# Patient Record
Sex: FEMALE | Race: WHITE | NOT HISPANIC OR LATINO
[De-identification: names, ages, dates, MRNs, and addresses within clinical notes are randomized per-mention and may not be internally consistent; named-entity substitution may affect disease eponyms.]

---

## 2017-01-18 LAB
ALBUMIN SERPL ELPH-MCNC: 4.6 G/DL
ALP BLD-CCNC: 72 U/L
ALT SERPL-CCNC: 11 U/L
ANION GAP SERPL CALC-SCNC: 16 MMOL/L
APPEARANCE: CLEAR
AST SERPL-CCNC: 17 U/L
B BURGDOR AB SER-IMP: NEGATIVE
B BURGDOR IGM PATRN SER IB-IMP: NEGATIVE
B BURGDOR18/20KD IGM SER QL IB: NORMAL
B BURGDOR18KD IGG SER QL IB: NORMAL
B BURGDOR23KD IGG SER QL IB: NORMAL
B BURGDOR23KD IGM SER QL IB: NORMAL
B BURGDOR28KD AB SER QL IB: NORMAL
B BURGDOR28KD IGG SER QL IB: PRESENT
B BURGDOR30KD AB SER QL IB: NORMAL
B BURGDOR30KD IGG SER QL IB: NORMAL
B BURGDOR31KD IGG SER QL IB: NORMAL
B BURGDOR31KD IGM SER QL IB: NORMAL
B BURGDOR39KD IGG SER QL IB: NORMAL
B BURGDOR39KD IGM SER QL IB: NORMAL
B BURGDOR41KD IGG SER QL IB: PRESENT
B BURGDOR41KD IGM SER QL IB: NORMAL
B BURGDOR45KD AB SER QL IB: NORMAL
B BURGDOR45KD IGG SER QL IB: NORMAL
B BURGDOR58KD AB SER QL IB: NORMAL
B BURGDOR58KD IGG SER QL IB: NORMAL
B BURGDOR66KD IGG SER QL IB: NORMAL
B BURGDOR66KD IGM SER QL IB: NORMAL
B BURGDOR93KD IGG SER QL IB: NORMAL
B BURGDOR93KD IGM SER QL IB: NORMAL
BACTERIA: ABNORMAL
BASOPHILS # BLD AUTO: 0.01 K/UL
BASOPHILS NFR BLD AUTO: 0.2 %
BILIRUB SERPL-MCNC: 0.4 MG/DL
BILIRUBIN URINE: NEGATIVE
BLOOD URINE: NEGATIVE
BUN SERPL-MCNC: 15 MG/DL
CALCIUM SERPL-MCNC: 9.2 MG/DL
CHLORIDE SERPL-SCNC: 102 MMOL/L
CHOLEST SERPL-MCNC: 208 MG/DL
CHOLEST/HDLC SERPL: 2.8 RATIO
CO2 SERPL-SCNC: 26 MMOL/L
COLOR: YELLOW
CREAT SERPL-MCNC: 0.85 MG/DL
EOSINOPHIL # BLD AUTO: 0.24 K/UL
EOSINOPHIL NFR BLD AUTO: 4.9 %
GLUCOSE QUALITATIVE U: NORMAL MG/DL
GLUCOSE SERPL-MCNC: 67 MG/DL
HBA1C MFR BLD HPLC: 5.8 %
HCT VFR BLD CALC: 41.9 %
HDLC SERPL-MCNC: 74 MG/DL
HGB BLD-MCNC: 13.2 G/DL
HYALINE CASTS: 0 /LPF
IMM GRANULOCYTES NFR BLD AUTO: 0 %
KETONES URINE: NEGATIVE
LDLC SERPL CALC-MCNC: 119 MG/DL
LEUKOCYTE ESTERASE URINE: NEGATIVE
LYMPHOCYTES # BLD AUTO: 1.28 K/UL
LYMPHOCYTES NFR BLD AUTO: 26.1 %
MAN DIFF?: NORMAL
MCHC RBC-ENTMCNC: 26.7 PG
MCHC RBC-ENTMCNC: 31.5 GM/DL
MCV RBC AUTO: 84.8 FL
MICROSCOPIC-UA: NORMAL
MONOCYTES # BLD AUTO: 0.25 K/UL
MONOCYTES NFR BLD AUTO: 5.1 %
NEUTROPHILS # BLD AUTO: 3.13 K/UL
NEUTROPHILS NFR BLD AUTO: 63.7 %
NITRITE URINE: NEGATIVE
PH URINE: 6.5
PLATELET # BLD AUTO: 189 K/UL
POTASSIUM SERPL-SCNC: 4.2 MMOL/L
PROT SERPL-MCNC: 7.6 G/DL
PROTEIN URINE: NEGATIVE MG/DL
RBC # BLD: 4.94 M/UL
RBC # FLD: 15.3 %
RED BLOOD CELLS URINE: 10 /HPF
SODIUM SERPL-SCNC: 144 MMOL/L
SPECIFIC GRAVITY URINE: 1.02
SQUAMOUS EPITHELIAL CELLS: 1 /HPF
T4 FREE SERPL-MCNC: 1.1 NG/DL
TRIGL SERPL-MCNC: 73 MG/DL
TSH SERPL-ACNC: 1.7 UIU/ML
TSH SERPL-ACNC: 1.71 UIU/ML
UROBILINOGEN URINE: NORMAL MG/DL
WBC # FLD AUTO: 4.91 K/UL
WHITE BLOOD CELLS URINE: 1 /HPF

## 2017-01-22 ENCOUNTER — FORM ENCOUNTER (OUTPATIENT)
Age: 57
End: 2017-01-22

## 2017-01-23 ENCOUNTER — OUTPATIENT (OUTPATIENT)
Dept: OUTPATIENT SERVICES | Facility: HOSPITAL | Age: 57
LOS: 1 days | End: 2017-01-23
Payer: COMMERCIAL

## 2017-01-23 PROCEDURE — 73721 MRI JNT OF LWR EXTRE W/O DYE: CPT | Mod: 26,LT

## 2017-01-23 PROCEDURE — 77081 DXA BONE DENSITY APPENDICULR: CPT

## 2017-01-23 PROCEDURE — 77081 DXA BONE DENSITY APPENDICULR: CPT | Mod: 26

## 2017-01-23 PROCEDURE — 73721 MRI JNT OF LWR EXTRE W/O DYE: CPT

## 2017-01-28 DIAGNOSIS — R31.9 HEMATURIA, UNSPECIFIED: ICD-10-CM

## 2017-01-30 DIAGNOSIS — M23.332 OTHER MENISCUS DERANGEMENTS, OTHER MEDIAL MENISCUS, LEFT KNEE: ICD-10-CM

## 2017-01-30 DIAGNOSIS — Z78.0 ASYMPTOMATIC MENOPAUSAL STATE: ICD-10-CM

## 2017-01-30 DIAGNOSIS — M25.862 OTHER SPECIFIED JOINT DISORDERS, LEFT KNEE: ICD-10-CM

## 2017-02-01 PROBLEM — R31.9 HEMATURIA: Status: ACTIVE | Noted: 2017-02-01

## 2017-02-17 DIAGNOSIS — N39.0 URINARY TRACT INFECTION, SITE NOT SPECIFIED: ICD-10-CM

## 2017-02-20 PROBLEM — N39.0 UTI (LOWER URINARY TRACT INFECTION): Status: ACTIVE | Noted: 2017-02-20

## 2017-02-23 ENCOUNTER — FORM ENCOUNTER (OUTPATIENT)
Age: 57
End: 2017-02-23

## 2017-02-24 ENCOUNTER — OUTPATIENT (OUTPATIENT)
Dept: OUTPATIENT SERVICES | Facility: HOSPITAL | Age: 57
LOS: 1 days | End: 2017-02-24
Payer: COMMERCIAL

## 2017-02-24 PROCEDURE — 76857 US EXAM PELVIC LIMITED: CPT

## 2017-02-24 PROCEDURE — 76700 US EXAM ABDOM COMPLETE: CPT

## 2017-02-24 PROCEDURE — 76857 US EXAM PELVIC LIMITED: CPT | Mod: 26

## 2017-02-24 PROCEDURE — 76536 US EXAM OF HEAD AND NECK: CPT | Mod: 26

## 2017-02-24 PROCEDURE — 76700 US EXAM ABDOM COMPLETE: CPT | Mod: 26

## 2017-02-24 PROCEDURE — 76536 US EXAM OF HEAD AND NECK: CPT

## 2017-03-23 ENCOUNTER — APPOINTMENT (OUTPATIENT)
Dept: ORTHOPEDIC SURGERY | Facility: CLINIC | Age: 57
End: 2017-03-23

## 2017-03-23 VITALS — SYSTOLIC BLOOD PRESSURE: 110 MMHG | DIASTOLIC BLOOD PRESSURE: 68 MMHG

## 2017-06-27 ENCOUNTER — APPOINTMENT (OUTPATIENT)
Dept: ORTHOPEDIC SURGERY | Facility: CLINIC | Age: 57
End: 2017-06-27

## 2017-07-05 ENCOUNTER — APPOINTMENT (OUTPATIENT)
Dept: INTERNAL MEDICINE | Facility: CLINIC | Age: 57
End: 2017-07-05

## 2017-07-05 VITALS
SYSTOLIC BLOOD PRESSURE: 122 MMHG | RESPIRATION RATE: 22 BRPM | DIASTOLIC BLOOD PRESSURE: 72 MMHG | WEIGHT: 128 LBS | OXYGEN SATURATION: 98 % | HEART RATE: 77 BPM

## 2017-07-05 DIAGNOSIS — M25.562 PAIN IN LEFT KNEE: ICD-10-CM

## 2017-08-29 ENCOUNTER — FORM ENCOUNTER (OUTPATIENT)
Age: 57
End: 2017-08-29

## 2017-08-30 ENCOUNTER — OUTPATIENT (OUTPATIENT)
Dept: OUTPATIENT SERVICES | Facility: HOSPITAL | Age: 57
LOS: 1 days | End: 2017-08-30
Payer: COMMERCIAL

## 2017-08-30 PROCEDURE — 77067 SCR MAMMO BI INCL CAD: CPT

## 2017-08-30 PROCEDURE — G0202: CPT | Mod: 26

## 2017-10-05 DIAGNOSIS — Z87.09 PERSONAL HISTORY OF OTHER DISEASES OF THE RESPIRATORY SYSTEM: ICD-10-CM

## 2018-05-03 ENCOUNTER — APPOINTMENT (OUTPATIENT)
Dept: INTERNAL MEDICINE | Facility: CLINIC | Age: 58
End: 2018-05-03
Payer: COMMERCIAL

## 2018-05-03 VITALS
DIASTOLIC BLOOD PRESSURE: 79 MMHG | HEIGHT: 67 IN | SYSTOLIC BLOOD PRESSURE: 131 MMHG | WEIGHT: 134 LBS | HEART RATE: 77 BPM | OXYGEN SATURATION: 99 % | BODY MASS INDEX: 21.03 KG/M2

## 2018-05-03 DIAGNOSIS — M19.90 UNSPECIFIED OSTEOARTHRITIS, UNSPECIFIED SITE: ICD-10-CM

## 2018-05-03 PROCEDURE — 99213 OFFICE O/P EST LOW 20 MIN: CPT | Mod: 25

## 2018-05-03 PROCEDURE — 36415 COLL VENOUS BLD VENIPUNCTURE: CPT

## 2018-05-04 ENCOUNTER — APPOINTMENT (OUTPATIENT)
Dept: INTERNAL MEDICINE | Facility: CLINIC | Age: 58
End: 2018-05-04
Payer: COMMERCIAL

## 2018-05-04 ENCOUNTER — TRANSCRIPTION ENCOUNTER (OUTPATIENT)
Age: 58
End: 2018-05-04

## 2018-05-04 DIAGNOSIS — Z87.442 PERSONAL HISTORY OF URINARY CALCULI: ICD-10-CM

## 2018-05-04 PROCEDURE — 36415 COLL VENOUS BLD VENIPUNCTURE: CPT

## 2018-05-15 LAB
ALBUMIN SERPL ELPH-MCNC: 4.6 G/DL
ALP BLD-CCNC: 76 U/L
ALT SERPL-CCNC: 28 U/L
ANA PAT FLD IF-IMP: ABNORMAL
ANA SER IF-ACNC: ABNORMAL
ANION GAP SERPL CALC-SCNC: 17 MMOL/L
APPEARANCE: CLEAR
AST SERPL-CCNC: 22 U/L
BACTERIA: ABNORMAL
BASOPHILS # BLD AUTO: 0.02 K/UL
BASOPHILS NFR BLD AUTO: 0.4 %
BILIRUB SERPL-MCNC: 0.5 MG/DL
BILIRUBIN URINE: NEGATIVE
BLOOD URINE: NEGATIVE
BUN SERPL-MCNC: 16 MG/DL
CALCIUM SERPL-MCNC: 9.5 MG/DL
CCP AB SER IA-ACNC: <8 UNITS
CHLORIDE SERPL-SCNC: 104 MMOL/L
CHOLEST SERPL-MCNC: 215 MG/DL
CHOLEST/HDLC SERPL: 2.5 RATIO
CO2 SERPL-SCNC: 22 MMOL/L
COLOR: YELLOW
CREAT SERPL-MCNC: 0.83 MG/DL
EOSINOPHIL # BLD AUTO: 0.19 K/UL
EOSINOPHIL NFR BLD AUTO: 3.7 %
GLUCOSE QUALITATIVE U: NEGATIVE MG/DL
GLUCOSE SERPL-MCNC: 90 MG/DL
HBA1C MFR BLD HPLC: 5.7 %
HCT VFR BLD CALC: 42.8 %
HDLC SERPL-MCNC: 86 MG/DL
HGB BLD-MCNC: 13 G/DL
HYALINE CASTS: 0 /LPF
IMM GRANULOCYTES NFR BLD AUTO: 0 %
KETONES URINE: NEGATIVE
LDLC SERPL CALC-MCNC: 120 MG/DL
LEUKOCYTE ESTERASE URINE: NEGATIVE
LYMPHOCYTES # BLD AUTO: 1.26 K/UL
LYMPHOCYTES NFR BLD AUTO: 24.7 %
MAN DIFF?: NORMAL
MCHC RBC-ENTMCNC: 26 PG
MCHC RBC-ENTMCNC: 30.4 GM/DL
MCV RBC AUTO: 85.6 FL
MICROSCOPIC-UA: NORMAL
MONOCYTES # BLD AUTO: 0.41 K/UL
MONOCYTES NFR BLD AUTO: 8 %
NEUTROPHILS # BLD AUTO: 3.22 K/UL
NEUTROPHILS NFR BLD AUTO: 63.2 %
NITRITE URINE: NEGATIVE
PH URINE: 5
PLATELET # BLD AUTO: 219 K/UL
POTASSIUM SERPL-SCNC: 4 MMOL/L
PROT SERPL-MCNC: 7.6 G/DL
PROTEIN URINE: NEGATIVE MG/DL
RBC # BLD: 5 M/UL
RBC # FLD: 15.9 %
RED BLOOD CELLS URINE: 0 /HPF
RF+CCP IGG SER-IMP: NEGATIVE
RHEUMATOID FACT SER QL: 8 IU/ML
SODIUM SERPL-SCNC: 143 MMOL/L
SPECIFIC GRAVITY URINE: 1.03
SQUAMOUS EPITHELIAL CELLS: 1 /HPF
TRIGL SERPL-MCNC: 44 MG/DL
UROBILINOGEN URINE: NEGATIVE MG/DL
WBC # FLD AUTO: 5.1 K/UL
WHITE BLOOD CELLS URINE: 0 /HPF

## 2018-07-30 DIAGNOSIS — J32.9 CHRONIC SINUSITIS, UNSPECIFIED: ICD-10-CM

## 2019-01-07 PROBLEM — J32.9 SINUSITIS: Status: ACTIVE | Noted: 2019-01-07
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
## 2019-05-02 ENCOUNTER — FORM ENCOUNTER (OUTPATIENT)
Age: 59
End: 2019-05-02

## 2019-05-03 ENCOUNTER — OUTPATIENT (OUTPATIENT)
Dept: OUTPATIENT SERVICES | Facility: HOSPITAL | Age: 59
LOS: 1 days | End: 2019-05-03
Payer: COMMERCIAL
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
## 2019-05-03 PROCEDURE — 73610 X-RAY EXAM OF ANKLE: CPT

## 2019-05-03 PROCEDURE — 73610 X-RAY EXAM OF ANKLE: CPT | Mod: 26,RT

## 2019-06-11 ENCOUNTER — APPOINTMENT (OUTPATIENT)
Dept: INTERNAL MEDICINE | Facility: CLINIC | Age: 59
End: 2019-06-11
Payer: COMMERCIAL

## 2019-06-11 VITALS
HEART RATE: 74 BPM | OXYGEN SATURATION: 99 % | HEIGHT: 67 IN | BODY MASS INDEX: 21.19 KG/M2 | SYSTOLIC BLOOD PRESSURE: 122 MMHG | DIASTOLIC BLOOD PRESSURE: 78 MMHG | TEMPERATURE: 98.2 F | WEIGHT: 135 LBS

## 2019-06-11 DIAGNOSIS — R68.89 OTHER GENERAL SYMPTOMS AND SIGNS: ICD-10-CM

## 2019-06-11 DIAGNOSIS — R35.0 FREQUENCY OF MICTURITION: ICD-10-CM

## 2019-06-11 PROCEDURE — 99396 PREV VISIT EST AGE 40-64: CPT | Mod: 25

## 2019-06-11 PROCEDURE — 36415 COLL VENOUS BLD VENIPUNCTURE: CPT

## 2019-06-11 RX ORDER — AMOXICILLIN AND CLAVULANATE POTASSIUM 500; 125 MG/1; MG/1
500-125 TABLET, FILM COATED ORAL TWICE DAILY
Qty: 14 | Refills: 1 | Status: DISCONTINUED | COMMUNITY
Start: 2019-01-07 | End: 2019-06-11

## 2019-06-11 RX ORDER — CIPROFLOXACIN HYDROCHLORIDE 500 MG/1
500 TABLET, FILM COATED ORAL
Qty: 14 | Refills: 1 | Status: DISCONTINUED | COMMUNITY
Start: 2017-05-23 | End: 2019-06-11

## 2019-06-11 RX ORDER — OSELTAMIVIR PHOSPHATE 75 MG/1
75 CAPSULE ORAL
Qty: 10 | Refills: 0 | Status: DISCONTINUED | COMMUNITY
Start: 2018-02-21 | End: 2019-06-11

## 2019-06-11 NOTE — HISTORY OF PRESENT ILLNESS
[FreeTextEntry1] : Forgetful at times; Also balance off at times;  [de-identified] : As above has to write everything done; Some difficulty sleeping at times; Still with some hot flashes;  Wants CT Head;  Nodule in chest area causes her some pain; Gotten larger; Also pain right sided abdominal pain with radiation; Numbness of right toes;

## 2019-06-11 NOTE — PHYSICAL EXAM
[No Acute Distress] : no acute distress [Well Nourished] : well nourished [Well Developed] : well developed [Well-Appearing] : well-appearing [PERRL] : pupils equal round and reactive to light [Normal Sclera/Conjunctiva] : normal sclera/conjunctiva [EOMI] : extraocular movements intact [Normal Outer Ear/Nose] : the outer ears and nose were normal in appearance [Normal Oropharynx] : the oropharynx was normal [No JVD] : no jugular venous distention [Supple] : supple [No Lymphadenopathy] : no lymphadenopathy [No Respiratory Distress] : no respiratory distress  [Thyroid Normal, No Nodules] : the thyroid was normal and there were no nodules present [Clear to Auscultation] : lungs were clear to auscultation bilaterally [No Accessory Muscle Use] : no accessory muscle use [Normal Rate] : normal rate  [Regular Rhythm] : with a regular rhythm [Normal S1, S2] : normal S1 and S2 [No Carotid Bruits] : no carotid bruits [No Murmur] : no murmur heard [No Abdominal Bruit] : a ~M bruit was not heard ~T in the abdomen [Pedal Pulses Present] : the pedal pulses are present [No Varicosities] : no varicosities [No Edema] : there was no peripheral edema [No Palpable Aorta] : no palpable aorta [No Extremity Clubbing/Cyanosis] : no extremity clubbing/cyanosis [Soft] : abdomen soft [Non Tender] : non-tender [No Masses] : no abdominal mass palpated [Non-distended] : non-distended [Normal Posterior Cervical Nodes] : no posterior cervical lymphadenopathy [Normal Bowel Sounds] : normal bowel sounds [No HSM] : no HSM [Normal Anterior Cervical Nodes] : no anterior cervical lymphadenopathy [No Spinal Tenderness] : no spinal tenderness [No CVA Tenderness] : no CVA  tenderness [No Joint Swelling] : no joint swelling [Grossly Normal Strength/Tone] : grossly normal strength/tone [No Rash] : no rash [Normal Gait] : normal gait [Coordination Grossly Intact] : coordination grossly intact [Deep Tendon Reflexes (DTR)] : deep tendon reflexes were 2+ and symmetric [No Focal Deficits] : no focal deficits [Normal Insight/Judgement] : insight and judgment were intact [Normal Affect] : the affect was normal [de-identified] : Area in chest no obvious problem Some pain with deep palpation

## 2019-06-11 NOTE — HEALTH RISK ASSESSMENT
[No falls in past year] : Patient reported no falls in the past year [0] : 1) Little interest or pleasure doing things: Not at all (0) [] : No [[id number]] : 0

## 2019-06-11 NOTE — ASSESSMENT
[FreeTextEntry1] : Patient appears to have a stable examination; Will obtain CT of Head; Also bone density and mammogram; All blood work today; No ultrasound until review of urine;  Will give Prevnar today

## 2019-06-20 LAB
25(OH)D3 SERPL-MCNC: 19.2 NG/ML
ALBUMIN SERPL ELPH-MCNC: 4.7 G/DL
ALP BLD-CCNC: 75 U/L
ALT SERPL-CCNC: 17 U/L
ANION GAP SERPL CALC-SCNC: 11 MMOL/L
APPEARANCE: CLEAR
AST SERPL-CCNC: 16 U/L
BACTERIA: NEGATIVE
BASOPHILS # BLD AUTO: 0.03 K/UL
BASOPHILS NFR BLD AUTO: 0.5 %
BILIRUB SERPL-MCNC: 0.3 MG/DL
BILIRUBIN URINE: NEGATIVE
BLOOD URINE: NEGATIVE
BUN SERPL-MCNC: 12 MG/DL
CALCIUM SERPL-MCNC: 9.2 MG/DL
CHLORIDE SERPL-SCNC: 101 MMOL/L
CHOLEST SERPL-MCNC: 209 MG/DL
CHOLEST/HDLC SERPL: 3 RATIO
CO2 SERPL-SCNC: 28 MMOL/L
COLOR: COLORLESS
CREAT SERPL-MCNC: 0.71 MG/DL
EOSINOPHIL # BLD AUTO: 0.15 K/UL
EOSINOPHIL NFR BLD AUTO: 2.6 %
ESTIMATED AVERAGE GLUCOSE: 117 MG/DL
GLUCOSE QUALITATIVE U: NEGATIVE
GLUCOSE SERPL-MCNC: 95 MG/DL
HBA1C MFR BLD HPLC: 5.7 %
HCT VFR BLD CALC: 45 %
HDLC SERPL-MCNC: 69 MG/DL
HGB BLD-MCNC: 13.4 G/DL
HYALINE CASTS: 0 /LPF
IMM GRANULOCYTES NFR BLD AUTO: 0.2 %
KETONES URINE: NEGATIVE
LDLC SERPL CALC-MCNC: 128 MG/DL
LEUKOCYTE ESTERASE URINE: NEGATIVE
LYMPHOCYTES # BLD AUTO: 1.04 K/UL
LYMPHOCYTES NFR BLD AUTO: 17.9 %
MAN DIFF?: NORMAL
MCHC RBC-ENTMCNC: 26.3 PG
MCHC RBC-ENTMCNC: 29.8 GM/DL
MCV RBC AUTO: 88.2 FL
MICROSCOPIC-UA: NORMAL
MONOCYTES # BLD AUTO: 0.37 K/UL
MONOCYTES NFR BLD AUTO: 6.4 %
NEUTROPHILS # BLD AUTO: 4.22 K/UL
NEUTROPHILS NFR BLD AUTO: 72.4 %
NITRITE URINE: NEGATIVE
PH URINE: 8
PLATELET # BLD AUTO: 234 K/UL
POTASSIUM SERPL-SCNC: 3.9 MMOL/L
PROT SERPL-MCNC: 7.4 G/DL
PROTEIN URINE: NEGATIVE
RBC # BLD: 5.1 M/UL
RBC # FLD: 14.6 %
RED BLOOD CELLS URINE: 1 /HPF
SODIUM SERPL-SCNC: 140 MMOL/L
SPECIFIC GRAVITY URINE: 1.01
SQUAMOUS EPITHELIAL CELLS: 0 /HPF
T4 FREE SERPL-MCNC: 1.2 NG/DL
TRIGL SERPL-MCNC: 59 MG/DL
TSH SERPL-ACNC: 1.98 UIU/ML
UROBILINOGEN URINE: NORMAL
WBC # FLD AUTO: 5.82 K/UL
WHITE BLOOD CELLS URINE: 0 /HPF

## 2019-07-09 ENCOUNTER — CHART COPY (OUTPATIENT)
Age: 59
End: 2019-07-09

## 2019-07-11 ENCOUNTER — APPOINTMENT (OUTPATIENT)
Dept: UROLOGY | Facility: CLINIC | Age: 59
End: 2019-07-11
Payer: COMMERCIAL

## 2019-07-11 VITALS — HEART RATE: 73 BPM | DIASTOLIC BLOOD PRESSURE: 70 MMHG | SYSTOLIC BLOOD PRESSURE: 122 MMHG | TEMPERATURE: 98.2 F

## 2019-07-11 PROCEDURE — 99204 OFFICE O/P NEW MOD 45 MIN: CPT

## 2019-07-11 NOTE — HISTORY OF PRESENT ILLNESS
[FreeTextEntry1] : 59F comes in with coomplaint of urinary incontinence progressively worseining. +AARON 4 PPD incontinence noted with healvy lifting/cough/sneeze. no history vaginal birth. +urgency with urge inconitnence. occasional soaked pads. no gross hematuria. occasional dysuria. feels irritated. no nocturia. no fevers chills. no nausea vomiting. intermittent use of kegel exercises. no family history kidney bladder prostate cancer. no additional compaltins.

## 2019-07-11 NOTE — ASSESSMENT
[FreeTextEntry1] : AARON\par timed voiding\par kegels\par poor antichoinergic candidate given forgetfulness\par PVR 0\par consider sling\par f/u 2 months

## 2019-07-11 NOTE — PHYSICAL EXAM
[General Appearance - Well Developed] : well developed [Normal Appearance] : normal appearance [General Appearance - Well Nourished] : well nourished [] : no respiratory distress [Well Groomed] : well groomed [Heart Rate And Rhythm] : Heart rate and rhythm were normal [Abdomen Soft] : soft [Abdomen Tenderness] : non-tender [Abdomen Hernia] : no hernia was discovered [Costovertebral Angle Tenderness] : no ~M costovertebral angle tenderness [Urethral Meatus] : normal urethra [Urinary Bladder Findings] : the bladder was normal on palpation [FreeTextEntry1] : +AARON. +urethral hypermobility [Normal Station and Gait] : the gait and station were normal for the patient's age [Skin Color & Pigmentation] : normal skin color and pigmentation [No Focal Deficits] : no focal deficits [Oriented To Time, Place, And Person] : oriented to person, place, and time [No Palpable Adenopathy] : no palpable adenopathy

## 2019-07-22 LAB
APPEARANCE: CLEAR
BACTERIA UR CULT: NORMAL
BACTERIA: ABNORMAL
BILIRUBIN URINE: NEGATIVE
BLOOD URINE: NEGATIVE
COLOR: NORMAL
GLUCOSE QUALITATIVE U: NEGATIVE
HYALINE CASTS: 0 /LPF
KETONES URINE: NEGATIVE
LEUKOCYTE ESTERASE URINE: NEGATIVE
MICROSCOPIC-UA: NORMAL
NITRITE URINE: NEGATIVE
PH URINE: 7
PROTEIN URINE: NEGATIVE
RED BLOOD CELLS URINE: 4 /HPF
SPECIFIC GRAVITY URINE: 1.01
SQUAMOUS EPITHELIAL CELLS: 0 /HPF
UROBILINOGEN URINE: NORMAL
WHITE BLOOD CELLS URINE: 0 /HPF

## 2019-07-24 DIAGNOSIS — R31.29 OTHER MICROSCOPIC HEMATURIA: ICD-10-CM

## 2019-07-25 ENCOUNTER — FORM ENCOUNTER (OUTPATIENT)
Age: 59
End: 2019-07-25

## 2019-07-26 ENCOUNTER — APPOINTMENT (OUTPATIENT)
Dept: CT IMAGING | Facility: HOSPITAL | Age: 59
End: 2019-07-26
Payer: COMMERCIAL

## 2019-07-26 ENCOUNTER — OUTPATIENT (OUTPATIENT)
Dept: OUTPATIENT SERVICES | Facility: HOSPITAL | Age: 59
LOS: 1 days | End: 2019-07-26
Payer: COMMERCIAL

## 2019-07-26 PROCEDURE — 70450 CT HEAD/BRAIN W/O DYE: CPT

## 2019-07-26 PROCEDURE — 70450 CT HEAD/BRAIN W/O DYE: CPT | Mod: 26

## 2019-07-26 PROCEDURE — 74178 CT ABD&PLV WO CNTR FLWD CNTR: CPT | Mod: 26

## 2019-07-26 PROCEDURE — 74178 CT ABD&PLV WO CNTR FLWD CNTR: CPT

## 2019-08-02 ENCOUNTER — APPOINTMENT (OUTPATIENT)
Dept: UROLOGY | Facility: CLINIC | Age: 59
End: 2019-08-02
Payer: COMMERCIAL

## 2019-08-02 VITALS
BODY MASS INDEX: 21.19 KG/M2 | HEART RATE: 74 BPM | WEIGHT: 135 LBS | HEIGHT: 67 IN | SYSTOLIC BLOOD PRESSURE: 124 MMHG | OXYGEN SATURATION: 98 % | DIASTOLIC BLOOD PRESSURE: 80 MMHG | TEMPERATURE: 98.3 F

## 2019-08-02 PROCEDURE — 52000 CYSTOURETHROSCOPY: CPT

## 2019-09-22 ENCOUNTER — FORM ENCOUNTER (OUTPATIENT)
Age: 59
End: 2019-09-22

## 2019-09-22 DIAGNOSIS — R92.8 OTHER ABNORMAL AND INCONCLUSIVE FINDINGS ON DIAGNOSTIC IMAGING OF BREAST: ICD-10-CM

## 2019-09-23 ENCOUNTER — OUTPATIENT (OUTPATIENT)
Dept: OUTPATIENT SERVICES | Facility: HOSPITAL | Age: 59
LOS: 1 days | End: 2019-09-23
Payer: COMMERCIAL

## 2019-09-23 ENCOUNTER — APPOINTMENT (OUTPATIENT)
Dept: MAMMOGRAPHY | Facility: HOSPITAL | Age: 59
End: 2019-09-23
Payer: COMMERCIAL

## 2019-09-23 ENCOUNTER — APPOINTMENT (OUTPATIENT)
Dept: RADIOLOGY | Facility: HOSPITAL | Age: 59
End: 2019-09-23
Payer: COMMERCIAL

## 2019-09-23 PROCEDURE — 77063 BREAST TOMOSYNTHESIS BI: CPT | Mod: 26

## 2019-09-23 PROCEDURE — 77085 DXA BONE DENSITY AXL VRT FX: CPT | Mod: 26

## 2019-09-23 PROCEDURE — 77067 SCR MAMMO BI INCL CAD: CPT | Mod: 26

## 2019-09-23 PROCEDURE — 77085 DXA BONE DENSITY AXL VRT FX: CPT

## 2019-09-23 PROCEDURE — 77063 BREAST TOMOSYNTHESIS BI: CPT

## 2019-09-23 PROCEDURE — 77067 SCR MAMMO BI INCL CAD: CPT

## 2019-09-30 PROBLEM — R92.8 ABNORMAL MAMMOGRAM: Status: ACTIVE | Noted: 2019-09-30

## 2019-10-13 ENCOUNTER — FORM ENCOUNTER (OUTPATIENT)
Age: 59
End: 2019-10-13

## 2019-10-14 ENCOUNTER — APPOINTMENT (OUTPATIENT)
Dept: MAMMOGRAPHY | Facility: HOSPITAL | Age: 59
End: 2019-10-14
Payer: COMMERCIAL

## 2019-10-14 ENCOUNTER — APPOINTMENT (OUTPATIENT)
Dept: ULTRASOUND IMAGING | Facility: HOSPITAL | Age: 59
End: 2019-10-14
Payer: COMMERCIAL

## 2019-10-14 ENCOUNTER — OUTPATIENT (OUTPATIENT)
Dept: OUTPATIENT SERVICES | Facility: HOSPITAL | Age: 59
LOS: 1 days | End: 2019-10-14
Payer: COMMERCIAL

## 2019-10-14 PROCEDURE — G0279: CPT | Mod: 26

## 2019-10-14 PROCEDURE — 77066 DX MAMMO INCL CAD BI: CPT | Mod: 26

## 2019-10-14 PROCEDURE — G0279: CPT

## 2019-10-14 PROCEDURE — 76641 ULTRASOUND BREAST COMPLETE: CPT | Mod: 26,50

## 2019-10-14 PROCEDURE — 76641 ULTRASOUND BREAST COMPLETE: CPT

## 2019-10-14 PROCEDURE — 77066 DX MAMMO INCL CAD BI: CPT

## 2019-10-22 ENCOUNTER — RESULT REVIEW (OUTPATIENT)
Age: 59
End: 2019-10-22

## 2019-10-25 ENCOUNTER — APPOINTMENT (OUTPATIENT)
Dept: ULTRASOUND IMAGING | Facility: HOSPITAL | Age: 59
End: 2019-10-25
Payer: COMMERCIAL

## 2019-10-25 ENCOUNTER — OUTPATIENT (OUTPATIENT)
Dept: OUTPATIENT SERVICES | Facility: HOSPITAL | Age: 59
LOS: 1 days | End: 2019-10-25
Payer: COMMERCIAL

## 2019-10-25 PROCEDURE — 76856 US EXAM PELVIC COMPLETE: CPT

## 2019-10-25 PROCEDURE — 76856 US EXAM PELVIC COMPLETE: CPT | Mod: 26

## 2019-10-25 PROCEDURE — 76830 TRANSVAGINAL US NON-OB: CPT | Mod: 26

## 2019-10-25 PROCEDURE — 76830 TRANSVAGINAL US NON-OB: CPT

## 2020-01-07 ENCOUNTER — OUTPATIENT (OUTPATIENT)
Dept: OUTPATIENT SERVICES | Facility: HOSPITAL | Age: 60
LOS: 1 days | End: 2020-01-07
Payer: COMMERCIAL

## 2020-01-07 ENCOUNTER — APPOINTMENT (OUTPATIENT)
Dept: ULTRASOUND IMAGING | Facility: HOSPITAL | Age: 60
End: 2020-01-07

## 2020-01-07 PROCEDURE — 58340 CATHETER FOR HYSTEROGRAPHY: CPT | Mod: 53

## 2020-01-07 PROCEDURE — 76831 ECHO EXAM UTERUS: CPT | Mod: 26,53

## 2020-01-07 PROCEDURE — 76831 ECHO EXAM UTERUS: CPT

## 2020-01-07 PROCEDURE — 58340 CATHETER FOR HYSTEROGRAPHY: CPT

## 2020-01-14 ENCOUNTER — APPOINTMENT (OUTPATIENT)
Dept: INTERNAL MEDICINE | Facility: CLINIC | Age: 60
End: 2020-01-14
Payer: COMMERCIAL

## 2020-01-14 VITALS
BODY MASS INDEX: 21.19 KG/M2 | TEMPERATURE: 97.8 F | OXYGEN SATURATION: 99 % | HEART RATE: 84 BPM | DIASTOLIC BLOOD PRESSURE: 78 MMHG | HEIGHT: 67 IN | WEIGHT: 135 LBS | SYSTOLIC BLOOD PRESSURE: 152 MMHG

## 2020-01-14 PROCEDURE — 99213 OFFICE O/P EST LOW 20 MIN: CPT | Mod: 25

## 2020-01-14 PROCEDURE — 93000 ELECTROCARDIOGRAM COMPLETE: CPT

## 2020-01-14 NOTE — HISTORY OF PRESENT ILLNESS
[Aortic Stenosis] : no aortic stenosis [Coronary Artery Disease] : no coronary artery disease [Atrial Fibrillation] : no atrial fibrillation [Implantable Device/Pacemaker] : no implantable device/pacemaker [Recent Myocardial Infarction] : no recent myocardial infarction [FreeTextEntry1] : Hysteroscopy  [FreeTextEntry2] : January 24 2020 [FreeTextEntry3] : Dr. Sykes [FreeTextEntry4] : As above found to have polyps on uterus; For above procedure

## 2020-01-14 NOTE — PHYSICAL EXAM
[Well Nourished] : well nourished [No Acute Distress] : no acute distress [Well Developed] : well developed [Normal Sclera/Conjunctiva] : normal sclera/conjunctiva [Well-Appearing] : well-appearing [EOMI] : extraocular movements intact [PERRL] : pupils equal round and reactive to light [Normal Outer Ear/Nose] : the outer ears and nose were normal in appearance [No JVD] : no jugular venous distention [Normal Oropharynx] : the oropharynx was normal [Supple] : supple [No Lymphadenopathy] : no lymphadenopathy [No Respiratory Distress] : no respiratory distress  [Thyroid Normal, No Nodules] : the thyroid was normal and there were no nodules present [No Accessory Muscle Use] : no accessory muscle use [Clear to Auscultation] : lungs were clear to auscultation bilaterally [Normal Rate] : normal rate  [Regular Rhythm] : with a regular rhythm [Normal S1, S2] : normal S1 and S2 [No Murmur] : no murmur heard [No Carotid Bruits] : no carotid bruits [No Abdominal Bruit] : a ~M bruit was not heard ~T in the abdomen [No Varicosities] : no varicosities [Pedal Pulses Present] : the pedal pulses are present [No Edema] : there was no peripheral edema [No Palpable Aorta] : no palpable aorta [No Extremity Clubbing/Cyanosis] : no extremity clubbing/cyanosis [Soft] : abdomen soft [Non Tender] : non-tender [Non-distended] : non-distended [No Masses] : no abdominal mass palpated [No HSM] : no HSM [Normal Bowel Sounds] : normal bowel sounds [Normal Posterior Cervical Nodes] : no posterior cervical lymphadenopathy [Normal Anterior Cervical Nodes] : no anterior cervical lymphadenopathy [No Spinal Tenderness] : no spinal tenderness [No CVA Tenderness] : no CVA  tenderness [No Joint Swelling] : no joint swelling [Grossly Normal Strength/Tone] : grossly normal strength/tone [No Rash] : no rash [Coordination Grossly Intact] : coordination grossly intact [No Focal Deficits] : no focal deficits [Normal Gait] : normal gait [Deep Tendon Reflexes (DTR)] : deep tendon reflexes were 2+ and symmetric [Normal Affect] : the affect was normal [Normal Insight/Judgement] : insight and judgment were intact

## 2020-01-24 ENCOUNTER — OUTPATIENT (OUTPATIENT)
Dept: OUTPATIENT SERVICES | Facility: HOSPITAL | Age: 60
LOS: 1 days | Discharge: HOME | End: 2020-01-24
Payer: COMMERCIAL

## 2020-01-24 ENCOUNTER — RESULT REVIEW (OUTPATIENT)
Age: 60
End: 2020-01-24

## 2020-01-24 PROCEDURE — 88305 TISSUE EXAM BY PATHOLOGIST: CPT | Mod: 26

## 2020-01-27 LAB — SURGICAL PATHOLOGY STUDY: SIGNIFICANT CHANGE UP

## 2020-02-07 ENCOUNTER — APPOINTMENT (OUTPATIENT)
Dept: UROLOGY | Facility: CLINIC | Age: 60
End: 2020-02-07

## 2020-04-25 ENCOUNTER — MESSAGE (OUTPATIENT)
Age: 60
End: 2020-04-25

## 2020-05-04 ENCOUNTER — TRANSCRIPTION ENCOUNTER (OUTPATIENT)
Age: 60
End: 2020-05-04

## 2020-05-22 ENCOUNTER — APPOINTMENT (OUTPATIENT)
Dept: DISASTER EMERGENCY | Facility: CLINIC | Age: 60
End: 2020-05-22

## 2020-05-22 LAB
SARS-COV-2 IGG SERPL IA-ACNC: 0.1 INDEX
SARS-COV-2 IGG SERPL QL IA: NEGATIVE

## 2020-10-12 ENCOUNTER — RESULT REVIEW (OUTPATIENT)
Age: 60
End: 2020-10-12

## 2020-10-27 ENCOUNTER — OUTPATIENT (OUTPATIENT)
Dept: OUTPATIENT SERVICES | Facility: HOSPITAL | Age: 60
LOS: 1 days | End: 2020-10-27
Payer: COMMERCIAL

## 2020-10-27 PROCEDURE — 71046 X-RAY EXAM CHEST 2 VIEWS: CPT

## 2020-10-27 PROCEDURE — 71046 X-RAY EXAM CHEST 2 VIEWS: CPT | Mod: 26

## 2020-12-16 PROBLEM — Z87.09 HISTORY OF INFLUENZA: Status: RESOLVED | Noted: 2018-02-21 | Resolved: 2020-12-16

## 2020-12-23 ENCOUNTER — APPOINTMENT (OUTPATIENT)
Dept: INTERNAL MEDICINE | Facility: CLINIC | Age: 60
End: 2020-12-23
Payer: COMMERCIAL

## 2020-12-23 VITALS
DIASTOLIC BLOOD PRESSURE: 82 MMHG | HEART RATE: 79 BPM | TEMPERATURE: 97.9 F | HEIGHT: 67 IN | BODY MASS INDEX: 20.72 KG/M2 | OXYGEN SATURATION: 99 % | WEIGHT: 132 LBS | SYSTOLIC BLOOD PRESSURE: 135 MMHG

## 2020-12-23 PROCEDURE — 99396 PREV VISIT EST AGE 40-64: CPT | Mod: 25

## 2020-12-23 PROCEDURE — 99072 ADDL SUPL MATRL&STAF TM PHE: CPT

## 2020-12-23 PROCEDURE — 36415 COLL VENOUS BLD VENIPUNCTURE: CPT

## 2020-12-23 NOTE — HEALTH RISK ASSESSMENT
[] : Yes [No] : No [No falls in past year] : Patient reported no falls in the past year [0] : 2) Feeling down, depressed, or hopeless: Not at all (0) [Patient reported mammogram was normal] : Patient reported mammogram was normal [Patient reported PAP Smear was normal] : Patient reported PAP Smear was normal [Patient reported bone density results were abnormal] : Patient reported bone density results were abnormal [Patient reported colonoscopy was normal] : Patient reported colonoscopy was normal [HIV test declined] : HIV test declined [Hepatitis C test declined] : Hepatitis C test declined [[id number]] : 0 [MammogramDate] : 02/2021 [PapSmearDate] : 11/2020 [BoneDensityDate] : 11/2019 [BoneDensityComments] : osteoporosis [ColonoscopyDate] : 01/2019

## 2020-12-23 NOTE — ASSESSMENT
[FreeTextEntry1] : Appears to have a stable examination; \par All labs; \par Will refer to Dr. Karrie clarke

## 2020-12-30 ENCOUNTER — APPOINTMENT (OUTPATIENT)
Dept: ENDOCRINOLOGY | Facility: CLINIC | Age: 60
End: 2020-12-30
Payer: COMMERCIAL

## 2020-12-30 VITALS
TEMPERATURE: 97.3 F | SYSTOLIC BLOOD PRESSURE: 126 MMHG | WEIGHT: 132 LBS | HEART RATE: 74 BPM | DIASTOLIC BLOOD PRESSURE: 78 MMHG | BODY MASS INDEX: 21.21 KG/M2 | OXYGEN SATURATION: 99 % | HEIGHT: 66.14 IN

## 2020-12-30 DIAGNOSIS — G60.8 OTHER HEREDITARY AND IDIOPATHIC NEUROPATHIES: ICD-10-CM

## 2020-12-30 LAB
25(OH)D3 SERPL-MCNC: 38.5 NG/ML
ALBUMIN SERPL ELPH-MCNC: 4.7 G/DL
ALP BLD-CCNC: 74 U/L
ALT SERPL-CCNC: 11 U/L
ANA SER IF-ACNC: NEGATIVE
ANION GAP SERPL CALC-SCNC: 15 MMOL/L
AST SERPL-CCNC: 15 U/L
BASOPHILS # BLD AUTO: 0.03 K/UL
BASOPHILS NFR BLD AUTO: 0.5 %
BILIRUB SERPL-MCNC: 0.3 MG/DL
BUN SERPL-MCNC: 12 MG/DL
CALCIUM SERPL-MCNC: 9.5 MG/DL
CCP AB SER IA-ACNC: <8 UNITS
CHLORIDE SERPL-SCNC: 103 MMOL/L
CHOLEST SERPL-MCNC: 212 MG/DL
CO2 SERPL-SCNC: 22 MMOL/L
CREAT SERPL-MCNC: 0.93 MG/DL
EOSINOPHIL # BLD AUTO: 0.21 K/UL
EOSINOPHIL NFR BLD AUTO: 3.7 %
ESTIMATED AVERAGE GLUCOSE: 114 MG/DL
GLUCOSE SERPL-MCNC: 119 MG/DL
HBA1C MFR BLD HPLC: 5.6 %
HCT VFR BLD CALC: 41.8 %
HDLC SERPL-MCNC: 70 MG/DL
HGB BLD-MCNC: 13 G/DL
IMM GRANULOCYTES NFR BLD AUTO: 0.2 %
LDLC SERPL CALC-MCNC: 130 MG/DL
LYMPHOCYTES # BLD AUTO: 1.25 K/UL
LYMPHOCYTES NFR BLD AUTO: 22 %
MAN DIFF?: NORMAL
MCHC RBC-ENTMCNC: 26 PG
MCHC RBC-ENTMCNC: 31.1 GM/DL
MCV RBC AUTO: 83.6 FL
MONOCYTES # BLD AUTO: 0.31 K/UL
MONOCYTES NFR BLD AUTO: 5.5 %
NEUTROPHILS # BLD AUTO: 3.87 K/UL
NEUTROPHILS NFR BLD AUTO: 68.1 %
NONHDLC SERPL-MCNC: 142 MG/DL
PLATELET # BLD AUTO: 204 K/UL
POTASSIUM SERPL-SCNC: 3.8 MMOL/L
PROT SERPL-MCNC: 7.3 G/DL
RBC # BLD: 5 M/UL
RBC # FLD: 14.5 %
RF+CCP IGG SER-IMP: NEGATIVE
SODIUM SERPL-SCNC: 141 MMOL/L
T4 FREE SERPL-MCNC: 1.2 NG/DL
TRIGL SERPL-MCNC: 57 MG/DL
TSH SERPL-ACNC: 1.9 UIU/ML
WBC # FLD AUTO: 5.68 K/UL

## 2020-12-30 PROCEDURE — 99072 ADDL SUPL MATRL&STAF TM PHE: CPT

## 2020-12-30 PROCEDURE — 99214 OFFICE O/P EST MOD 30 MIN: CPT

## 2020-12-30 RX ORDER — CIPROFLOXACIN HYDROCHLORIDE 500 MG/1
500 TABLET, FILM COATED ORAL TWICE DAILY
Qty: 10 | Refills: 1 | Status: DISCONTINUED | COMMUNITY
Start: 2019-06-29 | End: 2020-12-30

## 2020-12-30 RX ORDER — NITROFURANTOIN (MONOHYDRATE/MACROCRYSTALS) 25; 75 MG/1; MG/1
100 CAPSULE ORAL
Qty: 10 | Refills: 5 | Status: DISCONTINUED | COMMUNITY
Start: 2019-12-30 | End: 2020-12-30

## 2020-12-30 RX ORDER — SULFAMETHOXAZOLE AND TRIMETHOPRIM 800; 160 MG/1; MG/1
800-160 TABLET ORAL
Qty: 10 | Refills: 1 | Status: DISCONTINUED | COMMUNITY
Start: 2017-02-20 | End: 2020-12-30

## 2021-01-01 LAB
APPEARANCE: CLEAR
BACTERIA: NEGATIVE
BILIRUBIN URINE: NEGATIVE
BLOOD URINE: NEGATIVE
COLOR: NORMAL
GLUCOSE QUALITATIVE U: NEGATIVE
HYALINE CASTS: 0 /LPF
KETONES URINE: NEGATIVE
LEUKOCYTE ESTERASE URINE: NEGATIVE
MICROSCOPIC-UA: NORMAL
NITRITE URINE: NEGATIVE
PH URINE: 6
PROTEIN URINE: NEGATIVE
RED BLOOD CELLS URINE: 2 /HPF
SPECIFIC GRAVITY URINE: 1.01
SQUAMOUS EPITHELIAL CELLS: 0 /HPF
UROBILINOGEN URINE: NORMAL
WHITE BLOOD CELLS URINE: 0 /HPF

## 2021-01-04 ENCOUNTER — OUTPATIENT (OUTPATIENT)
Dept: OUTPATIENT SERVICES | Facility: HOSPITAL | Age: 61
LOS: 1 days | End: 2021-01-04
Payer: COMMERCIAL

## 2021-01-04 ENCOUNTER — APPOINTMENT (OUTPATIENT)
Dept: ULTRASOUND IMAGING | Facility: HOSPITAL | Age: 61
End: 2021-01-04

## 2021-01-04 PROCEDURE — 76536 US EXAM OF HEAD AND NECK: CPT | Mod: 26

## 2021-01-04 PROCEDURE — 76536 US EXAM OF HEAD AND NECK: CPT

## 2021-01-04 NOTE — ASSESSMENT
[FreeTextEntry1] : 1) Non-toxic multinodular goiter:  Goiter appears to be stable by palpation, and she has no compressive symptoms.  The last ultrasound in 2017 showed a borderline enlarged gland with heterogeneous echotexture.  There were three nodules in the R lobe (9 mm, 7 mm and 3 mm, all at the midpole), and two in the left lobe (6 mm at the upper pole, 10 mm at the mid-pole).  I believe that it was the left midpole nodule which was previously negative on biopsy, but none of the nodules show suspicious sonographic characteristics, and by current criteria none would warrant FNA at this point.\par Her TFTs are normal.  The heterogeneous echotexture of the gland would suggest underlying Hashimoto's disease, but her antibodies were previously negative.\par --Will obtain an updated ultrasound.\par \par 2) Osteoporosis:  The T-score at the spine is in the mildly osteoporotic range, and I explained to her that she is therefore a candidate for pharmacologic therapy.  I also briefly discussed the various options with her, though she will be seeing Dr. Yoon for this problem.  (Oral bisphosphonates might be ruled out by her chronic GERD symptoms and reliance on PPI therapy).  \par Her vitamin D level is normal.  Her diet includes a daily intake of dairy products, though I suggested that she add 500 mg of calcium supplementation a day.  I also encouraged her to increase her upper body resistance exercise.\par \par She is to call back after the ultrasound is done to discuss the results.\par \par See for follow-up in 1-2 years, depending on the results of the ultrasound [FreeTextEntry2] : calcium supplementation

## 2021-01-04 NOTE — ADDENDUM
[FreeTextEntry1] : Spoke with the pt on 1/4/21 regarding results of thyroid ultrasound performed earlier today:\par \par --Normal-sized gland\par --Heterogeneous echotexture  (still suspect underlying Hashimoto's)\par --Three nodules:\par 7 mm in largest dimension at the R mid-pole\par 8 mm in largest dimension at the upper pole of the left lobe\par 9 mm in largest dimension at the L mid-pole\par None of the nodules showed suspicious sonographic characteristics or warrant biopsy\par Defer next U/S for two years

## 2021-01-04 NOTE — DATA REVIEWED
[FreeTextEntry1] : [facility] [employer]  (12/23/20)\par \par Random glucose 119,  A1c 5.6%\par CMP WNL\par TSH level normal at 1.90 uU/ml  (0.27-4.2)\par , HDL 70, TG 57\par 25-D level in target range at 38.3 ng/ml

## 2021-01-04 NOTE — PHYSICAL EXAM
[Alert] : alert [Well Nourished] : well nourished [Healthy Appearance] : healthy appearance [Normal Sclera/Conjunctiva] : normal sclera/conjunctiva [EOMI] : extra ocular movement intact [PERRL] : pupils equal, round and reactive to light [No Proptosis] : no proptosis [No Lid Lag] : no lid lag [Normal Outer Ear/Nose] : the ears and nose were normal in appearance [Normal Hearing] : hearing was normal [No Neck Mass] : no neck mass was observed [No LAD] : no lymphadenopathy [Clear to Auscultation] : lungs were clear to auscultation bilaterally [No Murmurs] : no murmurs [Normal Rate] : heart rate was normal [Regular Rhythm] : with a regular rhythm [Carotids Normal] : carotid pulses were normal with no bruits [No Edema] : no peripheral edema [Not Tender] : non-tender [Soft] : abdomen soft [No HSM] : no hepato-splenomegaly [Normal Supraclavicular Nodes] : no supraclavicular lymphadenopathy [Normal Anterior Cervical Nodes] : no anterior cervical lymphadenopathy [Normal Gait] : normal gait [No Involuntary Movements] : no involuntary movements were seen [No Joint Swelling] : no joint swelling seen [Normal Strength/Tone] : muscle strength and tone were normal [No Tremors] : no tremors [Normal Affect] : the affect was normal [Normal Mood] : the mood was normal [Normal Sensation on Monofilament Testing] : normal sensation on monofilament testing of lower extremities [Kyphosis] : no kyphosis present [Acanthosis Nigricans] : no acanthosis nigricans [Hirsutism] : no hirsutism [de-identified] : No corneal arcus or xanthelasma [de-identified] : Thyroid borderline enlarged, firm and lobular in consistency [de-identified] : Vibratory sensation mildly decreased over the toes

## 2021-01-04 NOTE — HISTORY OF PRESENT ILLNESS
[FreeTextEntry1] : 60-year-old woman who is followed for a non-toxic multinodular goiter.  Most of the nodules are below 1 cm in size, and the single dominant nodule was negative on biopsy many years ago.  \par \par She now returns after a hiatus of close to 4 years.  She has not noted any neck swelling, hoarseness or dysphagia from her goiter..\par Interim history since her last visit in 2017:\par --Surgery to repair a torn rotator cuff and biceps tendon in her R shoulder\par --Hysteroscopy and biopsy of two cervical polyps (which were benign)\par --Diagnosed with torn meniscus in her L knee--did PT, no surgery\par --Recent DEXA showed mild osteoporosis at the spine (T-score -2.8) and osteopenia at the hip.\par Medications reviewed:  Currently limited to daily pantoprazole, vitamin D (she is unsure about the dose), and Lumigan (for increased IOP).\par Does not take any calcium supplementation. \par Dietary calcium at this point consists of yogurt most days, and she also puts milk in her cereal in the morning.\par Exercise--lifts 5 lb weights.\par Menopause was at age 55.  Had significant hot flashes afterward, now infrequent.

## 2021-01-04 NOTE — REVIEW OF SYSTEMS
[Dry Eyes] : dryness [Fatigue] : no fatigue [Decreased Appetite] : appetite not decreased [Recent Weight Gain (___ Lbs)] : no recent weight gain [Recent Weight Loss (___ Lbs)] : no recent weight loss [Fever] : no fever [Chills] : no chills [Blurred Vision] : no blurred vision [Eyes Itch] : no itch [Dysphagia] : no dysphagia [Hearing Loss] : no hearing loss  [Chest Pain] : no chest pain [Palpitations] : no palpitations [Lower Ext Edema] : no lower extremity edema [Shortness Of Breath] : no shortness of breath [Cough] : no cough [Wheezing] : no wheezing [SOB on Exertion] : no shortness of breath on exertion [Nausea] : no nausea [Constipation] : no constipation [Diarrhea] : no diarrhea [Polyuria] : no polyuria [Dysuria] : no dysuria [Nocturia] : no nocturia [Joint Pain] : no joint pain [Muscle Weakness] : no muscle weakness [Myalgia] : no myalgia  [Joint Stiffness] : no joint stiffness [Dry Skin] : no dry skin [Hair Loss] : no hair loss [Ulcer] : no ulcer [Headaches] : no headaches [Difficulty Walking] : no difficulty walking [Tremors] : no tremors [Depression] : no depression [Anxiety] : no anxiety [Stress] : no stress [Polydipsia] : no polydipsia [Easy Bleeding] : no ~M tendency for easy bleeding [Easy Bruising] : no tendency for easy bruising [FreeTextEntry7] : Reflux symptoms require daily PPI for control--famotidine is not effective [FreeTextEntry9] : No pain in her left knee from the torn meniscus [de-identified] : Still with mild subjective numbness in her toes, mostly at night

## 2021-01-05 ENCOUNTER — OUTPATIENT (OUTPATIENT)
Dept: OUTPATIENT SERVICES | Facility: HOSPITAL | Age: 61
LOS: 1 days | End: 2021-01-05
Payer: COMMERCIAL

## 2021-01-05 DIAGNOSIS — D86.9 SARCOIDOSIS, UNSPECIFIED: ICD-10-CM

## 2021-01-05 PROCEDURE — 94729 DIFFUSING CAPACITY: CPT | Mod: 26

## 2021-01-05 PROCEDURE — 94729 DIFFUSING CAPACITY: CPT

## 2021-01-05 PROCEDURE — 94060 EVALUATION OF WHEEZING: CPT

## 2021-01-05 PROCEDURE — 94726 PLETHYSMOGRAPHY LUNG VOLUMES: CPT

## 2021-01-05 PROCEDURE — 94760 N-INVAS EAR/PLS OXIMETRY 1: CPT

## 2021-01-05 PROCEDURE — 94726 PLETHYSMOGRAPHY LUNG VOLUMES: CPT | Mod: 26

## 2021-01-05 PROCEDURE — 94010 BREATHING CAPACITY TEST: CPT | Mod: 26

## 2021-02-22 ENCOUNTER — APPOINTMENT (OUTPATIENT)
Dept: MAMMOGRAPHY | Facility: HOSPITAL | Age: 61
End: 2021-02-22
Payer: COMMERCIAL

## 2021-02-22 ENCOUNTER — RESULT REVIEW (OUTPATIENT)
Age: 61
End: 2021-02-22

## 2021-02-22 ENCOUNTER — OUTPATIENT (OUTPATIENT)
Dept: OUTPATIENT SERVICES | Facility: HOSPITAL | Age: 61
LOS: 1 days | End: 2021-02-22
Payer: COMMERCIAL

## 2021-02-22 ENCOUNTER — APPOINTMENT (OUTPATIENT)
Dept: ULTRASOUND IMAGING | Facility: HOSPITAL | Age: 61
End: 2021-02-22
Payer: COMMERCIAL

## 2021-02-22 PROCEDURE — 76641 ULTRASOUND BREAST COMPLETE: CPT | Mod: 26,50

## 2021-02-22 PROCEDURE — 77063 BREAST TOMOSYNTHESIS BI: CPT | Mod: 26

## 2021-02-22 PROCEDURE — 77067 SCR MAMMO BI INCL CAD: CPT | Mod: 26

## 2021-02-22 PROCEDURE — 77063 BREAST TOMOSYNTHESIS BI: CPT

## 2021-02-22 PROCEDURE — 76641 ULTRASOUND BREAST COMPLETE: CPT

## 2021-02-22 PROCEDURE — 77067 SCR MAMMO BI INCL CAD: CPT

## 2021-03-02 ENCOUNTER — APPOINTMENT (OUTPATIENT)
Dept: ENDOCRINOLOGY | Facility: CLINIC | Age: 61
End: 2021-03-02
Payer: COMMERCIAL

## 2021-03-02 VITALS
RESPIRATION RATE: 18 BRPM | HEIGHT: 66 IN | DIASTOLIC BLOOD PRESSURE: 79 MMHG | HEART RATE: 73 BPM | WEIGHT: 134 LBS | BODY MASS INDEX: 21.53 KG/M2 | SYSTOLIC BLOOD PRESSURE: 126 MMHG

## 2021-03-02 PROCEDURE — 99072 ADDL SUPL MATRL&STAF TM PHE: CPT

## 2021-03-02 PROCEDURE — 99214 OFFICE O/P EST MOD 30 MIN: CPT

## 2021-03-02 PROCEDURE — 99204 OFFICE O/P NEW MOD 45 MIN: CPT

## 2021-04-14 ENCOUNTER — OUTPATIENT (OUTPATIENT)
Dept: OUTPATIENT SERVICES | Facility: HOSPITAL | Age: 61
LOS: 1 days | End: 2021-04-14
Payer: COMMERCIAL

## 2021-04-14 ENCOUNTER — APPOINTMENT (OUTPATIENT)
Dept: RADIOLOGY | Facility: HOSPITAL | Age: 61
End: 2021-04-14
Payer: COMMERCIAL

## 2021-04-14 ENCOUNTER — RESULT REVIEW (OUTPATIENT)
Age: 61
End: 2021-04-14

## 2021-04-14 PROCEDURE — 77085 DXA BONE DENSITY AXL VRT FX: CPT

## 2021-04-14 PROCEDURE — 77085 DXA BONE DENSITY AXL VRT FX: CPT | Mod: 26

## 2021-07-12 ENCOUNTER — NON-APPOINTMENT (OUTPATIENT)
Age: 61
End: 2021-07-12

## 2021-08-02 ENCOUNTER — APPOINTMENT (OUTPATIENT)
Dept: INTERNAL MEDICINE | Facility: CLINIC | Age: 61
End: 2021-08-02
Payer: COMMERCIAL

## 2021-08-02 VITALS
BODY MASS INDEX: 21.86 KG/M2 | DIASTOLIC BLOOD PRESSURE: 80 MMHG | HEIGHT: 66 IN | SYSTOLIC BLOOD PRESSURE: 144 MMHG | RESPIRATION RATE: 16 BRPM | WEIGHT: 136 LBS | HEART RATE: 68 BPM | TEMPERATURE: 98.1 F | OXYGEN SATURATION: 99 %

## 2021-08-02 DIAGNOSIS — R10.9 UNSPECIFIED ABDOMINAL PAIN: ICD-10-CM

## 2021-08-02 PROCEDURE — 99213 OFFICE O/P EST LOW 20 MIN: CPT

## 2021-08-02 NOTE — ASSESSMENT
[FreeTextEntry1] : RUQ pain of questionable etiology\par Will get US of Abdomen\par Also all labs\par \par Further plans after review of studies\par

## 2021-08-02 NOTE — HISTORY OF PRESENT ILLNESS
[FreeTextEntry1] : Chief complaint RUQ pain\par This pain has present for a while\par No Nausea or vomiting [de-identified] : No recent imaging\par Also no recent labs

## 2021-08-04 ENCOUNTER — OUTPATIENT (OUTPATIENT)
Dept: OUTPATIENT SERVICES | Facility: HOSPITAL | Age: 61
LOS: 1 days | End: 2021-08-04
Payer: COMMERCIAL

## 2021-08-04 ENCOUNTER — APPOINTMENT (OUTPATIENT)
Dept: ULTRASOUND IMAGING | Facility: HOSPITAL | Age: 61
End: 2021-08-04
Payer: COMMERCIAL

## 2021-08-04 PROCEDURE — 76700 US EXAM ABDOM COMPLETE: CPT | Mod: 26

## 2021-08-04 PROCEDURE — 76700 US EXAM ABDOM COMPLETE: CPT

## 2021-08-09 LAB
25(OH)D3 SERPL-MCNC: 37.3 NG/ML
ALBUMIN SERPL ELPH-MCNC: 4.6 G/DL
ALP BLD-CCNC: 59 U/L
ALT SERPL-CCNC: 16 U/L
ANION GAP SERPL CALC-SCNC: 12 MMOL/L
APPEARANCE: CLEAR
AST SERPL-CCNC: 15 U/L
BACTERIA: ABNORMAL
BASOPHILS # BLD AUTO: 0.03 K/UL
BASOPHILS NFR BLD AUTO: 0.6 %
BILIRUB SERPL-MCNC: 0.4 MG/DL
BILIRUBIN URINE: NEGATIVE
BLOOD URINE: NEGATIVE
BUN SERPL-MCNC: 10 MG/DL
CALCIUM SERPL-MCNC: 9.7 MG/DL
CHLORIDE SERPL-SCNC: 103 MMOL/L
CHOLEST SERPL-MCNC: 206 MG/DL
CO2 SERPL-SCNC: 25 MMOL/L
COLOR: YELLOW
CREAT SERPL-MCNC: 0.82 MG/DL
EOSINOPHIL # BLD AUTO: 0.21 K/UL
EOSINOPHIL NFR BLD AUTO: 4.4 %
ESTIMATED AVERAGE GLUCOSE: 120 MG/DL
GLUCOSE QUALITATIVE U: NEGATIVE
GLUCOSE SERPL-MCNC: 62 MG/DL
HBA1C MFR BLD HPLC: 5.8 %
HCT VFR BLD CALC: 45.4 %
HDLC SERPL-MCNC: 65 MG/DL
HGB BLD-MCNC: 13.3 G/DL
HYALINE CASTS: 0 /LPF
IMM GRANULOCYTES NFR BLD AUTO: 0.2 %
KETONES URINE: NEGATIVE
LDLC SERPL CALC-MCNC: 129 MG/DL
LEUKOCYTE ESTERASE URINE: NEGATIVE
LYMPHOCYTES # BLD AUTO: 1.16 K/UL
LYMPHOCYTES NFR BLD AUTO: 24.3 %
MAN DIFF?: NORMAL
MCHC RBC-ENTMCNC: 25.8 PG
MCHC RBC-ENTMCNC: 29.3 GM/DL
MCV RBC AUTO: 88.2 FL
MICROSCOPIC-UA: NORMAL
MONOCYTES # BLD AUTO: 0.39 K/UL
MONOCYTES NFR BLD AUTO: 8.2 %
NEUTROPHILS # BLD AUTO: 2.98 K/UL
NEUTROPHILS NFR BLD AUTO: 62.3 %
NITRITE URINE: NEGATIVE
NONHDLC SERPL-MCNC: 141 MG/DL
PH URINE: 7
PLATELET # BLD AUTO: 186 K/UL
POTASSIUM SERPL-SCNC: 4.6 MMOL/L
PROT SERPL-MCNC: 7.3 G/DL
PROTEIN URINE: NEGATIVE
RBC # BLD: 5.15 M/UL
RBC # FLD: 15.4 %
RED BLOOD CELLS URINE: 3 /HPF
SODIUM SERPL-SCNC: 141 MMOL/L
SPECIFIC GRAVITY URINE: 1.02
SQUAMOUS EPITHELIAL CELLS: 0 /HPF
T4 FREE SERPL-MCNC: 1.3 NG/DL
TRIGL SERPL-MCNC: 57 MG/DL
TSH SERPL-ACNC: 2.92 UIU/ML
UROBILINOGEN URINE: NORMAL
WBC # FLD AUTO: 4.78 K/UL
WHITE BLOOD CELLS URINE: 0 /HPF

## 2021-11-15 ENCOUNTER — RESULT REVIEW (OUTPATIENT)
Age: 61
End: 2021-11-15

## 2021-11-19 ENCOUNTER — APPOINTMENT (OUTPATIENT)
Dept: ULTRASOUND IMAGING | Facility: HOSPITAL | Age: 61
End: 2021-11-19
Payer: COMMERCIAL

## 2021-11-19 ENCOUNTER — OUTPATIENT (OUTPATIENT)
Dept: OUTPATIENT SERVICES | Facility: HOSPITAL | Age: 61
LOS: 1 days | End: 2021-11-19
Payer: COMMERCIAL

## 2021-11-19 PROCEDURE — 76830 TRANSVAGINAL US NON-OB: CPT

## 2021-11-19 PROCEDURE — 76830 TRANSVAGINAL US NON-OB: CPT | Mod: 26

## 2021-11-19 PROCEDURE — 76856 US EXAM PELVIC COMPLETE: CPT | Mod: 26

## 2021-11-19 PROCEDURE — 76856 US EXAM PELVIC COMPLETE: CPT

## 2021-11-22 ENCOUNTER — RESULT REVIEW (OUTPATIENT)
Age: 61
End: 2021-11-22

## 2021-11-22 ENCOUNTER — OUTPATIENT (OUTPATIENT)
Dept: OUTPATIENT SERVICES | Facility: HOSPITAL | Age: 61
LOS: 1 days | End: 2021-11-22
Payer: COMMERCIAL

## 2021-11-22 ENCOUNTER — APPOINTMENT (OUTPATIENT)
Dept: CT IMAGING | Facility: HOSPITAL | Age: 61
End: 2021-11-22

## 2021-11-22 PROCEDURE — 74177 CT ABD & PELVIS W/CONTRAST: CPT | Mod: 26

## 2021-11-22 PROCEDURE — 74177 CT ABD & PELVIS W/CONTRAST: CPT

## 2021-12-09 ENCOUNTER — APPOINTMENT (OUTPATIENT)
Dept: MRI IMAGING | Facility: HOSPITAL | Age: 61
End: 2021-12-09

## 2021-12-09 ENCOUNTER — OUTPATIENT (OUTPATIENT)
Dept: OUTPATIENT SERVICES | Facility: HOSPITAL | Age: 61
LOS: 1 days | End: 2021-12-09
Payer: COMMERCIAL

## 2021-12-09 ENCOUNTER — RX RENEWAL (OUTPATIENT)
Age: 61
End: 2021-12-09

## 2021-12-09 ENCOUNTER — APPOINTMENT (OUTPATIENT)
Dept: ORTHOPEDIC SURGERY | Facility: CLINIC | Age: 61
End: 2021-12-09
Payer: COMMERCIAL

## 2021-12-09 VITALS — WEIGHT: 134 LBS | RESPIRATION RATE: 16 BRPM | BODY MASS INDEX: 21.03 KG/M2 | HEIGHT: 67 IN

## 2021-12-09 PROCEDURE — 73564 X-RAY EXAM KNEE 4 OR MORE: CPT | Mod: RT

## 2021-12-09 PROCEDURE — 99204 OFFICE O/P NEW MOD 45 MIN: CPT

## 2021-12-09 PROCEDURE — 72197 MRI PELVIS W/O & W/DYE: CPT

## 2021-12-09 PROCEDURE — A9585: CPT

## 2021-12-09 PROCEDURE — 72197 MRI PELVIS W/O & W/DYE: CPT | Mod: 26

## 2021-12-09 RX ORDER — MELOXICAM 15 MG/1
15 TABLET ORAL
Qty: 30 | Refills: 1 | Status: DISCONTINUED | COMMUNITY
Start: 2021-12-09 | End: 2021-12-09

## 2021-12-09 RX ORDER — NYSTATIN AND TRIAMCINOLONE ACETONIDE 100000; 1 MG/G; MG/G
100000-0.1 CREAM TOPICAL 3 TIMES DAILY
Qty: 30 | Refills: 5 | Status: DISCONTINUED | COMMUNITY
Start: 2021-08-02 | End: 2021-12-09

## 2021-12-09 RX ORDER — BIMATOPROST 0.1 MG/ML
0.01 SOLUTION/ DROPS OPHTHALMIC
Qty: 7 | Refills: 0 | Status: DISCONTINUED | COMMUNITY
Start: 2017-01-23 | End: 2021-12-09

## 2021-12-09 RX ORDER — BIOTIN 10 MG
10000 TABLET ORAL DAILY
Refills: 0 | Status: DISCONTINUED | COMMUNITY
Start: 2020-12-30 | End: 2021-12-09

## 2021-12-09 RX ORDER — CIPROFLOXACIN HYDROCHLORIDE 500 MG/1
500 TABLET, FILM COATED ORAL TWICE DAILY
Qty: 10 | Refills: 1 | Status: DISCONTINUED | COMMUNITY
Start: 2021-09-20 | End: 2021-12-09

## 2021-12-17 ENCOUNTER — APPOINTMENT (OUTPATIENT)
Dept: GYNECOLOGIC ONCOLOGY | Facility: CLINIC | Age: 61
End: 2021-12-17
Payer: COMMERCIAL

## 2021-12-17 VITALS
BODY MASS INDEX: 21.03 KG/M2 | SYSTOLIC BLOOD PRESSURE: 138 MMHG | HEART RATE: 80 BPM | OXYGEN SATURATION: 99 % | TEMPERATURE: 96.4 F | DIASTOLIC BLOOD PRESSURE: 83 MMHG | HEIGHT: 67 IN | WEIGHT: 134 LBS

## 2021-12-17 PROCEDURE — 99205 OFFICE O/P NEW HI 60 MIN: CPT

## 2021-12-17 NOTE — ASSESSMENT
[FreeTextEntry1] : I discussed with the patient with the aid of diagrams, reviewed the findings on history and physical examination, and reviewed the imaging studies in detail.  We reviewed the cystic and solid component of the mass, the CA-125, and other tumor markers. ACOG management of adnexal masses has been reviewed. \par \par We discussed the differential diagnosis which includes benign, low malignant potential tumors and malignancy. Other etiologies of adnexal masses, such as metastatic disease from another organ site also discussed.\par \par In the case of asymptomatic cysts, without findings concerning for malignancy (such as solid components, increasing size and vascular septations, elevated tumor markers) conservative management is an option. \par \par In the case of symptomatic cysts, or those with findings concerning for malignancy, the recommendation is for surgical removal. Again, with the aid of diagrams, different surgical approaches discussed including minimally invasive and open approaches.\par \par Laparoscopic BSO discussed. Frozen section will be performed; and the appropriate additional management including, total hysterectomy, pelvic and para-aortic lymph node dissection, omentectomy and appendectomy. NCCN guidelines discussed. Possible laparotomy also discussed. If diagnosis is not clear on frozen section, we will avoid any additional procedures and defer to permanent pathology. This may require additional surgery and the patient states understanding this. We also discussed hysterectomy in the setting of a known fibroid uterus. We discussed differences in surgical complications and recovery time with hysterectomy. After discussion, patient electing for BSO alone\par \par Complications that include, but are not limited to: bleeding, infection, injury to other organs including bowel, bladder, ureters, blood vessels, nerves; infections, blood clots, lymphedema, pneumonia, wound complications and prolonged hospital stay have all been discussed with the patient. Whenever minimally invasive surgery is attempted, there is a chance of needing to convert to laparotomy. The risk of occult injury requiring additional surgery also discussed. I have also provided her with the diagrams.  \par \par Surgical scheduling was discussed and instructions for optimization prior to surgery were given. will follow the Enhanced Recovery After Surgery (ERAS) protocol.  \par No aspirin or NSAID products for 1 week prior. \par She will choose a surgical date.\par \par [] Request tumor markers from Dr. Bejarano\par [] Medical clearance\par [] COVID pre-op\par [] Lap BSO\par

## 2021-12-17 NOTE — CHIEF COMPLAINT
[FreeTextEntry1] : 62 y/o referred from Dr. Gloria Bejarano for evaluation of complex L adnexal mass. Patient reports cyst was found a few years ago when she was having lower quadrant pain. Patient says cyst has been followed annually and this year grew in size. Currently denies any pelvic pain, vaginal bleeding, bloating, weight gain/loss. \par \par OBHX: G0 \par GYNHX: Denies abnl pap smears, last was Nov 2021 normal per patient. Reports being told she has fibroids but unsure of size and is asymptomatic. Denies history of other ovarian cysts \par PMHX: GERD, Hashimotos, migraines \par SX: hysteroscopic polypectomy 2019, umbilical hernia repair 2004 (no mesh), R rotator cuff sx 2017 (no hardware) \par MED: pantoprazole, multivitamin, alendronate \par ALL: NKDA \par SOCIAL: denies alcohol use, tobacco use. 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 \par FAM: Father lung cancer, Maternal grandmother laryngeal cancer, maternal uncle with liver cancer and other maternal uncle with pancreatic cancer  \par \par Last Mammogram: Feb 2021 \par Last pap: 11/15/21- negative\par Last Colonoscopy: 2019

## 2021-12-17 NOTE — HISTORY OF PRESENT ILLNESS
[FreeTextEntry1] : Problem List\par 1. 4.4cm complex cystic lesion in the left ovary ( increase in size) \par \par Previous Therapy \par 1. Pelvic US 11/19/21\par     a) uterus 5.5. Endometrium 0.4cm -wnl\par     b) 4.9cm complex lesions in left ovary , possibly benign or malignant\par 2. MRI Pelvis 12/9/21\par     a) uterus is 5cm. Endometrium distended by the fibroid, measuring 0.9cm. The junctional zone is not thickened, measuring 0.2cm.\par     b) Left ovary - There has been an increase in the size of a complex cystic lesion in the left ovary, which contains at least two thin, enhancing septation. This lesion measures 3.3 x 4.4 x 2.6cm. On the pelvic US 10/25/19, there was a 1.6 x 1.2 x 1.2cm simple cystic lesion in the left ovary. No enhancing solid tissue noted in this lesion. It has smooth walls. No lipid component. No restricted diffusion.

## 2021-12-27 ENCOUNTER — NON-APPOINTMENT (OUTPATIENT)
Age: 61
End: 2021-12-27

## 2021-12-27 ENCOUNTER — APPOINTMENT (OUTPATIENT)
Dept: INTERNAL MEDICINE | Facility: CLINIC | Age: 61
End: 2021-12-27
Payer: COMMERCIAL

## 2021-12-27 ENCOUNTER — LABORATORY RESULT (OUTPATIENT)
Age: 61
End: 2021-12-27

## 2021-12-27 VITALS — SYSTOLIC BLOOD PRESSURE: 120 MMHG | DIASTOLIC BLOOD PRESSURE: 80 MMHG

## 2021-12-27 DIAGNOSIS — Z01.818 ENCOUNTER FOR OTHER PREPROCEDURAL EXAMINATION: ICD-10-CM

## 2021-12-27 PROCEDURE — 36415 COLL VENOUS BLD VENIPUNCTURE: CPT

## 2021-12-27 PROCEDURE — 99213 OFFICE O/P EST LOW 20 MIN: CPT | Mod: 25

## 2021-12-27 PROCEDURE — 93000 ELECTROCARDIOGRAM COMPLETE: CPT

## 2021-12-28 LAB
ALBUMIN SERPL ELPH-MCNC: 4.7 G/DL
ALP BLD-CCNC: 62 U/L
ALT SERPL-CCNC: 16 U/L
ANION GAP SERPL CALC-SCNC: 17 MMOL/L
APPEARANCE: CLEAR
APTT BLD: 34.9 SEC
AST SERPL-CCNC: 17 U/L
BASOPHILS # BLD AUTO: 0.02 K/UL
BASOPHILS NFR BLD AUTO: 0.4 %
BILIRUB SERPL-MCNC: 0.3 MG/DL
BILIRUBIN URINE: NEGATIVE
BLOOD URINE: NEGATIVE
BUN SERPL-MCNC: 11 MG/DL
CALCIUM SERPL-MCNC: 9.3 MG/DL
CHLORIDE SERPL-SCNC: 104 MMOL/L
CO2 SERPL-SCNC: 22 MMOL/L
COLOR: NORMAL
CREAT SERPL-MCNC: 0.74 MG/DL
EOSINOPHIL # BLD AUTO: 0.13 K/UL
EOSINOPHIL NFR BLD AUTO: 2.4 %
ESTIMATED AVERAGE GLUCOSE: 117 MG/DL
GLUCOSE QUALITATIVE U: NEGATIVE
GLUCOSE SERPL-MCNC: 126 MG/DL
HBA1C MFR BLD HPLC: 5.7 %
HCT VFR BLD CALC: 43.3 %
HGB BLD-MCNC: 13.2 G/DL
IMM GRANULOCYTES NFR BLD AUTO: 0.2 %
INR PPP: 1 RATIO
KETONES URINE: NEGATIVE
LEUKOCYTE ESTERASE URINE: NEGATIVE
LYMPHOCYTES # BLD AUTO: 1.16 K/UL
LYMPHOCYTES NFR BLD AUTO: 21.6 %
MAN DIFF?: NORMAL
MCHC RBC-ENTMCNC: 25.7 PG
MCHC RBC-ENTMCNC: 30.5 GM/DL
MCV RBC AUTO: 84.4 FL
MONOCYTES # BLD AUTO: 0.3 K/UL
MONOCYTES NFR BLD AUTO: 5.6 %
NEUTROPHILS # BLD AUTO: 3.76 K/UL
NEUTROPHILS NFR BLD AUTO: 69.8 %
NITRITE URINE: NEGATIVE
PH URINE: 7
PLATELET # BLD AUTO: 192 K/UL
POTASSIUM SERPL-SCNC: 4.4 MMOL/L
PROT SERPL-MCNC: 7.5 G/DL
PROTEIN URINE: NEGATIVE
PT BLD: 11.9 SEC
RBC # BLD: 5.13 M/UL
RBC # FLD: 14.7 %
SODIUM SERPL-SCNC: 142 MMOL/L
SPECIFIC GRAVITY URINE: 1.01
UROBILINOGEN URINE: NORMAL
WBC # FLD AUTO: 5.38 K/UL

## 2021-12-28 NOTE — PHYSICAL EXAM
[No Acute Distress] : no acute distress [Well Nourished] : well nourished [Well Developed] : well developed [Well-Appearing] : well-appearing [Normal Sclera/Conjunctiva] : normal sclera/conjunctiva [PERRL] : pupils equal round and reactive to light [EOMI] : extraocular movements intact [Normal Outer Ear/Nose] : the outer ears and nose were normal in appearance [Normal Oropharynx] : the oropharynx was normal [No JVD] : no jugular venous distention [No Lymphadenopathy] : no lymphadenopathy [Supple] : supple [Thyroid Normal, No Nodules] : the thyroid was normal and there were no nodules present [No Respiratory Distress] : no respiratory distress  [No Accessory Muscle Use] : no accessory muscle use [Clear to Auscultation] : lungs were clear to auscultation bilaterally [Normal Rate] : normal rate  [Regular Rhythm] : with a regular rhythm [Normal S1, S2] : normal S1 and S2 [No Murmur] : no murmur heard [No Carotid Bruits] : no carotid bruits [No Abdominal Bruit] : a ~M bruit was not heard ~T in the abdomen [No Varicosities] : no varicosities [Pedal Pulses Present] : the pedal pulses are present [No Edema] : there was no peripheral edema [No Palpable Aorta] : no palpable aorta [No Extremity Clubbing/Cyanosis] : no extremity clubbing/cyanosis [Soft] : abdomen soft [No HSM] : no HSM [Normal Bowel Sounds] : normal bowel sounds [Normal Posterior Cervical Nodes] : no posterior cervical lymphadenopathy [Normal Anterior Cervical Nodes] : no anterior cervical lymphadenopathy [No CVA Tenderness] : no CVA  tenderness [No Spinal Tenderness] : no spinal tenderness [No Joint Swelling] : no joint swelling [Grossly Normal Strength/Tone] : grossly normal strength/tone [No Rash] : no rash [Coordination Grossly Intact] : coordination grossly intact [No Focal Deficits] : no focal deficits [Normal Gait] : normal gait [Deep Tendon Reflexes (DTR)] : deep tendon reflexes were 2+ and symmetric [Normal Affect] : the affect was normal [Normal Insight/Judgement] : insight and judgment were intact [de-identified] : Some pain right side of abdomen

## 2021-12-28 NOTE — HISTORY OF PRESENT ILLNESS
[No Pertinent Cardiac History] : no history of aortic stenosis, atrial fibrillation, coronary artery disease, recent myocardial infarction, or implantable device/pacemaker [No Pertinent Pulmonary History] : no history of asthma, COPD, sleep apnea, or smoking [No Adverse Anesthesia Reaction] : no adverse anesthesia reaction in self or family member [Chronic Anticoagulation] : no chronic anticoagulation [Chronic Kidney Disease] : no chronic kidney disease [Diabetes] : no diabetes [FreeTextEntry1] : Oophorectomy [FreeTextEntry2] : 1/30/2022 [FreeTextEntry3] : Dr. John [FreeTextEntry4] : Preop for Lap oophorectomy \par Enlarging ovaries; Needs surgery

## 2021-12-29 ENCOUNTER — TRANSCRIPTION ENCOUNTER (OUTPATIENT)
Age: 61
End: 2021-12-29

## 2021-12-29 VITALS
TEMPERATURE: 98 F | DIASTOLIC BLOOD PRESSURE: 79 MMHG | HEART RATE: 80 BPM | RESPIRATION RATE: 16 BRPM | OXYGEN SATURATION: 98 % | HEIGHT: 67 IN | SYSTOLIC BLOOD PRESSURE: 128 MMHG | WEIGHT: 127.43 LBS

## 2021-12-29 NOTE — ASU PATIENT PROFILE, ADULT - FALL HARM RISK - UNIVERSAL INTERVENTIONS
Bed in lowest position, wheels locked, appropriate side rails in place/Call bell, personal items and telephone in reach/Instruct patient to call for assistance before getting out of bed or chair/Non-slip footwear when patient is out of bed/[place] to call system/Physically safe environment - no spills, clutter or unnecessary equipment/Purposeful Proactive Rounding/Room/bathroom lighting operational, light cord in reach

## 2021-12-29 NOTE — ASU PATIENT PROFILE, ADULT - NSICDXPASTSURGICALHX_GEN_ALL_CORE_FT
PAST SURGICAL HISTORY:  H/O sinus surgery     History of arthroscopy of shoulder rotator cuff repair, right    History of umbilical hernia repair     S/P correction of deviated nasal septum

## 2021-12-29 NOTE — ASU PATIENT PROFILE, ADULT - NSICDXPASTMEDICALHX_GEN_ALL_CORE_FT
PAST MEDICAL HISTORY:  GERD (gastroesophageal reflux disease)     Glaucoma     Hashimoto's disease     Kidney stones     Migraines     Osteoporosis     Ovarian cyst     Sarcoidosis     Sinusitis     Torn meniscus right

## 2021-12-30 ENCOUNTER — TRANSCRIPTION ENCOUNTER (OUTPATIENT)
Age: 61
End: 2021-12-30

## 2021-12-30 ENCOUNTER — OUTPATIENT (OUTPATIENT)
Dept: OUTPATIENT SERVICES | Facility: HOSPITAL | Age: 61
LOS: 1 days | Discharge: HOME | End: 2021-12-30
Payer: COMMERCIAL

## 2021-12-30 ENCOUNTER — APPOINTMENT (OUTPATIENT)
Dept: GYNECOLOGIC ONCOLOGY | Facility: HOSPITAL | Age: 61
End: 2021-12-30

## 2021-12-30 ENCOUNTER — RESULT REVIEW (OUTPATIENT)
Age: 61
End: 2021-12-30

## 2021-12-30 VITALS
HEART RATE: 66 BPM | OXYGEN SATURATION: 98 % | TEMPERATURE: 98 F | DIASTOLIC BLOOD PRESSURE: 78 MMHG | SYSTOLIC BLOOD PRESSURE: 118 MMHG | RESPIRATION RATE: 18 BRPM

## 2021-12-30 DIAGNOSIS — Z98.890 OTHER SPECIFIED POSTPROCEDURAL STATES: Chronic | ICD-10-CM

## 2021-12-30 LAB
BLD GP AB SCN SERPL QL: NEGATIVE — SIGNIFICANT CHANGE UP
GLUCOSE BLDC GLUCOMTR-MCNC: 100 MG/DL — HIGH (ref 70–99)
RH IG SCN BLD-IMP: POSITIVE — SIGNIFICANT CHANGE UP

## 2021-12-30 PROCEDURE — 88305 TISSUE EXAM BY PATHOLOGIST: CPT | Mod: 26

## 2021-12-30 PROCEDURE — 88332 PATH CONSLTJ SURG EA ADD BLK: CPT | Mod: 26

## 2021-12-30 PROCEDURE — 88112 CYTOPATH CELL ENHANCE TECH: CPT | Mod: 26

## 2021-12-30 PROCEDURE — 88331 PATH CONSLTJ SURG 1 BLK 1SPC: CPT | Mod: 26

## 2021-12-30 PROCEDURE — 58661 LAPAROSCOPY REMOVE ADNEXA: CPT | Mod: 50

## 2021-12-30 RX ORDER — OXYCODONE HYDROCHLORIDE 5 MG/1
5 TABLET ORAL ONCE
Refills: 0 | Status: DISCONTINUED | OUTPATIENT
Start: 2021-12-30 | End: 2021-12-30

## 2021-12-30 RX ORDER — HYDROMORPHONE HYDROCHLORIDE 2 MG/ML
0.5 INJECTION INTRAMUSCULAR; INTRAVENOUS; SUBCUTANEOUS
Refills: 0 | Status: DISCONTINUED | OUTPATIENT
Start: 2021-12-30 | End: 2021-12-30

## 2021-12-30 RX ORDER — ONDANSETRON 8 MG/1
8 TABLET, FILM COATED ORAL ONCE
Refills: 0 | Status: DISCONTINUED | OUTPATIENT
Start: 2021-12-30 | End: 2021-12-30

## 2021-12-30 RX ORDER — SODIUM CHLORIDE 9 MG/ML
1000 INJECTION, SOLUTION INTRAVENOUS
Refills: 0 | Status: DISCONTINUED | OUTPATIENT
Start: 2021-12-30 | End: 2021-12-30

## 2021-12-30 RX ORDER — KETOROLAC TROMETHAMINE 30 MG/ML
30 SYRINGE (ML) INJECTION ONCE
Refills: 0 | Status: DISCONTINUED | OUTPATIENT
Start: 2021-12-30 | End: 2021-12-30

## 2021-12-30 RX ORDER — ACETAMINOPHEN 500 MG
1000 TABLET ORAL ONCE
Refills: 0 | Status: COMPLETED | OUTPATIENT
Start: 2021-12-30 | End: 2021-12-30

## 2021-12-30 RX ORDER — ACETAMINOPHEN 500 MG
1000 TABLET ORAL ONCE
Refills: 0 | Status: DISCONTINUED | OUTPATIENT
Start: 2021-12-30 | End: 2021-12-30

## 2021-12-30 RX ORDER — SIMETHICONE 80 MG/1
80 TABLET, CHEWABLE ORAL ONCE
Refills: 0 | Status: DISCONTINUED | OUTPATIENT
Start: 2021-12-30 | End: 2021-12-30

## 2021-12-30 RX ORDER — CELECOXIB 200 MG/1
400 CAPSULE ORAL ONCE
Refills: 0 | Status: COMPLETED | OUTPATIENT
Start: 2021-12-30 | End: 2021-12-30

## 2021-12-30 RX ORDER — GABAPENTIN 400 MG/1
300 CAPSULE ORAL ONCE
Refills: 0 | Status: COMPLETED | OUTPATIENT
Start: 2021-12-30 | End: 2021-12-30

## 2021-12-30 RX ORDER — HEPARIN SODIUM 5000 [USP'U]/ML
5000 INJECTION INTRAVENOUS; SUBCUTANEOUS ONCE
Refills: 0 | Status: COMPLETED | OUTPATIENT
Start: 2021-12-30 | End: 2021-12-30

## 2021-12-30 RX ADMIN — HEPARIN SODIUM 5000 UNIT(S): 5000 INJECTION INTRAVENOUS; SUBCUTANEOUS at 10:32

## 2021-12-30 RX ADMIN — HYDROMORPHONE HYDROCHLORIDE 0.5 MILLIGRAM(S): 2 INJECTION INTRAMUSCULAR; INTRAVENOUS; SUBCUTANEOUS at 15:04

## 2021-12-30 RX ADMIN — CELECOXIB 400 MILLIGRAM(S): 200 CAPSULE ORAL at 10:31

## 2021-12-30 RX ADMIN — GABAPENTIN 300 MILLIGRAM(S): 400 CAPSULE ORAL at 10:32

## 2021-12-30 RX ADMIN — Medication 1000 MILLIGRAM(S): at 10:31

## 2021-12-30 NOTE — DISCHARGE NOTE NURSING/CASE MANAGEMENT/SOCIAL WORK - NSDCPEFALRISK_GEN_ALL_CORE
For information on Fall & Injury Prevention, visit: [email or web address] OR  [email or web address] OR  https://www.cdc.gov/steadi/patient.html

## 2021-12-30 NOTE — ASU DISCHARGE PLAN (ADULT/PEDIATRIC) - CARE PROVIDER_API CALL
Renetta John)  Obstetrics and Gynecology  [address], [address] [address]  Phone: [phone]  Fax: [phone]  Follow Up Time:

## 2021-12-30 NOTE — ASU DISCHARGE PLAN (ADULT/PEDIATRIC) - NS MD DC FALL RISK RISK
For information on Fall & Injury Prevention, visit: [email or web address] OR  [email or web address] OR  https://www.cdc.gov/steadi/patient.html

## 2021-12-30 NOTE — BRIEF OPERATIVE NOTE - NSICDXBRIEFPROCEDURE_GEN_ALL_CORE_FT
PROCEDURES:  Laparoscopic bilateral salpingo-oophorectomy (BSO) 30-Dec-2021 13:13:49  Madalyn Barrios

## 2021-12-30 NOTE — BRIEF OPERATIVE NOTE - OPERATION/FINDINGS
Pt placed in lithotomy position, vaginal preparation with CHG. Normal external genitalia, mobile small uterus. Entry at Palmar's point with a 5mm trocar under vision. Additional suprapubic 5mm and umbilical 12mm trocar inserted after Marcaine injection.   On inspection 5cm left ovary and small right ovary with nodularity on it's surface. Pelvic washings obtained.  LigaSure was used to seal and transect the IP and mesosalpinges up to the uterine cornua bilaterally. Both ovaries and tubes were removed through the umbilical port in an endobag and sent for frozen section:   all port facia closed with 2-0 Vicryl, skin with 4-0 Monocryl.   Excellent hemostasis, EBL minimal. Pt placed in lithotomy position, vaginal preparation with CHG. Normal external genitalia, mobile small uterus. Entry at Palmar's point with a 5mm trocar under vision. Additional suprapubic 5mm and umbilical 12mm trocar inserted after Marcaine injection.   On inspection 5cm left ovary and small right ovary with nodularity on it's surface. Pelvic washings obtained.  LigaSure was used to seal and transect the IP and mesosalpinges up to the uterine cornua bilaterally. Both ovaries and tubes were removed through the umbilical port in an endobag and sent for frozen section and resulted as adenofibroma.  all port facia closed with 2-0 Vicryl, skin with 4-0 Monocryl.   Excellent hemostasis, EBL minimal.

## 2021-12-30 NOTE — DISCHARGE NOTE NURSING/CASE MANAGEMENT/SOCIAL WORK - PATIENT PORTAL LINK FT
You can access the FollowMyHealth Patient Portal offered by [facility] by registering at the following website: [email or web address]. By joining [employer]’s FollowMyHealth portal, you will also be able to view your health information using other applications (apps) compatible with our system.

## 2022-01-01 NOTE — ASU PATIENT PROFILE, ADULT - HAVE YOU HAD A FIRST COVID-19 BOOSTER?
Nutrition Assessment  Reason for Consult/Assessment: Initial     Nutrition Risk Factors: Parenteral nutrition, Prematurity, Length of Stay, Very Low Birth Weight   Diagnosis and Hx: Reviewed          Feeding Order: TPN/PPN, Expressed breast milk, Donor breast milk   TPN/PPN: D15, P4, L2 at 140ml/kg  Donor breast milk: Unfortified             Expressed breast milk: Unfortified                                         Feeding volume/frequency: 6 ml Q 3 hrs     Route: NG/OG   Feeding Tolerance: Tolerating   Food Allergies/Intolerance: No    Nutrient Intake Analysis/Assessment  Energy (calories/kg) Total: 110 calories/kg  Volume (mL/kg) Total: 140 mL/kg  Protein (g/kg) Total: 4.5 g/kg  Energy (calories/kg) Enteral: 28 calories/kg  Energy (calories/kg) Parenteral: 82 calories/kg  Protein (g/kg) Enteral: 0.48 g/kg  Protein (g/kg) Parenteral: 4 g/kg  Glucose Infusion Rate (mg/kg/min) Parenteral: 9.16 mg/kg/min    Demographic/Anthropometrics Information   Gender: female   Birth Gestational Age: 30w2d    Patient Age: 5 day old (Corrected GA: 31w0d) Day of Life: 5 days    Weight Change Since Birth: 6% at DOL 5     Birth Size: AGA     Birth Weight: 2 lb 8.2 oz (1140 g)  Birth Length:  36 cm Birth HC:   26 cm   %tile: 25 %tile: 13  %tile: 20    Z-score: -067  Z-score: -1.11  Z-score: -0.84        Current Weight:(!) 1210 g (22 0600)  Current Length: 14.17\" (36 cm) (22) Current HC: 26 cm (10.24\") (22)    n/a  n/a  n/a                      Growth Curve Used: Shahid       Growth Analysis/Comments: Expected weight gain goal of 23g/d  Estimated Needs   Calories: Method kcal/k-111 kcal/kg   Protein: Method grams/kg: 3-4 g/kg   Fluid:             Physical Observation       Interventions  Intervention: Modify composition of enteral/parenteral, Modify concentration of enteral/parenteral, Modify rate/volume of enteral/parenteral                                                                     Modify  composition of enteral/parenteral: advance EN per feeding protocol  Modify concentration of enteral/parenteral: advance EN per feeding protocol  Modify rate/volume of enteral/parenteral : advance EN per feeding protocol, wean TPN per protocol                               Monitoring/Evaluation  Goal: Transition to enteral nutrition, Meet nutritional needs   Intervention goal status: Initiated  Time frame to achieve goal: 5-7 days   Dietitian will monitor: Biochemical data, medical tests, procedures, Growth velocity, Parenteral nutrition, Vitamin/mineral intake, GI function, Enteral nutrition     Nutrition Diagnosis / PES  Nutrition Diagnosis:   Increased nutrient needs   Related to: Prematurity   As evidenced by: Calculated needs      Primary Nutrition Diagnosis status: New nutrition diagnosis                       DIETITIAN INTERVENTION/RECOMMENDATIONS:    1. Will continue to monitor clinical progress/changes, nutrition tolerance, and growth parameters.   2.      Yes

## 2022-01-01 NOTE — ASSESSMENT
HOSPITAL PROGRESS NOTE  [facility], [place]      Tristan Cardona,  2021, MRN [id number]    Summary:  Tristan is a 3 month old male with history of poor weight gain presented to urgent care for right humerus fx found to have chronic rib fractures concerning for non-accidental trauma.    Interval History:  Mom is at bedside.     She reports that Tristan is doing well. Pain seems to be controlled, no increased fussiness. His feeding is similar to home (1-2 oz q3h during day, 3-4oz q4h overnight). She reports that has had difficulty gaining weight, followed closely be pediatrician. She has tried Simlac, Enfamil Gentlease, currently using Nutramigen formula. With Enfamil, and when fortifying Nutramigen formula, patient would show signs his is in pain and his stomach would get hard.     Making good UOP, has wet diaper with every feed and 4 BMs since arrival at hospital.      Current Facility-Administered Medications   Medication   • sodium chloride (PF) 0.9 % injection 0.6-4.6 mL   • sodium chloride 0.9 % flush bag 25 mL   • sodium chloride (PF) 0.9 % injection 0.5-10 mL   • acetaminophen (TYLENOL) 160 MG/5ML suspension 73.6 mg       Vital Sign Last Value 24 Hour Range   Temp 98.6 °F (37 °C) (22 1140) Temp  Min: 97.2 °F (36.2 °C)  Max: 98.6 °F (37 °C)   Pulse 154 (22 1140) Pulse  Min: 106  Max: 154   Resp Rate 30 (22 1140) Resp  Min: 30  Max: 52   BP (cuff) (!) 101/84 (22 1140) BP  Min: 76/56  Max: 118/62   Pulse Oximetry 98 % (22 1140) SpO2  Min: 90 %  Max: 100 %       Intake/Output:   I/O last 3 completed shifts:  In: 150 [P.O.:150]  Out: 235 [Other:235]      Physical Examination:  Physical Exam  Constitutional:       General: He is not in acute distress.     Appearance: Normal appearance.   HENT:      Head: Normocephalic and atraumatic.      Right Ear: External ear normal.      Left Ear: External ear normal.      Nose: No congestion.      Mouth/Throat:      Mouth: Mucous  [FreeTextEntry4] : Good risk for OR;\par Will review pending labs membranes are moist.      Pharynx: Oropharynx is clear.   Eyes:      Conjunctiva/sclera: Conjunctivae normal.   Cardiovascular:      Rate and Rhythm: Normal rate.      Heart sounds: Normal heart sounds.   Pulmonary:      Effort: Pulmonary effort is normal.      Breath sounds: Normal breath sounds.   Abdominal:      General: Abdomen is flat. There is no distension.   Musculoskeletal:      Comments: Right Arm in Splint; Patient is moving fingers of right hand freely, Good capillary refill   Skin:     General: Skin is warm and dry.      Capillary Refill: Capillary refill takes less than 2 seconds.   Neurological:      Mental Status: He is alert.   Psychiatric:         Behavior: Behavior normal.               Recent Labs:  No results found for this or any previous visit (from the past 24 hour(s)).    Microbiology:  No results found for: UC, CSFCS, BLC, STTH, CULTUREFUN, YSTC, CULTUREWOUND, CTIP    Recent Imaging:    Skeletal Survery, XR Right Arm, CT Head from [facility] [place] - Over-reads Pending    X-ray RUE  Impression:  - Oblique fracture of distal shaft of humerus with < 2 mm of displacement     Bone Survery  Impression:  - Suspicion for chronic lateral right rib fractures in fourtht through eighth ribs      CT Head w/o Contrast  Impression:   - Unremarkable CT head       Assessment:  Tristan Cardona is a 3 month old male with no significant PMH presented with fracture of left humerus, found to have multiple chronic rib fractures admitted for evaluation of non-accidental trauma and DCFS clearance. Vital signs and clinical picture is stable at this time.    Active Hospital Problems    Diagnosis    • Non-accidental traumatic injury to child        Problem List:  2022-12: Non-accidental traumatic injury to child   Right Humerus Fracture  Chronic Rib Fractures (4th-8th)  Poor Weight Gain    Plan:    Neuro/Pain/MSK:  - Tylenol q6h PRN for pain   - F/u CT head, X-ray RUE, Bone Survey over-reads   - Ortho consulted,  appreciate recs     Resp:  - Breathing comfortably on room air, continue to monitor     CV:  - Hemodynamically appropriate, continue to monitor      FEN/GI:  - Nutramigen formula feeds PO adlib on demand  - Nutrition consulted for poor weight gain, appreciate recs     ID:  - COVID negative  - Isolation per protocol  Antibiotic Decision Making  Patient on Antibiotics: - No     Other:  - DCFS investigation underway; Agent is Nikki Ott, #[phone]   - Child protective services consulted      VTE: 0 (None), at baseline mobility, too small for SCDs  Lines: PIV. Function, utilization, and necessity addressed/discussed on rounds     DISPO: will be ready for discharge pending DCFS clearance.    This plan was discussed with the attending physician, nursing staff, care coordinator, and patient's family.  All verbalized understanding and agreement.    Lizbeth Santana MD  Pediatrics Service    Please contact the resident physician assigned to this patient via [employer] with questions.

## 2022-01-03 LAB — SURGICAL PATHOLOGY STUDY: SIGNIFICANT CHANGE UP

## 2022-01-04 LAB — NON-GYNECOLOGICAL CYTOLOGY STUDY: SIGNIFICANT CHANGE UP

## 2022-01-07 ENCOUNTER — APPOINTMENT (OUTPATIENT)
Dept: GYNECOLOGIC ONCOLOGY | Facility: CLINIC | Age: 62
End: 2022-01-07
Payer: COMMERCIAL

## 2022-01-07 ENCOUNTER — APPOINTMENT (OUTPATIENT)
Dept: GYNECOLOGIC ONCOLOGY | Facility: CLINIC | Age: 62
End: 2022-01-07

## 2022-01-07 PROBLEM — J32.9 CHRONIC SINUSITIS, UNSPECIFIED: Chronic | Status: ACTIVE | Noted: 2021-12-29

## 2022-01-07 PROBLEM — G43.909 MIGRAINE, UNSPECIFIED, NOT INTRACTABLE, WITHOUT STATUS MIGRAINOSUS: Chronic | Status: ACTIVE | Noted: 2021-12-29

## 2022-01-07 PROBLEM — K21.9 GASTRO-ESOPHAGEAL REFLUX DISEASE WITHOUT ESOPHAGITIS: Chronic | Status: ACTIVE | Noted: 2021-12-29

## 2022-01-07 PROBLEM — D86.9 SARCOIDOSIS, UNSPECIFIED: Chronic | Status: ACTIVE | Noted: 2021-12-29

## 2022-01-07 PROBLEM — E06.3 AUTOIMMUNE THYROIDITIS: Chronic | Status: ACTIVE | Noted: 2021-12-29

## 2022-01-07 PROBLEM — N20.0 CALCULUS OF KIDNEY: Chronic | Status: ACTIVE | Noted: 2021-12-29

## 2022-01-07 PROBLEM — N83.209 UNSPECIFIED OVARIAN CYST, UNSPECIFIED SIDE: Chronic | Status: ACTIVE | Noted: 2021-12-29

## 2022-01-07 PROBLEM — M81.0 AGE-RELATED OSTEOPOROSIS WITHOUT CURRENT PATHOLOGICAL FRACTURE: Chronic | Status: ACTIVE | Noted: 2021-12-29

## 2022-01-07 PROBLEM — S83.209A UNSPECIFIED TEAR OF UNSPECIFIED MENISCUS, CURRENT INJURY, UNSPECIFIED KNEE, INITIAL ENCOUNTER: Chronic | Status: ACTIVE | Noted: 2021-12-29

## 2022-01-07 PROBLEM — H40.9 UNSPECIFIED GLAUCOMA: Chronic | Status: ACTIVE | Noted: 2021-12-29

## 2022-01-07 PROCEDURE — 99024 POSTOP FOLLOW-UP VISIT: CPT

## 2022-01-07 NOTE — ASSESSMENT
[FreeTextEntry1] : Appropriate 1 week recovery.\par \par Pathology discussed.\par \par Follow up in 3 weeks for post op exam, sooner if indicated.

## 2022-01-07 NOTE — PHYSICAL EXAM
[Normal] : Mood and affect: Normal [de-identified] : patient endorses incisions c/d/i, no erythema, no edema

## 2022-01-07 NOTE — HISTORY OF PRESENT ILLNESS
[FreeTextEntry1] : Problem List\par 1. 4.4cm complex cystic lesion in the left ovary ( increase in size) \par \par Previous Therapy \par 1. Pelvic US 11/19/21\par     a) uterus 5.5. Endometrium 0.4cm -wnl\par     b) 4.9cm complex lesions in left ovary , possibly benign or malignant\par 2. MRI Pelvis 12/9/21\par     a) uterus is 5cm. Endometrium distended by the fibroid, measuring 0.9cm. The junctional zone is not thickened, measuring 0.2cm.\par     b) Left ovary - There has been an increase in the size of a complex cystic lesion in the left ovary, which contains at least two thin, enhancing septation. This lesion measures 3.3 x 4.4 x 2.6cm. On the pelvic US 10/25/19, there was a 1.6 x 1.2 x 1.2cm simple cystic lesion in the left ovary. No enhancing solid tissue noted in this lesion. It has smooth walls. No lipid component. No restricted diffusion. \par 3. 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

## 2022-01-07 NOTE — CHIEF COMPLAINT
[FreeTextEntry1] : 62 y/o referred from Dr. Gloria Bejarano for evaluation of complex L adnexal mass. Patient reports cyst was found a few years ago when she was having lower quadrant pain. Patient says cyst has been followed annually and this year grew in size. Currently denies any pelvic pain, vaginal bleeding, bloating, weight gain/loss. \par \par OBHX: G0 \par GYNHX: Denies abnl pap smears, last was Nov 2021 normal per patient. Reports being told she has fibroids but unsure of size and is asymptomatic. Denies history of other ovarian cysts \par PMHX: GERD, Hashimotos, migraines \par SX: hysteroscopic polypectomy 2019, umbilical hernia repair 2004 (no mesh), R rotator cuff sx 2017 (no hardware) \par MED: pantoprazole, multivitamin, alendronate \par ALL: NKDA \par SOCIAL: denies alcohol use, tobacco use. 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. \par FAM: Father lung cancer, Maternal grandmother laryngeal cancer, maternal uncle with liver cancer and other maternal uncle with pancreatic cancer  \par \par Last Mammogram: Feb 2021 \par Last pap: 11/15/21- negative\par Last Colonoscopy: 2019

## 2022-01-07 NOTE — REASON FOR VISIT
[Home] : at home, [unfilled] , at the time of the visit. [Medical Office: ([facility])___] : at the medical office located in  [Verbal consent obtained from patient] : the patient, [unfilled] [FreeTextEntry1] : 1 week post op. Lap BSO performed 12/30/21 pathology c/w benign serous adenofibroma and serous cystadenofibroma. \par \par Patient reports not taking any pain meds since POD2. She only used ibuprofen nightly the first two days. ROS otherwise negative.

## 2022-02-01 ENCOUNTER — APPOINTMENT (OUTPATIENT)
Dept: GYNECOLOGIC ONCOLOGY | Facility: CLINIC | Age: 62
End: 2022-02-01
Payer: COMMERCIAL

## 2022-02-01 VITALS
BODY MASS INDEX: 20.25 KG/M2 | TEMPERATURE: 96.5 F | OXYGEN SATURATION: 97 % | HEART RATE: 73 BPM | WEIGHT: 129 LBS | HEIGHT: 67 IN | SYSTOLIC BLOOD PRESSURE: 145 MMHG | DIASTOLIC BLOOD PRESSURE: 83 MMHG

## 2022-02-01 DIAGNOSIS — N83.299 OTHER OVARIAN CYST, UNSPECIFIED SIDE: ICD-10-CM

## 2022-02-01 PROCEDURE — 99212 OFFICE O/P EST SF 10 MIN: CPT

## 2022-02-01 NOTE — PHYSICAL EXAM
[Absent] : Adnexa(ae): Absent [Normal] : Anus and perineum: Normal sphincter tone, no masses, no prolapse. [de-identified] : incisions c/d/i

## 2022-02-01 NOTE — ASSESSMENT
[FreeTextEntry1] : Appropriate 1 month recovery\par \par Okay to resume normal activities\par \par Pathology discussed\par \par Importance of routine gyn care emphasized. She will see Dr. Bejarano for her routine care\par \par Reconsult as needed.

## 2022-02-08 PROCEDURE — 86900 BLOOD TYPING SEROLOGIC ABO: CPT

## 2022-02-08 PROCEDURE — 88112 CYTOPATH CELL ENHANCE TECH: CPT

## 2022-02-08 PROCEDURE — 86901 BLOOD TYPING SEROLOGIC RH(D): CPT

## 2022-02-08 PROCEDURE — 82962 GLUCOSE BLOOD TEST: CPT

## 2022-02-08 PROCEDURE — 58661 LAPAROSCOPY REMOVE ADNEXA: CPT | Mod: 50

## 2022-02-08 PROCEDURE — 88332 PATH CONSLTJ SURG EA ADD BLK: CPT

## 2022-02-08 PROCEDURE — C9399: CPT

## 2022-02-08 PROCEDURE — 88331 PATH CONSLTJ SURG 1 BLK 1SPC: CPT

## 2022-02-08 PROCEDURE — 88305 TISSUE EXAM BY PATHOLOGIST: CPT

## 2022-02-08 PROCEDURE — 86850 RBC ANTIBODY SCREEN: CPT

## 2022-02-23 ENCOUNTER — OUTPATIENT (OUTPATIENT)
Dept: OUTPATIENT SERVICES | Facility: HOSPITAL | Age: 62
LOS: 1 days | End: 2022-02-23
Payer: COMMERCIAL

## 2022-02-23 ENCOUNTER — APPOINTMENT (OUTPATIENT)
Dept: MAMMOGRAPHY | Facility: HOSPITAL | Age: 62
End: 2022-02-23
Payer: COMMERCIAL

## 2022-02-23 ENCOUNTER — APPOINTMENT (OUTPATIENT)
Dept: ULTRASOUND IMAGING | Facility: HOSPITAL | Age: 62
End: 2022-02-23
Payer: COMMERCIAL

## 2022-02-23 DIAGNOSIS — Z98.890 OTHER SPECIFIED POSTPROCEDURAL STATES: Chronic | ICD-10-CM

## 2022-02-23 PROCEDURE — 77063 BREAST TOMOSYNTHESIS BI: CPT

## 2022-02-23 PROCEDURE — 77063 BREAST TOMOSYNTHESIS BI: CPT | Mod: 26

## 2022-02-23 PROCEDURE — 76641 ULTRASOUND BREAST COMPLETE: CPT

## 2022-02-23 PROCEDURE — 77067 SCR MAMMO BI INCL CAD: CPT | Mod: 26

## 2022-02-23 PROCEDURE — 77067 SCR MAMMO BI INCL CAD: CPT

## 2022-02-23 PROCEDURE — 76641 ULTRASOUND BREAST COMPLETE: CPT | Mod: 26,50

## 2022-02-24 ENCOUNTER — OUTPATIENT (OUTPATIENT)
Dept: OUTPATIENT SERVICES | Facility: HOSPITAL | Age: 62
LOS: 1 days | End: 2022-02-24
Payer: COMMERCIAL

## 2022-02-24 ENCOUNTER — APPOINTMENT (OUTPATIENT)
Dept: MRI IMAGING | Facility: HOSPITAL | Age: 62
End: 2022-02-24

## 2022-02-24 DIAGNOSIS — Z98.890 OTHER SPECIFIED POSTPROCEDURAL STATES: Chronic | ICD-10-CM

## 2022-02-24 PROCEDURE — 73721 MRI JNT OF LWR EXTRE W/O DYE: CPT

## 2022-02-24 PROCEDURE — 73721 MRI JNT OF LWR EXTRE W/O DYE: CPT | Mod: 26,RT

## 2022-02-25 ENCOUNTER — NON-APPOINTMENT (OUTPATIENT)
Age: 62
End: 2022-02-25

## 2022-02-25 ENCOUNTER — APPOINTMENT (OUTPATIENT)
Age: 62
End: 2022-02-25
Payer: COMMERCIAL

## 2022-02-25 DIAGNOSIS — H40.9 UNSPECIFIED GLAUCOMA: ICD-10-CM

## 2022-02-25 DIAGNOSIS — D86.9 SARCOIDOSIS, UNSPECIFIED: ICD-10-CM

## 2022-02-25 PROCEDURE — 99214 OFFICE O/P EST MOD 30 MIN: CPT | Mod: 95

## 2022-03-01 ENCOUNTER — NON-APPOINTMENT (OUTPATIENT)
Age: 62
End: 2022-03-01

## 2022-03-04 ENCOUNTER — APPOINTMENT (OUTPATIENT)
Dept: RADIOLOGY | Facility: HOSPITAL | Age: 62
End: 2022-03-04
Payer: COMMERCIAL

## 2022-03-04 ENCOUNTER — OUTPATIENT (OUTPATIENT)
Dept: OUTPATIENT SERVICES | Facility: HOSPITAL | Age: 62
LOS: 1 days | End: 2022-03-04
Payer: COMMERCIAL

## 2022-03-04 DIAGNOSIS — Z98.890 OTHER SPECIFIED POSTPROCEDURAL STATES: Chronic | ICD-10-CM

## 2022-03-04 PROCEDURE — 77080 DXA BONE DENSITY AXIAL: CPT | Mod: 26

## 2022-03-04 PROCEDURE — 77080 DXA BONE DENSITY AXIAL: CPT

## 2022-03-04 NOTE — HISTORY OF PRESENT ILLNESS
[FreeTextEntry1] : Ms. Kelley is a 60 year-old woman presenting for evaluation of osteoporosis. 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 She is referred by Dr. Susy Cordero. She has a history of thyroid nodules managed by Dr. Rajeev Phillips. \par \par Bone History\par Menopause: Age 54\par Osteoporosis diagnosed in 2019 on routine bone density significant for T-scores of -2.8 at the lumbar spine, -1.4 at the femoral neck, -0.9 at the total hip, +0.7 at the distal radius\par Fracture history: None\par Family history: No parental history of hip fracture\par Treatment: None\par \par Falls: None recent\par Height loss: No\par Kidney stones: History of right kidney stone\par Dental health: Regular appointments, no history of implants, no upcoming procedures planned\par Exercise: Walks, weight training four times per week\par Dairy intake: 0-1 serving daily (cheese a few times per week, occasional cereal)\par Calcium supplements: None\par Multivitamin: None\par Vitamin D supplements: 1000 intl units daily\par \par Osteoporosis risk factors include: Postmenopausal status,  race, prior fracture, falls, height loss, small thin bones, tobacco use, excessive alcohol, anorexia, family history, vitamin D deficiency, corticosteroid use, seizure medications, malabsorption, hyperparathyroidism, hyperthyroidism.\par NEGATIVE EXCEPT: Postmenopausal status,  race

## 2022-03-04 NOTE — ADDENDUM
[FreeTextEntry1] : Recent bone density as below; discussed with Ms. Kelley. Bone density is overall stable from previous; we will monitor with therapy. She has started alendronate and is tolerating well. 4/15/21\par \par Recent bone density as below. Bone density demonstrated a significant improvement at the femoral neck from previous, with trends towards improvement at the other sites. I left a telephone message for Ms. Kelley to discuss and advised that she schedule a follow-up appointment with me. 3/04/22

## 2022-03-04 NOTE — ASSESSMENT
[Bisphosphonates] : The patient was instructed to take bisphosphonates on an empty stomach with a full glass of water,and wait at least 30 minutes before eating or lying down [FreeTextEntry1] : Osteoporosis. She has no history of fragility fracture. She has no history of prior osteoporosis therapy. Laboratory evaluation unremarkable as above. We discussed the potential benefits and risks of antiresorptive osteoporosis therapy at length, including but not limited to osteonecrosis of the jaw and atypical femoral fracture. She is amenable to therapy with alendronate. We reviewed proper use and compliance with alendronate. \par Start alendronate 70 mg weekly\par Interval bone density testing for treatment baseline\par Calcium 1200 mg daily from diet and supplements (to be taken in divided doses as no more than 500-600 mg can be absorbed at one time); advised increased dietary and/or supplemental calcium\par Continue current vitamin D regimen\par Diet, exercise and fall prevention discussed\par \par I reviewed the DXA performed on September 23, 2019 with the patient today.\par I reviewed the laboratories performed on December 23, 2020 with the patient today. \par I counseled the patient regarding calcium and vitamin D intake today.\par I discussed the following osteoporosis therapies: Alendronate, risedronate, ibandronate, zoledronic acid, denosumab

## 2022-03-04 NOTE — PHYSICAL EXAM
[Alert] : alert [Healthy Appearance] : healthy appearance [No Acute Distress] : no acute distress [Normal Sclera/Conjunctiva] : normal sclera/conjunctiva [Normal Hearing] : hearing was normal [No Respiratory Distress] : no respiratory distress [Clear to Auscultation] : lungs were clear to auscultation bilaterally [Normal S1, S2] : normal S1 and S2 [Normal Rate] : heart rate was normal [Regular Rhythm] : with a regular rhythm [No Stigmata of Cushings Syndrome] : no stigmata of Cushings Syndrome [Normal Gait] : normal gait [Normal Insight/Judgement] : insight and judgment were intact [No LAD] : no lymphadenopathy [Supple] : the neck was supple [Kyphosis] : no kyphosis present [Acanthosis Nigricans] : no acanthosis nigricans [de-identified] : no moon facies, no supraclavicular fat pads

## 2022-03-04 NOTE — DATA REVIEWED
[FreeTextEntry1] : Most recent bone mineral density\par Date: September 23, 2019\par Source: Hologic\par Site: [facility]\par \par Site	BMD (g/cm2)	T-score	Change previous	Change baseline	\par Lumbar spine	0.738	-2.8\par Femoral neck	0.692	-1.4	\par Total hip	                0.831       -0.9         \par Distal radius           	0.738       +0.7         \par DXA Comments:

## 2022-05-04 ENCOUNTER — APPOINTMENT (OUTPATIENT)
Dept: INTERNAL MEDICINE | Facility: CLINIC | Age: 62
End: 2022-05-04
Payer: COMMERCIAL

## 2022-05-04 VITALS
RESPIRATION RATE: 14 BRPM | BODY MASS INDEX: 20.59 KG/M2 | OXYGEN SATURATION: 100 % | HEIGHT: 67 IN | SYSTOLIC BLOOD PRESSURE: 118 MMHG | DIASTOLIC BLOOD PRESSURE: 72 MMHG | WEIGHT: 131.19 LBS | TEMPERATURE: 98.5 F | HEART RATE: 72 BPM

## 2022-05-04 DIAGNOSIS — M06.30 RHEUMATOID NODULE, UNSPECIFIED SITE: ICD-10-CM

## 2022-05-04 PROCEDURE — 99396 PREV VISIT EST AGE 40-64: CPT

## 2022-05-04 NOTE — HEALTH RISK ASSESSMENT
[Good] : ~his/her~  mood as  good [Never] : Never [No] : No [No falls in past year] : Patient reported no falls in the past year [0] : 2) Feeling down, depressed, or hopeless: Not at all (0) [Patient reported mammogram was normal] : Patient reported mammogram was normal [Patient reported colonoscopy was normal] : Patient reported colonoscopy was normal [HIV test declined] : HIV test declined [Hepatitis C test declined] : Hepatitis C test declined [MammogramDate] : 0/2022 [PapSmearComments] : See Gyn note [ColonoscopyDate] : 02/2020

## 2022-05-04 NOTE — HISTORY OF PRESENT ILLNESS
[FreeTextEntry1] : No chief complaint;\par No problems with surgery [de-identified] : Exercise and use weights

## 2022-05-04 NOTE — HEALTH RISK ASSESSMENT
[Patient reported mammogram was normal] : Patient reported mammogram was normal [Patient reported colonoscopy was normal] : Patient reported colonoscopy was normal [MammogramDate] : 02/2022 [PapSmearComments] : See Gyn note [ColonoscopyDate] : 02/2021

## 2022-05-04 NOTE — HISTORY OF PRESENT ILLNESS
[FreeTextEntry1] : No chief complaint;\par No problems with surgery [de-identified] : Exercise and use weights

## 2022-05-04 NOTE — PHYSICAL EXAM
[Normal] : no joint swelling and grossly normal strength and tone [de-identified] : Small hernia on left inguinal area

## 2022-05-04 NOTE — PHYSICAL EXAM
[Normal] : no joint swelling and grossly normal strength and tone [de-identified] : Small hernia on left inguinal area

## 2022-05-05 ENCOUNTER — APPOINTMENT (OUTPATIENT)
Dept: INTERNAL MEDICINE | Facility: CLINIC | Age: 62
End: 2022-05-05
Payer: COMMERCIAL

## 2022-05-05 LAB
APPEARANCE: CLEAR
BACTERIA: NEGATIVE
BILIRUBIN URINE: NEGATIVE
BLOOD URINE: NEGATIVE
COLOR: COLORLESS
GLUCOSE QUALITATIVE U: NEGATIVE
HYALINE CASTS: 0 /LPF
KETONES URINE: NEGATIVE
LEUKOCYTE ESTERASE URINE: NEGATIVE
MICROSCOPIC-UA: NORMAL
NITRITE URINE: NEGATIVE
PH URINE: 6
PROTEIN URINE: NEGATIVE
RED BLOOD CELLS URINE: 2 /HPF
SPECIFIC GRAVITY URINE: 1.01
SQUAMOUS EPITHELIAL CELLS: 0 /HPF
UROBILINOGEN URINE: NORMAL
WHITE BLOOD CELLS URINE: 0 /HPF

## 2022-05-05 PROCEDURE — 36415 COLL VENOUS BLD VENIPUNCTURE: CPT

## 2022-05-10 LAB
25(OH)D3 SERPL-MCNC: 29.7 NG/ML
ALBUMIN SERPL ELPH-MCNC: 4.5 G/DL
ALP BLD-CCNC: 56 U/L
ALT SERPL-CCNC: 15 U/L
ANION GAP SERPL CALC-SCNC: 12 MMOL/L
AST SERPL-CCNC: 16 U/L
BASOPHILS # BLD AUTO: 0.03 K/UL
BASOPHILS NFR BLD AUTO: 0.7 %
BILIRUB SERPL-MCNC: 0.4 MG/DL
BUN SERPL-MCNC: 15 MG/DL
CALCIUM SERPL-MCNC: 9.4 MG/DL
CHLORIDE SERPL-SCNC: 104 MMOL/L
CHOLEST SERPL-MCNC: 207 MG/DL
CO2 SERPL-SCNC: 25 MMOL/L
CREAT SERPL-MCNC: 0.77 MG/DL
EGFR: 88 ML/MIN/1.73M2
EOSINOPHIL # BLD AUTO: 0.18 K/UL
EOSINOPHIL NFR BLD AUTO: 4.1 %
ESTIMATED AVERAGE GLUCOSE: 120 MG/DL
GLUCOSE SERPL-MCNC: 84 MG/DL
HBA1C MFR BLD HPLC: 5.8 %
HCT VFR BLD CALC: 43.7 %
HDLC SERPL-MCNC: 65 MG/DL
HGB BLD-MCNC: 13.1 G/DL
IMM GRANULOCYTES NFR BLD AUTO: 0 %
LDLC SERPL CALC-MCNC: 127 MG/DL
LYMPHOCYTES # BLD AUTO: 1.22 K/UL
LYMPHOCYTES NFR BLD AUTO: 28 %
MAN DIFF?: NORMAL
MCHC RBC-ENTMCNC: 25.9 PG
MCHC RBC-ENTMCNC: 30 GM/DL
MCV RBC AUTO: 86.5 FL
MONOCYTES # BLD AUTO: 0.32 K/UL
MONOCYTES NFR BLD AUTO: 7.3 %
NEUTROPHILS # BLD AUTO: 2.61 K/UL
NEUTROPHILS NFR BLD AUTO: 59.9 %
NONHDLC SERPL-MCNC: 142 MG/DL
PLATELET # BLD AUTO: 203 K/UL
POTASSIUM SERPL-SCNC: 4.2 MMOL/L
PROT SERPL-MCNC: 7.3 G/DL
RBC # BLD: 5.05 M/UL
RBC # FLD: 14.9 %
SODIUM SERPL-SCNC: 142 MMOL/L
T4 FREE SERPL-MCNC: 1.3 NG/DL
TRIGL SERPL-MCNC: 71 MG/DL
TSH SERPL-ACNC: 2.17 UIU/ML
WBC # FLD AUTO: 4.36 K/UL

## 2022-05-12 NOTE — DISCUSSION/SUMMARY
[de-identified] : Assessment: 61-year-old female with complex tear medial meniscus as well as mild subchondral insufficiency fracture medial tibial plateau RIGHT knee.\par \par Findings were discussed with the patient and treatment options were reviewed including continued conservative treatment off  bracing injection therapy surgical arthroscopy with partial meniscectomy and intraosseous bio plasty with bone marrow aspirate concentrate as well as possible partial knee replacement referral\par \par After shared decision making patient would like to proceed with off  brace and gel injections.  Hyaluronic acid was ordered.  We will proceed from there.  If the hyaluronic acid injections are denied we will consider steroid injection.

## 2022-05-12 NOTE — PHYSICAL EXAM
[de-identified] : General: Patient is awake and alert, demonstrates appropriate mood and affect, exhibits normal breathing and is in no acute distress.\par Constitutional: Well sounding in no apparent distress\par \par Respiratory: The patient is in no apparent respiratory distress. \par  [de-identified] : MRI of the right knee obtained at [facility] shows complex tearing posterior horn medial meniscus with subchondral insufficiency fracture medial meniscal mild extrusion

## 2022-05-12 NOTE — DISCUSSION/SUMMARY
[de-identified] : Assessment: 61-year-old female with complex tear medial meniscus as well as mild subchondral insufficiency fracture medial tibial plateau RIGHT knee.\par \par Findings were discussed with the patient and treatment options were reviewed including continued conservative treatment off  bracing injection therapy surgical arthroscopy with partial meniscectomy and intraosseous bio plasty with bone marrow aspirate concentrate as well as possible partial knee replacement referral\par \par After shared decision making patient would like to proceed with off  brace and gel injections.  Hyaluronic acid was ordered.  We will proceed from there.  If the hyaluronic acid injections are denied we will consider steroid injection.

## 2022-05-12 NOTE — HISTORY OF PRESENT ILLNESS
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  After clarification the patient's prior knee pain was in her left knee as well as prior MRI of the right knee has been hurting her on and off for the past 2 years however.  Since we last saw each of the patient has been experiencing increasing pain she states she walks with a limp and has been using Advil intermittently with mild pain relief.\par \par

## 2022-05-12 NOTE — PHYSICAL EXAM
[de-identified] : General: Patient is awake and alert, demonstrates appropriate mood and affect, exhibits normal breathing and is in no acute distress.\par Constitutional: Well sounding in no apparent distress\par \par Respiratory: The patient is in no apparent respiratory distress. \par  [de-identified] : MRI of the right knee obtained at [facility] shows complex tearing posterior horn medial meniscus with subchondral insufficiency fracture medial meniscal mild extrusion

## 2022-05-17 ENCOUNTER — APPOINTMENT (OUTPATIENT)
Dept: ORTHOPEDIC SURGERY | Facility: CLINIC | Age: 62
End: 2022-05-17
Payer: COMMERCIAL

## 2022-05-17 VITALS — BODY MASS INDEX: 20.56 KG/M2 | HEIGHT: 67 IN | WEIGHT: 131 LBS

## 2022-05-17 DIAGNOSIS — M25.561 PAIN IN RIGHT KNEE: ICD-10-CM

## 2022-05-17 PROCEDURE — 20611 DRAIN/INJ JOINT/BURSA W/US: CPT | Mod: RT

## 2022-05-17 NOTE — PROCEDURE
[de-identified] : Procedure: aspiration and Monovisc injection of the right Knee joint under ultrasound\par Indication:  Inflammation and Joint pain. \par Risk, benefits and alternatives were discussed with the patient. Potential complications include bleeding and infection as well as the risk of increased blood sugar. LOT#[id number] EXP 05/31/2023\par Alcohol was used to prep the area.  Ethyl chloride spray was used as a topical anesthetic.  Using sterile technique, the aspiration/injection needle was then directed from a lateral and superior aspect.  A 1.5 inch 22g needle was used to inject 3 mL of 1% Lidocaine and then a 18g needle was used to aspirate 30cc of joint fluid, then the same needle was used to injection Monovisc.  A bandage was applied.  The patient tolerated the procedure well. \par Complications: None. \par Patient instructed to avoid strenuous activity for 2 day(s). \par Follow-up in the office as needed, in 6 months for repeat injection, if pain remains unresolved, or for further concerns.  Specifically counseled regarding the signs and symptoms of potential intraarticular infection and instructed to present promptly to clinic or hospital if such signs and symptoms arise.\par \par

## 2022-07-11 ENCOUNTER — APPOINTMENT (OUTPATIENT)
Dept: ORTHOPEDIC SURGERY | Facility: CLINIC | Age: 62
End: 2022-07-11

## 2022-07-11 DIAGNOSIS — M84.469A PATHOLOGICAL FRACTURE, UNSPECIFIED TIBIA AND FIBULA, INITIAL ENCOUNTER FOR FRACTURE: ICD-10-CM

## 2022-07-11 DIAGNOSIS — S83.242D OTHER TEAR OF MEDIAL MENISCUS, CURRENT INJURY, LEFT KNEE, SUBSEQUENT ENCOUNTER: ICD-10-CM

## 2022-07-11 PROCEDURE — 99214 OFFICE O/P EST MOD 30 MIN: CPT | Mod: 25

## 2022-07-11 PROCEDURE — 20610 DRAIN/INJ JOINT/BURSA W/O US: CPT | Mod: LT

## 2022-07-13 NOTE — DISCUSSION/SUMMARY
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 \par Plan \par Performed arthrocentesis removed 50 cc fluid and placed cortisone. \par Will begin PT \par IN 6 weeks if symptoms persist we will speak about meniscal root repair and or BMAC sub chondral injection. \par We had a long discussion today about the risks and benefits of various Orthobiologic injection procedures. These included PRP, Amniotic Allograft product, Lipogems/lipoaspirate injections and BMAC. The fact that these treatments are investigational and not approved by any insurance product was also discussed \par

## 2022-07-13 NOTE — HISTORY OF PRESENT ILLNESS
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 SHe had right knee HA injection with no benefit by Dr Neri and was told she might need recurrent knee drainage. SHe came for second opinion. \par She has had no cortisone in that knee but had it in the other knee years ago with good result. \par \par \par

## 2022-07-13 NOTE — PROCEDURE
[de-identified] : The left knee was prepped with alchohol and ethyl chloride was used to numb the skin. A 3 cc injection with 1 cc xylocaine, 1cc sensoricaine and 1 cc depomedrol , was given without complication into the knee joint. Patient instructed that there may be an inflammatory flare for 24 hrs , to use ice or advil if needed\par \par

## 2022-07-13 NOTE — PHYSICAL EXAM
[de-identified] : Right knee grade3 effusion medial joint tenderness lacks 2 degrees of extension and has positive olga and Bibiana. \par Neg lachman. \par

## 2022-08-12 ENCOUNTER — NON-APPOINTMENT (OUTPATIENT)
Age: 62
End: 2022-08-12

## 2022-08-19 ENCOUNTER — APPOINTMENT (OUTPATIENT)
Dept: UROLOGY | Facility: CLINIC | Age: 62
End: 2022-08-19

## 2022-08-19 ENCOUNTER — NON-APPOINTMENT (OUTPATIENT)
Age: 62
End: 2022-08-19

## 2022-08-19 VITALS
SYSTOLIC BLOOD PRESSURE: 114 MMHG | HEART RATE: 80 BPM | BODY MASS INDEX: 20.56 KG/M2 | TEMPERATURE: 97.4 F | WEIGHT: 131 LBS | HEIGHT: 67 IN | DIASTOLIC BLOOD PRESSURE: 77 MMHG

## 2022-08-19 DIAGNOSIS — N39.3 STRESS INCONTINENCE (FEMALE) (MALE): ICD-10-CM

## 2022-08-19 PROCEDURE — 99204 OFFICE O/P NEW MOD 45 MIN: CPT

## 2022-08-22 LAB
APPEARANCE: CLEAR
BACTERIA UR CULT: NORMAL
BACTERIA: NEGATIVE
BILIRUBIN URINE: NEGATIVE
BLOOD URINE: NEGATIVE
COLOR: COLORLESS
GLUCOSE QUALITATIVE U: NEGATIVE
HYALINE CASTS: 0 /LPF
KETONES URINE: NEGATIVE
LEUKOCYTE ESTERASE URINE: NEGATIVE
MICROSCOPIC-UA: NORMAL
NITRITE URINE: NEGATIVE
PH URINE: 7
PROTEIN URINE: NEGATIVE
RED BLOOD CELLS URINE: 0 /HPF
SPECIFIC GRAVITY URINE: 1
SQUAMOUS EPITHELIAL CELLS: 0 /HPF
UROBILINOGEN URINE: NORMAL
WHITE BLOOD CELLS URINE: 0 /HPF

## 2022-08-22 NOTE — PHYSICAL EXAM

## 2022-08-22 NOTE — ASSESSMENT
[FreeTextEntry1] : AARON\par Reviewed treatment options\par No interest in sling procedure\par Hesitant to start anticholinergic due to forgetfulness\par Recommend bladder training - timed voiding\par Continue kegel/pelvic exercise \par \par Gross hematuria\par prev negative workup\par recent treated for UTI with antibiotic \par Labs: UA, UC, urine cytology \par \par follow up renal ultrasound

## 2022-08-22 NOTE — HISTORY OF PRESENT ILLNESS
[Currently Experiencing ___] :  [unfilled] [Urinary Incontinence] : urinary incontinence [None] : None [FreeTextEntry1] : 62 yr old female with h/o of urinary incontinence, gross hematuria, \par Returns for follow up \par Complains of gross hematuria with UTI. \par Recently had UTI two weeks ago, treated with ciprofloxacin \par Reports the same urinary incontinence since last office visit in 2019 \par alvaro to hold urine with small leak \par currently asymptomatic\par Denies fever or chills\par Denies dysuria, hematuria, flank pain \par

## 2022-08-24 LAB — URINE CYTOLOGY: NORMAL

## 2022-08-29 ENCOUNTER — APPOINTMENT (OUTPATIENT)
Dept: ORTHOPEDIC SURGERY | Facility: CLINIC | Age: 62
End: 2022-08-29

## 2022-08-29 VITALS — HEIGHT: 67 IN | WEIGHT: 131 LBS | BODY MASS INDEX: 20.56 KG/M2 | TEMPERATURE: 98.2 F

## 2022-08-29 VITALS — HEART RATE: 72 BPM | DIASTOLIC BLOOD PRESSURE: 81 MMHG | SYSTOLIC BLOOD PRESSURE: 127 MMHG | OXYGEN SATURATION: 97 %

## 2022-08-29 DIAGNOSIS — M84.469S: ICD-10-CM

## 2022-08-29 DIAGNOSIS — S83.241S OTHER TEAR OF MEDIAL MENISCUS, CURRENT INJURY, RIGHT KNEE, SEQUELA: ICD-10-CM

## 2022-08-29 PROCEDURE — 99213 OFFICE O/P EST LOW 20 MIN: CPT

## 2022-08-29 NOTE — DISCUSSION/SUMMARY
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 She has been to 10 sessions of PT and is doing very well. She has no tibial plateau tenderness neg Bibiana and olga no effusion and is able to do deep squats without pain. Her insufficiency fracture has healed.\par \par Plan\par Continue PT\par f/u 8 weeks. for final visit to see she is back to all activities without pain

## 2022-08-29 NOTE — HISTORY OF PRESENT ILLNESS
[de-identified] : 63 yo female follow-up for right knee medial meniscus tear and insufficiency fracture. She has been attending physical therapy and it feels she is doing much better. Takes advil as needed. She feels the HA injection is still working.

## 2022-09-01 ENCOUNTER — APPOINTMENT (OUTPATIENT)
Dept: UROLOGY | Facility: CLINIC | Age: 62
End: 2022-09-01

## 2022-09-01 VITALS — TEMPERATURE: 97.3 F | HEART RATE: 74 BPM | SYSTOLIC BLOOD PRESSURE: 125 MMHG | DIASTOLIC BLOOD PRESSURE: 79 MMHG

## 2022-09-01 DIAGNOSIS — R31.0 GROSS HEMATURIA: ICD-10-CM

## 2022-09-01 PROCEDURE — 99214 OFFICE O/P EST MOD 30 MIN: CPT | Mod: 25

## 2022-09-01 PROCEDURE — 76775 US EXAM ABDO BACK WALL LIM: CPT

## 2022-09-01 NOTE — HISTORY OF PRESENT ILLNESS
[FreeTextEntry1] : see attached renal sono\par right fullness no clear hydro\par no pain\par UA UCX negative\par cytology negative

## 2022-09-01 NOTE — ASSESSMENT
[FreeTextEntry1] : right hydro\par hx hematuria\par for CT urogram\par review by phone\par if benign f/u 1 year\par

## 2022-09-09 ENCOUNTER — APPOINTMENT (OUTPATIENT)
Dept: ENDOCRINOLOGY | Facility: CLINIC | Age: 62
End: 2022-09-09

## 2022-09-09 VITALS
BODY MASS INDEX: 20.2 KG/M2 | DIASTOLIC BLOOD PRESSURE: 69 MMHG | HEART RATE: 75 BPM | WEIGHT: 129 LBS | SYSTOLIC BLOOD PRESSURE: 134 MMHG

## 2022-09-09 PROCEDURE — 99214 OFFICE O/P EST MOD 30 MIN: CPT

## 2022-09-09 RX ORDER — OXYCODONE AND ACETAMINOPHEN 5; 325 MG/1; MG/1
5-325 TABLET ORAL
Qty: 10 | Refills: 0 | Status: DISCONTINUED | COMMUNITY
Start: 2021-12-28 | End: 2022-09-09

## 2022-09-09 RX ORDER — CIPROFLOXACIN HYDROCHLORIDE 500 MG/1
500 TABLET, FILM COATED ORAL TWICE DAILY
Qty: 10 | Refills: 1 | Status: DISCONTINUED | COMMUNITY
Start: 2022-04-13 | End: 2022-09-09

## 2022-09-09 RX ORDER — MELOXICAM 15 MG/1
15 TABLET ORAL
Qty: 30 | Refills: 1 | Status: DISCONTINUED | COMMUNITY
Start: 2021-12-09 | End: 2022-09-09

## 2022-09-09 RX ORDER — PANTOPRAZOLE SODIUM 40 MG/1
40 TABLET, DELAYED RELEASE ORAL
Refills: 0 | Status: DISCONTINUED | COMMUNITY
End: 2022-09-09

## 2022-09-09 RX ORDER — IBUPROFEN 800 MG/1
800 TABLET, FILM COATED ORAL 3 TIMES DAILY
Qty: 15 | Refills: 0 | Status: DISCONTINUED | COMMUNITY
Start: 2021-12-28 | End: 2022-09-09

## 2022-09-09 RX ORDER — DOCUSATE SODIUM 100 MG/1
100 CAPSULE ORAL TWICE DAILY
Qty: 20 | Refills: 2 | Status: DISCONTINUED | COMMUNITY
Start: 2021-12-28 | End: 2022-09-09

## 2022-09-09 RX ORDER — NITROFURANTOIN (MONOHYDRATE/MACROCRYSTALS) 25; 75 MG/1; MG/1
100 CAPSULE ORAL TWICE DAILY
Qty: 10 | Refills: 1 | Status: DISCONTINUED | COMMUNITY
Start: 2021-12-27 | End: 2022-09-09

## 2022-09-09 RX ORDER — CHROMIUM 200 MCG
TABLET ORAL
Refills: 0 | Status: ACTIVE | COMMUNITY

## 2022-09-13 ENCOUNTER — RESULT REVIEW (OUTPATIENT)
Age: 62
End: 2022-09-13

## 2022-09-13 ENCOUNTER — OUTPATIENT (OUTPATIENT)
Dept: OUTPATIENT SERVICES | Facility: HOSPITAL | Age: 62
LOS: 1 days | End: 2022-09-13
Payer: COMMERCIAL

## 2022-09-13 ENCOUNTER — APPOINTMENT (OUTPATIENT)
Dept: CT IMAGING | Facility: HOSPITAL | Age: 62
End: 2022-09-13

## 2022-09-13 DIAGNOSIS — Z98.890 OTHER SPECIFIED POSTPROCEDURAL STATES: Chronic | ICD-10-CM

## 2022-09-13 LAB — POCT ISTAT CREATININE: 0.7 MG/DL — SIGNIFICANT CHANGE UP (ref 0.5–1.3)

## 2022-09-13 PROCEDURE — 74178 CT ABD&PLV WO CNTR FLWD CNTR: CPT

## 2022-09-13 PROCEDURE — 74178 CT ABD&PLV WO CNTR FLWD CNTR: CPT | Mod: 26

## 2022-09-13 PROCEDURE — 82565 ASSAY OF CREATININE: CPT

## 2022-10-05 ENCOUNTER — NON-APPOINTMENT (OUTPATIENT)
Age: 62
End: 2022-10-05

## 2022-10-06 ENCOUNTER — APPOINTMENT (OUTPATIENT)
Dept: ULTRASOUND IMAGING | Facility: HOSPITAL | Age: 62
End: 2022-10-06

## 2022-10-06 ENCOUNTER — OUTPATIENT (OUTPATIENT)
Dept: OUTPATIENT SERVICES | Facility: HOSPITAL | Age: 62
LOS: 1 days | End: 2022-10-06
Payer: COMMERCIAL

## 2022-10-06 DIAGNOSIS — Z98.890 OTHER SPECIFIED POSTPROCEDURAL STATES: Chronic | ICD-10-CM

## 2022-10-06 PROCEDURE — 76536 US EXAM OF HEAD AND NECK: CPT | Mod: 26

## 2022-10-06 PROCEDURE — 76536 US EXAM OF HEAD AND NECK: CPT

## 2022-10-31 ENCOUNTER — APPOINTMENT (OUTPATIENT)
Dept: ORTHOPEDIC SURGERY | Facility: CLINIC | Age: 62
End: 2022-10-31

## 2022-11-18 ENCOUNTER — RESULT REVIEW (OUTPATIENT)
Age: 62
End: 2022-11-18

## 2023-01-05 ENCOUNTER — APPOINTMENT (OUTPATIENT)
Dept: INTERNAL MEDICINE | Facility: CLINIC | Age: 63
End: 2023-01-05

## 2023-01-27 ENCOUNTER — LABORATORY RESULT (OUTPATIENT)
Age: 63
End: 2023-01-27

## 2023-01-27 LAB
AFP-TM SERPL-MCNC: 2.3 NG/ML
CANCER AG125 SERPL-ACNC: 9 U/ML
CANCER AG19-9 SERPL-ACNC: <2 U/ML
CEA SERPL-MCNC: 1.9 NG/ML

## 2023-01-31 LAB
APPEARANCE: ABNORMAL
BILIRUBIN URINE: NEGATIVE
BLOOD URINE: ABNORMAL
COLOR: YELLOW
GLUCOSE QUALITATIVE U: NEGATIVE
KETONES URINE: NEGATIVE
LEUKOCYTE ESTERASE URINE: ABNORMAL
NITRITE URINE: NEGATIVE
PH URINE: 6.5
PROTEIN URINE: NORMAL
SPECIFIC GRAVITY URINE: 1.01
UROBILINOGEN URINE: NORMAL

## 2023-02-07 ENCOUNTER — APPOINTMENT (OUTPATIENT)
Dept: ULTRASOUND IMAGING | Facility: HOSPITAL | Age: 63
End: 2023-02-07
Payer: COMMERCIAL

## 2023-02-07 ENCOUNTER — APPOINTMENT (OUTPATIENT)
Dept: MAMMOGRAPHY | Facility: HOSPITAL | Age: 63
End: 2023-02-07
Payer: COMMERCIAL

## 2023-02-07 ENCOUNTER — OUTPATIENT (OUTPATIENT)
Dept: OUTPATIENT SERVICES | Facility: HOSPITAL | Age: 63
LOS: 1 days | End: 2023-02-07
Payer: COMMERCIAL

## 2023-02-07 DIAGNOSIS — Z98.890 OTHER SPECIFIED POSTPROCEDURAL STATES: Chronic | ICD-10-CM

## 2023-02-07 PROCEDURE — 77063 BREAST TOMOSYNTHESIS BI: CPT | Mod: 26

## 2023-02-07 PROCEDURE — 77067 SCR MAMMO BI INCL CAD: CPT

## 2023-02-07 PROCEDURE — 76641 ULTRASOUND BREAST COMPLETE: CPT | Mod: 26,50

## 2023-02-07 PROCEDURE — 77067 SCR MAMMO BI INCL CAD: CPT | Mod: 26

## 2023-02-07 PROCEDURE — 76641 ULTRASOUND BREAST COMPLETE: CPT

## 2023-02-07 PROCEDURE — 77063 BREAST TOMOSYNTHESIS BI: CPT

## 2023-05-17 ENCOUNTER — NON-APPOINTMENT (OUTPATIENT)
Age: 63
End: 2023-05-17

## 2023-05-19 ENCOUNTER — APPOINTMENT (OUTPATIENT)
Dept: INTERNAL MEDICINE | Facility: CLINIC | Age: 63
End: 2023-05-19
Payer: COMMERCIAL

## 2023-05-19 ENCOUNTER — NON-APPOINTMENT (OUTPATIENT)
Age: 63
End: 2023-05-19

## 2023-05-19 VITALS
SYSTOLIC BLOOD PRESSURE: 131 MMHG | HEIGHT: 67 IN | OXYGEN SATURATION: 95 % | TEMPERATURE: 96.5 F | WEIGHT: 129 LBS | HEART RATE: 65 BPM | BODY MASS INDEX: 20.25 KG/M2 | DIASTOLIC BLOOD PRESSURE: 74 MMHG | RESPIRATION RATE: 15 BRPM

## 2023-05-19 DIAGNOSIS — K21.9 GASTRO-ESOPHAGEAL REFLUX DISEASE W/OUT ESOPHAGITIS: ICD-10-CM

## 2023-05-19 PROCEDURE — 99396 PREV VISIT EST AGE 40-64: CPT | Mod: 25

## 2023-05-19 PROCEDURE — 36415 COLL VENOUS BLD VENIPUNCTURE: CPT

## 2023-05-19 NOTE — HEALTH RISK ASSESSMENT
[No] : No [No falls in past year] : Patient reported no falls in the past year [0] : 2) Feeling down, depressed, or hopeless: Not at all (0) [Patient reported mammogram was normal] : Patient reported mammogram was normal [UEQ2Oacem] : 0 [MammogramDate] : 02/2021 [PapSmearComments] : Yearly Dr Sykes [BoneDensityComments] : Repeat Sept  [ColonoscopyComments] : Dr Brady

## 2023-05-19 NOTE — HISTORY OF PRESENT ILLNESS
[FreeTextEntry1] : No chief complaint;\par Positive exercise \par Followed by various consultants [de-identified] : Has occ abnormal cardiac complaints\par Frequent UTI :

## 2023-05-22 LAB
25(OH)D3 SERPL-MCNC: 29 NG/ML
ALBUMIN SERPL ELPH-MCNC: 4.4 G/DL
ALP BLD-CCNC: 53 U/L
ALT SERPL-CCNC: 14 U/L
ANION GAP SERPL CALC-SCNC: 12 MMOL/L
APPEARANCE: CLEAR
AST SERPL-CCNC: 15 U/L
BILIRUB SERPL-MCNC: 0.3 MG/DL
BILIRUBIN URINE: NEGATIVE
BLOOD URINE: NEGATIVE
BUN SERPL-MCNC: 15 MG/DL
CALCIUM SERPL-MCNC: 9.5 MG/DL
CHLORIDE SERPL-SCNC: 102 MMOL/L
CHOLEST SERPL-MCNC: 209 MG/DL
CO2 SERPL-SCNC: 26 MMOL/L
COLOR: YELLOW
CREAT SERPL-MCNC: 0.69 MG/DL
EGFR: 99 ML/MIN/1.73M2
ESTIMATED AVERAGE GLUCOSE: 123 MG/DL
GLUCOSE QUALITATIVE U: NEGATIVE MG/DL
GLUCOSE SERPL-MCNC: 117 MG/DL
HBA1C MFR BLD HPLC: 5.9 %
HDLC SERPL-MCNC: 73 MG/DL
KETONES URINE: NEGATIVE MG/DL
LDLC SERPL CALC-MCNC: 117 MG/DL
LEUKOCYTE ESTERASE URINE: NEGATIVE
NITRITE URINE: NEGATIVE
NONHDLC SERPL-MCNC: 136 MG/DL
PH URINE: 6.5
POTASSIUM SERPL-SCNC: 3.7 MMOL/L
PROT SERPL-MCNC: 7.3 G/DL
PROTEIN URINE: NEGATIVE MG/DL
SODIUM SERPL-SCNC: 140 MMOL/L
SPECIFIC GRAVITY URINE: 1.01
T4 FREE SERPL-MCNC: 1.2 NG/DL
TRIGL SERPL-MCNC: 95 MG/DL
TSH SERPL-ACNC: 1.67 UIU/ML
UROBILINOGEN URINE: 0.2 MG/DL

## 2023-06-27 ENCOUNTER — NON-APPOINTMENT (OUTPATIENT)
Age: 63
End: 2023-06-27

## 2023-09-05 ENCOUNTER — APPOINTMENT (OUTPATIENT)
Dept: RADIOLOGY | Facility: HOSPITAL | Age: 63
End: 2023-09-05
Payer: COMMERCIAL

## 2023-09-05 ENCOUNTER — OUTPATIENT (OUTPATIENT)
Dept: OUTPATIENT SERVICES | Facility: HOSPITAL | Age: 63
LOS: 1 days | End: 2023-09-05
Payer: COMMERCIAL

## 2023-09-05 DIAGNOSIS — Z98.890 OTHER SPECIFIED POSTPROCEDURAL STATES: Chronic | ICD-10-CM

## 2023-09-05 PROCEDURE — 77085 DXA BONE DENSITY AXL VRT FX: CPT

## 2023-09-05 PROCEDURE — 77085 DXA BONE DENSITY AXL VRT FX: CPT | Mod: 26

## 2023-09-06 NOTE — HISTORY OF PRESENT ILLNESS
[FreeTextEntry1] : Ms. Kelley is a 62 year-old woman presenting for follow-up of osteoporosis and thyroid nodules. I saw her for an initial visit in March 2021. 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 \par \par Bone History\par Menopause: Age 54\par Osteoporosis diagnosed in 2019 on routine bone density significant for T-scores of -2.8 at the lumbar spine, -1.4 at the femoral neck, -0.9 at the total hip, +0.7 at the distal radius; most recent bone density as below\par Fracture history: Right knee insufficiency fracture diagnosed in 2022 with torn meniscus\par Family history: No parental history of hip fracture\par Treatment: Alendronate 70 mg weekly from March 2021 to present\par \par Falls: None recent\par Height loss: No\par Kidney stones: History of right kidney stone\par Dental health: Regular appointments, no history of implants, no upcoming procedures planned\par Exercise: Walks, weight training four times per week\par Dairy intake: 2-3 serving daily (yogurt and milk daily, cheese a few times per week)\par Calcium supplements: None\par Multivitamin: None\par Vitamin D supplements: 1000 intl units daily\par \par Osteoporosis risk factors include: Postmenopausal status,  race, prior fracture, falls, height loss, small thin bones, tobacco use, excessive alcohol, anorexia, family history, vitamin D deficiency, corticosteroid use, seizure medications, malabsorption, hyperparathyroidism, hyperthyroidism.\par NEGATIVE EXCEPT: Postmenopausal status,  race\par \par Hashimoto's disease. Thyroid nodules.\par She has a history of Hashimoto's disease but has remained biochemically euthyroid and has not required levothyroxine. \par She has a history of multiple thyroid nodules. She had a biopsy of a single dominant nodule many years ago that was negative per report.\par Thyroid ultrasound from January 2021 with bilateral subcentimeter nodules not meeting criteria for biopsy.\par No history of radiation exposure.\par Niece with history of hyperthyroidism. No family history of thyroid cancer.\par \par Interim History \par We started alendronate 70 mg weekly in March 2021. She is tolerating well and taking as prescribed.\par Bone density from March 2022 demonstrated a significant improvement at the femoral neck from previous, with trends towards improvement at the other sites.\par Medical and surgical history, medications, allergies, social and family history reviewed and updated as needed.

## 2023-09-06 NOTE — ADDENDUM
[FreeTextEntry1] : Recent thyroid ultrasound as below, with bilateral subcentimeter nodules not meeting criteria for biopsy. I left a telephone message for Ms. Kelley to discuss. 10/06/22  Recent bone density as below. There is osteoporosis by the World Health Organization criteria. There were significant declines at the femoral neck and total hip from previous, due at least in part to positioning. Vertebral fracture assessment without evidence of compression fractures. I left a telephone message for Ms. Kelley, and I have asked my office to schedule a follow-up appointment to further discuss. 9/06/23  Most recent bone mineral density Date: September 5, 2023 Source: [facility] Site: [facility]  Site	BMD	T-score	Change previous	Change baseline	 Lumbar spine	0.775	-2.5		+2.8%	 Femoral neck	0.691	-1.4		-4.5%*	 Total hip           0.804	-1.1		-5.7%*	 Distal radius	0.704	+0.2		-2.5%	 DXA comments: *Significant change from previous; left hip values Vertebral fracture assessment without evidence of compression fractures T7 to L5 (my read).   Impression: I have personally reviewed the DXA images and report. There is osteoporosis by the World Health Organization criteria. There were significant declines at the femoral neck and total hip from previous, due at least in part to positioning. Vertebral fracture assessment without evidence of compression fractures.

## 2023-09-06 NOTE — PHYSICAL EXAM
[Alert] : alert [Healthy Appearance] : healthy appearance [No Acute Distress] : no acute distress [Normal Sclera/Conjunctiva] : normal sclera/conjunctiva [Normal Hearing] : hearing was normal [No LAD] : no lymphadenopathy [Supple] : the neck was supple [No Respiratory Distress] : no respiratory distress [No Stigmata of Cushings Syndrome] : no stigmata of Cushings Syndrome [Normal Gait] : normal gait [Normal Insight/Judgement] : insight and judgment were intact [No Neck Mass] : no neck mass was observed [Thyroid Not Enlarged] : the thyroid was not enlarged [Kyphosis] : no kyphosis present [Acanthosis Nigricans] : no acanthosis nigricans [de-identified] : no moon facies, no supraclavicular fat pads

## 2023-09-06 NOTE — DATA REVIEWED
[FreeTextEntry1] : Most recent bone mineral density\par Date: September 23, 2019\par Source: [facility]\par Site: [facility]\par \par Site	BMD (g/cm2)	T-score	Change previous	Change baseline	\par Lumbar spine	0.738	-2.8\par Femoral neck	0.692	-1.4	\par Total hip	                0.831       -0.9         \par Distal radius           	0.738       +0.7         \par DXA Comments:

## 2023-09-06 NOTE — ASSESSMENT
[Bisphosphonates] : The patient was instructed to take bisphosphonates on an empty stomach with a full glass of water,and wait at least 30 minutes before eating or lying down [FreeTextEntry1] : Osteoporosis. She has no history of fragility fracture. She has a history of alendronate therapy from 2021 to present. Metabolic evaluation for secondary causes of bone loss previously unremarkable. Her last bone density demonstrated a significant improvement at the femoral neck. We discussed the potential benefits and risks of antiresorptive osteoporosis therapy, including but not limited to osteonecrosis of the jaw and atypical femoral fracture. She is tolerating alendronate and we will continue. We reviewed proper use and compliance with alendronate. \par Continue alendronate 70 mg weekly\par Calcium 1200 mg daily from diet (to be taken in divided doses as no more than 500-600 mg can be absorbed at one time)\par Continue current vitamin D regimen\par Diet, exercise and fall prevention discussed\par \par Hashimoto's disease. Thyroid nodules. She has been biochemically euthyroid. Thyroid ultrasound from 2021 with bilateral subcentimeter nodules not meeting criteria for biopsy. We will plan to obtain an interval thyroid ultrasound now, and increase the monitoring interval if stable.\par Monitor TSH annually or earlier if clinically indicated\par Interval thyroid ultrasound\par \par Next bone density testin year\par Next appointment: 1 year or earlier as needed\par \par I reviewed the DXA performed on 2022 with the patient today.\par I reviewed the thyroid ultrasound performed on 2021 with the patient today.\par I reviewed the laboratories performed on May 4, 2022 with the patient today. \par I counseled the patient regarding calcium and vitamin D intake today.\par I discussed the following osteoporosis therapies: Alendronate

## 2023-10-08 ENCOUNTER — EMERGENCY (EMERGENCY)
Facility: HOSPITAL | Age: 63
LOS: 1 days | Discharge: HOME | End: 2023-10-08
Attending: EMERGENCY MEDICINE | Admitting: EMERGENCY MEDICINE
Payer: COMMERCIAL

## 2023-10-08 VITALS
SYSTOLIC BLOOD PRESSURE: 153 MMHG | RESPIRATION RATE: 20 BRPM | TEMPERATURE: 99 F | HEART RATE: 79 BPM | OXYGEN SATURATION: 97 % | DIASTOLIC BLOOD PRESSURE: 87 MMHG

## 2023-10-08 VITALS
HEIGHT: 67 IN | HEART RATE: 80 BPM | DIASTOLIC BLOOD PRESSURE: 90 MMHG | SYSTOLIC BLOOD PRESSURE: 157 MMHG | OXYGEN SATURATION: 96 % | TEMPERATURE: 98 F | RESPIRATION RATE: 16 BRPM | WEIGHT: 126.99 LBS

## 2023-10-08 DIAGNOSIS — R30.0 DYSURIA: ICD-10-CM

## 2023-10-08 DIAGNOSIS — M54.9 DORSALGIA, UNSPECIFIED: ICD-10-CM

## 2023-10-08 DIAGNOSIS — Z98.890 OTHER SPECIFIED POSTPROCEDURAL STATES: Chronic | ICD-10-CM

## 2023-10-08 DIAGNOSIS — R20.2 PARESTHESIA OF SKIN: ICD-10-CM

## 2023-10-08 DIAGNOSIS — G43.909 MIGRAINE, UNSPECIFIED, NOT INTRACTABLE, WITHOUT STATUS MIGRAINOSUS: ICD-10-CM

## 2023-10-08 DIAGNOSIS — R59.0 LOCALIZED ENLARGED LYMPH NODES: ICD-10-CM

## 2023-10-08 DIAGNOSIS — N39.3 STRESS INCONTINENCE (FEMALE) (MALE): ICD-10-CM

## 2023-10-08 DIAGNOSIS — Z86.69 PERSONAL HISTORY OF OTHER DISEASES OF THE NERVOUS SYSTEM AND SENSE ORGANS: ICD-10-CM

## 2023-10-08 DIAGNOSIS — Z87.39 PERSONAL HISTORY OF OTHER DISEASES OF THE MUSCULOSKELETAL SYSTEM AND CONNECTIVE TISSUE: ICD-10-CM

## 2023-10-08 DIAGNOSIS — Z87.42 PERSONAL HISTORY OF OTHER DISEASES OF THE FEMALE GENITAL TRACT: ICD-10-CM

## 2023-10-08 DIAGNOSIS — M54.6 PAIN IN THORACIC SPINE: ICD-10-CM

## 2023-10-08 DIAGNOSIS — Z87.442 PERSONAL HISTORY OF URINARY CALCULI: ICD-10-CM

## 2023-10-08 DIAGNOSIS — D86.9 SARCOIDOSIS, UNSPECIFIED: ICD-10-CM

## 2023-10-08 DIAGNOSIS — E06.3 AUTOIMMUNE THYROIDITIS: ICD-10-CM

## 2023-10-08 DIAGNOSIS — K21.9 GASTRO-ESOPHAGEAL REFLUX DISEASE WITHOUT ESOPHAGITIS: ICD-10-CM

## 2023-10-08 DIAGNOSIS — Z87.19 PERSONAL HISTORY OF OTHER DISEASES OF THE DIGESTIVE SYSTEM: ICD-10-CM

## 2023-10-08 DIAGNOSIS — R26.2 DIFFICULTY IN WALKING, NOT ELSEWHERE CLASSIFIED: ICD-10-CM

## 2023-10-08 LAB
ALBUMIN SERPL ELPH-MCNC: 4.5 G/DL — SIGNIFICANT CHANGE UP (ref 3.3–5)
ALP SERPL-CCNC: 64 U/L — SIGNIFICANT CHANGE UP (ref 40–120)
ALT FLD-CCNC: 17 U/L — SIGNIFICANT CHANGE UP (ref 10–45)
ANION GAP SERPL CALC-SCNC: 11 MMOL/L — SIGNIFICANT CHANGE UP (ref 5–17)
APPEARANCE UR: CLEAR — SIGNIFICANT CHANGE UP
APTT BLD: 30.8 SEC — SIGNIFICANT CHANGE UP (ref 24.5–35.6)
AST SERPL-CCNC: 18 U/L — SIGNIFICANT CHANGE UP (ref 10–40)
BACTERIA # UR AUTO: SIGNIFICANT CHANGE UP /HPF
BASOPHILS # BLD AUTO: 0.03 K/UL — SIGNIFICANT CHANGE UP (ref 0–0.2)
BASOPHILS NFR BLD AUTO: 0.4 % — SIGNIFICANT CHANGE UP (ref 0–2)
BILIRUB SERPL-MCNC: 0.3 MG/DL — SIGNIFICANT CHANGE UP (ref 0.2–1.2)
BILIRUB UR-MCNC: NEGATIVE — SIGNIFICANT CHANGE UP
BUN SERPL-MCNC: 11 MG/DL — SIGNIFICANT CHANGE UP (ref 7–23)
CALCIUM SERPL-MCNC: 9.6 MG/DL — SIGNIFICANT CHANGE UP (ref 8.4–10.5)
CHLORIDE SERPL-SCNC: 104 MMOL/L — SIGNIFICANT CHANGE UP (ref 96–108)
CO2 SERPL-SCNC: 25 MMOL/L — SIGNIFICANT CHANGE UP (ref 22–31)
COLOR SPEC: YELLOW — SIGNIFICANT CHANGE UP
CREAT SERPL-MCNC: 0.56 MG/DL — SIGNIFICANT CHANGE UP (ref 0.5–1.3)
DIFF PNL FLD: ABNORMAL
EGFR: 103 ML/MIN/1.73M2 — SIGNIFICANT CHANGE UP
EOSINOPHIL # BLD AUTO: 0.14 K/UL — SIGNIFICANT CHANGE UP (ref 0–0.5)
EOSINOPHIL NFR BLD AUTO: 1.9 % — SIGNIFICANT CHANGE UP (ref 0–6)
EPI CELLS # UR: SIGNIFICANT CHANGE UP /HPF (ref 0–5)
GLUCOSE SERPL-MCNC: 114 MG/DL — HIGH (ref 70–99)
GLUCOSE UR QL: NEGATIVE — SIGNIFICANT CHANGE UP
HCT VFR BLD CALC: 42.3 % — SIGNIFICANT CHANGE UP (ref 34.5–45)
HGB BLD-MCNC: 14 G/DL — SIGNIFICANT CHANGE UP (ref 11.5–15.5)
IMM GRANULOCYTES NFR BLD AUTO: 0.1 % — SIGNIFICANT CHANGE UP (ref 0–0.9)
INR BLD: 0.92 — SIGNIFICANT CHANGE UP (ref 0.85–1.18)
KETONES UR-MCNC: NEGATIVE — SIGNIFICANT CHANGE UP
LEUKOCYTE ESTERASE UR-ACNC: NEGATIVE — SIGNIFICANT CHANGE UP
LYMPHOCYTES # BLD AUTO: 1.02 K/UL — SIGNIFICANT CHANGE UP (ref 1–3.3)
LYMPHOCYTES # BLD AUTO: 13.9 % — SIGNIFICANT CHANGE UP (ref 13–44)
MAGNESIUM SERPL-MCNC: 2 MG/DL — SIGNIFICANT CHANGE UP (ref 1.6–2.6)
MCHC RBC-ENTMCNC: 26.1 PG — LOW (ref 27–34)
MCHC RBC-ENTMCNC: 33.1 GM/DL — SIGNIFICANT CHANGE UP (ref 32–36)
MCV RBC AUTO: 78.9 FL — LOW (ref 80–100)
MONOCYTES # BLD AUTO: 0.39 K/UL — SIGNIFICANT CHANGE UP (ref 0–0.9)
MONOCYTES NFR BLD AUTO: 5.3 % — SIGNIFICANT CHANGE UP (ref 2–14)
NEUTROPHILS # BLD AUTO: 5.73 K/UL — SIGNIFICANT CHANGE UP (ref 1.8–7.4)
NEUTROPHILS NFR BLD AUTO: 78.4 % — HIGH (ref 43–77)
NITRITE UR-MCNC: NEGATIVE — SIGNIFICANT CHANGE UP
NRBC # BLD: 0 /100 WBCS — SIGNIFICANT CHANGE UP (ref 0–0)
PH UR: 6 — SIGNIFICANT CHANGE UP (ref 5–8)
PHOSPHATE SERPL-MCNC: 3.7 MG/DL — SIGNIFICANT CHANGE UP (ref 2.5–4.5)
PLATELET # BLD AUTO: 229 K/UL — SIGNIFICANT CHANGE UP (ref 150–400)
POTASSIUM SERPL-MCNC: 3.8 MMOL/L — SIGNIFICANT CHANGE UP (ref 3.5–5.3)
POTASSIUM SERPL-SCNC: 3.8 MMOL/L — SIGNIFICANT CHANGE UP (ref 3.5–5.3)
PROT SERPL-MCNC: 8.2 G/DL — SIGNIFICANT CHANGE UP (ref 6–8.3)
PROT UR-MCNC: NEGATIVE MG/DL — SIGNIFICANT CHANGE UP
PROTHROM AB SERPL-ACNC: 10.5 SEC — SIGNIFICANT CHANGE UP (ref 9.5–13)
RBC # BLD: 5.36 M/UL — HIGH (ref 3.8–5.2)
RBC # FLD: 14.2 % — SIGNIFICANT CHANGE UP (ref 10.3–14.5)
RBC CASTS # UR COMP ASSIST: < 5 /HPF — SIGNIFICANT CHANGE UP
SODIUM SERPL-SCNC: 140 MMOL/L — SIGNIFICANT CHANGE UP (ref 135–145)
SP GR SPEC: <=1.005 — SIGNIFICANT CHANGE UP (ref 1–1.03)
UROBILINOGEN FLD QL: 0.2 E.U./DL — SIGNIFICANT CHANGE UP
WBC # BLD: 7.32 K/UL — SIGNIFICANT CHANGE UP (ref 3.8–10.5)
WBC # FLD AUTO: 7.32 K/UL — SIGNIFICANT CHANGE UP (ref 3.8–10.5)
WBC UR QL: < 5 /HPF — SIGNIFICANT CHANGE UP

## 2023-10-08 PROCEDURE — 72156 MRI NECK SPINE W/O & W/DYE: CPT | Mod: 26,MA

## 2023-10-08 PROCEDURE — 83735 ASSAY OF MAGNESIUM: CPT

## 2023-10-08 PROCEDURE — 72125 CT NECK SPINE W/O DYE: CPT | Mod: MA

## 2023-10-08 PROCEDURE — 72128 CT CHEST SPINE W/O DYE: CPT | Mod: MA

## 2023-10-08 PROCEDURE — A9585: CPT

## 2023-10-08 PROCEDURE — 87086 URINE CULTURE/COLONY COUNT: CPT

## 2023-10-08 PROCEDURE — 80053 COMPREHEN METABOLIC PANEL: CPT

## 2023-10-08 PROCEDURE — 72156 MRI NECK SPINE W/O & W/DYE: CPT | Mod: MA

## 2023-10-08 PROCEDURE — 72125 CT NECK SPINE W/O DYE: CPT | Mod: 26,MA

## 2023-10-08 PROCEDURE — 85025 COMPLETE CBC W/AUTO DIFF WBC: CPT

## 2023-10-08 PROCEDURE — 36415 COLL VENOUS BLD VENIPUNCTURE: CPT

## 2023-10-08 PROCEDURE — 85610 PROTHROMBIN TIME: CPT

## 2023-10-08 PROCEDURE — 99285 EMERGENCY DEPT VISIT HI MDM: CPT

## 2023-10-08 PROCEDURE — 99284 EMERGENCY DEPT VISIT MOD MDM: CPT | Mod: 25

## 2023-10-08 PROCEDURE — 96374 THER/PROPH/DIAG INJ IV PUSH: CPT | Mod: XU

## 2023-10-08 PROCEDURE — 72157 MRI CHEST SPINE W/O & W/DYE: CPT | Mod: 26,MA

## 2023-10-08 PROCEDURE — 85730 THROMBOPLASTIN TIME PARTIAL: CPT

## 2023-10-08 PROCEDURE — 72157 MRI CHEST SPINE W/O & W/DYE: CPT | Mod: MA

## 2023-10-08 PROCEDURE — 84100 ASSAY OF PHOSPHORUS: CPT

## 2023-10-08 PROCEDURE — 96375 TX/PRO/DX INJ NEW DRUG ADDON: CPT | Mod: XU

## 2023-10-08 PROCEDURE — 72128 CT CHEST SPINE W/O DYE: CPT | Mod: 26,MA

## 2023-10-08 PROCEDURE — 81001 URINALYSIS AUTO W/SCOPE: CPT

## 2023-10-08 RX ORDER — HYDROMORPHONE HYDROCHLORIDE 2 MG/ML
1 INJECTION INTRAMUSCULAR; INTRAVENOUS; SUBCUTANEOUS ONCE
Refills: 0 | Status: DISCONTINUED | OUTPATIENT
Start: 2023-10-08 | End: 2023-10-08

## 2023-10-08 RX ORDER — DIAZEPAM 5 MG
5 TABLET ORAL ONCE
Refills: 0 | Status: DISCONTINUED | OUTPATIENT
Start: 2023-10-08 | End: 2023-10-08

## 2023-10-08 RX ORDER — ONDANSETRON 8 MG/1
4 TABLET, FILM COATED ORAL ONCE
Refills: 0 | Status: DISCONTINUED | OUTPATIENT
Start: 2023-10-08 | End: 2023-10-11

## 2023-10-08 RX ORDER — OXYCODONE AND ACETAMINOPHEN 5; 325 MG/1; MG/1
1 TABLET ORAL
Qty: 20 | Refills: 0
Start: 2023-10-08 | End: 2023-10-12

## 2023-10-08 RX ORDER — ONDANSETRON 8 MG/1
4 TABLET, FILM COATED ORAL ONCE
Refills: 0 | Status: COMPLETED | OUTPATIENT
Start: 2023-10-08 | End: 2023-10-08

## 2023-10-08 RX ORDER — ALENDRONATE SODIUM 70 MG/1
1 TABLET ORAL
Refills: 0 | DISCHARGE

## 2023-10-08 RX ORDER — ONDANSETRON 8 MG/1
1 TABLET, FILM COATED ORAL
Qty: 15 | Refills: 0
Start: 2023-10-08 | End: 2023-10-12

## 2023-10-08 RX ORDER — ACETAMINOPHEN 500 MG
1000 TABLET ORAL ONCE
Refills: 0 | Status: COMPLETED | OUTPATIENT
Start: 2023-10-08 | End: 2023-10-08

## 2023-10-08 RX ORDER — PANTOPRAZOLE SODIUM 20 MG/1
1 TABLET, DELAYED RELEASE ORAL
Qty: 0 | Refills: 0 | DISCHARGE

## 2023-10-08 RX ORDER — DEXAMETHASONE 0.5 MG/5ML
5 ELIXIR ORAL ONCE
Refills: 0 | Status: COMPLETED | OUTPATIENT
Start: 2023-10-08 | End: 2023-10-08

## 2023-10-08 RX ORDER — ALENDRONATE SODIUM 70 MG/1
1 TABLET ORAL
Qty: 0 | Refills: 0 | DISCHARGE

## 2023-10-08 RX ORDER — KETOROLAC TROMETHAMINE 30 MG/ML
15 SYRINGE (ML) INJECTION ONCE
Refills: 0 | Status: DISCONTINUED | OUTPATIENT
Start: 2023-10-08 | End: 2023-10-08

## 2023-10-08 RX ADMIN — Medication 15 MILLIGRAM(S): at 12:37

## 2023-10-08 RX ADMIN — Medication 400 MILLIGRAM(S): at 12:38

## 2023-10-08 RX ADMIN — Medication 5 MILLIGRAM(S): at 12:37

## 2023-10-08 RX ADMIN — HYDROMORPHONE HYDROCHLORIDE 1 MILLIGRAM(S): 2 INJECTION INTRAMUSCULAR; INTRAVENOUS; SUBCUTANEOUS at 15:02

## 2023-10-08 NOTE — ED PROVIDER NOTE - NS ED ATTENDING STATEMENT MOD
I have seen and examined this patient and fully participated in the care of this patient as the teaching attending.  The service was shared with the MILAGROS.  I reviewed and verified the documentation and independently performed the documented:

## 2023-10-08 NOTE — ED PROVIDER NOTE - ATTENDING CONTRIBUTION TO CARE
pt w new thoracic back pain / tingling in extremities, pt w resolved tingling aftger meds / no deficits on my exam however especially as pt w ho of sarcoid / osteoporosis extensive workup performed / no spinal pathology apparent .   + extensive intrathoracic lymphadenopathy , possibly due to pt's sarcoid although will auroraley need malignancy workup / discussed with Dr. Cordero who will f/u w patient for this and pt was seen in ER by neuro team consultation.   discussed emergent return instructions

## 2023-10-08 NOTE — ED ADULT NURSE NOTE - OBJECTIVE STATEMENT
63 year old female with PMH of OA, GERD, and sarcoidosis p/w back pain. pt states that she is having lower back pain with associated numbness in extremities x 5 days. rpts mucus in her stool. ambulating w/ steady gait. also endorses HA. denies urinary incontinence, CP, SOB, n/v/d, dizziness. pt well appearing. No acute distress noted at this time.

## 2023-10-08 NOTE — ED PROVIDER NOTE - CLINICAL SUMMARY MEDICAL DECISION MAKING FREE TEXT BOX
63 year old female with PMH of OA, GERD, and sarcoidosis presenting with worsening back pain associated bilateral upper and lower extremities numbness and tingling for the last 5 days. Vitals stable. PE significant for thoracic spine tenderness, no signs of trauma, bilateral upper extremity numbness from the elbow down, along with b/l lower extremity numbness.       r/o spinal compression vs sarcoidosis spinal spread vs metastasis vs AAA    Labs, CT spine, pain control, UA

## 2023-10-08 NOTE — ED ADULT NURSE NOTE - NSFALLUNIVINTERV_ED_ALL_ED
Bed/Stretcher in lowest position, wheels locked, appropriate side rails in place/Call bell, personal items and telephone in reach/Instruct patient to call for assistance before getting out of bed/chair/stretcher/Non-slip footwear applied when patient is off stretcher/[place] to call system/Physically safe environment - no spills, clutter or unnecessary equipment/Purposeful proactive rounding/Room/bathroom lighting operational, light cord in reach

## 2023-10-08 NOTE — CONSULT NOTE ADULT - SUBJECTIVE AND OBJECTIVE BOX
NEUROLOGY CONSULT    HPI: "63 year old female with PMH of OA, GERD, and sarcoidosis presenting with worsening back pain associated bilateral upper and lower extremities numbness and tingling for the last 5 days. Patient reports she was in her normal state of health until 8 days ago when she started having mucus in her BM, denies any blood in urine or BM. Two days later, patient started experiencing lower back pain which over the day moved to her thoracic spine as a throbbing pain. She reports the pain continued to radiated to her cervical spine causing a migraine. She denies any visual changes but does note tenderness to her upper eyes. She denies any trauma or urinary retention. She notes she does have stress incontinence and has been experiencing urinary discomfort along with a mild odor over the last two days. Of note, patient received flu shot about 2 weeks ago."    Neurology was consulted for back pain, numbness in all extremities x 5 d ago. Per patient started w L shoulder pain with some sensation changes and next day she noticed numnbess on other arm spread to both legs with end result of numbness in distal limbs x 4 (below elbow in UE and below knees in LE). Has hx of sarcoidosis found accidentally after CXR and CT chest for nagging cough in 2008. Chest radiographs were stable since then, no Bx or other diagnostic labs done since then. Denies weakness, falls, new balance problems/dizziness, vision changes, any numbness at face, seizures, previous similar episodes. Admits unintentional weight loss for 6lbs within 4 weeks which is significant for her despite increased appetite; some changes in her stool w mucus, urinary discomfort, occational neck pain which can lead to her migraines or tension HA. 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       MEDICATIONS  Home Medications:  alendronate 70 mg oral tablet: 1 tab(s) orally once a day (08 Oct 2023 12:25)  levothyroxine 125 mcg (0.125 mg) oral tablet: 1 tab(s) orally once a day (08 Oct 2023 12:25)  Protonix 40 mg oral delayed release tablet: 1 tab(s) orally once a day (08 Oct 2023 12:25)      FAMILY HISTORY: Remarkable for GI and  CA in first degree relatives, no neuromuscular ds.   SOCIAL HISTORY: negative for tobacco, alcohol, or ilicit drug use.    Allergies  No Known Allergies          GEN: NAD, pleasant, cooperative  NEUROLOGICAL EXAMINATION:  GENERAL:  Appearance is consistent with chronologic age.   COGNITION/LANGUAGE:  Awake, alert, and oriented to person, place, time and date.  Fluent, intact comprehension, repetition, naming. Recent and remote memory intact.  Fund of knowledge is appropriate.  Nondysarthric.    CRANIAL NERVES:   - Eyes:  Visual acuity intact. Pupils equal round and reactive, no RAPD. EOMI w/o nystagmus, skew or reported double vision. Normal visual field on confrontation. No ptosis/weakness of eyelid closure.   - Face:  Facial sensation normal V1 - 3, no facial asymmetry.    - Ears/Nose/Throat:  Hearing grossly intact b/l to finger rub.  Palate elevates midline.  Tongue and uvula midline.   MOTOR EXAM:  (R/L) 5/5 UE; 5/5 LE.  No observable drift. Normal tone and bulk. No tenderness, twitching, tremors or involuntary movements.  SENSORY EXAM:  Intact to light touch and pinprick, pain, temperature and proprioception and vibration in all extremities.  REFLEXES:   2+ b/l biceps, triceps, Brisk 3+ patella with adductor components, 2+ achilles.  Plantar response downgoing b/l.  Jaw jerk, Maday, clonus absent.  CEREBELLUM:  Finger to nose/Heel to knee and shin intact.  No dysmetria.    GAIT: narrow based and normal.  Romberg: negative.        LABS:                        14.0   7.32  )-----------( 229      ( 08 Oct 2023 12:12 )             42.3     10-08    140  |  104  |  11  ----------------------------<  114<H>  3.8   |  25  |  0.56    Ca    9.6      08 Oct 2023 12:12  Phos  3.7     10-08  Mg     2.0     10-08    TPro  8.2  /  Alb  4.5  /  TBili  0.3  /  DBili  x   /  AST  18  /  ALT  17  /  AlkPhos  64  10-08    Hemoglobin A1C:   Vitamin B12   PT/INR - ( 08 Oct 2023 12:12 )   PT: 10.5 sec;   INR: 0.92          PTT - ( 08 Oct 2023 12:12 )  PTT:30.8 sec  CAPILLARY BLOOD GLUCOSE    Urinalysis Basic - ( 08 Oct 2023 12:12 )    Color: Yellow / Appearance: Clear / SG: <=1.005 / pH: x  Gluc: 114 mg/dL / Ketone: NEGATIVE  / Bili: Negative / Urobili: 0.2 E.U./dL   Blood: x / Protein: NEGATIVE mg/dL / Nitrite: NEGATIVE   Leuk Esterase: NEGATIVE / RBC: < 5 /HPF / WBC < 5 /HPF   Sq Epi: x / Non Sq Epi: x / Bacteria: None /HPF            Microbiology:      RADIOLOGY, EKG AND ADDITIONAL TESTS: Reviewed.           NEUROLOGY CONSULT    HPI: "63 year old female with PMH of OA, GERD, and sarcoidosis presenting with worsening back pain associated bilateral upper and lower extremities numbness and tingling for the last 5 days. Patient reports she was in her normal state of health until 8 days ago when she started having mucus in her BM, denies any blood in urine or BM. Two days later, patient started experiencing lower back pain which over the day moved to her thoracic spine as a throbbing pain. She reports the pain continued to radiated to her cervical spine causing a migraine. She denies any visual changes but does note tenderness to her upper eyes. She denies any trauma or urinary retention. She notes she does have stress incontinence and has been experiencing urinary discomfort along with a mild odor over the last two days. Of note, patient received flu shot about 2 weeks ago."    Neurology was consulted for back pain, numbness in all extremities x 5 d ago. Per patient started w L shoulder pain with some sensation changes and next day she noticed numnbess on other arm spread to both legs with end result of numbness in distal limbs x 4 (below elbow in UE and below knees in LE). Has hx of sarcoidosis found accidentally after CXR and CT chest for nagging cough in 2008. Chest radiographs were stable since then, no Bx or other diagnostic labs done since then. Denies weakness, falls, new balance problems/dizziness, vision changes, any numbness at face, seizures, previous similar episodes. Admits unintentional weight loss for 6lbs within 4 weeks which is significant for her despite increased appetite; some changes in her stool w mucus, urinary discomfort, occasional neck pain which can lead to her migraines or tension HA. 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       MEDICATIONS  Home Medications:  alendronate 70 mg oral tablet: 1 tab(s) orally once a day (08 Oct 2023 12:25)  levothyroxine 125 mcg (0.125 mg) oral tablet: 1 tab(s) orally once a day (08 Oct 2023 12:25)  Protonix 40 mg oral delayed release tablet: 1 tab(s) orally once a day (08 Oct 2023 12:25)      FAMILY HISTORY: Remarkable for GI and  CA in first degree relatives, no neuromuscular ds.   SOCIAL HISTORY: negative for tobacco, alcohol, or ilicit drug use.    Allergies  No Known Allergies          GEN: NAD, pleasant, cooperative  NEUROLOGICAL EXAMINATION:  GENERAL:  Appearance is consistent with chronologic age.   COGNITION/LANGUAGE:  Awake, alert, and oriented to person, place, time and date.  Fluent, intact comprehension, repetition, naming. Recent and remote memory intact.  Fund of knowledge is appropriate.  Nondysarthric.    CRANIAL NERVES:   - Eyes:  Visual acuity intact. Pupils equal round and reactive, no RAPD. EOMI w/o nystagmus, skew or reported double vision. Normal visual field on confrontation. No ptosis/weakness of eyelid closure.   - Face:  Facial sensation normal V1 - 3, no facial asymmetry.    - Ears/Nose/Throat:  Hearing grossly intact b/l to finger rub.  Palate elevates midline.  Tongue and uvula midline.   MOTOR EXAM:  (R/L) 5/5 UE; 5/5 LE.  No observable drift. Normal tone and bulk. No tenderness, twitching, tremors or involuntary movements.  SENSORY EXAM:  Intact to light touch and pinprick, pain, proprioception and decreased temperature and vibration b/l LE.   REFLEXES:   2+ b/l biceps, triceps, Brisk 3+ patella with adductor components, 2+ achilles.  Plantar response downgoing b/l.  Jaw jerk, Maday, clonus absent.  CEREBELLUM:  Finger to nose/Heel to knee and shin intact.  No dysmetria.    GAIT: narrow based and normal.  Romberg: negative.        LABS:                        14.0   7.32  )-----------( 229      ( 08 Oct 2023 12:12 )             42.3     10-08    140  |  104  |  11  ----------------------------<  114<H>  3.8   |  25  |  0.56    Ca    9.6      08 Oct 2023 12:12  Phos  3.7     10-08  Mg     2.0     10-08    TPro  8.2  /  Alb  4.5  /  TBili  0.3  /  DBili  x   /  AST  18  /  ALT  17  /  AlkPhos  64  10-08    Hemoglobin A1C:   Vitamin B12   PT/INR - ( 08 Oct 2023 12:12 )   PT: 10.5 sec;   INR: 0.92          PTT - ( 08 Oct 2023 12:12 )  PTT:30.8 sec  CAPILLARY BLOOD GLUCOSE    Urinalysis Basic - ( 08 Oct 2023 12:12 )    Color: Yellow / Appearance: Clear / SG: <=1.005 / pH: x  Gluc: 114 mg/dL / Ketone: NEGATIVE  / Bili: Negative / Urobili: 0.2 E.U./dL   Blood: x / Protein: NEGATIVE mg/dL / Nitrite: NEGATIVE   Leuk Esterase: NEGATIVE / RBC: < 5 /HPF / WBC < 5 /HPF   Sq Epi: x / Non Sq Epi: x / Bacteria: None /HPF            Microbiology:      RADIOLOGY, EKG AND ADDITIONAL TESTS: Reviewed.           NEUROLOGY CONSULT    HPI: "63 year old female with PMH of OA, GERD, and sarcoidosis presenting with worsening back pain associated bilateral upper and lower extremities numbness and tingling for the last 5 days. Patient reports she was in her normal state of health until 8 days ago when she started having mucus in her BM, denies any blood in urine or BM. Two days later, patient started experiencing lower back pain which over the day moved to her thoracic spine as a throbbing pain. She reports the pain continued to radiated to her cervical spine causing a migraine. She denies any visual changes but does note tenderness to her upper eyes. She denies any trauma or urinary retention. She notes she does have stress incontinence and has been experiencing urinary discomfort along with a mild odor over the last two days. Of note, patient received flu shot about 2 weeks ago."    Neurology was consulted for back pain, numbness in all extremities x 5 d ago. Per patient started w L shoulder pain with some sensation changes and next day she noticed numnbess on other arm spread to both legs with end result of numbness in distal limbs x 4 (below elbow in UE and below knees in LE). Has hx of sarcoidosis found accidentally after CXR and CT chest for nagging cough in 2008. Chest radiographs were stable since then, no Bx or other diagnostic labs done since then. Denies weakness, falls, new balance problems/dizziness, vision changes, any numbness at face, seizures, previous similar episodes. Admits unintentional weight loss for 6lbs within 4 weeks which is significant for her despite increased appetite; some changes in her stool w mucus, urinary discomfort, occasional neck pain which can lead to her migraines or tension HA. 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       MEDICATIONS  Home Medications:  alendronate 70 mg oral tablet: 1 tab(s) orally once a day (08 Oct 2023 12:25)  levothyroxine 125 mcg (0.125 mg) oral tablet: 1 tab(s) orally once a day (08 Oct 2023 12:25)  Protonix 40 mg oral delayed release tablet: 1 tab(s) orally once a day (08 Oct 2023 12:25)      FAMILY HISTORY: Remarkable for GI and  CA in first degree relatives, no neuromuscular ds.   SOCIAL HISTORY: negative for tobacco, alcohol, or ilicit drug use.    Allergies  No Known Allergies          GEN: NAD, pleasant, cooperative  NEUROLOGICAL EXAMINATION:  GENERAL:  Appearance is consistent with chronologic age.   COGNITION/LANGUAGE:  Awake, alert, and oriented to person, place, time and date.  Fluent, intact comprehension, repetition, naming. Recent and remote memory intact.  Fund of knowledge is appropriate.  Nondysarthric.    CRANIAL NERVES:   - Eyes:  Visual acuity intact. Pupils equal round and reactive, no RAPD. EOMI w/o nystagmus, skew or reported double vision. Normal visual field on confrontation. No ptosis/weakness of eyelid closure.   - Face:  Facial sensation normal V1 - 3, no facial asymmetry.    - Ears/Nose/Throat:  Hearing grossly intact b/l to finger rub.  Palate elevates midline.  Tongue and uvula midline.   MOTOR EXAM:  (R/L) 5/5 UE; 5/5 LE.  No observable drift. Normal tone and bulk. No tenderness, twitching, tremors or involuntary movements.  SENSORY EXAM:  Intact to light touch and pinprick, pain, proprioception and decreased temperature and vibration b/l LE.   REFLEXES:   2+ b/l biceps, triceps, Brisk 3+ patella with adductor components, 2+ achilles.  Plantar response downgoing b/l.  Jaw jerk, Maday, clonus absent.  CEREBELLUM:  Finger to nose/Heel to knee and shin intact.  No dysmetria.    GAIT: narrow based and normal.  Romberg: negative.        LABS:                        14.0   7.32  )-----------( 229      ( 08 Oct 2023 12:12 )             42.3     10-08    140  |  104  |  11  ----------------------------<  114<H>  3.8   |  25  |  0.56    Ca    9.6      08 Oct 2023 12:12  Phos  3.7     10-08  Mg     2.0     10-08    TPro  8.2  /  Alb  4.5  /  TBili  0.3  /  DBili  x   /  AST  18  /  ALT  17  /  AlkPhos  64  10-08    Hemoglobin A1C:   Vitamin B12   PT/INR - ( 08 Oct 2023 12:12 )   PT: 10.5 sec;   INR: 0.92          PTT - ( 08 Oct 2023 12:12 )  PTT:30.8 sec  CAPILLARY BLOOD GLUCOSE    Urinalysis Basic - ( 08 Oct 2023 12:12 )    Color: Yellow / Appearance: Clear / SG: <=1.005 / pH: x  Gluc: 114 mg/dL / Ketone: NEGATIVE  / Bili: Negative / Urobili: 0.2 E.U./dL   Blood: x / Protein: NEGATIVE mg/dL / Nitrite: NEGATIVE   Leuk Esterase: NEGATIVE / RBC: < 5 /HPF / WBC < 5 /HPF   Sq Epi: x / Non Sq Epi: x / Bacteria: None /HPF  Microbiology:  RADIOLOGY, EKG AND ADDITIONAL TESTS: Reviewed.

## 2023-10-08 NOTE — CONSULT NOTE ADULT - ASSESSMENT
63 year old female with PMH of OA, GERD, sarcoidosis, migraines presenting with worsening back pain associated bilateral upper and lower extremities subjective numbness and tingling in distal parts of all her extremities for the last 5 days. Her neurological exam is remarkable only for slight temperature hypesthesia in LE and increased patellar reflexes w/o any pathological Babinski signs - most likely related to anxiety and cold temperature in the room. Taking into account her hx of sarcoidosis MRI C and T spine w and w/o contrast was performed which didn't reveal acute pathology, only showed broad C6-7 disc bulging w/o any spinal compression, no contrast enchainment. Her hx of probable sarcoidosis, enlargement of mediastinal LN, tenderness of spinal processus of entire spine, unintentional weight loss needs rheumatological and oncological w/up. From neurological standpoint would do basic polyneuropathy w/up.     Recommendations:     ·	Blood work and neurology f/u which may be done OP settings: A1C, GTT (glucose tolerance test), B12 level, B1 and B6 levels, SPEP w immunofixation, Serum ACE, CHRIS, SSA/B, RF, Hep B/C   ·	Rheumathology consut 63 year old female with PMH of OA, GERD, sarcoidosis, migraines presenting with worsening back pain associated bilateral upper and lower extremities subjective numbness and tingling in distal parts of all her extremities for the last 5 days. Her neurological exam is remarkable only for slight temperature hypesthesia in LE and increased patellar reflexes w/o any pathological Babinski signs - most likely related to anxiety and cold temperature in the room. Taking into account her hx of sarcoidosis MRI C and T spine w and w/o contrast was performed which didn't reveal acute pathology, only showed broad C6-7 disc bulging w/o any spinal compression, no contrast enchainment. Her hx of probable sarcoidosis, enlargement of mediastinal LN, tenderness of spinal processus of entire spine, unintentional weight loss needs rheumatological and oncological w/up. From neurological standpoint would do basic polyneuropathy w/up.     Recommendations:     ·	Blood work and neurology f/u which may be done OP settings: ESR, CRP, Serum ACE, CHRIS, SSA/B, RF, Hep B/C, A1C, GTT (glucose tolerance test), B12 level, B1 and B6 levels, SPEP w immunofixation   ·	Oncology screening tests per discretion of PCP, possible rheumatology GI consult        Was discussed w attending Dr. Villegas 63 year old female with PMH of OA, GERD, sarcoidosis, migraines presenting with worsening back pain associated bilateral upper and lower extremities subjective numbness and tingling in distal parts of all her extremities for the last 5 days. Her neurological exam is remarkable only for slight vibration and temperature hypesthesia in b/l LE and increased patellar reflexes w/o any pathological Babinski signs - most likely related to anxiety and cold temperature in the room. Taking into account her hx of sarcoidosis MRI C and T spine w and w/o contrast was performed which didn't reveal acute pathology, only showed broad C6-7 disc bulging w/o any spinal compression, no contrast enchainment. Her hx of probable sarcoidosis, enlargement of mediastinal LN, unintentional weight loss needs rheumatological and oncological w/up. From neurological standpoint would do basic polyneuropathy w/up.     Recommendations:     ·	Blood work and neurology f/u which may be done OP settings: ESR, CRP, Serum ACE, CHRIS, SSA/B, RF, Hep B/C, A1C, GTT (glucose tolerance test), B12 level, B1 and B6 levels, SPEP w immunofixation   ·	Oncology screening tests per discretion of PCP, possible rheumatology GI consult        Was discussed w attending Dr. Villegas 63 year old female with PMH of OA, GERD, sarcoidosis, migraines presenting with worsening back pain associated bilateral upper and lower extremities subjective numbness and tingling in distal parts of all her extremities for the last 5 days. Her neurological exam is remarkable only for slight vibration and temperature hypesthesia in b/l LE and increased patellar reflexes w/o any pathological Babinski signs - most likely related to anxiety and cold temperature in the room. Taking into account her hx of sarcoidosis MRI C and T spine w and w/o contrast was performed which didn't reveal acute pathology, only showed broad C6-7 disc bulging w/o any spinal compression, no contrast enchainment. Her hx of probable sarcoidosis, enlargement of mediastinal LN, unintentional weight loss needs rheumatological and oncological w/up. From neurological standpoint would do basic polyneuropathy w/up.     Recommendations:     ·	Blood work and neurology f/u which may be done OP settings: ESR, CRP, Serum ACE, CHRIS, SSA/B, RF, Hep B/C, A1C, GTT (glucose tolerance test), B12 level, B1 and B6 levels, SPEP w immunofixation; considering paraneoplastic w/up   ·	Oncology screening tests per discretion of PCP, possible rheumatology GI consult        Was discussed w attending Dr. Villegas

## 2023-10-08 NOTE — ED PROVIDER NOTE - PATIENT PORTAL LINK FT
You can access the FollowMyHealth Patient Portal offered by [facility] by registering at the following website: [email or web address]. By joining [facility]’s FollowMyHealth portal, you will also be able to view your health information using other applications (apps) compatible with our system.

## 2023-10-08 NOTE — ED PROVIDER NOTE - NSFOLLOWUPINSTRUCTIONS_ED_ALL_ED_FT
Please follow up with Dr. Cordero. He has been updated on your care.     Back Pain    Back pain is very common in adults. The cause of back pain is rarely dangerous and the pain often gets better over time. The cause of your back pain may not be known and may include strain of muscles or ligaments, degeneration of the spinal disks, or arthritis. Occasionally the pain may radiate down your leg(s). Over-the-counter medicines to reduce pain and inflammation are often the most helpful. Stretching and remaining active frequently helps the healing process.     SEEK IMMEDIATE MEDICAL CARE IF YOU HAVE ANY OF THE FOLLOWING SYMPTOMS: bowel or bladder control problems, unusual weakness or numbness in your arms or legs, nausea or vomiting, abdominal pain, fever, dizziness/lightheadedness.     Lymphadenopathy    Lymphadenopathy refers to swollen or enlarged lymph glands (nodes). Lymph glands are part of your body's defense (immune) system, which protects the body from infections, germs, and diseases. Enlargement of these glands are commonly related to the body properly fighting an infection but in some people may be a sign of another disease, such as cancer. Make sure to follow up with your primary care physician for monitoring for symptom resolution and possible further testing.     SEEK IMMEDIATE MEDICAL CARE IF YOU HAVE ANY OF THE FOLLOWING SYMPTOMS: fluid leaking from the area of the enlarged lymph node, chest pain, SOB, weight loss, night sweats, or continued swelling.      Warning Signs to monitor:  Please call your doctor or nurse practitioner if you experience the following:  *You experience new chest pain, pressure, squeezing or tightness.  *New or worsening cough, shortness of breath, or wheeze.  *If you are vomiting and cannot keep down fluids or your medications.  *You are getting dehydrated due to continued vomiting, diarrhea, or other reasons. Signs of dehydration include dry mouth, rapid heartbeat, or feeling dizzy or faint when standing.  *You see blood or dark/black material when you vomit or have a bowel movement.  *You experience burning when you urinate, have blood in your urine, or experience a discharge.  *Your pain is not improving within 8-12 hours or is not gone within 24 hours. Call or return immediately if your pain is getting worse, changes location, or moves to your chest or back.  *You have shaking chills, or fever greater than 101.5 degrees Fahrenheit or 38 degrees Celsius.  *Any change in your symptoms, or any new symptoms that concern you.    If unable to reach a medical professional please go to an emergency room.

## 2023-10-08 NOTE — ED PROVIDER NOTE - OBJECTIVE STATEMENT
63 year old female with PMH of OA, GERD, and sarcoidosis presenting with worsening back pain associated bilateral upper and lower extremities numbness and tingling for the last 5 days. Patient reports she was in her normal state of health until 8 days ago when she started having mucus in her BM, denies any blood in urine or BM. Two days later, patient started experiencing lower back pain which over the day moved to her thoracic spine as a throbbing pain. She reports the pain continued to radiated to her cervical spine causing a migraine. She denies any visual changes but does note tenderness to her upper eyes. She denies any trauma or urinary retention. She notes she does have stress incontinence and has been experiencing urinary discomfort along with a mild odor over the last two days. 63 year old female with PMH of OA, GERD, and sarcoidosis presenting with worsening back pain associated bilateral upper and lower extremities numbness and tingling for the last 5 days. Patient reports she was in her normal state of health until 8 days ago when she started having mucus in her BM, denies any blood in urine or BM. Two days later, patient started experiencing lower back pain which over the day moved to her thoracic spine as a throbbing pain. She reports the pain continued to radiated to her cervical spine causing a migraine. She denies any visual changes but does note tenderness to her upper eyes. She denies any trauma or urinary retention. She notes she does have stress incontinence and has been experiencing urinary discomfort along with a mild odor over the last two days. Of note, patient received flu shot about 2 weeks ago.

## 2023-10-08 NOTE — ED ADULT TRIAGE NOTE - CHIEF COMPLAINT QUOTE
Pt presents to ED c/o middle back pain, radiating to the neck with numbness/tingling sensation to bilateral arms and bilateral legs x 3 days. denies chest pain, dizziness, shortness of breath.

## 2023-10-09 DIAGNOSIS — M54.50 LOW BACK PAIN, UNSPECIFIED: ICD-10-CM

## 2023-10-09 LAB
CULTURE RESULTS: NO GROWTH — SIGNIFICANT CHANGE UP
SPECIMEN SOURCE: SIGNIFICANT CHANGE UP

## 2023-10-11 ENCOUNTER — APPOINTMENT (OUTPATIENT)
Dept: INTERNAL MEDICINE | Facility: CLINIC | Age: 63
End: 2023-10-11
Payer: COMMERCIAL

## 2023-10-11 ENCOUNTER — INPATIENT (INPATIENT)
Facility: HOSPITAL | Age: 63
LOS: 13 days | Discharge: HOME | DRG: 95 | End: 2023-10-25
Attending: INTERNAL MEDICINE | Admitting: INTERNAL MEDICINE
Payer: COMMERCIAL

## 2023-10-11 VITALS
OXYGEN SATURATION: 97 % | DIASTOLIC BLOOD PRESSURE: 94 MMHG | RESPIRATION RATE: 16 BRPM | SYSTOLIC BLOOD PRESSURE: 173 MMHG | HEIGHT: 67 IN | WEIGHT: 131.62 LBS | TEMPERATURE: 98 F | HEART RATE: 73 BPM

## 2023-10-11 VITALS
BODY MASS INDEX: 20.4 KG/M2 | HEART RATE: 80 BPM | HEIGHT: 67 IN | WEIGHT: 130 LBS | DIASTOLIC BLOOD PRESSURE: 84 MMHG | SYSTOLIC BLOOD PRESSURE: 182 MMHG | TEMPERATURE: 97.9 F | OXYGEN SATURATION: 98 %

## 2023-10-11 DIAGNOSIS — M54.50 LOW BACK PAIN, UNSPECIFIED: ICD-10-CM

## 2023-10-11 DIAGNOSIS — R29.898 OTHER SYMPTOMS AND SIGNS INVOLVING THE MUSCULOSKELETAL SYSTEM: ICD-10-CM

## 2023-10-11 DIAGNOSIS — Z98.890 OTHER SPECIFIED POSTPROCEDURAL STATES: Chronic | ICD-10-CM

## 2023-10-11 DIAGNOSIS — D86.9 SARCOIDOSIS, UNSPECIFIED: ICD-10-CM

## 2023-10-11 DIAGNOSIS — E06.3 AUTOIMMUNE THYROIDITIS: ICD-10-CM

## 2023-10-11 DIAGNOSIS — R59.0 LOCALIZED ENLARGED LYMPH NODES: ICD-10-CM

## 2023-10-11 DIAGNOSIS — K21.9 GASTRO-ESOPHAGEAL REFLUX DISEASE WITHOUT ESOPHAGITIS: ICD-10-CM

## 2023-10-11 DIAGNOSIS — Z29.9 ENCOUNTER FOR PROPHYLACTIC MEASURES, UNSPECIFIED: ICD-10-CM

## 2023-10-11 LAB
A1C WITH ESTIMATED AVERAGE GLUCOSE RESULT: 5.9 % — HIGH (ref 4–5.6)
ALBUMIN SERPL ELPH-MCNC: 4.5 G/DL — SIGNIFICANT CHANGE UP (ref 3.3–5)
ALP SERPL-CCNC: 64 U/L — SIGNIFICANT CHANGE UP (ref 40–120)
ALT FLD-CCNC: 16 U/L — SIGNIFICANT CHANGE UP (ref 10–45)
ANION GAP SERPL CALC-SCNC: 14 MMOL/L — SIGNIFICANT CHANGE UP (ref 5–17)
AST SERPL-CCNC: 17 U/L — SIGNIFICANT CHANGE UP (ref 10–40)
BASOPHILS # BLD AUTO: 0.03 K/UL — SIGNIFICANT CHANGE UP (ref 0–0.2)
BASOPHILS NFR BLD AUTO: 0.3 % — SIGNIFICANT CHANGE UP (ref 0–2)
BILIRUB SERPL-MCNC: 0.6 MG/DL — SIGNIFICANT CHANGE UP (ref 0.2–1.2)
BUN SERPL-MCNC: 11 MG/DL — SIGNIFICANT CHANGE UP (ref 7–23)
CALCIUM SERPL-MCNC: 9.1 MG/DL — SIGNIFICANT CHANGE UP (ref 8.4–10.5)
CHLORIDE SERPL-SCNC: 92 MMOL/L — LOW (ref 96–108)
CK SERPL-CCNC: 111 U/L — SIGNIFICANT CHANGE UP (ref 25–170)
CO2 SERPL-SCNC: 23 MMOL/L — SIGNIFICANT CHANGE UP (ref 22–31)
CREAT SERPL-MCNC: 0.49 MG/DL — LOW (ref 0.5–1.3)
CRP SERPL-MCNC: <3 MG/L — SIGNIFICANT CHANGE UP (ref 0–4)
CRP SERPL-MCNC: <3 MG/L — SIGNIFICANT CHANGE UP (ref 0–4)
EGFR: 106 ML/MIN/1.73M2 — SIGNIFICANT CHANGE UP
EOSINOPHIL # BLD AUTO: 0.06 K/UL — SIGNIFICANT CHANGE UP (ref 0–0.5)
EOSINOPHIL NFR BLD AUTO: 0.5 % — SIGNIFICANT CHANGE UP (ref 0–6)
ERYTHROCYTE [SEDIMENTATION RATE] IN BLOOD: 7 MM/HR — SIGNIFICANT CHANGE UP
ERYTHROCYTE [SEDIMENTATION RATE] IN BLOOD: 7 MM/HR — SIGNIFICANT CHANGE UP
ESTIMATED AVERAGE GLUCOSE: 123 MG/DL — HIGH (ref 68–114)
GLUCOSE SERPL-MCNC: 145 MG/DL — HIGH (ref 70–99)
HAV IGM SER-ACNC: SIGNIFICANT CHANGE UP
HBV CORE AB SER-ACNC: SIGNIFICANT CHANGE UP
HBV CORE IGM SER-ACNC: SIGNIFICANT CHANGE UP
HBV SURFACE AB SER-ACNC: SIGNIFICANT CHANGE UP
HBV SURFACE AG SER-ACNC: SIGNIFICANT CHANGE UP
HCT VFR BLD CALC: 42.2 % — SIGNIFICANT CHANGE UP (ref 34.5–45)
HCV AB S/CO SERPL IA: 0.04 S/CO — SIGNIFICANT CHANGE UP
HCV AB SERPL-IMP: SIGNIFICANT CHANGE UP
HGB BLD-MCNC: 13.7 G/DL — SIGNIFICANT CHANGE UP (ref 11.5–15.5)
IMM GRANULOCYTES NFR BLD AUTO: 0.5 % — SIGNIFICANT CHANGE UP (ref 0–0.9)
LYMPHOCYTES # BLD AUTO: 1.67 K/UL — SIGNIFICANT CHANGE UP (ref 1–3.3)
LYMPHOCYTES # BLD AUTO: 14.9 % — SIGNIFICANT CHANGE UP (ref 13–44)
MAGNESIUM SERPL-MCNC: 1.8 MG/DL — SIGNIFICANT CHANGE UP (ref 1.6–2.6)
MCHC RBC-ENTMCNC: 25.8 PG — LOW (ref 27–34)
MCHC RBC-ENTMCNC: 32.5 GM/DL — SIGNIFICANT CHANGE UP (ref 32–36)
MCV RBC AUTO: 79.5 FL — LOW (ref 80–100)
MONOCYTES # BLD AUTO: 0.71 K/UL — SIGNIFICANT CHANGE UP (ref 0–0.9)
MONOCYTES NFR BLD AUTO: 6.3 % — SIGNIFICANT CHANGE UP (ref 2–14)
NEUTROPHILS # BLD AUTO: 8.71 K/UL — HIGH (ref 1.8–7.4)
NEUTROPHILS NFR BLD AUTO: 77.5 % — HIGH (ref 43–77)
NRBC # BLD: 0 /100 WBCS — SIGNIFICANT CHANGE UP (ref 0–0)
PHOSPHATE SERPL-MCNC: 3 MG/DL — SIGNIFICANT CHANGE UP (ref 2.5–4.5)
PLATELET # BLD AUTO: 250 K/UL — SIGNIFICANT CHANGE UP (ref 150–400)
POTASSIUM SERPL-MCNC: 3.9 MMOL/L — SIGNIFICANT CHANGE UP (ref 3.5–5.3)
POTASSIUM SERPL-SCNC: 3.9 MMOL/L — SIGNIFICANT CHANGE UP (ref 3.5–5.3)
PROT SERPL-MCNC: 7.8 G/DL — SIGNIFICANT CHANGE UP (ref 6–8.3)
RBC # BLD: 5.31 M/UL — HIGH (ref 3.8–5.2)
RBC # FLD: 14.3 % — SIGNIFICANT CHANGE UP (ref 10.3–14.5)
SODIUM SERPL-SCNC: 129 MMOL/L — LOW (ref 135–145)
WBC # BLD: 11.24 K/UL — HIGH (ref 3.8–10.5)
WBC # FLD AUTO: 11.24 K/UL — HIGH (ref 3.8–10.5)

## 2023-10-11 PROCEDURE — 99213 OFFICE O/P EST LOW 20 MIN: CPT

## 2023-10-11 PROCEDURE — 99221 1ST HOSP IP/OBS SF/LOW 40: CPT | Mod: GC

## 2023-10-11 PROCEDURE — 99223 1ST HOSP IP/OBS HIGH 75: CPT

## 2023-10-11 RX ORDER — HYDROMORPHONE HYDROCHLORIDE 2 MG/ML
0.5 INJECTION INTRAMUSCULAR; INTRAVENOUS; SUBCUTANEOUS EVERY 6 HOURS
Refills: 0 | Status: DISCONTINUED | OUTPATIENT
Start: 2023-10-11 | End: 2023-10-13

## 2023-10-11 RX ORDER — ACETAMINOPHEN 500 MG
650 TABLET ORAL EVERY 6 HOURS
Refills: 0 | Status: DISCONTINUED | OUTPATIENT
Start: 2023-10-11 | End: 2023-10-12

## 2023-10-11 RX ORDER — BIMATOPROST 0.3 MG/ML
1 SOLUTION/ DROPS OPHTHALMIC
Refills: 0 | DISCHARGE

## 2023-10-11 RX ORDER — LATANOPROST 0.05 MG/ML
1 SOLUTION/ DROPS OPHTHALMIC; TOPICAL AT BEDTIME
Refills: 0 | Status: DISCONTINUED | OUTPATIENT
Start: 2023-10-11 | End: 2023-10-18

## 2023-10-11 RX ORDER — LANOLIN ALCOHOL/MO/W.PET/CERES
3 CREAM (GRAM) TOPICAL AT BEDTIME
Refills: 0 | Status: DISCONTINUED | OUTPATIENT
Start: 2023-10-11 | End: 2023-10-25

## 2023-10-11 RX ORDER — ONDANSETRON 8 MG/1
4 TABLET, FILM COATED ORAL EVERY 8 HOURS
Refills: 0 | Status: DISCONTINUED | OUTPATIENT
Start: 2023-10-11 | End: 2023-10-25

## 2023-10-11 RX ORDER — LEVOTHYROXINE SODIUM 125 MCG
1 TABLET ORAL
Refills: 0 | DISCHARGE

## 2023-10-11 RX ORDER — ENOXAPARIN SODIUM 100 MG/ML
40 INJECTION SUBCUTANEOUS EVERY 24 HOURS
Refills: 0 | Status: DISCONTINUED | OUTPATIENT
Start: 2023-10-11 | End: 2023-10-25

## 2023-10-11 RX ORDER — HYDROMORPHONE HYDROCHLORIDE 2 MG/ML
0.25 INJECTION INTRAMUSCULAR; INTRAVENOUS; SUBCUTANEOUS EVERY 6 HOURS
Refills: 0 | Status: DISCONTINUED | OUTPATIENT
Start: 2023-10-11 | End: 2023-10-13

## 2023-10-11 RX ORDER — PANTOPRAZOLE SODIUM 20 MG/1
40 TABLET, DELAYED RELEASE ORAL EVERY 24 HOURS
Refills: 0 | Status: DISCONTINUED | OUTPATIENT
Start: 2023-10-11 | End: 2023-10-25

## 2023-10-11 RX ADMIN — HYDROMORPHONE HYDROCHLORIDE 0.25 MILLIGRAM(S): 2 INJECTION INTRAMUSCULAR; INTRAVENOUS; SUBCUTANEOUS at 19:46

## 2023-10-11 RX ADMIN — HYDROMORPHONE HYDROCHLORIDE 0.25 MILLIGRAM(S): 2 INJECTION INTRAMUSCULAR; INTRAVENOUS; SUBCUTANEOUS at 20:19

## 2023-10-11 RX ADMIN — Medication 0.5 MILLIGRAM(S): at 21:28

## 2023-10-11 RX ADMIN — LATANOPROST 1 DROP(S): 0.05 SOLUTION/ DROPS OPHTHALMIC; TOPICAL at 21:29

## 2023-10-11 RX ADMIN — ONDANSETRON 4 MILLIGRAM(S): 8 TABLET, FILM COATED ORAL at 13:43

## 2023-10-11 RX ADMIN — HYDROMORPHONE HYDROCHLORIDE 0.5 MILLIGRAM(S): 2 INJECTION INTRAMUSCULAR; INTRAVENOUS; SUBCUTANEOUS at 23:37

## 2023-10-11 RX ADMIN — ENOXAPARIN SODIUM 40 MILLIGRAM(S): 100 INJECTION SUBCUTANEOUS at 19:46

## 2023-10-11 RX ADMIN — HYDROMORPHONE HYDROCHLORIDE 0.5 MILLIGRAM(S): 2 INJECTION INTRAMUSCULAR; INTRAVENOUS; SUBCUTANEOUS at 23:42

## 2023-10-11 RX ADMIN — HYDROMORPHONE HYDROCHLORIDE 0.5 MILLIGRAM(S): 2 INJECTION INTRAMUSCULAR; INTRAVENOUS; SUBCUTANEOUS at 14:07

## 2023-10-11 RX ADMIN — HYDROMORPHONE HYDROCHLORIDE 0.5 MILLIGRAM(S): 2 INJECTION INTRAMUSCULAR; INTRAVENOUS; SUBCUTANEOUS at 16:19

## 2023-10-11 NOTE — H&P ADULT - PROBLEM SELECTOR PLAN 1
Progressive weakness in lower limbs over last 3 days with continued urinary incontinence. Last admission 10/08 with MRI and CT didn't reveal acute pathology, only showed broad C6-7 disc bulging w/o any spinal compression, no contrast enchainment. Imaging also showed mediastinal LN. Pt on methylprednisolone 4mg for 6 day (on day 3/6).  - neuro consulted  - PT consulted Progressive weakness in lower limbs over last 3 days with continued urinary incontinence. Last admission 10/08 with MRI and CT didn't reveal acute pathology, only showed broad C6-7 disc bulging w/o any spinal compression, no contrast enchainment. Imaging also showed mediastinal LN. Pt on methylprednisolone 4mg for 6 day (on day 3/6- 3 pills 10/12, 2 pills 10/13, 1 pill 10/14).  - neuro consulted  - PT consulted  - c/w steroid for 3 more days Progressive weakness in lower limbs over last 3 days with continued urinary incontinence. Last admission 10/08 with MRI and CT didn't reveal acute pathology, only showed broad C6-7 disc bulging w/o any spinal compression, no contrast enchainment. Imaging also showed mediastinal LN. Pt on methylprednisolone 4mg for 6 day (on day 3/6- 3 pills 10/12, 2 pills 10/13, 1 pill 10/14).  - neuro consulted  - PT consulted  - hold steroid course pending neuro recs

## 2023-10-11 NOTE — H&P ADULT - ASSESSMENT
Diabetes log 3/16-7/1/20 63 year old female with PMH of OA, GERD, and sarcoidosis (diagnosed in 2008) presenting with worsening back pain and progressive lower limb weakness for the last three days. Admitted for pain management.

## 2023-10-11 NOTE — H&P ADULT - PROBLEM SELECTOR PLAN 6
D: regular  E: Mg <2, Phosp <3, K <4  DVT ppx: Lovenox  Code: Full Hx of hashimotos. Not on any meds

## 2023-10-11 NOTE — H&P ADULT - PROBLEM SELECTOR PLAN 2
Lower lumbar back pain, that radiates down anterior part of b/l legs.   - dilaudid 0.5mg q6 for severe pain  - acetaminophen 650 prn q6 10/8: CT and MRI showed Extensive mediastinal, hilar, and paratracheal lymphadenopathy is present  in the chest. Enlarged thoracic lymphadenopathy was noted on the 06/08/2012 chest CT study which was attributed to the patient's known history of sarcoidosis at that time. Concern for rheumatologic vs malignant work up  - f/u ESR, CRP, Serum ACE, CHRIS, SSA/B, RF, Hep B/C, A1C, B12 level, B1 and B6 levels  - consider paraneoplastic w/up

## 2023-10-11 NOTE — PROGRESS NOTE ADULT - SUBJECTIVE AND OBJECTIVE BOX
INTERVAL HPI/OVERNIGHT EVENTS:  All notes reviewed;  Neurology consult noted;  Unclear etiology of this leg weakness;   Have discussed with resident and all studies to be ordered;  Also she has asked for anti anxiety medication       MEDICATIONS  (STANDING):  enoxaparin Injectable 40 milliGRAM(s) SubCutaneous every 24 hours  latanoprost 0.005% Ophthalmic Solution 1 Drop(s) Both EYES at bedtime  pantoprazole    Tablet 40 milliGRAM(s) Oral every 24 hours    MEDICATIONS  (PRN):  acetaminophen     Tablet .. 650 milliGRAM(s) Oral every 6 hours PRN Temp greater or equal to 38C (100.4F), Mild Pain (1 - 3)  aluminum hydroxide/magnesium hydroxide/simethicone Suspension 30 milliLiter(s) Oral every 4 hours PRN Dyspepsia  HYDROmorphone  Injectable 0.5 milliGRAM(s) IV Push every 6 hours PRN Severe Pain (7 - 10)  melatonin 3 milliGRAM(s) Oral at bedtime PRN Insomnia  ondansetron Injectable 4 milliGRAM(s) IV Push every 8 hours PRN Nausea and/or Vomiting      Allergies    No Known Allergies    Intolerances        Vital Signs Last 24 Hrs  T(C): 36.7 (11 Oct 2023 13:17), Max: 36.7 (11 Oct 2023 13:17)  T(F): 98 (11 Oct 2023 13:17), Max: 98 (11 Oct 2023 13:17)  HR: 73 (11 Oct 2023 13:17) (73 - 73)  BP: 173/94 (11 Oct 2023 13:17) (173/94 - 173/94)  BP(mean): --  RR: 16 (11 Oct 2023 13:17) (16 - 16)  SpO2: 97% (11 Oct 2023 13:17) (97% - 97%)    Parameters below as of 11 Oct 2023 13:17  Patient On (Oxygen Delivery Method): room air              Constitutional:  Awake     Eyes: EMILY    ENMT: Negative    Neck: Supple    Back:  no tenderness     Respiratory:  clear     Cardiovascular: S1 S2    Gastrointestinal:  soft     Genitourinary:    Extremities:  no edema       Vascular:    Neurological: Neurological exam as outlined     Skin:    Lymph Nodes:            LABS:                        13.7   11.24 )-----------( 250      ( 11 Oct 2023 14:38 )             42.2     10-11    129<L>  |  92<L>  |  11  ----------------------------<  145<H>  3.9   |  23  |  0.49<L>    Ca    9.1      11 Oct 2023 14:38  Phos  3.0     10-11  Mg     1.8     10-11    TPro  7.8  /  Alb  4.5  /  TBili  0.6  /  DBili  x   /  AST  17  /  ALT  16  /  AlkPhos  64  10-11      Urinalysis Basic - ( 11 Oct 2023 14:38 )    Color: x / Appearance: x / SG: x / pH: x  Gluc: 145 mg/dL / Ketone: x  / Bili: x / Urobili: x   Blood: x / Protein: x / Nitrite: x   Leuk Esterase: x / RBC: x / WBC x   Sq Epi: x / Non Sq Epi: x / Bacteria: x        RADIOLOGY & ADDITIONAL TESTS:

## 2023-10-11 NOTE — CONSULT NOTE ADULT - ATTENDING COMMENTS
For the past week she's had back pain, imbalance, leg weakness, numbness in hands and feet  MRI C and T spine were unremarkable other than lymphadenopathy, present to some degree on prior scans and has history of sarcoidosis  Exam shows vibration loss in toes, absent achilles reflexes, weakness of hip flexion and knee flexion b/l   Recommend: check CK, neuropathy labs, MRI brain and L spine with contrast, PET/CT to evaluate lymphadenopathy  Will follow

## 2023-10-11 NOTE — PROGRESS NOTE ADULT - TIME BILLING
Patient seen and examined;  Discussed plans with neurology  All labs and imaging studies to be ordered  Also small dose of Lorazepam for anxiety

## 2023-10-11 NOTE — H&P ADULT - PROBLEM SELECTOR PLAN 3
at home protonix 40mg qd  -c/w home med Lower lumbar back pain, that radiates down anterior part of b/l legs.   - dilaudid 0.5mg q6 for severe pain  - acetaminophen 650 prn q6

## 2023-10-11 NOTE — H&P ADULT - NSHPPHYSICALEXAM_GEN_ALL_CORE
T(C): 36.7 (10-11-23 @ 13:17), Max: 36.7 (10-11-23 @ 13:17)  HR: 73 (10-11-23 @ 13:17) (73 - 73)  BP: 173/94 (10-11-23 @ 13:17) (173/94 - 173/94)  RR: 16 (10-11-23 @ 13:17) (16 - 16)  SpO2: 97% (10-11-23 @ 13:17) (97% - 97%)    CONSTITUTIONAL: Well groomed, no apparent distress  EYES: PERRLA and symmetric, EOMI  ENMT: Oral mucosa with moist membranes.   RESP: No respiratory distress, no use of accessory muscles; CTA b/l  CV: RRR, +S1S2, no peripheral edema  GI: Soft, NT, ND, no rebound, no guarding; no palpable masses  MSK: 5/5 UE b/l. Tenderness to palpation along lumbar spine. LE 4/5 strength b/l   NEURO: AOx3. CN II-XII intact; sensation intact in upper and lower extremities b/l to light touch T(C): 36.7 (10-11-23 @ 13:17), Max: 36.7 (10-11-23 @ 13:17)  HR: 73 (10-11-23 @ 13:17) (73 - 73)  BP: 173/94 (10-11-23 @ 13:17) (173/94 - 173/94)  RR: 16 (10-11-23 @ 13:17) (16 - 16)  SpO2: 97% (10-11-23 @ 13:17) (97% - 97%)    CONSTITUTIONAL: Well groomed, no apparent distress  EYES: PERRLA and symmetric, EOMI  ENMT: Oral mucosa with moist membranes.   RESP: No respiratory distress, no use of accessory muscles; CTA b/l  CV: RRR, +S1S2, no peripheral edema  GI: Soft, NT, ND, no rebound, no guarding; no palpable masses  MSK: 5/5 UE b/l. Tenderness to palpation along lumbar spine. LE 4/5 strength proximally with 5/5 strength distally b/l   NEURO: AOx3. CN II-XII intact; sensation intact in upper and lower extremities b/l to light touch

## 2023-10-11 NOTE — H&P ADULT - HISTORY OF PRESENT ILLNESS
63 year old female with PMH of OA, GERD, and sarcoidosis (diagnosed in 2008) presenting with worsening back pain and progressive lower limb weakness for the last three days. Pt was recently admitted on 10/8/23 for lower and upper extremity numbness and tingling. At the time, MRI and CT lumbar and thoracic which didn't reveal acute pathology, only showed broad C6-7 disc bulging w/o any spinal compression, no contrast enchainment. Imaging also showed mediastinal LN. Today, pt reports 9/10 tearing in quality lower back pain, increased weakness as she was unable to get off the toilet on her own. She also had to use a walker as she felt like she would fall otherwise. Pt also reports urinary incontinence during the day, reports no episodes of incontinence overnight. Pt endorses occipital headache and nausea. Last BM on sunday. Pt denies chest pain, sob, abd pain.

## 2023-10-11 NOTE — PATIENT PROFILE ADULT - FALL HARM RISK - HARM RISK INTERVENTIONS

## 2023-10-11 NOTE — PATIENT PROFILE ADULT - PATIENT'S SEXUAL ORIENTATION
ASSESSMENT:  1. Acute diarrhea  Bola Lyon is a 48-year-old man presents for acute diarrhea.  He has had diarrhea for the last 4 days, up to 5 nonbloody loose stools, with nausea and vomiting on onset 3 days ago.  He was at a pot luck, so I think foodborne illness is still high on the differential, especially given the nausea/vomiting preceding the diarrhea.  His left lower quadrant tenderness, rebound tenderness as well.  He does not look acutely ill, but does have commented that he did not look well.  Differential diagnosis includes bacterial infection, Clostridium difficile (recent hospitalization and Augmentin), diverticulitis.  Check CT for the latter, metronidazole and ciprofloxacin started empirically.  I plan to adjust these or drop them depending on the results of the following tests.  He can use Imodium if no fever or diarrhea, and if C. difficile is negative.  - Culture, Stool; Future  - C. difficile Toxigenic by PCR; Future  - CT Abdomen With Oral With IV Contrast; Future      PLAN:  Patient Instructions   Diarrhea    Stool culture (foodborne illness) , C.diff test (antibiotic related)     CT scan abdomen     Ciprofloxacin and metronidazole antibiotic x 10 days, pending results from the tests    Imodium AD-- can get over the counter to help slow down the diarrhea if needed (don't take if you have a fever or blood in the stool)       Orders Placed This Encounter   Procedures     Culture, Stool     Standing Status:   Future     Standing Expiration Date:   6/24/2017     C. difficile Toxigenic by PCR     Standing Status:   Future     Standing Expiration Date:   6/24/2017     CT Abdomen With Oral With IV Contrast     Standing Status:   Future     Standing Expiration Date:   5/25/2018     Order Specific Question:   Reason for Exam (Describe Symptoms):     Answer:   fever, abdominal pain     Order Specific Question:   Can the procedure be changed per Radiologist protocol?     Answer:   Yes     There are no  discontinued medications.    Return if symptoms worsen or fail to improve, for Next scheduled follow up.    CHIEF COMPLAINT:  Chief Complaint   Patient presents with     Abdominal Pain     pt thinks that he has food poisoning. He was at a potluck 5 days ago and symptoms started that night     Diarrhea     Hot Flashes       HISTORY OF PRESENT ILLNESS:  Bola is a 48 y.o. male presenting to the clinic today with complaints of abdominal pain and diarrhea. He is normally followed by Dr. Gentile. On saturday he went to a [employer] luck and ate some food he thinks did not agree with him. He woke Saturday night feeling very nauseous and began to vomit. Subsequently, he developed body aches, increased nausea and diarrhea. He again vomited on Sunday morning. Additionally, he has had constant abdominal pain that he rates as  6 of 10 in severity and likens to bloating. He has not vomited since early Sunday morning. Pain is most located in the periumbilical area with slight radiation to the left quadrant, most acute just around the umbilicus. His appetite has been very poor and he has been trying to eat foods that are easy on his stomach, like toast and fruits. Stool has become watery but remains very loose. He estimates that he has about 3-5 bowel movements every day. Interestingly, he thinks that his stool has had an odor of humus since the [employer]. He has been drinking fluids and supplementing his electrolytes. Of note, he felt feverish yesterday but did not take his temperature. He mentions that he has also had sinus head aches that he thinks are related to his illness or might be due to dehydration. He tried madyson seltzer antidiarrheal but is unsure if it has helped.      REVIEW OF SYSTEMS:   He has chronic lymphedema in his lower abdomen. He denies any hematochezia.    All other systems are negative.    PFSH:  He ate a great deal of humus while at the [email or web address] and recalls that there was a recall a month ago.    He has a  "history of hernia repair.     TOBACCO USE:  History   Smoking Status     Former Smoker     Packs/day: 0.50     Years: 6.00     Types: Cigarettes     Start date: 8/20/2009     Quit date: 2/21/2017   Smokeless Tobacco     Former User     Comment: USING PATCH       VITALS:  Vitals:    05/24/17 1539   BP: 136/80   Pulse: 80   Temp: 97.5  F (36.4  C)   TempSrc: Tympanic   Weight: (!) 225 lb (102.1 kg)   Height: 5' 6.5\" (1.689 m)     Wt Readings from Last 3 Encounters:   05/24/17 (!) 225 lb (102.1 kg)   05/01/17 (!) 228 lb (103.4 kg)   04/03/17 (!) 232 lb (105.2 kg)       PHYSICAL EXAM:  General: Alert, pleasant.   Skin: Mildly diaphoretic   Lungs:  Clear to auscultation.   Abdomen:  Active bowel sounds. Moderate left side tenderness with rebound.   Musculoskeletal:  Well healed incision of the left leg, mild surrounding swelling.     ADDITIONAL HISTORY SUMMARIZED (2): Nurse triage notes from 5/24/2017 reviewed regarding emesis and diarrhea.  DECISION TO OBTAIN EXTRA INFORMATION (1): Care everywhere accessed.   RADIOLOGY TESTS (1): CT ordered.  LABS (1): Labs ordered.  MEDICINE TESTS (1): None.  INDEPENDENT REVIEW (2 each): None.     QUALITY MEASURES:  The following high BMI interventions were performed this visit: encouragement to exercise     The visit lasted a total of 23 minutes face to face with the patient. Over 50% of the time was spent counseling and educating the patient about colitis.    IDmitriy, am scribing for and in the presence of, Dr. Mcclain.    IDr. Mcclain, personally performed the services described in this documentation, as scribed by Dmitriy Pineda in my presence, and it is both accurate and complete.    MEDICATIONS:  Current Outpatient Prescriptions   Medication Sig Dispense Refill     albuterol (PROAIR HFA;PROVENTIL HFA;VENTOLIN HFA) 90 mcg/actuation inhaler Inhale 2 puffs every 6 (six) hours as needed for wheezing. 1 Inhaler 12     amLODIPine (NORVASC) 10 MG tablet TAKE ONE TABLET BY " MOUTH ONCE DAILY 90 tablet 1     [START ON 5/31/2017] busPIRone (BUSPAR) 10 MG tablet Take 1 tablet (10 mg total) by mouth 3 (three) times a day. 90 tablet 0     calcium carbonate-vitamin D3 (CALTRATE 600 PLUS D3) 600 mg(1,500mg) -400 unit per tablet Take 3 tablets by mouth daily. 270 tablet 2     cetirizine (ZYRTEC) 10 MG tablet Take 1 tablet (10 mg total) by mouth daily. 90 tablet 5     ciprofloxacin HCl (CIPRO) 500 MG tablet Take 1 tablet (500 mg total) by mouth 2 (two) times a day for 10 days. 20 tablet 0     divalproex (DEPAKOTE) 500 MG 24 hr tablet Take 500 mg by mouth 2 (two) times a day.        fluticasone (FLONASE) 50 mcg/actuation nasal spray USE TWO SPRAY(S) IN EACH NOSTRIL ONCE DAILY 16 g 5     gabapentin (NEURONTIN) 400 MG capsule Take 1,600 mg by mouth daily.       guaiFENesin ER (MUCINEX) 600 mg 12 hr tablet Take 2 tablets (1,200 mg total) by mouth 2 (two) times a day. 60 each 0     [START ON 5/31/2017] hydrOXYzine (ATARAX) 25 MG tablet Take 1 tablet (25 mg total) by mouth 4 (four) times a day. 120 tablet 5     ketoconazole (NIZORAL) 2 % cream Apply topically 2 (two) times a day. 30 g 5     lidocaine HCl 3 % Crea Apply topically to affected areas twice daily 1 Tube 5     loperamide (IMODIUM A-D) 2 mg tablet Take 1 tablet (2 mg total) by mouth 4 (four) times a day as needed for diarrhea. 30 tablet 0     losartan (COZAAR) 50 MG tablet TAKE 1 TABLET (50 MG TOTAL) BY MOUTH DAILY. 90 tablet 3     meloxicam (MOBIC) 15 MG tablet TAKE 1 TABLET (15 MG TOTAL) BY MOUTH DAILY. 90 tablet 1     metoprolol succinate (TOPROL-XL) 100 MG 24 hr tablet TAKE 2 TABLETS (200 MG TOTAL) BY MOUTH DAILY. 180 tablet 3     metroNIDAZOLE (FLAGYL) 500 MG tablet Take 1 tablet (500 mg total) by mouth 3 (three) times a day for 10 days. 30 tablet 0     nicotine (NICODERM CQ) 21 mg/24 hr Place 1 patch on the skin daily. 30 patch 1     nystatin (MYCOSTATIN) powder Apply to affected area 3 times daily 60 g 12     omeprazole (PRILOSEC) 40  MG capsule TAKE 1 CAPSULE (40 MG TOTAL) BY MOUTH DAILY. 90 capsule 2     [START ON 5/31/2017] oxyCODONE (OXYCONTIN) 15 mg 12 hr tablet Take 1 tablet (15 mg total) by mouth every 12 (twelve) hours. 60 tablet 0     [START ON 5/31/2017] oxyCODONE (ROXICODONE) 5 MG immediate release tablet Take 1-2 tablets (5-10 mg total) by mouth every 4 (four) hours as needed for pain. 300 tablet 0     pseudoephedrine (SUDAFED) 120 mg 12 hr tablet Take 1 tablet (120 mg total) by mouth 2 (two) times a day as needed for congestion. 60 tablet 5     pseudoephedrine (SUDAFED) 120 mg 12 hr tablet   4     QUEtiapine (SEROQUEL) 200 MG tablet Take 200 mg by mouth bedtime.       QUEtiapine (SEROQUEL) 50 MG tablet Take 50 mg by mouth 4 (four) times a day.       THEREMS-M 27-0.4 mg Tab 1 daily 90 tablet 3     traZODone (DESYREL) 50 MG tablet Take  mg by mouth bedtime as needed.        No current facility-administered medications for this visit.        Total data points:  5.     Dmitriy Mcclain DO  Internal Medicine  [facility]     Heterosexual

## 2023-10-12 ENCOUNTER — TRANSCRIPTION ENCOUNTER (OUTPATIENT)
Age: 63
End: 2023-10-12

## 2023-10-12 DIAGNOSIS — E87.1 HYPO-OSMOLALITY AND HYPONATREMIA: ICD-10-CM

## 2023-10-12 LAB
ALBUMIN SERPL ELPH-MCNC: 3.9 G/DL — SIGNIFICANT CHANGE UP (ref 3.3–5)
ALP SERPL-CCNC: 68 U/L — SIGNIFICANT CHANGE UP (ref 40–120)
ALT FLD-CCNC: 16 U/L — SIGNIFICANT CHANGE UP (ref 10–45)
ANION GAP SERPL CALC-SCNC: 8 MMOL/L — SIGNIFICANT CHANGE UP (ref 5–17)
APPEARANCE CSF: CLEAR — SIGNIFICANT CHANGE UP
APPEARANCE SPUN FLD: COLORLESS — SIGNIFICANT CHANGE UP
AST SERPL-CCNC: 16 U/L — SIGNIFICANT CHANGE UP (ref 10–40)
BASOPHILS # BLD AUTO: 0.02 K/UL — SIGNIFICANT CHANGE UP (ref 0–0.2)
BASOPHILS NFR BLD AUTO: 0.2 % — SIGNIFICANT CHANGE UP (ref 0–2)
BILIRUB SERPL-MCNC: 0.6 MG/DL — SIGNIFICANT CHANGE UP (ref 0.2–1.2)
BUN SERPL-MCNC: 12 MG/DL — SIGNIFICANT CHANGE UP (ref 7–23)
CALCIUM SERPL-MCNC: 8.7 MG/DL — SIGNIFICANT CHANGE UP (ref 8.4–10.5)
CHLORIDE SERPL-SCNC: 93 MMOL/L — LOW (ref 96–108)
CO2 SERPL-SCNC: 27 MMOL/L — SIGNIFICANT CHANGE UP (ref 22–31)
COLOR CSF: SIGNIFICANT CHANGE UP
CREAT SERPL-MCNC: 0.59 MG/DL — SIGNIFICANT CHANGE UP (ref 0.5–1.3)
EGFR: 102 ML/MIN/1.73M2 — SIGNIFICANT CHANGE UP
EOSINOPHIL # BLD AUTO: 0.09 K/UL — SIGNIFICANT CHANGE UP (ref 0–0.5)
EOSINOPHIL NFR BLD AUTO: 0.9 % — SIGNIFICANT CHANGE UP (ref 0–6)
GLUCOSE CSF-MCNC: 82 MG/DL — HIGH (ref 40–70)
GLUCOSE SERPL-MCNC: 115 MG/DL — HIGH (ref 70–99)
GLUCOSE SERPL-MCNC: 125 MG/DL — HIGH (ref 70–99)
HCT VFR BLD CALC: 40 % — SIGNIFICANT CHANGE UP (ref 34.5–45)
HGB BLD-MCNC: 13.2 G/DL — SIGNIFICANT CHANGE UP (ref 11.5–15.5)
IMM GRANULOCYTES NFR BLD AUTO: 0.3 % — SIGNIFICANT CHANGE UP (ref 0–0.9)
LYMPHOCYTES # BLD AUTO: 1.58 K/UL — SIGNIFICANT CHANGE UP (ref 1–3.3)
LYMPHOCYTES # BLD AUTO: 15.4 % — SIGNIFICANT CHANGE UP (ref 13–44)
LYMPHOCYTES # CSF: 0 % — LOW (ref 40–80)
MAGNESIUM SERPL-MCNC: 2.1 MG/DL — SIGNIFICANT CHANGE UP (ref 1.6–2.6)
MCHC RBC-ENTMCNC: 26.1 PG — LOW (ref 27–34)
MCHC RBC-ENTMCNC: 33 GM/DL — SIGNIFICANT CHANGE UP (ref 32–36)
MCV RBC AUTO: 79.1 FL — LOW (ref 80–100)
MONOCYTES # BLD AUTO: 0.76 K/UL — SIGNIFICANT CHANGE UP (ref 0–0.9)
MONOCYTES NFR BLD AUTO: 7.4 % — SIGNIFICANT CHANGE UP (ref 2–14)
MONOS+MACROS NFR CSF: 0 % — LOW (ref 15–45)
NEUTROPHILS # BLD AUTO: 7.76 K/UL — HIGH (ref 1.8–7.4)
NEUTROPHILS # CSF: 0 % — SIGNIFICANT CHANGE UP (ref 0–6)
NEUTROPHILS NFR BLD AUTO: 75.8 % — SIGNIFICANT CHANGE UP (ref 43–77)
NRBC # BLD: 0 /100 WBCS — SIGNIFICANT CHANGE UP (ref 0–0)
NRBC NFR CSF: 0 /UL — SIGNIFICANT CHANGE UP (ref 0–5)
PHOSPHATE SERPL-MCNC: 2.9 MG/DL — SIGNIFICANT CHANGE UP (ref 2.5–4.5)
PLATELET # BLD AUTO: 245 K/UL — SIGNIFICANT CHANGE UP (ref 150–400)
POTASSIUM SERPL-MCNC: 3.5 MMOL/L — SIGNIFICANT CHANGE UP (ref 3.5–5.3)
POTASSIUM SERPL-SCNC: 3.5 MMOL/L — SIGNIFICANT CHANGE UP (ref 3.5–5.3)
PROT CSF-MCNC: 109 MG/DL — HIGH (ref 15–45)
PROT SERPL-MCNC: 7.3 G/DL — SIGNIFICANT CHANGE UP (ref 6–8.3)
PROT SERPL-MCNC: 7.6 G/DL — SIGNIFICANT CHANGE UP (ref 6–8.3)
PROT SERPL-MCNC: 7.6 G/DL — SIGNIFICANT CHANGE UP (ref 6–8.3)
RBC # BLD: 5.06 M/UL — SIGNIFICANT CHANGE UP (ref 3.8–5.2)
RBC # CSF: 0 /UL — SIGNIFICANT CHANGE UP (ref 0–0)
RBC # FLD: 14.2 % — SIGNIFICANT CHANGE UP (ref 10.3–14.5)
RHEUMATOID FACT SERPL-ACNC: 11 IU/ML — SIGNIFICANT CHANGE UP (ref 0–13)
SODIUM SERPL-SCNC: 128 MMOL/L — LOW (ref 135–145)
T4 FREE SERPL-MCNC: 1.47 NG/DL — SIGNIFICANT CHANGE UP (ref 0.93–1.7)
TSH SERPL-MCNC: 1.75 UIU/ML — SIGNIFICANT CHANGE UP (ref 0.27–4.2)
TUBE TYPE: SIGNIFICANT CHANGE UP
VIT B12 SERPL-MCNC: 361 PG/ML — SIGNIFICANT CHANGE UP (ref 232–1245)
WBC # BLD: 10.24 K/UL — SIGNIFICANT CHANGE UP (ref 3.8–10.5)
WBC # FLD AUTO: 10.24 K/UL — SIGNIFICANT CHANGE UP (ref 3.8–10.5)

## 2023-10-12 PROCEDURE — 95885 MUSC TST DONE W/NERV TST LIM: CPT | Mod: 26

## 2023-10-12 PROCEDURE — 99232 SBSQ HOSP IP/OBS MODERATE 35: CPT | Mod: GC

## 2023-10-12 PROCEDURE — 95913 NRV CNDJ TEST 13/> STUDIES: CPT | Mod: 26

## 2023-10-12 PROCEDURE — 99233 SBSQ HOSP IP/OBS HIGH 50: CPT | Mod: 25

## 2023-10-12 RX ORDER — POLYETHYLENE GLYCOL 3350 17 G/17G
17 POWDER, FOR SOLUTION ORAL EVERY 12 HOURS
Refills: 0 | Status: DISCONTINUED | OUTPATIENT
Start: 2023-10-12 | End: 2023-10-25

## 2023-10-12 RX ORDER — ONDANSETRON 8 MG/1
4 TABLET, FILM COATED ORAL ONCE
Refills: 0 | Status: COMPLETED | OUTPATIENT
Start: 2023-10-12 | End: 2023-10-12

## 2023-10-12 RX ORDER — LIDOCAINE HCL 20 MG/ML
30 VIAL (ML) INJECTION ONCE
Refills: 0 | Status: COMPLETED | OUTPATIENT
Start: 2023-10-12 | End: 2023-10-12

## 2023-10-12 RX ORDER — SENNA PLUS 8.6 MG/1
2 TABLET ORAL ONCE
Refills: 0 | Status: COMPLETED | OUTPATIENT
Start: 2023-10-12 | End: 2023-10-12

## 2023-10-12 RX ORDER — ACETAMINOPHEN 500 MG
1000 TABLET ORAL EVERY 8 HOURS
Refills: 0 | Status: DISCONTINUED | OUTPATIENT
Start: 2023-10-12 | End: 2023-10-18

## 2023-10-12 RX ORDER — SENNA PLUS 8.6 MG/1
2 TABLET ORAL AT BEDTIME
Refills: 0 | Status: DISCONTINUED | OUTPATIENT
Start: 2023-10-12 | End: 2023-10-25

## 2023-10-12 RX ORDER — ACETAMINOPHEN 500 MG
650 TABLET ORAL DAILY
Refills: 0 | Status: DISCONTINUED | OUTPATIENT
Start: 2023-10-12 | End: 2023-10-15

## 2023-10-12 RX ORDER — IMMUNE GLOBULIN (HUMAN) 10 G/100ML
30 INJECTION INTRAVENOUS; SUBCUTANEOUS EVERY 24 HOURS
Refills: 0 | Status: COMPLETED | OUTPATIENT
Start: 2023-10-12 | End: 2023-10-15

## 2023-10-12 RX ORDER — DIPHENHYDRAMINE HCL 50 MG
25 CAPSULE ORAL DAILY
Refills: 0 | Status: DISCONTINUED | OUTPATIENT
Start: 2023-10-12 | End: 2023-10-22

## 2023-10-12 RX ORDER — SODIUM CHLORIDE 9 MG/ML
1000 INJECTION INTRAMUSCULAR; INTRAVENOUS; SUBCUTANEOUS
Refills: 0 | Status: DISCONTINUED | OUTPATIENT
Start: 2023-10-12 | End: 2023-10-17

## 2023-10-12 RX ORDER — ACETAMINOPHEN 500 MG
1000 TABLET ORAL ONCE
Refills: 0 | Status: COMPLETED | OUTPATIENT
Start: 2023-10-12 | End: 2023-10-12

## 2023-10-12 RX ADMIN — IMMUNE GLOBULIN (HUMAN) 50 GRAM(S): 10 INJECTION INTRAVENOUS; SUBCUTANEOUS at 19:17

## 2023-10-12 RX ADMIN — PANTOPRAZOLE SODIUM 40 MILLIGRAM(S): 20 TABLET, DELAYED RELEASE ORAL at 07:42

## 2023-10-12 RX ADMIN — Medication 30 MILLILITER(S): at 16:10

## 2023-10-12 RX ADMIN — Medication 650 MILLIGRAM(S): at 19:22

## 2023-10-12 RX ADMIN — Medication 1000 MILLIGRAM(S): at 22:07

## 2023-10-12 RX ADMIN — HYDROMORPHONE HYDROCHLORIDE 0.5 MILLIGRAM(S): 2 INJECTION INTRAMUSCULAR; INTRAVENOUS; SUBCUTANEOUS at 15:41

## 2023-10-12 RX ADMIN — HYDROMORPHONE HYDROCHLORIDE 0.25 MILLIGRAM(S): 2 INJECTION INTRAMUSCULAR; INTRAVENOUS; SUBCUTANEOUS at 23:59

## 2023-10-12 RX ADMIN — HYDROMORPHONE HYDROCHLORIDE 0.25 MILLIGRAM(S): 2 INJECTION INTRAMUSCULAR; INTRAVENOUS; SUBCUTANEOUS at 02:35

## 2023-10-12 RX ADMIN — Medication 0.5 MILLIGRAM(S): at 21:41

## 2023-10-12 RX ADMIN — ENOXAPARIN SODIUM 40 MILLIGRAM(S): 100 INJECTION SUBCUTANEOUS at 19:15

## 2023-10-12 RX ADMIN — PANTOPRAZOLE SODIUM 40 MILLIGRAM(S): 20 TABLET, DELAYED RELEASE ORAL at 19:15

## 2023-10-12 RX ADMIN — HYDROMORPHONE HYDROCHLORIDE 0.25 MILLIGRAM(S): 2 INJECTION INTRAMUSCULAR; INTRAVENOUS; SUBCUTANEOUS at 08:28

## 2023-10-12 RX ADMIN — Medication 1000 MILLIGRAM(S): at 22:00

## 2023-10-12 RX ADMIN — HYDROMORPHONE HYDROCHLORIDE 0.5 MILLIGRAM(S): 2 INJECTION INTRAMUSCULAR; INTRAVENOUS; SUBCUTANEOUS at 05:20

## 2023-10-12 RX ADMIN — HYDROMORPHONE HYDROCHLORIDE 0.5 MILLIGRAM(S): 2 INJECTION INTRAMUSCULAR; INTRAVENOUS; SUBCUTANEOUS at 21:40

## 2023-10-12 RX ADMIN — HYDROMORPHONE HYDROCHLORIDE 0.5 MILLIGRAM(S): 2 INJECTION INTRAMUSCULAR; INTRAVENOUS; SUBCUTANEOUS at 15:26

## 2023-10-12 RX ADMIN — HYDROMORPHONE HYDROCHLORIDE 0.25 MILLIGRAM(S): 2 INJECTION INTRAMUSCULAR; INTRAVENOUS; SUBCUTANEOUS at 02:07

## 2023-10-12 RX ADMIN — POLYETHYLENE GLYCOL 3350 17 GRAM(S): 17 POWDER, FOR SOLUTION ORAL at 10:04

## 2023-10-12 RX ADMIN — Medication 1000 MILLIGRAM(S): at 10:03

## 2023-10-12 RX ADMIN — Medication 650 MILLIGRAM(S): at 18:28

## 2023-10-12 RX ADMIN — SENNA PLUS 2 TABLET(S): 8.6 TABLET ORAL at 10:04

## 2023-10-12 RX ADMIN — HYDROMORPHONE HYDROCHLORIDE 0.5 MILLIGRAM(S): 2 INJECTION INTRAMUSCULAR; INTRAVENOUS; SUBCUTANEOUS at 21:15

## 2023-10-12 RX ADMIN — HYDROMORPHONE HYDROCHLORIDE 0.25 MILLIGRAM(S): 2 INJECTION INTRAMUSCULAR; INTRAVENOUS; SUBCUTANEOUS at 07:40

## 2023-10-12 RX ADMIN — HYDROMORPHONE HYDROCHLORIDE 0.5 MILLIGRAM(S): 2 INJECTION INTRAMUSCULAR; INTRAVENOUS; SUBCUTANEOUS at 05:40

## 2023-10-12 NOTE — PROGRESS NOTE ADULT - PROBLEM SELECTOR PLAN 2
10/8: CT and MRI showed Extensive mediastinal, hilar, and paratracheal lymphadenopathy is present  in the chest. Enlarged thoracic lymphadenopathy was noted on the 06/08/2012 chest CT study which was attributed to the patient's known history of sarcoidosis at that time. Concern for rheumatologic vs malignant work up  - f/u ESR, CRP, Serum ACE, CHRIS, SSA/B, RF, Hep B/C, A1C, B12 level, B1 and B6 levels  - consider paraneoplastic w/up 10/8: CT and MRI showed Extensive mediastinal, hilar, and paratracheal lymphadenopathy is present  in the chest. Enlarged thoracic lymphadenopathy was noted on the 06/08/2012 chest CT study which was attributed to the patient's known history of sarcoidosis at that time. Concern for rheumatologic vs malignant work up,  ESR, CRP, B12, RF w/n/l, Hep B/C negative, A1C 5.9.  - f/u CHRIS, SSA, TSH, ACE, immunoglobulin panel, B1 and B6 levels  - f/u SPEC with w immunofixation  - consider paraneoplastic w/up

## 2023-10-12 NOTE — PROGRESS NOTE ADULT - SUBJECTIVE AND OBJECTIVE BOX
**INCOMPLETE NOTE    OVERNIGHT EVENTS:    SUBJECTIVE:  Patient seen and examined at bedside.  No fever, chills, dizziness, headache, changes in vision, chest pain, dyspnea, nausea or vomiting, dysuria, changes in bowel movements, LE edema. Rest of 12 point Review of systems negative unless otherwise documented elsewhere in note.     Vital Signs Last 12 Hrs  T(F): 98.2 (10-12-23 @ 06:09), Max: 98.2 (10-12-23 @ 06:09)  HR: 72 (10-12-23 @ 06:09) (72 - 76)  BP: 162/85 (10-12-23 @ 06:09) (141/88 - 162/85)  BP(mean): --  RR: 15 (10-12-23 @ 06:09) (15 - 16)  SpO2: 97% (10-12-23 @ 06:09) (97% - 97%)  I&O's Summary      PHYSICAL EXAM:  Constitutional: NAD, comfortable in bed.  HEENT: NC/AT, PERRLA, EOMI, no conjunctival pallor or scleral icterus, MMM  Neck: Supple, no JVD  Respiratory: CTA B/L. No w/r/r.   Cardiovascular: RRR, normal S1 and S2, no m/r/g.   Gastrointestinal: +BS, soft NTND, no guarding or rebound tenderness, no palpable masses   Extremities: wwp; no cyanosis, clubbing or edema.   Vascular: Pulses equal and strong throughout.   Neurological: AAOx3, no CN deficits, strength and sensation intact throughout.   Skin: No gross skin abnormalities or rashes        LABS:                        13.7   11.24 )-----------( 250      ( 11 Oct 2023 14:38 )             42.2     10-11    129<L>  |  92<L>  |  11  ----------------------------<  145<H>  3.9   |  23  |  0.49<L>    Ca    9.1      11 Oct 2023 14:38  Phos  3.0     10-11  Mg     1.8     10-11    TPro  7.8  /  Alb  4.5  /  TBili  0.6  /  DBili  x   /  AST  17  /  ALT  16  /  AlkPhos  64  10-11      Urinalysis Basic - ( 11 Oct 2023 14:38 )    Color: x / Appearance: x / SG: x / pH: x  Gluc: 145 mg/dL / Ketone: x  / Bili: x / Urobili: x   Blood: x / Protein: x / Nitrite: x   Leuk Esterase: x / RBC: x / WBC x   Sq Epi: x / Non Sq Epi: x / Bacteria: x          RADIOLOGY & ADDITIONAL TESTS:    MEDICATIONS  (STANDING):  enoxaparin Injectable 40 milliGRAM(s) SubCutaneous every 24 hours  latanoprost 0.005% Ophthalmic Solution 1 Drop(s) Both EYES at bedtime  pantoprazole    Tablet 40 milliGRAM(s) Oral every 24 hours    MEDICATIONS  (PRN):  acetaminophen     Tablet .. 650 milliGRAM(s) Oral every 6 hours PRN Temp greater or equal to 38C (100.4F), Mild Pain (1 - 3)  aluminum hydroxide/magnesium hydroxide/simethicone Suspension 30 milliLiter(s) Oral every 4 hours PRN Dyspepsia  HYDROmorphone  Injectable 0.5 milliGRAM(s) IV Push every 6 hours PRN Severe Pain (7 - 10)  HYDROmorphone  Injectable 0.25 milliGRAM(s) IV Push every 6 hours PRN Moderate Pain (4 - 6)  LORazepam     Tablet 0.5 milliGRAM(s) Oral every 8 hours PRN Anxiety  melatonin 3 milliGRAM(s) Oral at bedtime PRN Insomnia  ondansetron Injectable 4 milliGRAM(s) IV Push every 8 hours PRN Nausea and/or Vomiting   OVERNIGHT EVENTS: ALBAN    SUBJECTIVE:  Patient seen and examined at bedside. Patient has numbness in hands, pain in the lumbo-sacral area, and lateral thigh pain when raising legs. Balance is off. Patient endorse having flu shot 3 weeks ago.   No fever, chills, dizziness, headache, changes in vision, chest pain, dyspnea, nausea or vomiting, dysuria, changes in bowel movements, LE edema. Rest of 12 point Review of systems negative unless otherwise documented elsewhere in note.     Vital Signs Last 12 Hrs  T(F): 98.2 (10-12-23 @ 06:09), Max: 98.2 (10-12-23 @ 06:09)  HR: 72 (10-12-23 @ 06:09) (72 - 76)  BP: 162/85 (10-12-23 @ 06:09) (141/88 - 162/85)  BP(mean): --  RR: 15 (10-12-23 @ 06:09) (15 - 16)  SpO2: 97% (10-12-23 @ 06:09) (97% - 97%)  I&O's Summary      PHYSICAL EXAM:  Constitutional: NAD, comfortable in bed.  HEENT: NC/AT, PERRLA, EOMI, no conjunctival pallor or scleral icterus, MMM  Neck: Supple, no JVD  Respiratory: CTA B/L. No w/r/r.   Cardiovascular: RRR, normal S1 and S2, no m/r/g.   Gastrointestinal: +BS, soft NTND, no guarding or rebound tenderness, no palpable masses   Extremities: wwp; no cyanosis, clubbing or edema.   Vascular: Pulses equal and strong throughout.   Neurological: AAOx3, unsteady gait, 4/5 hip flexors, 4/5 knee flexors. Sensation intact.   Skin: No gross skin abnormalities or rashes        LABS:                        13.7   11.24 )-----------( 250      ( 11 Oct 2023 14:38 )             42.2     10-11    129<L>  |  92<L>  |  11  ----------------------------<  145<H>  3.9   |  23  |  0.49<L>    Ca    9.1      11 Oct 2023 14:38  Phos  3.0     10-11  Mg     1.8     10-11    TPro  7.8  /  Alb  4.5  /  TBili  0.6  /  DBili  x   /  AST  17  /  ALT  16  /  AlkPhos  64  10-11      Urinalysis Basic - ( 11 Oct 2023 14:38 )    Color: x / Appearance: x / SG: x / pH: x  Gluc: 145 mg/dL / Ketone: x  / Bili: x / Urobili: x   Blood: x / Protein: x / Nitrite: x   Leuk Esterase: x / RBC: x / WBC x   Sq Epi: x / Non Sq Epi: x / Bacteria: x          RADIOLOGY & ADDITIONAL TESTS:    MEDICATIONS  (STANDING):  enoxaparin Injectable 40 milliGRAM(s) SubCutaneous every 24 hours  latanoprost 0.005% Ophthalmic Solution 1 Drop(s) Both EYES at bedtime  pantoprazole    Tablet 40 milliGRAM(s) Oral every 24 hours    MEDICATIONS  (PRN):  acetaminophen     Tablet .. 650 milliGRAM(s) Oral every 6 hours PRN Temp greater or equal to 38C (100.4F), Mild Pain (1 - 3)  aluminum hydroxide/magnesium hydroxide/simethicone Suspension 30 milliLiter(s) Oral every 4 hours PRN Dyspepsia  HYDROmorphone  Injectable 0.5 milliGRAM(s) IV Push every 6 hours PRN Severe Pain (7 - 10)  HYDROmorphone  Injectable 0.25 milliGRAM(s) IV Push every 6 hours PRN Moderate Pain (4 - 6)  LORazepam     Tablet 0.5 milliGRAM(s) Oral every 8 hours PRN Anxiety  melatonin 3 milliGRAM(s) Oral at bedtime PRN Insomnia  ondansetron Injectable 4 milliGRAM(s) IV Push every 8 hours PRN Nausea and/or Vomiting

## 2023-10-12 NOTE — PROGRESS NOTE ADULT - PROBLEM SELECTOR PLAN 1
Progressive weakness in lower limbs over last 3 days with continued urinary incontinence. Last admission 10/08 with MRI and CT didn't reveal acute pathology, only showed broad C6-7 disc bulging w/o any spinal compression, no contrast enchainment. Imaging also showed mediastinal LN. Pt on methylprednisolone 4mg for 6 day (on day 3/6- 3 pills 10/12, 2 pills 10/13, 1 pill 10/14).  - neuro consulted  - PT consulted  - hold steroid course pending neuro recs Progressive weakness in lower limbs over last 3 days with continued urinary incontinence. Last admission 10/08 with MRI and CT didn't reveal acute pathology, only showed broad C6-7 disc bulging w/o any spinal compression, no contrast enchainment. Imaging also showed mediastinal LN. Pt on methylprednisolone 4mg for 6 day (on day 3/6- 3 pills 10/12, 2 pills 10/13, 1 pill 10/14).  - neuro consulted, recommendation appreciated  - s/p EMG and LP, suspecting GBS, start IVIG 30g qd, infuse over 6hr  - PT consulted  - hold steroid course pending neuro recs

## 2023-10-12 NOTE — DIETITIAN INITIAL EVALUATION ADULT - PROBLEM SELECTOR PLAN 2
10/8: CT and MRI showed Extensive mediastinal, hilar, and paratracheal lymphadenopathy is present  in the chest. Enlarged thoracic lymphadenopathy was noted on the 06/08/2012 chest CT study which was attributed to the patient's known history of sarcoidosis at that time. Concern for rheumatologic vs malignant work up  - f/u ESR, CRP, Serum ACE, CHRIS, SSA/B, RF, Hep B/C, A1C, B12 level, B1 and B6 levels  - consider paraneoplastic w/up

## 2023-10-12 NOTE — DISCHARGE NOTE PROVIDER - NSDCMRMEDTOKEN_GEN_ALL_CORE_FT
alendronate 70 mg oral tablet: 1 tab(s) orally once a day  Lumigan 0.01% ophthalmic solution: 1 drop(s) in each affected eye once a day (at bedtime) Both eyes  methylPREDNISolone 4 mg oral tablet: 1 tab(s) orally 3 times a day Taper for 6 days  ondansetron 8 mg oral tablet: 1 tab(s) orally 3 times a day for nausea if needed  Protonix 40 mg oral delayed release tablet: 1 tab(s) orally once a day   alendronate 70 mg oral tablet: 1 tab(s) orally once a day  Lumigan 0.01% ophthalmic solution: 1 drop(s) in each affected eye once a day (at bedtime) Both eyes  methylPREDNISolone 4 mg oral tablet: 1 tab(s) orally 3 times a day Taper for 6 days  ocular lubricant ophthalmic ointment: 1 application to each affected eye once a day  ocular lubricant ophthalmic solution: 1 drop(s) to each affected eye every 2 hours  ondansetron 8 mg oral tablet: 1 tab(s) orally 3 times a day for nausea if needed   alendronate 70 mg oral tablet: 1 tab(s) orally once a day  gabapentin 300 mg oral capsule: 2 cap(s) orally every 24 hours  Lumigan 0.01% ophthalmic solution: 1 drop(s) in each affected eye once a day (at bedtime) Both eyes  ocular lubricant ophthalmic ointment: 1 application to each affected eye once a day  ocular lubricant ophthalmic solution: 1 drop(s) to each affected eye every 2 hours  ondansetron 8 mg oral tablet: 1 tab(s) orally 3 times a day for nausea if needed

## 2023-10-12 NOTE — PROGRESS NOTE ADULT - TIME BILLING
Patient seen and examined;  Scans pending including PETCT  Also please check Thyroid function with low sodium  Also obtain Urine Sodium   EMG   Neuro follow up

## 2023-10-12 NOTE — DIETITIAN INITIAL EVALUATION ADULT - OTHER CALCULATIONS
Estimated needs based on CBW as within % IBW 135lb (98%). Needs adjusted for age and clinical status.   Defer fluids to team in setting of hyponatremia.

## 2023-10-12 NOTE — DIETITIAN INITIAL EVALUATION ADULT - EDUCATION DIETARY MODIFICATIONS
Discussed strategies to promote intake and foods well/not well tolerated in setting of GI symptoms. Encouraged intake of oral nutrition supplements between meals to maximize nutrition. Pt aware RD remains available for additional questions/concerns./(2) meets goals/outcomes/verbalization

## 2023-10-12 NOTE — DISCHARGE NOTE PROVIDER - CARE PROVIDER_API CALL
Susy Cordero J  Critical Care Medicine  [address] [address]  Phone: [phone]  Fax: [phone]  Follow Up Time: 2 weeks   Susy Cordero J  Critical Care Medicine  [address] [address]  Phone: [phone]  Fax: [phone]  Follow Up Time: 2 weeks    Bob Lozano  Neurology  [address] [address]  Phone: [phone]  Fax: [phone]  Follow Up Time:    Susy Cordero J  Critical Care Medicine  [address] [address]  Phone: [phone]  Fax: [phone]  Scheduled Appointment: 10/24/2023 12:20 PM    Bob Lozano  Neurology  [address] [address]  Phone: [phone]  Fax: [phone]  Scheduled Appointment: 03/08/2024 03:20 PM   Susy Cordero J  Critical Care Medicine  [address] [address]  Phone: [phone]  Fax: [phone]  Scheduled Appointment: 10/24/2023 12:20 PM    Bob Lozano  Neurology  [address] [address]  Phone: [phone]  Fax: [phone]  Scheduled Appointment: 03/08/2024 03:20 PM    YAIR CORTES  [address] [place] [id number]  Phone: [phone]  Fax: ([phone]  Scheduled Appointment: 12/06/2023 09:00 AM   oBb Lozano  Neurology  [address] [address]  Phone: [phone]  Fax: [phone]  Scheduled Appointment: 03/08/2024 03:20 PM    YAIR CORTES  [address] [place] [address]  Phone: [phone]  Fax: [phone]  Scheduled Appointment: 12/06/2023 09:00 AM    Susy Cordero  Critical Care Medicine  [address] [address]  Phone: [phone]  Fax: [phone]  Follow Up Time:

## 2023-10-12 NOTE — DISCHARGE NOTE PROVIDER - HOSPITAL COURSE
#Discharge: do not delete    Patient is __ yo M/F with past medical history of _____ presented with _____, found to have _____ (one liner)    Hospital course (by problem):     Patient was discharged to: (home/[facility]/acute rehab/hospice, etc, and with what services – home health PT/RN? Home O2?)    New medications:   Changes to old medications:  Medications that were stopped:    Items to follow up as outpatient:    Physical exam at the time of discharge:       #Discharge: do not delete  63 year old female with PMH of OA, GERD, and sarcoidosis (diagnosed in 2008) presenting with worsening back pain and progressive lower limb weakness for the 3 days, workup c/w possible GBS vs AIDP vs neurosarcoidosis now s/p IVIG x4 days with symptomatic improvement, still with some bulbar involvement pending AR disposition.    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   -Progressive weakness in lower limbs over 3 days with continued urinary incontinence. EMG findings consistent with demyelinating disease, CSF with elevated protein supporting likely Guillain [place]. MRI head w/ no acute infarct or other acute abnormality. No abnormal enhancement. MRI of lumbar spine on 10/14 compatible with active neurosarcoidosis, other etiologies include Guillain-Mentor syndrome and other inflammatory, infectious, or neoplastic causes. Now s/p IVIG 30g qd x4 days. PET/CT showed mediastinal LN. Daily NIFs and vital capacity normalized. Weakness now resolved, no respiratory distress, improving bulbar involvement. PT consulted (patient can tolerate 3 hours of PT).  Plan:  - Follow up outpatient with Dr. Lozano at scheduled appointment  - Continue gabapentin to 300mg/300mg/600mg.     Problem/Plan - 2:  ·  Problem: Corneal ulcer.   ·  Plan: Pt at high risk for corneal ulcer and infection due to inability to close eye. Ophthalmology consulted; made recommendations 10/24. Tobramycin eyedrops stopped.    Plan:  - C/W artifical tears q2-3h  - Continue bimatoprost 0/01% ophthalmic solution for glaucoma.     Problem/Plan - 3:  ·  Problem: Lower back pain.   ·  Plan: MRI of lumbar spine on 10/14 compatible with active neurosarcoidosis, other etiologies include Guillain-M[place]or syndrome and other inflammatory,  infectious, or neoplastic causes. Imaging showed mediastinal LN.  DDx: Likely related to inflammatory polyneuropathy vs malignancy driven pain. Discontinued Tylenol given transaminitis. Stopped IV morphine 10/24, switched to oxycodone IR 5mg.    Plan:  - Continue with gabapentin to 300mg/300mg/600mg   - Diclofenac 75mg PO bid  - Toradol 30 IV q12h PRN for severe pain  - Oxycodone 5mg PO IR for severe pain  - Lidocaine patch.     Problem/Plan - 4:  ·  Problem: Blood pressure instability.   ·  Plan: BP gradually rising to 200s. Patient was started on labetalol 100 P.O BID. On 10/14-10/15 overnight BP in 220s s/p labetalol 5mg IV, Metoprolol 5mg IV in am. BP remained elevated to 190s, Patient received Amlodipine 10mg and BP dropped to 96/92. Per neurology, dysautonomia is an expected complication of GBS.    Plan:   - Continue anti-hypertensive to goal SBP <140.     Problem/Plan - 5:  ·  Problem: Transaminitis.   ·  Plan: Likely 2/2 IVIG treatment vs hepatitis. Ordered CK (wnl) and RUQ U/S (neg.). LFTs declining    Plan:  - Trend LFTs.     Problem/Plan - 6:  ·  Problem: Mediastinal lymphadenopathy.   ·  Plan: 10/8: CT and MRI showed Extensive mediastinal, hilar, and paratracheal lymphadenopathy is present  in the chest. Enlarged thoracic lymphadenopathy was noted on the 06/08/2012 chest CT study which was attributed to the patient's known history of sarcoidosis at that time. Concern for rheumatologic vs malignant work up,  ESR, CRP, B12, RF w/n/l, Hep A/B/C negative, A1C 5.9. CHRIS elevated 1:160, SSA wnl, TSH 1.75 w/n/l. Immunoglobulin panel wnl. Immunofixation no monoclonal band. MRI as above.    Plan:  - Consider paraneoplastic workup/pursue LN biopsy.     Problem/Plan - 7:  ·  Problem: Hyponatremia.   ·  Plan: #RESOLVED  Na 128. Patient asymptomatic. Patient appear euvolemic on exam. Patient has been having severe and constant pain. 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 TSH 1.75 w/n/l.  Serum osmo low at 267, hypotonic hyponatremia. Ulytes c/w SIADH i/so sarcoidosis     Plan  - Trend CMP.     Problem/Plan - 8:  ·  Problem: GERD (gastroesophageal reflux disease).   ·  Plan: At home, Protonix 40mg qd.    Plan:  -Continue home med.     Problem/Plan - 9:  ·  Problem: Sarcoidosis.   ·  Plan: Diagnosed in 2008. MRI imaging 10/8 with mediastinal LAD. Imaging consistent with neurosarcoidosis.     Problem/Plan - 10:  ·  Problem: Hashimoto's disease.   ·  Plan; Hx of Hashimoto's. Not on any meds. TSH 1.75 w/n/l.    Patient was discharged to: Acute rehab  New medications:   Changes to old medications:  Medications that were stopped:  Items to follow up as outpatient:  Physical exam at the time of discharge:       #Discharge: do not delete  63 year old female with PMH of OA, GERD, and sarcoidosis (diagnosed in 2008) presenting with worsening back pain and progressive lower limb weakness for the 3 days, workup c/w possible GBS vs AIDP vs neurosarcoidosis now s/p IVIG x4 days with symptomatic improvement, still with some bulbar involvement pending AR disposition.    Hospital course (by problem):     1. GBS (Guillain [place] syndrome).   -Progressive weakness in lower limbs over 3 days with continued urinary incontinence. EMG findings consistent with demyelinating disease, CSF with elevated protein supporting likely Guillain [place]. MRI head w/ no acute infarct or other acute abnormality. No abnormal enhancement. MRI of lumbar spine on 10/14 compatible with active neurosarcoidosis, other etiologies include Guillain-[place] syndrome and other inflammatory, infectious, or neoplastic causes. Now s/p IVIG 30g qd x4 days. PET/CT showed mediastinal LN. Daily NIFs and vital capacity normalized. Weakness now resolved, no respiratory distress, improving bulbar involvement. PT consulted (patient can tolerate 3 hours of PT).  Plan:  - Follow up outpatient with Dr. Lozano at scheduled appointment  - Continue gabapentin to 300mg/300mg/600mg.    2. Corneal ulcer.   -Pt at high risk for corneal ulcer and infection due to inability to close eye. Ophthalmology consulted; made recommendations 10/24. Tobramycin eyedrops stopped.  Plan:  - C/W artifical tears q2-3h  - Continue bimatoprost 0/01% ophthalmic solution for glaucoma.     Problem/Plan - 3:  ·  Problem: Lower back pain.   ·  Plan: MRI of lumbar spine on 10/14 compatible with active neurosarcoidosis, other etiologies include Guillain-[place]toon syndrome and other inflammatory,  infectious, or neoplastic causes. Imaging showed mediastinal LN.  DDx: Likely related to inflammatory polyneuropathy vs malignancy driven pain. Discontinued Tylenol given transaminitis. Stopped IV morphine 10/24, switched to oxycodone IR 5mg.    Plan:  - Continue with gabapentin to 300mg/300mg/600mg   - Diclofenac 75mg PO bid  - Toradol 30 IV q12h PRN for severe pain  - Oxycodone 5mg PO IR for severe pain  - Lidocaine patch.     Problem/Plan - 4:  ·  Problem: Blood pressure instability.   ·  Plan: BP gradually rising to 200s. Patient was started on labetalol 100 P.O BID. On 10/14-10/15 overnight BP in 220s s/p labetalol 5mg IV, Metoprolol 5mg IV in am. BP remained elevated to 190s, Patient received Amlodipine 10mg and BP dropped to 96/92. Per neurology, dysautonomia is an expected complication of GBS.    Plan:   - Continue anti-hypertensive to goal SBP <140.     Problem/Plan - 5:  ·  Problem: Transaminitis.   ·  Plan: Likely 2/2 IVIG treatment vs hepatitis. Ordered CK (wnl) and RUQ U/S (neg.). LFTs declining    Plan:  - Trend LFTs.     Problem/Plan - 6:  ·  Problem: Mediastinal lymphadenopathy.   ·  Plan: 10/8: CT and MRI showed Extensive mediastinal, hilar, and paratracheal lymphadenopathy is present  in the chest. Enlarged thoracic lymphadenopathy was noted on the 06/08/2012 chest CT study which was attributed to the patient's known history of sarcoidosis at that time. Concern for rheumatologic vs malignant work up,  ESR, CRP, B12, RF w/n/l, Hep A/B/C negative, A1C 5.9. CHRIS elevated 1:160, SSA wnl, TSH 1.75 w/n/l. Immunoglobulin panel wnl. Immunofixation no monoclonal band. MRI as above.    Plan:  - Consider paraneoplastic workup/pursue LN biopsy.     Problem/Plan - 7:  ·  Problem: Hyponatremia.   ·  Plan: #RESOLVED  Na 128. Patient asymptomatic. Patient appear euvolemic on exam. Patient has been having severe and constant pain. 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 TSH 1.75 w/n/l.  Serum osmo low at 267, hypotonic hyponatremia. Ulytes c/w SIADH i/so sarcoidosis     Plan  - Trend CMP.     Problem/Plan - 8:  ·  Problem: GERD (gastroesophageal reflux disease).   ·  Plan: At home, Protonix 40mg qd.    Plan:  -Continue home med.     Problem/Plan - 9:  ·  Problem: Sarcoidosis.   ·  Plan: Diagnosed in 2008. MRI imaging 10/8 with mediastinal LAD. Imaging consistent with neurosarcoidosis.     Problem/Plan - 10:  ·  Problem: Hashimoto's disease.   ·  Plan; Hx of Hashimoto's. Not on any meds. TSH 1.75 w/n/l.    Patient was discharged to: Acute rehab  New medications:   Changes to old medications:  Medications that were stopped:  Items to follow up as outpatient:  Physical exam at the time of discharge:       #Discharge: do not delete  63 year old female with PMH of OA, GERD, and sarcoidosis (diagnosed in 2008) presenting with worsening back pain and progressive lower limb weakness for the 3 days, workup c/w possible GBS vs AIDP vs neurosarcoidosis now s/p IVIG x4 days with symptomatic improvement, still with some bulbar involvement pending AR disposition.    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   -Progressive weakness in lower limbs over 3 days with continued urinary incontinence. EMG findings consistent with demyelinating disease, CSF with elevated protein supporting likely Guillain [place]. MRI head w/ no acute infarct or other acute abnormality. No abnormal enhancement. MRI of lumbar spine on 10/14 compatible with active neurosarcoidosis, other etiologies include Guillain-[place] syndrome and other inflammatory, infectious, or neoplastic causes. Now s/p IVIG 30g qd x4 days. PET/CT showed mediastinal LN. Daily NIFs and vital capacity normalized. Weakness now resolved, no respiratory distress, improving bulbar involvement. PT consulted (patient can tolerate 3 hours of PT).  Plan:  - Follow up outpatient with Dr. Lozano at scheduled appointment  - Continue gabapentin to 300mg/300mg/600mg.    2. Corneal ulcer.   -Pt at high risk for corneal ulcer and infection due to inability to close eye. Ophthalmology consulted; made recommendations 10/24. Tobramycin eyedrops stopped.  Plan:  - C/W artifical tears q2-3h  - Continue bimatoprost 0/01% ophthalmic solution for glaucoma.    3. Lower back pain.   -MRI of lumbar spine on 10/14 compatible with active neurosarcoidosis, other etiologies include Guillain-[place] syndrome and other inflammatory,  infectious, or neoplastic causes. Imaging showed mediastinal LN. Likely related to inflammatory polyneuropathy vs malignancy driven pain. Discontinued Tylenol given transaminitis. Stopped IV morphine 10/24, switched to oxycodone IR 5mg.  Plan:  - Continue with gabapentin to 300mg bid for neuropathic pain   - Can also use OTC NSAIDs for neuropathic pain    4. Blood pressure instability.   -BP gradually rising to 200s during admission. Patient was started on labetalol 100 P.O BID. On 10/14-10/15 overnight BP in 220s s/p labetalol 5mg IV, metoprolol 5mg IV in am. BP remained elevated to 190s, patient received amlodipine 10mg and BP dropped to 96/92. Per neurology, dysautonomia is an expected complication of GBS. BPs stable during last week of admission.  Plan:   -NTD    5. Transaminitis.   -Likely 2/2 IVIG treatment vs hepatitis. Ordered CK (wnl) and RUQ U/S (neg.). LFTs declining.  Plan:  -NTD    6. Mediastinal lymphadenopathy.   -On 10/8, CT and MRI showed extensive mediastinal, hilar, and paratracheal lymphadenopathy in the chest. Enlarged thoracic lymphadenopathy was noted on the 06/08/2012 chest CT study which was attributed to the patient's known history of sarcoidosis at that time. Concern for rheumatologic vs malignant work up,  ESR, CRP, B12, RF w/n/l, Hep A/B/C negative, A1C 5.9. CHRIS elevated 1:160, SSA wnl, TSH 1.75 w/n/l. Immunoglobulin panel wnl. Immunofixation no monoclonal band.   Plan:  - Likely i/s/o known sarcoidosis; NTD    7. Hyponatremia.   #RESOLVED  -Na 128. Patient asymptomatic. Patient appear euvolemic on exam. Patient has been having severe and constant pain. 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 TSH 1.75 w/n/l.  Serum osmo low at 267, hypotonic hyponatremia. Urine electrolytes c/w SIADH i/so sarcoidosis.  Plan:  -NTD    8. GERD (gastroesophageal reflux disease).   -At home, Protonix 40mg qd.  Plan:  -Continue home med.    9. Sarcoidosis.   -Diagnosed in 2008. MRI imaging 10/8 with mediastinal LAD. Imaging consistent with neurosarcoidosis.  Plan:  -NTD    10. Hashimoto's disease.   -History of Hashimoto's. Not on any meds. TSH 1.75 w/n/l.    Patient was discharged to: Home with home PT  New medications: Gabapentin 300mg bid  Changes to old medications: N/A  Medications that were stopped: Methylprednisolone taper (unsure if patient was actively still tapering)  Items to follow up as outpatient: Neurology, ophthalmology,   Physical exam at the time of discharge:    T(C): 36.3 (10-25-23 @ 11:07), Max: 36.7 (10-24-23 @ 20:21)  HR: 86 (10-25-23 @ 11:07) (85 - 97)  BP: 124/69 (10-25-23 @ 11:07) (119/69 - 132/79)  RR: 16 (10-25-23 @ 11:07) (16 - 16)  SpO2: 97% (10-25-23 @ 11:07) (95% - 97%)    General: Well-appearing, no acute distress  HEENT: NC/AT; PERRL, scleral injection; MMM  Neck: supple  Cardiovascular: +S1/S2; RRR  Respiratory: CTA B/L; no W/R/R  Gastrointestinal: soft, NT/ND; +BSx4  Extremities: WWP; no edema, clubbing or cyanosis  Vascular: 2+ radial, DP/PT pulses B/L  Neurological: AAOx3; no facial droop. Weakness in closing L eye. Strength 5/5 in all 4 ext, no sensory deficits

## 2023-10-12 NOTE — DISCHARGE NOTE PROVIDER - NPI NUMBER (FOR [employer] USE ONLY) :
[[id number]] [[id number]],[[id number]] [[id number]],[[id number]]

## 2023-10-12 NOTE — PROGRESS NOTE ADULT - ASSESSMENT
63 year old female with PMH of OA, GERD, and sarcoidosis (diagnosed in 2008) presenting with worsening back pain and progressive lower limb weakness for the last three days. Admitted for pain management.

## 2023-10-12 NOTE — DISCHARGE NOTE PROVIDER - PROVIDER TOKENS
PROVIDER:[TOKEN:[[id number]],FOLLOWUP:[2 weeks]] PROVIDER:[TOKEN:[[id number]],FOLLOWUP:[2 weeks]],PROVIDER:[TOKEN:[[id number]]] PROVIDER:[[id number]],SCHEDULEDAPPT:[10/24/2023],SCHEDULEDAPPTTIME:[12:20 PM]],PROVIDER:[[id number]],SCHEDULEDAPPT:[03/08/2024],SCHEDULEDAPPTTIME:[03:20 PM]] PROVIDER:[[id number]],SCHEDULEDAPPT:[10/24/2023],SCHEDULEDAPPTTIME:[12:20 PM]],PROVIDER:[TOKEN:[[id number]],SCHEDULEDAPPT:[03/08/2024],SCHEDULEDAPPTTIME:[03:20 PM]],PROVIDER:[[id number]],SCHEDULEDAPPT:[12/06/2023],SCHEDULEDAPPTTIME:[09:00 AM]] PROVIDER:[[id number]],SCHEDULEDAPPT:[03/08/2024],SCHEDULEDAPPTTIME:[03:20 PM]],PROVIDER:[[id number]],SCHEDULEDAPPT:[12/06/2023],SCHEDULEDAPPTTIME:[09:00 AM]],PROVIDER:[[id number]]]

## 2023-10-12 NOTE — PROGRESS NOTE ADULT - PROBLEM SELECTOR PLAN 3
Lower lumbar back pain, that radiates down anterior part of b/l legs.   - dilaudid 0.5mg q6 for severe pain  - acetaminophen 650 prn q6 Lower lumbar back pain, that radiates down anterior part of b/l legs.   - Dilaudid 0.5mg q6 for severe pain  - acetaminophen 650 prn q6

## 2023-10-12 NOTE — PROGRESS NOTE ADULT - ASSESSMENT
63 year old female with PMH of OA, GERD, sarcoidosis, migraines presenting with worsening back pain associated bilateral upper and lower extremities numbness and tingling in distal parts of all her extremities for the last week. Her neurological exam is remarkable only for mild b/l proximal hip flexion and knee flexion vibration and temperature hypesthesia in b/l LE and increased patellar reflexes. MRI C and T spine w and w/o contrast was performed which didn't reveal acute pathology, only showed broad C6-7 disc bulging w/o any spinal compression, no contrast enchainment. Her hx of probable sarcoidosis, enlargement of mediastinal LN, unintentional weight loss needs oncological w/up. From neurological standpoint would benefit from MRI L-spine w and w/o, basic polyneuropathy w/up, whole body PET scan. CPK, ESR, CRP was unremarkable. A1C prediabetic.     Recommendations:     1. F/u:   - Serum ACE,   - CHRIS, SSA/B,   - RF,   - Hep B/C,   - B12 level, B1 and B6 levels,   - SPEP w immunofixation; considering paraneoplastic w/up    2. MRI  L-spine w and w/o contrast  3. whole body PET scan for LAD  4. LP today 10/12.  5. EMG/NCS today 10/12.       The case was discussed w Dr. Lozano   Thank you for the courtesy of the consult. Will continue to follow.

## 2023-10-12 NOTE — DIETITIAN INITIAL EVALUATION ADULT - OTHER INFO
63 year old female with PMH of OA, GERD, and sarcoidosis (diagnosed in 2008) presenting with worsening back pain and progressive lower limb weakness for the last three days. Admitted for pain management.     On assessment, pt resting in bed. Labs and medication orders reviewed. Na 128 <L>, K/Mg/Phos WNL. On Regular diet. Pt reports poor intake x3 days due to poor appetite, nausea and vomiting, last episode emesis this AM 10/12 - ordered for antiemetic regimen. Endorses constipation, reports last BM 10/9 - bowel regimen ordered. Reports UBW ~132lb, consistent with admission wt 132lb and RD obtained bedscale wt 10/12 133lb. Nutrition-focused physical examination insignificant for overt wasting at this time. Pt denies difficulty chewing/swallowing. Confirms NKFA. No Orthodox/ethnic/cultural food preferences noted. No pressure injuries or edema documented, Alan score 23. Recommended oral nutrition supplement, pt receptive to Ensure x2/day. See nutrition recommendations. RD to follow-up per protocol.

## 2023-10-12 NOTE — PROGRESS NOTE ADULT - SUBJECTIVE AND OBJECTIVE BOX
Neurology Progress Note    Interval History:  The patient was seen and examined at the bedside.       PAST MEDICAL & SURGICAL HISTORY:  Ovarian cyst    GERD (gastroesophageal reflux disease)    Glaucoma    Sarcoidosis    Sinusitis    Osteoporosis    Migraines    Hashimoto's disease    Kidney stones    Torn meniscus  right    History of arthroscopy of shoulder  rotator cuff repair, right    History of umbilical hernia repair    S/P correction of deviated nasal septum    H/O sinus surgery            Medications:  acetaminophen     Tablet .. 1000 milliGRAM(s) Oral every 8 hours  aluminum hydroxide/magnesium hydroxide/simethicone Suspension 30 milliLiter(s) Oral every 4 hours PRN  enoxaparin Injectable 40 milliGRAM(s) SubCutaneous every 24 hours  HYDROmorphone  Injectable 0.25 milliGRAM(s) IV Push every 6 hours PRN  HYDROmorphone  Injectable 0.5 milliGRAM(s) IV Push every 6 hours PRN  latanoprost 0.005% Ophthalmic Solution 1 Drop(s) Both EYES at bedtime  LORazepam     Tablet 0.5 milliGRAM(s) Oral every 8 hours PRN  melatonin 3 milliGRAM(s) Oral at bedtime PRN  ondansetron Injectable 4 milliGRAM(s) IV Push every 8 hours PRN  pantoprazole    Tablet 40 milliGRAM(s) Oral every 24 hours  polyethylene glycol 3350 17 Gram(s) Oral every 12 hours  senna 2 Tablet(s) Oral at bedtime      Vital Signs Last 24 Hrs  T(C): 36.8 (12 Oct 2023 11:21), Max: 36.8 (12 Oct 2023 06:09)  T(F): 98.3 (12 Oct 2023 11:21), Max: 98.3 (12 Oct 2023 11:21)  HR: 78 (12 Oct 2023 11:21) (72 - 78)  BP: 156/86 (12 Oct 2023 11:21) (141/88 - 173/94)  BP(mean): --  RR: 16 (12 Oct 2023 11:21) (15 - 16)  SpO2: 98% (12 Oct 2023 11:21) (97% - 98%)    Parameters below as of 12 Oct 2023 11:21  Patient On (Oxygen Delivery Method): room air      NEUROLOGICAL EXAMINATION:  GENERAL:  Appearance is consistent with chronologic age.   COGNITION/LANGUAGE:  Awake, alert, and oriented to person, place, time and date.  Fluent, intact comprehension, repetition, naming. Recent and remote memory intact.  Fund of knowledge is appropriate.  Nondysarthric.    CRANIAL NERVES:   - Eyes:  Visual acuity intact. Pupils equal round and reactive, no RAPD. EOMI w/o nystagmus, skew or reported double vision. Normal visual field on confrontation. No ptosis/weakness of eyelid closure.   - Face:  Facial sensation normal V1 - 3, no facial asymmetry.    - Ears/Nose/Throat:  Hearing grossly intact b/l to finger rub.  Palate elevates midline.  Tongue and uvula midline.     MOTOR EXAM:  No observable drift. Normal tone and bulk. No tenderness, twitching, tremors or involuntary movements.                                                                 Right | Left    Shoulder abductors:    4|5   Shoulder adductors:    5|5   Elbow flexors:             5|5   Elbow extensors:         5|5   Palmar flexion:            5|5   Palmar dorsiflexion:     5|5   Hip flexors:              4-|4-   Hip extensors:            5|5   Knee extensors:          5|5   Knee flexors:            4-|4-   Foot dorsiflexors:        4|4   Foot plantarflexors:     5|5      DTRs:             Right | Left   Biceps:                 2+|2+     Triceps:                2+|2+   Brachioradialis:     2+|2+     Patellar:              3+|3+   Achilles:              0+|0+   Plantar responses equivocal b/l.    No observable drift. Normal tone and bulk. No tenderness, twitching, tremors or involuntary movements.  SENSORY EXAM:  Intact to light touch and pinprick, pain, proprioception but decreased temperature and vibration b/l in distal LE.   REFLEXES:   2+ b/l biceps, triceps, Brisk 3+ patella with adductor components, 0+ achilles.  Plantar response downgoing b/l.  Jaw jerk, Maday, clonus absent.  CEREBELLUM:  Finger to nose/Heel to knee and shin intact. No dysmetria.    GAIT: unsteady but broad based.  Romberg: not assessed due to unsteadiness.       Labs:  CBC Full  -  ( 12 Oct 2023 08:28 )  WBC Count : 10.24 K/uL  RBC Count : 5.06 M/uL  Hemoglobin : 13.2 g/dL  Hematocrit : 40.0 %  Platelet Count - Automated : 245 K/uL  Mean Cell Volume : 79.1 fl  Mean Cell Hemoglobin : 26.1 pg  Mean Cell Hemoglobin Concentration : 33.0 gm/dL  Auto Neutrophil # : 7.76 K/uL  Auto Lymphocyte # : 1.58 K/uL  Auto Monocyte # : 0.76 K/uL  Auto Eosinophil # : 0.09 K/uL  Auto Basophil # : 0.02 K/uL  Auto Neutrophil % : 75.8 %  Auto Lymphocyte % : 15.4 %  Auto Monocyte % : 7.4 %  Auto Eosinophil % : 0.9 %  Auto Basophil % : 0.2 %    10-12    128<L>  |  93<L>  |  12  ----------------------------<  125<H>  3.5   |  27  |  0.59    Ca    8.7      12 Oct 2023 08:28  Phos  2.9     10-12  Mg     2.1     10-12    TPro  7.3  /  Alb  3.9  /  TBili  0.6  /  DBili  x   /  AST  16  /  ALT  16  /  AlkPhos  68  10-12    LIVER FUNCTIONS - ( 12 Oct 2023 08:28 )  Alb: 3.9 g/dL / Pro: 7.3 g/dL / ALK PHOS: 68 U/L / ALT: 16 U/L / AST: 16 U/L / GGT: x             Urinalysis Basic - ( 12 Oct 2023 08:28 )    Color: x / Appearance: x / SG: x / pH: x  Gluc: 125 mg/dL / Ketone: x  / Bili: x / Urobili: x   Blood: x / Protein: x / Nitrite: x   Leuk Esterase: x / RBC: x / WBC x   Sq Epi: x / Non Sq Epi: x / Bacteria: x        Assessment:  This is a 63y Female w/ h/o     Plan: Neurology Progress Note    Interval History:  The patient was seen and examined at the bedside. She reports worsening balance compared to yesterday.       PAST MEDICAL & SURGICAL HISTORY:  Ovarian cyst    GERD (gastroesophageal reflux disease)    Glaucoma    Sarcoidosis    Sinusitis    Osteoporosis    Migraines    Hashimoto's disease    Kidney stones    Torn meniscus  right    History of arthroscopy of shoulder  rotator cuff repair, right    History of umbilical hernia repair    S/P correction of deviated nasal septum    H/O sinus surgery            Medications:  acetaminophen     Tablet .. 1000 milliGRAM(s) Oral every 8 hours  aluminum hydroxide/magnesium hydroxide/simethicone Suspension 30 milliLiter(s) Oral every 4 hours PRN  enoxaparin Injectable 40 milliGRAM(s) SubCutaneous every 24 hours  HYDROmorphone  Injectable 0.25 milliGRAM(s) IV Push every 6 hours PRN  HYDROmorphone  Injectable 0.5 milliGRAM(s) IV Push every 6 hours PRN  latanoprost 0.005% Ophthalmic Solution 1 Drop(s) Both EYES at bedtime  LORazepam     Tablet 0.5 milliGRAM(s) Oral every 8 hours PRN  melatonin 3 milliGRAM(s) Oral at bedtime PRN  ondansetron Injectable 4 milliGRAM(s) IV Push every 8 hours PRN  pantoprazole    Tablet 40 milliGRAM(s) Oral every 24 hours  polyethylene glycol 3350 17 Gram(s) Oral every 12 hours  senna 2 Tablet(s) Oral at bedtime      Vital Signs Last 24 Hrs  T(C): 36.8 (12 Oct 2023 11:21), Max: 36.8 (12 Oct 2023 06:09)  T(F): 98.3 (12 Oct 2023 11:21), Max: 98.3 (12 Oct 2023 11:21)  HR: 78 (12 Oct 2023 11:21) (72 - 78)  BP: 156/86 (12 Oct 2023 11:21) (141/88 - 173/94)  BP(mean): --  RR: 16 (12 Oct 2023 11:21) (15 - 16)  SpO2: 98% (12 Oct 2023 11:21) (97% - 98%)    Parameters below as of 12 Oct 2023 11:21  Patient On (Oxygen Delivery Method): room air      NEUROLOGICAL EXAMINATION:  GENERAL:  Appearance is consistent with chronologic age.   COGNITION/LANGUAGE:  Awake, alert, and oriented to person, place, time and date.  Fluent, intact comprehension, repetition, naming. Recent and remote memory intact.  Fund of knowledge is appropriate.  Nondysarthric.    CRANIAL NERVES:   - Eyes:  Visual acuity intact. Pupils equal round and reactive, no RAPD. EOMI w/o nystagmus, skew or reported double vision. Normal visual field on confrontation. No ptosis/weakness of eyelid closure.   - Face:  Facial sensation normal V1 - 3, no facial asymmetry.    - Ears/Nose/Throat:  Hearing grossly intact b/l to finger rub.  Palate elevates midline.  Tongue and uvula midline.     MOTOR EXAM:  No observable drift. Normal tone and bulk. No tenderness, twitching, tremors or involuntary movements.                                                                 Right | Left    Shoulder abductors:    4|5   Shoulder adductors:    5|5   Elbow flexors:             5|5   Elbow extensors:         5|5   Palmar flexion:            5|5   Palmar dorsiflexion:     5|5   Hip flexors:              4-|4-   Hip extensors:            5|5   Knee extensors:          5|5   Knee flexors:            4-|4-   Foot dorsiflexors:        4|4   Foot plantarflexors:     5|5      DTRs:             Right | Left   Biceps:                 2+|2+     Triceps:                2+|2+   Brachioradialis:     2+|2+     Patellar:              3+|3+   Achilles:              0+|0+   Plantar responses equivocal b/l.    No observable drift. Normal tone and bulk. No tenderness, twitching, tremors or involuntary movements.  SENSORY EXAM:  Intact to light touch and pinprick, pain, proprioception but decreased temperature and vibration b/l in distal LE.   REFLEXES:   2+ b/l biceps, triceps, 2+ patella, 0+ achilles.  Plantar response downgoing b/l.  Jaw jerk, Maday, clonus absent.  CEREBELLUM:  Finger to nose/Heel to knee and shin intact. No dysmetria.    GAIT: unsteady, broad based stance  Romberg: not assessed due to unsteadiness.       Labs:  CBC Full  -  ( 12 Oct 2023 08:28 )  WBC Count : 10.24 K/uL  RBC Count : 5.06 M/uL  Hemoglobin : 13.2 g/dL  Hematocrit : 40.0 %  Platelet Count - Automated : 245 K/uL  Mean Cell Volume : 79.1 fl  Mean Cell Hemoglobin : 26.1 pg  Mean Cell Hemoglobin Concentration : 33.0 gm/dL  Auto Neutrophil # : 7.76 K/uL  Auto Lymphocyte # : 1.58 K/uL  Auto Monocyte # : 0.76 K/uL  Auto Eosinophil # : 0.09 K/uL  Auto Basophil # : 0.02 K/uL  Auto Neutrophil % : 75.8 %  Auto Lymphocyte % : 15.4 %  Auto Monocyte % : 7.4 %  Auto Eosinophil % : 0.9 %  Auto Basophil % : 0.2 %    10-12    128<L>  |  93<L>  |  12  ----------------------------<  125<H>  3.5   |  27  |  0.59    Ca    8.7      12 Oct 2023 08:28  Phos  2.9     10-12  Mg     2.1     10-12    TPro  7.3  /  Alb  3.9  /  TBili  0.6  /  DBili  x   /  AST  16  /  ALT  16  /  AlkPhos  68  10-12    LIVER FUNCTIONS - ( 12 Oct 2023 08:28 )  Alb: 3.9 g/dL / Pro: 7.3 g/dL / ALK PHOS: 68 U/L / ALT: 16 U/L / AST: 16 U/L / GGT: x             Urinalysis Basic - ( 12 Oct 2023 08:28 )    Color: x / Appearance: x / SG: x / pH: x  Gluc: 125 mg/dL / Ketone: x  / Bili: x / Urobili: x   Blood: x / Protein: x / Nitrite: x   Leuk Esterase: x / RBC: x / WBC x   Sq Epi: x / Non Sq Epi: x / Bacteria: x        Assessment:  This is a 63y Female w/ h/o     Plan:

## 2023-10-12 NOTE — PROGRESS NOTE ADULT - PROBLEM SELECTOR PLAN 7
D: regular  E: Mg <2, Phosp <3, K <4  DVT ppx: Lovenox  Code: Full Hx of hashimotos. Not on any meds. TSH 1.75 w/n/l

## 2023-10-12 NOTE — DIETITIAN INITIAL EVALUATION ADULT - PERTINENT MEDS FT
MEDICATIONS  (STANDING):  acetaminophen     Tablet .. 1000 milliGRAM(s) Oral every 8 hours  enoxaparin Injectable 40 milliGRAM(s) SubCutaneous every 24 hours  latanoprost 0.005% Ophthalmic Solution 1 Drop(s) Both EYES at bedtime  pantoprazole    Tablet 40 milliGRAM(s) Oral every 24 hours  polyethylene glycol 3350 17 Gram(s) Oral every 12 hours  senna 2 Tablet(s) Oral at bedtime    MEDICATIONS  (PRN):  aluminum hydroxide/magnesium hydroxide/simethicone Suspension 30 milliLiter(s) Oral every 4 hours PRN Dyspepsia  HYDROmorphone  Injectable 0.5 milliGRAM(s) IV Push every 6 hours PRN Severe Pain (7 - 10)  HYDROmorphone  Injectable 0.25 milliGRAM(s) IV Push every 6 hours PRN Moderate Pain (4 - 6)  LORazepam     Tablet 0.5 milliGRAM(s) Oral every 8 hours PRN Anxiety  melatonin 3 milliGRAM(s) Oral at bedtime PRN Insomnia  ondansetron Injectable 4 milliGRAM(s) IV Push every 8 hours PRN Nausea and/or Vomiting

## 2023-10-12 NOTE — PHYSICAL THERAPY INITIAL EVALUATION ADULT - ADDITIONAL COMMENTS
Pt reports prior to admission living in a 2 family home with 10 steps to enter. Pt lives by herself on her level but her mother and son live in the same house, upon discharge pt can stay at her mothers level of the home with no steps to enter. Pt reports being indep with ADLs and mobility without AD. Since having recent progressing weakness, pt has been using her mothers rollator for amb.

## 2023-10-12 NOTE — DISCHARGE NOTE PROVIDER - NSDCCPCAREPLAN_GEN_ALL_CORE_FT
PRINCIPAL DISCHARGE DIAGNOSIS  Diagnosis: GBS (Guillain [place] syndrome)  Assessment and Plan of Treatment:      PRINCIPAL DISCHARGE DIAGNOSIS  Diagnosis: GBS (Guillain [place] syndrome)  Assessment and Plan of Treatment: You were found to have Guillain [place] syndrome. Guillain-[place] syndrome is a rare disorder in which your body's immune system attacks your nerves. Weakness and tingling in your hands and feet are usually the first symptoms.  These sensations can quickly spread, eventually paralyzing your whole body. In its most severe form, Guillain-[place] syndrome is a medical emergency. Most people with the condition must be hospitalized to receive treatment.  The exact cause of Guillain-[place] syndrome is unknown. But two-thirds of patients report symptoms of an infection in the six weeks preceding.  There's no known cure for Guillain-[place] syndrome, but several treatments can ease symptoms and reduce the duration of the illness. While in the hospital, you received a treatment called IVIG, which improved your symptoms.  While full recovery may take up to several years, most people are able to walk again six months after symptoms first started. Some people may have lasting effects from it, such as weakness, numbness or fatigue.     PRINCIPAL DISCHARGE DIAGNOSIS  Diagnosis: GBS (Guillain [place] syndrome)  Assessment and Plan of Treatment: You were found to have Guillain [place] syndrome. Guillain[place] syndrome is a rare disorder in which your body's immune system attacks your nerves. Weakness and tingling in your hands and feet are usually the first symptoms.  These sensations can quickly spread, eventually paralyzing your whole body. In its most severe form, Guillain[place] syndrome is a medical emergency. Most people with the condition must be hospitalized to receive treatment.  The exact cause of Guillain[place] syndrome is unknown. But two-thirds of patients report symptoms of an infection in the six weeks preceding.  There's no known cure for Guillain[place] syndrome, but several treatments can ease symptoms and reduce the duration of the illness. While in the hospital, you received a treatment called IVIG, which improved your symptoms.  While full recovery may take up to several years, most people are able to walk again six months after symptoms first started. Some people may have lasting effects from it, such as weakness, numbness or fatigue.  INSTRUCTIONS  1. Take one gabapentin 300mg tablet twice daily, once in the morning and once in the evening. We are providing you with a 30-day supply - Dr. Cordero can prescribe more for you if your pain persists at your follow-up appointment.  2. Take over-the-counter NSAIDs as needed for any additional pain.  3. Follow up at your scheduled outpatient appointments with neurology (Dr. Lozano), ophthalmology (Dr. Haddad), and Dr. Cordero (call Dr. Cordero to schedule your appointment).

## 2023-10-12 NOTE — PROGRESS NOTE ADULT - PROBLEM SELECTOR PLAN 8
N: regular w/ Ensure  E: Mg <2, Phosp <3, K <4  DVT ppx: Lovenox  GI: Protonix 40mg PO qd  C: Full Code  D: [facility]

## 2023-10-12 NOTE — DISCHARGE NOTE PROVIDER - NSDCFUADDAPPT_GEN_ALL_CORE_FT
Please bring your Insurance card, Photo ID and Discharge paperwork to the following appointment:    (1) Please follow up with your Neurology Provider, Dr. Bob Lozano at [address] [place] [address], 8[address] [place] [address] on 03/08/2024 at 3:20pm.    Please note that this a tentative date. Please note that the office will contact you for an earlier appointment once available.    Appointment was scheduled by Ms. MICHELLE Goodman, Referral Coordinator.

## 2023-10-12 NOTE — DISCHARGE NOTE PROVIDER - CARE PROVIDERS DIRECT ADDRESSES
,[email or web address]'s [email or web address] ,[email or web address]

## 2023-10-12 NOTE — PROGRESS NOTE ADULT - PROBLEM SELECTOR PLAN 4
at home protonix 40mg qd  -c/w home med Na 128. Patient asymptomatic. No edema on physical exam.  TSH 1.75 w/n/l  - f/u Serum Osm, Urine Osm, Urine Na

## 2023-10-12 NOTE — DIETITIAN INITIAL EVALUATION ADULT - PERTINENT LABORATORY DATA
10-12    128<L>  |  93<L>  |  12  ----------------------------<  125<H>  3.5   |  27  |  0.59    Ca    8.7      12 Oct 2023 08:28  Phos  2.9     10-12  Mg     2.1     10-12    TPro  7.3  /  Alb  3.9  /  TBili  0.6  /  DBili  x   /  AST  16  /  ALT  16  /  AlkPhos  68  10-12  A1C with Estimated Average Glucose Result: 5.9 % (10-11-23 @ 20:24)

## 2023-10-12 NOTE — PROGRESS NOTE ADULT - SUBJECTIVE AND OBJECTIVE BOX
INTERVAL HPI/OVERNIGHT EVENTS:  Patient feels weaker today  Also having difficulty eating   Labs noted;  Hyponatremia ;      MEDICATIONS  (STANDING):  acetaminophen     Tablet .. 1000 milliGRAM(s) Oral every 8 hours  enoxaparin Injectable 40 milliGRAM(s) SubCutaneous every 24 hours  latanoprost 0.005% Ophthalmic Solution 1 Drop(s) Both EYES at bedtime  pantoprazole    Tablet 40 milliGRAM(s) Oral every 24 hours  polyethylene glycol 3350 17 Gram(s) Oral every 12 hours  senna 2 Tablet(s) Oral at bedtime    MEDICATIONS  (PRN):  aluminum hydroxide/magnesium hydroxide/simethicone Suspension 30 milliLiter(s) Oral every 4 hours PRN Dyspepsia  HYDROmorphone  Injectable 0.5 milliGRAM(s) IV Push every 6 hours PRN Severe Pain (7 - 10)  HYDROmorphone  Injectable 0.25 milliGRAM(s) IV Push every 6 hours PRN Moderate Pain (4 - 6)  LORazepam     Tablet 0.5 milliGRAM(s) Oral every 8 hours PRN Anxiety  melatonin 3 milliGRAM(s) Oral at bedtime PRN Insomnia  ondansetron Injectable 4 milliGRAM(s) IV Push every 8 hours PRN Nausea and/or Vomiting      Allergies    No Known Allergies    Intolerances        Vital Signs Last 24 Hrs  T(C): 36.8 (12 Oct 2023 06:09), Max: 36.8 (12 Oct 2023 06:09)  T(F): 98.2 (12 Oct 2023 06:09), Max: 98.2 (12 Oct 2023 06:09)  HR: 72 (12 Oct 2023 06:09) (72 - 76)  BP: 162/85 (12 Oct 2023 06:09) (141/88 - 173/94)  BP(mean): --  RR: 15 (12 Oct 2023 06:09) (15 - 16)  SpO2: 97% (12 Oct 2023 06:09) (97% - 97%)    Parameters below as of 11 Oct 2023 21:15  Patient On (Oxygen Delivery Method): room air              Constitutional:  Awake     Eyes: EMILY    ENMT: Negative    Neck: Supple    Back:  no tenderness     Respiratory:  clear     Cardiovascular: S1 S2    Gastrointestinal:  soft     Genitourinary:    Extremities:  no edema     Vascular:    Neurological:  Bilateral leg weakness     Skin:    Lymph Nodes:            LABS:                        13.2   10.24 )-----------( 245      ( 12 Oct 2023 08:28 )             40.0     10-12    128<L>  |  93<L>  |  12  ----------------------------<  125<H>  3.5   |  27  |  0.59    Ca    8.7      12 Oct 2023 08:28  Phos  2.9     10-12  Mg     2.1     10-12    TPro  7.3  /  Alb  3.9  /  TBili  0.6  /  DBili  x   /  AST  16  /  ALT  16  /  AlkPhos  68  10-12      Urinalysis Basic - ( 12 Oct 2023 08:28 )    Color: x / Appearance: x / SG: x / pH: x  Gluc: 125 mg/dL / Ketone: x  / Bili: x / Urobili: x   Blood: x / Protein: x / Nitrite: x   Leuk Esterase: x / RBC: x / WBC x   Sq Epi: x / Non Sq Epi: x / Bacteria: x        RADIOLOGY & ADDITIONAL TESTS:

## 2023-10-12 NOTE — DIETITIAN INITIAL EVALUATION ADULT - ADD RECOMMEND
1. Continue Regular diet, recommend ADD Ensure High Protein (350kcal, 20g protein) x2/day to promote intake.   >>Encourage & monitor PO intake. [place] dietary preferences as able.   2. Monitor GI tolerance, weight trends, labs, & skin integrity.  3. Defer bowel and pain regimens to team.   4. RD to remain available for diet education/intervention prn.

## 2023-10-12 NOTE — DIETITIAN INITIAL EVALUATION ADULT - PROBLEM SELECTOR PLAN 3
Lower lumbar back pain, that radiates down anterior part of b/l legs.   - dilaudid 0.5mg q6 for severe pain  - acetaminophen 650 prn q6

## 2023-10-13 DIAGNOSIS — G61.0 GUILLAIN-BARRE SYNDROME: ICD-10-CM

## 2023-10-13 LAB
ACE SERPL-CCNC: 41 U/L — SIGNIFICANT CHANGE UP (ref 14–82)
ALBUMIN SERPL ELPH-MCNC: 4 G/DL — SIGNIFICANT CHANGE UP (ref 3.3–5)
ALP SERPL-CCNC: 70 U/L — SIGNIFICANT CHANGE UP (ref 40–120)
ALT FLD-CCNC: 20 U/L — SIGNIFICANT CHANGE UP (ref 10–45)
ANA PAT FLD IF-IMP: ABNORMAL
ANA TITR SER: ABNORMAL
ANION GAP SERPL CALC-SCNC: 10 MMOL/L — SIGNIFICANT CHANGE UP (ref 5–17)
AST SERPL-CCNC: 17 U/L — SIGNIFICANT CHANGE UP (ref 10–40)
BASOPHILS # BLD AUTO: 0.01 K/UL — SIGNIFICANT CHANGE UP (ref 0–0.2)
BASOPHILS NFR BLD AUTO: 0.2 % — SIGNIFICANT CHANGE UP (ref 0–2)
BILIRUB SERPL-MCNC: 0.5 MG/DL — SIGNIFICANT CHANGE UP (ref 0.2–1.2)
BUN SERPL-MCNC: 10 MG/DL — SIGNIFICANT CHANGE UP (ref 7–23)
CALCIUM SERPL-MCNC: 8 MG/DL — LOW (ref 8.4–10.5)
CHLORIDE SERPL-SCNC: 93 MMOL/L — LOW (ref 96–108)
CO2 SERPL-SCNC: 24 MMOL/L — SIGNIFICANT CHANGE UP (ref 22–31)
CREAT SERPL-MCNC: 0.46 MG/DL — LOW (ref 0.5–1.3)
EGFR: 108 ML/MIN/1.73M2 — SIGNIFICANT CHANGE UP
EOSINOPHIL # BLD AUTO: 0.04 K/UL — SIGNIFICANT CHANGE UP (ref 0–0.5)
EOSINOPHIL NFR BLD AUTO: 0.6 % — SIGNIFICANT CHANGE UP (ref 0–6)
GLUCOSE BLDC GLUCOMTR-MCNC: 108 MG/DL — HIGH (ref 70–99)
GLUCOSE SERPL-MCNC: 123 MG/DL — HIGH (ref 70–99)
HCT VFR BLD CALC: 39.2 % — SIGNIFICANT CHANGE UP (ref 34.5–45)
HGB BLD-MCNC: 12.8 G/DL — SIGNIFICANT CHANGE UP (ref 11.5–15.5)
IGA FLD-MCNC: 364 MG/DL — SIGNIFICANT CHANGE UP (ref 84–499)
IGG FLD-MCNC: 1084 MG/DL — SIGNIFICANT CHANGE UP (ref 610–1660)
IGM SERPL-MCNC: 82 MG/DL — SIGNIFICANT CHANGE UP (ref 35–242)
IMM GRANULOCYTES NFR BLD AUTO: 0.5 % — SIGNIFICANT CHANGE UP (ref 0–0.9)
KAPPA LC SER QL IFE: 1.44 MG/DL — SIGNIFICANT CHANGE UP (ref 0.33–1.94)
KAPPA/LAMBDA FREE LIGHT CHAIN RATIO, SERUM: 1.44 RATIO — SIGNIFICANT CHANGE UP (ref 0.26–1.65)
LAMBDA LC SER QL IFE: 1 MG/DL — SIGNIFICANT CHANGE UP (ref 0.57–2.63)
LDH CSF L TO P-CCNC: 22 U/L — SIGNIFICANT CHANGE UP
LDH FLD-CCNC: 22 U/L — SIGNIFICANT CHANGE UP
LYMPHOCYTES # BLD AUTO: 0.76 K/UL — LOW (ref 1–3.3)
LYMPHOCYTES # BLD AUTO: 12.1 % — LOW (ref 13–44)
MAGNESIUM SERPL-MCNC: 2.1 MG/DL — SIGNIFICANT CHANGE UP (ref 1.6–2.6)
MCHC RBC-ENTMCNC: 25.7 PG — LOW (ref 27–34)
MCHC RBC-ENTMCNC: 32.7 GM/DL — SIGNIFICANT CHANGE UP (ref 32–36)
MCV RBC AUTO: 78.6 FL — LOW (ref 80–100)
MONOCYTES # BLD AUTO: 0.45 K/UL — SIGNIFICANT CHANGE UP (ref 0–0.9)
MONOCYTES NFR BLD AUTO: 7.2 % — SIGNIFICANT CHANGE UP (ref 2–14)
NEUTROPHILS # BLD AUTO: 4.99 K/UL — SIGNIFICANT CHANGE UP (ref 1.8–7.4)
NEUTROPHILS NFR BLD AUTO: 79.4 % — HIGH (ref 43–77)
NRBC # BLD: 0 /100 WBCS — SIGNIFICANT CHANGE UP (ref 0–0)
OSMOLALITY SERPL: 267 MOSM/KG — LOW (ref 280–301)
OSMOLALITY UR: 609 MOSM/KG — SIGNIFICANT CHANGE UP (ref 300–900)
PHOSPHATE SERPL-MCNC: 2.4 MG/DL — LOW (ref 2.5–4.5)
PLATELET # BLD AUTO: 216 K/UL — SIGNIFICANT CHANGE UP (ref 150–400)
POTASSIUM SERPL-MCNC: 3.4 MMOL/L — LOW (ref 3.5–5.3)
POTASSIUM SERPL-SCNC: 3.4 MMOL/L — LOW (ref 3.5–5.3)
PROT SERPL-MCNC: 7.4 G/DL — SIGNIFICANT CHANGE UP (ref 6–8.3)
PROT SERPL-MCNC: 7.4 G/DL — SIGNIFICANT CHANGE UP (ref 6–8.3)
PROT SERPL-MCNC: 7.9 G/DL — SIGNIFICANT CHANGE UP (ref 6–8.3)
RBC # BLD: 4.99 M/UL — SIGNIFICANT CHANGE UP (ref 3.8–5.2)
RBC # FLD: 14 % — SIGNIFICANT CHANGE UP (ref 10.3–14.5)
SODIUM SERPL-SCNC: 127 MMOL/L — LOW (ref 135–145)
SODIUM UR-SCNC: 137 MMOL/L — SIGNIFICANT CHANGE UP
WBC # BLD: 6.28 K/UL — SIGNIFICANT CHANGE UP (ref 3.8–10.5)
WBC # FLD AUTO: 6.28 K/UL — SIGNIFICANT CHANGE UP (ref 3.8–10.5)

## 2023-10-13 PROCEDURE — 99233 SBSQ HOSP IP/OBS HIGH 50: CPT

## 2023-10-13 PROCEDURE — 99233 SBSQ HOSP IP/OBS HIGH 50: CPT | Mod: GC

## 2023-10-13 RX ORDER — POTASSIUM CHLORIDE 20 MEQ
40 PACKET (EA) ORAL ONCE
Refills: 0 | Status: COMPLETED | OUTPATIENT
Start: 2023-10-13 | End: 2023-10-13

## 2023-10-13 RX ORDER — OXYCODONE HYDROCHLORIDE 5 MG/1
10 TABLET ORAL EVERY 4 HOURS
Refills: 0 | Status: DISCONTINUED | OUTPATIENT
Start: 2023-10-13 | End: 2023-10-13

## 2023-10-13 RX ORDER — OXYCODONE HYDROCHLORIDE 5 MG/1
5 TABLET ORAL EVERY 4 HOURS
Refills: 0 | Status: DISCONTINUED | OUTPATIENT
Start: 2023-10-13 | End: 2023-10-13

## 2023-10-13 RX ORDER — HYDROMORPHONE HYDROCHLORIDE 2 MG/ML
0.5 INJECTION INTRAMUSCULAR; INTRAVENOUS; SUBCUTANEOUS ONCE
Refills: 0 | Status: DISCONTINUED | OUTPATIENT
Start: 2023-10-13 | End: 2023-10-13

## 2023-10-13 RX ORDER — MORPHINE SULFATE 50 MG/1
4 CAPSULE, EXTENDED RELEASE ORAL EVERY 4 HOURS
Refills: 0 | Status: DISCONTINUED | OUTPATIENT
Start: 2023-10-13 | End: 2023-10-17

## 2023-10-13 RX ORDER — MORPHINE SULFATE 50 MG/1
2 CAPSULE, EXTENDED RELEASE ORAL EVERY 4 HOURS
Refills: 0 | Status: DISCONTINUED | OUTPATIENT
Start: 2023-10-13 | End: 2023-10-17

## 2023-10-13 RX ORDER — POTASSIUM PHOSPHATE, MONOBASIC POTASSIUM PHOSPHATE, DIBASIC 236; 224 MG/ML; MG/ML
30 INJECTION, SOLUTION INTRAVENOUS ONCE
Refills: 0 | Status: COMPLETED | OUTPATIENT
Start: 2023-10-13 | End: 2023-10-13

## 2023-10-13 RX ORDER — OXYCODONE HYDROCHLORIDE 5 MG/1
5 TABLET ORAL ONCE
Refills: 0 | Status: DISCONTINUED | OUTPATIENT
Start: 2023-10-13 | End: 2023-10-13

## 2023-10-13 RX ADMIN — HYDROMORPHONE HYDROCHLORIDE 0.5 MILLIGRAM(S): 2 INJECTION INTRAMUSCULAR; INTRAVENOUS; SUBCUTANEOUS at 07:14

## 2023-10-13 RX ADMIN — MORPHINE SULFATE 4 MILLIGRAM(S): 50 CAPSULE, EXTENDED RELEASE ORAL at 10:02

## 2023-10-13 RX ADMIN — POLYETHYLENE GLYCOL 3350 17 GRAM(S): 17 POWDER, FOR SOLUTION ORAL at 21:25

## 2023-10-13 RX ADMIN — HYDROMORPHONE HYDROCHLORIDE 0.5 MILLIGRAM(S): 2 INJECTION INTRAMUSCULAR; INTRAVENOUS; SUBCUTANEOUS at 02:21

## 2023-10-13 RX ADMIN — MORPHINE SULFATE 4 MILLIGRAM(S): 50 CAPSULE, EXTENDED RELEASE ORAL at 13:51

## 2023-10-13 RX ADMIN — Medication 1000 MILLIGRAM(S): at 07:03

## 2023-10-13 RX ADMIN — HYDROMORPHONE HYDROCHLORIDE 0.25 MILLIGRAM(S): 2 INJECTION INTRAMUSCULAR; INTRAVENOUS; SUBCUTANEOUS at 00:15

## 2023-10-13 RX ADMIN — MORPHINE SULFATE 2 MILLIGRAM(S): 50 CAPSULE, EXTENDED RELEASE ORAL at 13:08

## 2023-10-13 RX ADMIN — LATANOPROST 1 DROP(S): 0.05 SOLUTION/ DROPS OPHTHALMIC; TOPICAL at 21:34

## 2023-10-13 RX ADMIN — HYDROMORPHONE HYDROCHLORIDE 0.25 MILLIGRAM(S): 2 INJECTION INTRAMUSCULAR; INTRAVENOUS; SUBCUTANEOUS at 07:04

## 2023-10-13 RX ADMIN — PANTOPRAZOLE SODIUM 40 MILLIGRAM(S): 20 TABLET, DELAYED RELEASE ORAL at 19:16

## 2023-10-13 RX ADMIN — Medication 1000 MILLIGRAM(S): at 14:52

## 2023-10-13 RX ADMIN — MORPHINE SULFATE 4 MILLIGRAM(S): 50 CAPSULE, EXTENDED RELEASE ORAL at 19:16

## 2023-10-13 RX ADMIN — HYDROMORPHONE HYDROCHLORIDE 0.5 MILLIGRAM(S): 2 INJECTION INTRAMUSCULAR; INTRAVENOUS; SUBCUTANEOUS at 02:46

## 2023-10-13 RX ADMIN — MORPHINE SULFATE 2 MILLIGRAM(S): 50 CAPSULE, EXTENDED RELEASE ORAL at 17:08

## 2023-10-13 RX ADMIN — POTASSIUM PHOSPHATE, MONOBASIC POTASSIUM PHOSPHATE, DIBASIC 83.33 MILLIMOLE(S): 236; 224 INJECTION, SOLUTION INTRAVENOUS at 10:02

## 2023-10-13 RX ADMIN — OXYCODONE HYDROCHLORIDE 5 MILLIGRAM(S): 5 TABLET ORAL at 08:53

## 2023-10-13 RX ADMIN — MORPHINE SULFATE 2 MILLIGRAM(S): 50 CAPSULE, EXTENDED RELEASE ORAL at 16:15

## 2023-10-13 RX ADMIN — IMMUNE GLOBULIN (HUMAN) 50 GRAM(S): 10 INJECTION INTRAVENOUS; SUBCUTANEOUS at 17:52

## 2023-10-13 RX ADMIN — MORPHINE SULFATE 4 MILLIGRAM(S): 50 CAPSULE, EXTENDED RELEASE ORAL at 17:08

## 2023-10-13 RX ADMIN — MORPHINE SULFATE 2 MILLIGRAM(S): 50 CAPSULE, EXTENDED RELEASE ORAL at 21:40

## 2023-10-13 RX ADMIN — MORPHINE SULFATE 4 MILLIGRAM(S): 50 CAPSULE, EXTENDED RELEASE ORAL at 10:43

## 2023-10-13 RX ADMIN — ENOXAPARIN SODIUM 40 MILLIGRAM(S): 100 INJECTION SUBCUTANEOUS at 19:16

## 2023-10-13 RX ADMIN — SENNA PLUS 2 TABLET(S): 8.6 TABLET ORAL at 21:25

## 2023-10-13 RX ADMIN — Medication 1000 MILLIGRAM(S): at 13:51

## 2023-10-13 RX ADMIN — OXYCODONE HYDROCHLORIDE 5 MILLIGRAM(S): 5 TABLET ORAL at 09:34

## 2023-10-13 RX ADMIN — Medication 1000 MILLIGRAM(S): at 23:46

## 2023-10-13 RX ADMIN — MORPHINE SULFATE 2 MILLIGRAM(S): 50 CAPSULE, EXTENDED RELEASE ORAL at 12:05

## 2023-10-13 RX ADMIN — MORPHINE SULFATE 2 MILLIGRAM(S): 50 CAPSULE, EXTENDED RELEASE ORAL at 21:25

## 2023-10-13 RX ADMIN — Medication 1000 MILLIGRAM(S): at 07:44

## 2023-10-13 RX ADMIN — MORPHINE SULFATE 4 MILLIGRAM(S): 50 CAPSULE, EXTENDED RELEASE ORAL at 20:00

## 2023-10-13 RX ADMIN — Medication 650 MILLIGRAM(S): at 17:51

## 2023-10-13 RX ADMIN — SODIUM CHLORIDE 150 MILLILITER(S): 9 INJECTION INTRAMUSCULAR; INTRAVENOUS; SUBCUTANEOUS at 00:28

## 2023-10-13 RX ADMIN — HYDROMORPHONE HYDROCHLORIDE 0.5 MILLIGRAM(S): 2 INJECTION INTRAMUSCULAR; INTRAVENOUS; SUBCUTANEOUS at 04:50

## 2023-10-13 RX ADMIN — HYDROMORPHONE HYDROCHLORIDE 0.25 MILLIGRAM(S): 2 INJECTION INTRAMUSCULAR; INTRAVENOUS; SUBCUTANEOUS at 09:33

## 2023-10-13 RX ADMIN — Medication 40 MILLIEQUIVALENT(S): at 10:03

## 2023-10-13 NOTE — PROGRESS NOTE ADULT - PROBLEM SELECTOR PLAN 2
10/8: CT and MRI showed Extensive mediastinal, hilar, and paratracheal lymphadenopathy is present  in the chest. Enlarged thoracic lymphadenopathy was noted on the 06/08/2012 chest CT study which was attributed to the patient's known history of sarcoidosis at that time. Concern for rheumatologic vs malignant work up,  ESR, CRP, B12, RF w/n/l, Hep B/C negative, A1C 5.9.  - f/u CHRIS, SSA, TSH, ACE, immunoglobulin panel, B1 and B6 levels  - f/u SPEC with w immunofixation  - consider paraneoplastic w/up

## 2023-10-13 NOTE — PROGRESS NOTE ADULT - TIME BILLING
Patient seen and examined;  Pain is severe this morning;  Unclear etiology   Scans need to be done today;  IVIG as per neurology  Will try Morphine for pain   Check Urine sodium and osmolarity ; R/O SIADH   Follow respiratory parameters  Physical therapy

## 2023-10-13 NOTE — PROGRESS NOTE ADULT - SUBJECTIVE AND OBJECTIVE BOX
Neurology Progress Note    Interval History:  The patient was seen and examined at the bedside. This morning weakness is lateral. She still       PAST MEDICAL & SURGICAL HISTORY:  Ovarian cyst    GERD (gastroesophageal reflux disease)    Glaucoma    Sarcoidosis    Sinusitis    Osteoporosis    Migraines    Hashimoto's disease    Kidney stones    Torn meniscus  right    History of arthroscopy of shoulder  rotator cuff repair, right    History of umbilical hernia repair    S/P correction of deviated nasal septum    H/O sinus surgery            Medications:  acetaminophen     Tablet .. 1000 milliGRAM(s) Oral every 8 hours  aluminum hydroxide/magnesium hydroxide/simethicone Suspension 30 milliLiter(s) Oral every 4 hours PRN  enoxaparin Injectable 40 milliGRAM(s) SubCutaneous every 24 hours  HYDROmorphone  Injectable 0.25 milliGRAM(s) IV Push every 6 hours PRN  HYDROmorphone  Injectable 0.5 milliGRAM(s) IV Push every 6 hours PRN  latanoprost 0.005% Ophthalmic Solution 1 Drop(s) Both EYES at bedtime  LORazepam     Tablet 0.5 milliGRAM(s) Oral every 8 hours PRN  melatonin 3 milliGRAM(s) Oral at bedtime PRN  ondansetron Injectable 4 milliGRAM(s) IV Push every 8 hours PRN  pantoprazole    Tablet 40 milliGRAM(s) Oral every 24 hours  polyethylene glycol 3350 17 Gram(s) Oral every 12 hours  senna 2 Tablet(s) Oral at bedtime      Vital Signs Last 24 Hrs  T(C): 36.8 (12 Oct 2023 11:21), Max: 36.8 (12 Oct 2023 06:09)  T(F): 98.3 (12 Oct 2023 11:21), Max: 98.3 (12 Oct 2023 11:21)  HR: 78 (12 Oct 2023 11:21) (72 - 78)  BP: 156/86 (12 Oct 2023 11:21) (141/88 - 173/94)  BP(mean): --  RR: 16 (12 Oct 2023 11:21) (15 - 16)  SpO2: 98% (12 Oct 2023 11:21) (97% - 98%)    Parameters below as of 12 Oct 2023 11:21  Patient On (Oxygen Delivery Method): room air      NEUROLOGICAL EXAMINATION:  GENERAL:  Appearance is consistent with chronologic age.   COGNITION/LANGUAGE:  Awake, alert, and oriented to person, place, time and date.  Fluent, intact comprehension, repetition, naming. Recent and remote memory intact.  Fund of knowledge is appropriate.  Nondysarthric.    CRANIAL NERVES:   - Eyes:  Visual acuity intact. Pupils equal round and reactive, no RAPD. EOMI w/o nystagmus, skew or reported double vision. Normal visual field on confrontation. No ptosis/weakness of eyelid closure.   - Face:  Facial sensation normal V1 - 3, no facial asymmetry.    - Ears/Nose/Throat:  Hearing grossly intact b/l to finger rub.  Palate elevates midline.  Tongue and uvula midline.     MOTOR EXAM:  No observable drift. Normal tone and bulk. No tenderness, twitching, tremors or involuntary movements.                                                                 Right | Left    Shoulder abductors:    4|5   Shoulder adductors:    5|5   Elbow flexors:             5|5   Elbow extensors:         5|5   Palmar flexion:            5|5   Palmar dorsiflexion:     5|5   Hip flexors:              4-|4-   Hip extensors:            5|5   Knee extensors:          5|5   Knee flexors:            4-|4-   Foot dorsiflexors:        4|4   Foot plantarflexors:     5|5      DTRs:             Right | Left   Biceps:                 2+|2+     Triceps:                2+|2+   Brachioradialis:     2+|2+     Patellar:              3+|3+   Achilles:              0+|0+   Plantar responses equivocal b/l.    No observable drift. Normal tone and bulk. No tenderness, twitching, tremors or involuntary movements.  SENSORY EXAM:  Intact to light touch and pinprick, pain, proprioception but decreased temperature and vibration b/l in distal LE.   REFLEXES:   2+ b/l biceps, triceps, 2+ patella, 0+ achilles.  Plantar response downgoing b/l.  Jaw jerk, Maday, clonus absent.  CEREBELLUM:  Finger to nose/Heel to knee and shin intact. No dysmetria.    GAIT: unsteady, broad based stance  Romberg: not assessed due to unsteadiness.       Labs:  CBC Full  -  ( 12 Oct 2023 08:28 )  WBC Count : 10.24 K/uL  RBC Count : 5.06 M/uL  Hemoglobin : 13.2 g/dL  Hematocrit : 40.0 %  Platelet Count - Automated : 245 K/uL  Mean Cell Volume : 79.1 fl  Mean Cell Hemoglobin : 26.1 pg  Mean Cell Hemoglobin Concentration : 33.0 gm/dL  Auto Neutrophil # : 7.76 K/uL  Auto Lymphocyte # : 1.58 K/uL  Auto Monocyte # : 0.76 K/uL  Auto Eosinophil # : 0.09 K/uL  Auto Basophil # : 0.02 K/uL  Auto Neutrophil % : 75.8 %  Auto Lymphocyte % : 15.4 %  Auto Monocyte % : 7.4 %  Auto Eosinophil % : 0.9 %  Auto Basophil % : 0.2 %    10-12    128<L>  |  93<L>  |  12  ----------------------------<  125<H>  3.5   |  27  |  0.59    Ca    8.7      12 Oct 2023 08:28  Phos  2.9     10-12  Mg     2.1     10-12    TPro  7.3  /  Alb  3.9  /  TBili  0.6  /  DBili  x   /  AST  16  /  ALT  16  /  AlkPhos  68  10-12    LIVER FUNCTIONS - ( 12 Oct 2023 08:28 )  Alb: 3.9 g/dL / Pro: 7.3 g/dL / ALK PHOS: 68 U/L / ALT: 16 U/L / AST: 16 U/L / GGT: x             Urinalysis Basic - ( 12 Oct 2023 08:28 )    Color: x / Appearance: x / SG: x / pH: x  Gluc: 125 mg/dL / Ketone: x  / Bili: x / Urobili: x   Blood: x / Protein: x / Nitrite: x   Leuk Esterase: x / RBC: x / WBC x   Sq Epi: x / Non Sq Epi: x / Bacteria: x        Assessment:  This is a 63y Female w/ h/o     Plan: Neurology Progress Note    Interval History:  The patient was seen and examined at the bedside. This morning weakness is continued. She has new lip numbness and R sided facial droop.       PAST MEDICAL & SURGICAL HISTORY:  Ovarian cyst    GERD (gastroesophageal reflux disease)    Glaucoma    Sarcoidosis    Sinusitis    Osteoporosis    Migraines    Hashimoto's disease    Kidney stones    Torn meniscus  right    History of arthroscopy of shoulder  rotator cuff repair, right    History of umbilical hernia repair    S/P correction of deviated nasal septum    H/O sinus surgery            Medications:  acetaminophen     Tablet .. 1000 milliGRAM(s) Oral every 8 hours  aluminum hydroxide/magnesium hydroxide/simethicone Suspension 30 milliLiter(s) Oral every 4 hours PRN  enoxaparin Injectable 40 milliGRAM(s) SubCutaneous every 24 hours  HYDROmorphone  Injectable 0.25 milliGRAM(s) IV Push every 6 hours PRN  HYDROmorphone  Injectable 0.5 milliGRAM(s) IV Push every 6 hours PRN  latanoprost 0.005% Ophthalmic Solution 1 Drop(s) Both EYES at bedtime  LORazepam     Tablet 0.5 milliGRAM(s) Oral every 8 hours PRN  melatonin 3 milliGRAM(s) Oral at bedtime PRN  ondansetron Injectable 4 milliGRAM(s) IV Push every 8 hours PRN  pantoprazole    Tablet 40 milliGRAM(s) Oral every 24 hours  polyethylene glycol 3350 17 Gram(s) Oral every 12 hours  senna 2 Tablet(s) Oral at bedtime      Vital Signs Last 24 Hrs  T(C): 36.8 (12 Oct 2023 11:21), Max: 36.8 (12 Oct 2023 06:09)  T(F): 98.3 (12 Oct 2023 11:21), Max: 98.3 (12 Oct 2023 11:21)  HR: 78 (12 Oct 2023 11:21) (72 - 78)  BP: 156/86 (12 Oct 2023 11:21) (141/88 - 173/94)  BP(mean): --  RR: 16 (12 Oct 2023 11:21) (15 - 16)  SpO2: 98% (12 Oct 2023 11:21) (97% - 98%)    Parameters below as of 12 Oct 2023 11:21  Patient On (Oxygen Delivery Method): room air      NEUROLOGICAL EXAMINATION:  GENERAL:  Appearance is consistent with chronologic age.   COGNITION/LANGUAGE:  Awake, alert, and oriented to person, place, time and date.  Fluent, intact comprehension, repetition, naming. Recent and remote memory intact.  Fund of knowledge is appropriate.  Nondysarthric.    CRANIAL NERVES:   - Eyes:  Visual acuity intact. Pupils equal round and reactive, no RAPD. EOMI w/o nystagmus, skew or reported double vision. Normal visual field on confrontation. No ptosis/weakness of eyelid closure.   - Face:  Facial sensation normal V1 - 3, + R facial droop upper and lower   - Ears/Nose/Throat:  Hearing grossly intact b/l to finger rub.  Palate elevates midline.  Tongue and uvula midline.     MOTOR EXAM:  No observable drift. Normal tone and bulk. No tenderness, twitching, tremors or involuntary movements.                                                                 Right | Left    Shoulder abductors:    4|5   Shoulder adductors:    5|5   Elbow flexors:             5|5   Elbow extensors:         5|5   Palmar flexion:            5|5   Palmar dorsiflexion:     5|5   Hip flexors:              4-|4-   Hip extensors:            5|5   Knee extensors:          5|5   Knee flexors:            4-|4-   Foot dorsiflexors:        4|4   Foot plantarflexors:     5|5      DTRs:             Right | Left   Biceps:                 2+|2+     Triceps:                2+|2+   Brachioradialis:     2+|2+     Patellar:              3+|3+   Achilles:              0+|0+   Plantar responses equivocal b/l.    No observable drift. Normal tone and bulk. No tenderness, twitching, tremors or involuntary movements.  SENSORY EXAM:  Intact to light touch and pinprick, pain, proprioception but decreased temperature and vibration b/l in distal LE.   REFLEXES:   2+ b/l biceps, triceps, 2+ patella, 0+ achilles.  Plantar response downgoing b/l.  Jaw jerk, Maday, clonus absent.  CEREBELLUM:  Finger to nose/Heel to knee and shin intact. No dysmetria.    GAIT: unsteady, broad based stance  Romberg: not assessed due to unsteadiness.       Labs:  CBC Full  -  ( 12 Oct 2023 08:28 )  WBC Count : 10.24 K/uL  RBC Count : 5.06 M/uL  Hemoglobin : 13.2 g/dL  Hematocrit : 40.0 %  Platelet Count - Automated : 245 K/uL  Mean Cell Volume : 79.1 fl  Mean Cell Hemoglobin : 26.1 pg  Mean Cell Hemoglobin Concentration : 33.0 gm/dL  Auto Neutrophil # : 7.76 K/uL  Auto Lymphocyte # : 1.58 K/uL  Auto Monocyte # : 0.76 K/uL  Auto Eosinophil # : 0.09 K/uL  Auto Basophil # : 0.02 K/uL  Auto Neutrophil % : 75.8 %  Auto Lymphocyte % : 15.4 %  Auto Monocyte % : 7.4 %  Auto Eosinophil % : 0.9 %  Auto Basophil % : 0.2 %    10-12    128<L>  |  93<L>  |  12  ----------------------------<  125<H>  3.5   |  27  |  0.59    Ca    8.7      12 Oct 2023 08:28  Phos  2.9     10-12  Mg     2.1     10-12    TPro  7.3  /  Alb  3.9  /  TBili  0.6  /  DBili  x   /  AST  16  /  ALT  16  /  AlkPhos  68  10-12    LIVER FUNCTIONS - ( 12 Oct 2023 08:28 )  Alb: 3.9 g/dL / Pro: 7.3 g/dL / ALK PHOS: 68 U/L / ALT: 16 U/L / AST: 16 U/L / GGT: x             Urinalysis Basic - ( 12 Oct 2023 08:28 )    Color: x / Appearance: x / SG: x / pH: x  Gluc: 125 mg/dL / Ketone: x  / Bili: x / Urobili: x   Blood: x / Protein: x / Nitrite: x   Leuk Esterase: x / RBC: x / WBC x   Sq Epi: x / Non Sq Epi: x / Bacteria: x        Assessment:  This is a 63y Female w/ h/o     Plan: Neurology Progress Note    Interval History:  The patient was seen and examined at the bedside. This morning weakness is continued. She has new lip numbness and R sided facial weakness      PAST MEDICAL & SURGICAL HISTORY:  Ovarian cyst    GERD (gastroesophageal reflux disease)    Glaucoma    Sarcoidosis    Sinusitis    Osteoporosis    Migraines    Hashimoto's disease    Kidney stones    Torn meniscus  right    History of arthroscopy of shoulder  rotator cuff repair, right    History of umbilical hernia repair    S/P correction of deviated nasal septum    H/O sinus surgery            Medications:  acetaminophen     Tablet .. 1000 milliGRAM(s) Oral every 8 hours  aluminum hydroxide/magnesium hydroxide/simethicone Suspension 30 milliLiter(s) Oral every 4 hours PRN  enoxaparin Injectable 40 milliGRAM(s) SubCutaneous every 24 hours  HYDROmorphone  Injectable 0.25 milliGRAM(s) IV Push every 6 hours PRN  HYDROmorphone  Injectable 0.5 milliGRAM(s) IV Push every 6 hours PRN  latanoprost 0.005% Ophthalmic Solution 1 Drop(s) Both EYES at bedtime  LORazepam     Tablet 0.5 milliGRAM(s) Oral every 8 hours PRN  melatonin 3 milliGRAM(s) Oral at bedtime PRN  ondansetron Injectable 4 milliGRAM(s) IV Push every 8 hours PRN  pantoprazole    Tablet 40 milliGRAM(s) Oral every 24 hours  polyethylene glycol 3350 17 Gram(s) Oral every 12 hours  senna 2 Tablet(s) Oral at bedtime      Vital Signs Last 24 Hrs  T(C): 36.8 (12 Oct 2023 11:21), Max: 36.8 (12 Oct 2023 06:09)  T(F): 98.3 (12 Oct 2023 11:21), Max: 98.3 (12 Oct 2023 11:21)  HR: 78 (12 Oct 2023 11:21) (72 - 78)  BP: 156/86 (12 Oct 2023 11:21) (141/88 - 173/94)  BP(mean): --  RR: 16 (12 Oct 2023 11:21) (15 - 16)  SpO2: 98% (12 Oct 2023 11:21) (97% - 98%)    Parameters below as of 12 Oct 2023 11:21  Patient On (Oxygen Delivery Method): room air      NEUROLOGICAL EXAMINATION:  GENERAL:  Appearance is consistent with chronologic age.   COGNITION/LANGUAGE:  Awake, alert, and oriented to person, place, time and date.  Fluent, intact comprehension, repetition, naming. Recent and remote memory intact.  Fund of knowledge is appropriate.  Nondysarthric.    CRANIAL NERVES:   - Eyes:  Visual acuity intact. Pupils equal round and reactive, no RAPD. EOMI w/o nystagmus, skew or reported double vision. Normal visual field on confrontation. No ptosis/weakness of eyelid closure.   - Face:  Facial sensation normal V1 - 3, + R facial droop upper and lower   - Ears/Nose/Throat:  Hearing grossly intact b/l to finger rub.  Palate elevates midline.  Tongue and uvula midline.     MOTOR EXAM:  No observable drift. Normal tone and bulk. No tenderness, twitching, tremors or involuntary movements.                                                                 Right | Left    Shoulder abductors:    4|5   Shoulder adductors:    5|5   Elbow flexors:             5|5   Elbow extensors:         5|5   Palmar flexion:            5|5   Palmar dorsiflexion:     5|5   Hip flexors:              4-|4-   Hip extensors:            5|5   Knee extensors:          5|5   Knee flexors:            4-|4-   Foot dorsiflexors:        4|4   Foot plantarflexors:     5|5      DTRs:             Right | Left   Biceps:                 2+|2+     Triceps:                2+|2+   Brachioradialis:     2+|2+     Patellar:              3+|3+   Achilles:              0+|0+   Plantar responses equivocal b/l.    No observable drift. Normal tone and bulk. No tenderness, twitching, tremors or involuntary movements.  SENSORY EXAM:  Intact to light touch and pinprick, pain, proprioception but decreased temperature and vibration b/l in distal LE.   REFLEXES:   2+ b/l biceps, triceps, 2+ patella, 0+ achilles.  Plantar response downgoing b/l.  Jaw jerk, Maday, clonus absent.  CEREBELLUM:  Finger to nose/Heel to knee and shin intact. No dysmetria.    GAIT: unsteady, broad based stance  Romberg: not assessed due to unsteadiness.       Labs:  CBC Full  -  ( 12 Oct 2023 08:28 )  WBC Count : 10.24 K/uL  RBC Count : 5.06 M/uL  Hemoglobin : 13.2 g/dL  Hematocrit : 40.0 %  Platelet Count - Automated : 245 K/uL  Mean Cell Volume : 79.1 fl  Mean Cell Hemoglobin : 26.1 pg  Mean Cell Hemoglobin Concentration : 33.0 gm/dL  Auto Neutrophil # : 7.76 K/uL  Auto Lymphocyte # : 1.58 K/uL  Auto Monocyte # : 0.76 K/uL  Auto Eosinophil # : 0.09 K/uL  Auto Basophil # : 0.02 K/uL  Auto Neutrophil % : 75.8 %  Auto Lymphocyte % : 15.4 %  Auto Monocyte % : 7.4 %  Auto Eosinophil % : 0.9 %  Auto Basophil % : 0.2 %    10-12    128<L>  |  93<L>  |  12  ----------------------------<  125<H>  3.5   |  27  |  0.59    Ca    8.7      12 Oct 2023 08:28  Phos  2.9     10-12  Mg     2.1     10-12    TPro  7.3  /  Alb  3.9  /  TBili  0.6  /  DBili  x   /  AST  16  /  ALT  16  /  AlkPhos  68  10-12    LIVER FUNCTIONS - ( 12 Oct 2023 08:28 )  Alb: 3.9 g/dL / Pro: 7.3 g/dL / ALK PHOS: 68 U/L / ALT: 16 U/L / AST: 16 U/L / GGT: x             Urinalysis Basic - ( 12 Oct 2023 08:28 )    Color: x / Appearance: x / SG: x / pH: x  Gluc: 125 mg/dL / Ketone: x  / Bili: x / Urobili: x   Blood: x / Protein: x / Nitrite: x   Leuk Esterase: x / RBC: x / WBC x   Sq Epi: x / Non Sq Epi: x / Bacteria: x        Assessment:  This is a 63y Female w/ h/o     Plan:

## 2023-10-13 NOTE — PROGRESS NOTE ADULT - SUBJECTIVE AND OBJECTIVE BOX
INTERVAL HPI/OVERNIGHT EVENTS:  Patient with severe pain in lower back which has interfered with her sleep;  Present pain meds without help  Labs noted; CSF c/w with GBS;   Hyponatremia noted ; Check urine sodium and osmolarity; R/O SIADH       MEDICATIONS  (STANDING):  acetaminophen     Tablet .. 650 milliGRAM(s) Oral daily  acetaminophen     Tablet .. 1000 milliGRAM(s) Oral every 8 hours  enoxaparin Injectable 40 milliGRAM(s) SubCutaneous every 24 hours  immune   globulin 10% (GAMMAGARD) IVPB 30 Gram(s) IV Intermittent every 24 hours  latanoprost 0.005% Ophthalmic Solution 1 Drop(s) Both EYES at bedtime  pantoprazole    Tablet 40 milliGRAM(s) Oral every 24 hours  polyethylene glycol 3350 17 Gram(s) Oral every 12 hours  potassium chloride    Tablet ER 40 milliEquivalent(s) Oral once  potassium phosphate IVPB 30 milliMole(s) IV Intermittent once  senna 2 Tablet(s) Oral at bedtime  sodium chloride 0.9%. 1000 milliLiter(s) (150 mL/Hr) IV Continuous <Continuous>    MEDICATIONS  (PRN):  aluminum hydroxide/magnesium hydroxide/simethicone Suspension 30 milliLiter(s) Oral every 4 hours PRN Dyspepsia  diphenhydrAMINE 25 milliGRAM(s) Oral daily PRN Allergy symptoms  LORazepam     Tablet 0.5 milliGRAM(s) Oral every 8 hours PRN Anxiety  melatonin 3 milliGRAM(s) Oral at bedtime PRN Insomnia  morphine  - Injectable 4 milliGRAM(s) IV Push every 4 hours PRN Severe Pain (7 - 10)  ondansetron Injectable 4 milliGRAM(s) IV Push every 8 hours PRN Nausea and/or Vomiting      Allergies    No Known Allergies    Intolerances        Vital Signs Last 24 Hrs  T(C): 36.6 (13 Oct 2023 05:46), Max: 36.8 (12 Oct 2023 11:21)  T(F): 97.8 (13 Oct 2023 05:46), Max: 98.3 (12 Oct 2023 11:21)  HR: 73 (13 Oct 2023 05:46) (73 - 79)  BP: 174/88 (13 Oct 2023 05:46) (156/80 - 174/88)  BP(mean): --  RR: 16 (13 Oct 2023 05:46) (16 - 16)  SpO2: 97% (13 Oct 2023 05:46) (97% - 98%)    Parameters below as of 12 Oct 2023 21:10  Patient On (Oxygen Delivery Method): room air              Constitutional:  Awake ;  Respiratory status is stable:    Eyes: EMILY    ENMT: Negative    Neck: Supple    Back:  no tenderness     Respiratory:  clear with good inspiratory effort     Cardiovascular: S1 S2    Gastrointestinal:  soft     Genitourinary:    Extremities:  Strength appears improved     Vascular:    Neurological:    Skin:    Lymph Nodes:            LABS:                        12.8   6.28  )-----------( 216      ( 13 Oct 2023 08:25 )             39.2     10-13    127<L>  |  93<L>  |  10  ----------------------------<  123<H>  3.4<L>   |  24  |  0.46<L>    Ca    8.0<L>      13 Oct 2023 08:25  Phos  2.4     10-13  Mg     2.1     10-13    TPro  7.9  /  Alb  4.0  /  TBili  0.5  /  DBili  x   /  AST  17  /  ALT  20  /  AlkPhos  70  10-13      Urinalysis Basic - ( 13 Oct 2023 08:25 )    Color: x / Appearance: x / SG: x / pH: x  Gluc: 123 mg/dL / Ketone: x  / Bili: x / Urobili: x   Blood: x / Protein: x / Nitrite: x   Leuk Esterase: x / RBC: x / WBC x   Sq Epi: x / Non Sq Epi: x / Bacteria: x        RADIOLOGY & ADDITIONAL TESTS:

## 2023-10-13 NOTE — PROGRESS NOTE ADULT - PROBLEM SELECTOR PLAN 1
Progressive weakness in lower limbs over last 3 days with continued urinary incontinence. Last admission 10/08 with MRI and CT didn't reveal acute pathology, only showed broad C6-7 disc bulging w/o any spinal compression, no contrast enchainment. Imaging also showed mediastinal LN. Pt on methylprednisolone 4mg for 6 day (on day 3/6- 3 pills 10/12, 2 pills 10/13, 1 pill 10/14).  - neuro consulted, recommendation appreciated  - s/p EMG and LP, suspecting GBS, start IVIG 30g qd, infuse over 6hr  - PT consulted  - hold steroid course pending neuro recs

## 2023-10-13 NOTE — PROGRESS NOTE ADULT - PROBLEM SELECTOR PROBLEM 6
Mediastinal lymphadenopathy Detail Level: Simple Render Risk Assessment In Note?: yes Comment: Acne vulgaris, moderate (mild end of spectrum), mixed, improving on oral doxycycline and topical therapy- nature/etiology reviewed with patient and mom, gentle skin care reviewed. Pt is unable to tolerate benzoyl peroxide. Continue tretinoin 0.05% cream every 3 nights as tolerated. Will do Amzeeq foam the evenings she doesn’t do Tretinoin 0.05% cream. Will continue doxycycline 100mg BID x2 months for a total of 5 months. Will continue clindamycin 1% lotion QAM. Follow up in 4 months, 2 months after finishing the oral abx.

## 2023-10-13 NOTE — PROGRESS NOTE ADULT - ASSESSMENT
63 year old female with PMH of OA, GERD, sarcoidosis, migraines presenting with worsening back pain associated bilateral upper and lower extremities numbness and tingling in distal parts of all her extremities for the last week. Her neurological exam is remarkable only for mild b/l proximal hip flexion and knee flexion vibration and temperature hypesthesia in b/l LE and increased patellar reflexes. MRI C and T spine w and w/o contrast was performed which didn't reveal acute pathology, only showed broad C6-7 disc bulging w/o any spinal compression, no contrast enchainment. Her hx of probable sarcoidosis, enlargement of mediastinal LN, unintentional weight loss needs oncological w/up. From neurological standpoint would benefit from MRI L-spine w and w/o, basic polyneuropathy w/up, whole body PET scan. CPK, ESR, CRP was unremarkable. A1C prediabetic.     Recommendations:     1. F/u:   - Serum ACE,   - CHRIS, SSA/B,   - RF,   - Hep B/C,   - B12 level, B1 and B6 levels,   - SPEP w immunofixation; considering paraneoplastic w/up    2. MRI  L-spine w and w/o contrast  3. whole body PET scan for LAD  4. LP today 10/12.  5. EMG/NCS today 10/12.       The case was discussed w Dr. Lozano   Thank you for the courtesy of the consult. Will continue to follow.       63 year old female with PMH of OA, GERD, sarcoidosis, migraines presenting with worsening back pain associated bilateral upper and lower extremities numbness and tingling in distal parts of all her extremities for the last week. Her neurological exam is remarkable only for mild b/l proximal hip flexion and knee flexion vibration and temperature hypesthesia in b/l LE and increased patellar reflexes. MRI C and T spine w and w/o contrast was performed which didn't reveal acute pathology, only showed broad C6-7 disc bulging w/o any spinal compression, no contrast enchainment. Her hx of probable sarcoidosis, enlargement of mediastinal LN, unintentional weight loss needs oncological w/up. From neurological standpoint would benefit from MRI L-spine w and w/o, basic polyneuropathy w/up, whole body PET scan. CPK, ESR, CRP was unremarkable. A1C prediabetic. 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    Recommendations:     1. F/u:   - Serum ACE,   - CHRIS, SSA/B,   - RF,   - Hep B/C,   - B12 level, B1 and B6 levels,   - SPEP w immunofixation;     2. MRI brain and  L-spine w and w/o contrast  3. whole body PET scan for LAD  4. NIF and vital capacity QD vs BID  5. Continue with IVIG 500mg/kg x4 days  6. Consider closer monitoring to [facility] given worsening of symptoms.      The case was discussed w Dr. Lozano   Thank you for the courtesy of the consult. Will continue to follow.       63 year old female with PMH of OA, GERD, sarcoidosis, migraines presenting with worsening back pain associated bilateral upper and lower extremities numbness and tingling in distal parts of all her extremities for the last week. Her neurological exam is remarkable only for mild b/l proximal hip flexion and knee flexion vibration and temperature hypesthesia in b/l LE and increased patellar reflexes. MRI C and T spine w and w/o contrast was performed which didn't reveal acute pathology, only showed broad C6-7 disc bulging w/o any spinal compression, no contrast enchainment. Her hx of probable sarcoidosis, enlargement of mediastinal LN, unintentional weight loss needs oncological w/up. From neurological standpoint would benefit from MRI L-spine w and w/o, basic polyneuropathy w/up, whole body PET scan. CPK, ESR, CRP was unremarkable. A1C prediabetic. 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    Recommendations:     1. F/u:   - Serum ACE,   - CHRIS, SSA/B,   - RF,   - Hep B/C,   - B12 level, B1 and B6 levels,   - SPEP w immunofixation;     2. MRI brain and  L-spine w and w/o contrast  3. whole body PET scan for LAD  4. NIF and vital capacity QD vs BID  5. Continue with IVIG 500mg/kg x4 days  6. Consider closer monitoring given worsening of symptoms.      The case was discussed w Dr. Lozano   Thank you for the courtesy of the consult. Will continue to follow.

## 2023-10-13 NOTE — PROGRESS NOTE ADULT - PROBLEM SELECTOR PLAN 8
N: regular w/ Ensure  E: Mg <2, Phosp <3, K <4  DVT ppx: Lovenox  GI: Protonix 40mg PO qd  C: Full Code  D: [facility]

## 2023-10-13 NOTE — PROGRESS NOTE ADULT - PROBLEM SELECTOR PLAN 4
Na 128. Patient asymptomatic. No edema on physical exam.  TSH 1.75 w/n/l  - f/u Serum Osm, Urine Osm, Urine Na Na 128. Patient asymptomatic. Patient appear euvolemic on exam. Patient has been having severe and constant pain. 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  TSH 1.75 w/n/l.   Serum osmo low at 267, true hyponatremia  - f/u urine osmo and urine Na

## 2023-10-13 NOTE — PROGRESS NOTE ADULT - SUBJECTIVE AND OBJECTIVE BOX
OVERNIGHT EVENTS: [place]    SUBJECTIVE:  Patient seen and examined at bedside. Patient is in pain, stating the pain meds only lasts for minutes. Zofran is not very helpful for nausea but chocolate helped. No BM yet but wants to hold bowel regimen for PET scan.   No fever, chills, dizziness, headache, changes in vision, chest pain, dyspnea, nausea or vomiting, dysuria, changes in bowel movements, LE edema. Rest of 12 point Review of systems negative unless otherwise documented elsewhere in note.     Vital Signs Last 12 Hrs  T(F): 97.8 (10-13-23 @ 05:46), Max: 97.8 (10-12-23 @ 21:10)  HR: 73 (10-13-23 @ 05:46) (73 - 79)  BP: 174/88 (10-13-23 @ 05:46) (156/80 - 174/88)  BP(mean): --  RR: 16 (10-13-23 @ 05:46) (16 - 16)  SpO2: 97% (10-13-23 @ 05:46) (97% - 97%)  I&O's Summary      PHYSICAL EXAM:  Constitutional: NAD, comfortable in bed.  HEENT: NC/AT, PERRLA, EOMI, no conjunctival pallor or scleral icterus, MMM  Neck: Supple, no JVD  Respiratory: CTA B/L. No w/r/r.   Cardiovascular: RRR, normal S1 and S2, no m/r/g.   Gastrointestinal: +BS, soft NTND, no guarding or rebound tenderness, no palpable masses   Extremities: wwp; no cyanosis, clubbing or edema.   Vascular: Pulses equal and strong throughout.   Neurological: AAOx3, no CN deficits, strength and sensation intact throughout.   Skin: No gross skin abnormalities or rashes        LABS:                        13.2   10.24 )-----------( 245      ( 12 Oct 2023 08:28 )             40.0     10-12    x   |  x   |  x   ----------------------------<  115<H>  x    |  x   |  x     Ca    8.7      12 Oct 2023 08:28  Phos  2.9     10-12  Mg     2.1     10-12    TPro  7.3  /  Alb  3.9  /  TBili  0.6  /  DBili  x   /  AST  16  /  ALT  16  /  AlkPhos  68  10-12      Urinalysis Basic - ( 12 Oct 2023 18:09 )    Color: x / Appearance: x / SG: x / pH: x  Gluc: 115 mg/dL / Ketone: x  / Bili: x / Urobili: x   Blood: x / Protein: x / Nitrite: x   Leuk Esterase: x / RBC: x / WBC x   Sq Epi: x / Non Sq Epi: x / Bacteria: x          RADIOLOGY & ADDITIONAL TESTS:    MEDICATIONS  (STANDING):  acetaminophen     Tablet .. 1000 milliGRAM(s) Oral every 8 hours  acetaminophen     Tablet .. 650 milliGRAM(s) Oral daily  enoxaparin Injectable 40 milliGRAM(s) SubCutaneous every 24 hours  immune   globulin 10% (GAMMAGARD) IVPB 30 Gram(s) IV Intermittent every 24 hours  latanoprost 0.005% Ophthalmic Solution 1 Drop(s) Both EYES at bedtime  oxyCODONE    IR 5 milliGRAM(s) Oral once  pantoprazole    Tablet 40 milliGRAM(s) Oral every 24 hours  polyethylene glycol 3350 17 Gram(s) Oral every 12 hours  senna 2 Tablet(s) Oral at bedtime  sodium chloride 0.9%. 1000 milliLiter(s) (150 mL/Hr) IV Continuous <Continuous>    MEDICATIONS  (PRN):  aluminum hydroxide/magnesium hydroxide/simethicone Suspension 30 milliLiter(s) Oral every 4 hours PRN Dyspepsia  diphenhydrAMINE 25 milliGRAM(s) Oral daily PRN Allergy symptoms  LORazepam     Tablet 0.5 milliGRAM(s) Oral every 8 hours PRN Anxiety  melatonin 3 milliGRAM(s) Oral at bedtime PRN Insomnia  ondansetron Injectable 4 milliGRAM(s) IV Push every 8 hours PRN Nausea and/or Vomiting  oxyCODONE    IR 5 milliGRAM(s) Oral every 4 hours PRN Moderate Pain (4 - 6)  oxyCODONE    IR 10 milliGRAM(s) Oral every 4 hours PRN Severe Pain (7 - 10)   ----------------------------TRANSFER to TELEMETRY from 7W--------------------------    HOSPITAL COURSE:  Patient is a 63 year old female (RA) with PMH of OA, GERD, and sarcoidosis (diagnosed in 2008), presenting with worsening back pain and progressive lower limb weakness for the 3 days. On last Wed patient started to have neck and occipital headache with L. lower arm numbness; on Thursday the pain progressed down her back and numbness in both arms; on Friday pain more severe in lumbosacral area and numbness in all extremities. Patient had a flushot 3 weeks ago. She was admitted for further evaluation. On 10/12 EMG and LP completed by neurology, suspicious of GBS. IVIG 30g qd started. Pain regimen adjusted to Morphine IV for moderate/severe pain and Dilaudid for breakthrough pain. New facial numbness noticed AM 10/13, ordered NIF and VC with RT and plan to transfer to telemetry for monitoring of respiratory status.    OVERNIGHT EVENTS: Dilaudid given 1 hour ahead of scheduled time for pain    SUBJECTIVE:  Patient seen and examined at bedside. Patient in constant pain, stating the pain meds (Dilaudid) helps with the pain but only lasts for minutes. Zofran is not very helpful for nausea but chocolate helped. No BM yet but wants to hold bowel regimen till after completion of PET scan.   No fever, chills, dizziness, headache, changes in vision, chest pain, dyspnea, dysuria, LE edema. Rest of 12 point Review of systems negative unless otherwise documented elsewhere in note.     Vital Signs Last 12 Hrs  T(F): 97.8 (10-13-23 @ 05:46), Max: 97.8 (10-12-23 @ 21:10)  HR: 73 (10-13-23 @ 05:46) (73 - 79)  BP: 174/88 (10-13-23 @ 05:46) (156/80 - 174/88)  BP(mean): --  RR: 16 (10-13-23 @ 05:46) (16 - 16)  SpO2: 97% (10-13-23 @ 05:46) (97% - 97%)  I&O's Summary      PHYSICAL EXAM:  Constitutional: NAD, in constant pain, resting in bed.  HEENT: NC/AT, PERRLA, EOMI, no conjunctival pallor or scleral icterus, MMM  Neck: Supple, no JVD  Respiratory: CTA B/L. No w/r/r.   Cardiovascular: RRR, normal S1 and S2, no m/r/g.   Gastrointestinal: +BS, soft NTND, no guarding or rebound tenderness, no palpable masses   Extremities: wwp; no cyanosis, clubbing or edema.   Vascular: Pulses equal and strong throughout.   Neurological: AAOx3, unsteady gait, 4/5 hip flexors, 4/5 knee flexors. Sensation intact.   Skin: No gross skin abnormalities or rashes          LABS:                        13.2   10.24 )-----------( 245      ( 12 Oct 2023 08:28 )             40.0     10-12    x   |  x   |  x   ----------------------------<  115<H>  x    |  x   |  x     Ca    8.7      12 Oct 2023 08:28  Phos  2.9     10-12  Mg     2.1     10-12    TPro  7.3  /  Alb  3.9  /  TBili  0.6  /  DBili  x   /  AST  16  /  ALT  16  /  AlkPhos  68  10-12      Urinalysis Basic - ( 12 Oct 2023 18:09 )    Color: x / Appearance: x / SG: x / pH: x  Gluc: 115 mg/dL / Ketone: x  / Bili: x / Urobili: x   Blood: x / Protein: x / Nitrite: x   Leuk Esterase: x / RBC: x / WBC x   Sq Epi: x / Non Sq Epi: x / Bacteria: x          RADIOLOGY & ADDITIONAL TESTS:    MEDICATIONS  (STANDING):  acetaminophen     Tablet .. 1000 milliGRAM(s) Oral every 8 hours  acetaminophen     Tablet .. 650 milliGRAM(s) Oral daily  enoxaparin Injectable 40 milliGRAM(s) SubCutaneous every 24 hours  immune   globulin 10% (GAMMAGARD) IVPB 30 Gram(s) IV Intermittent every 24 hours  latanoprost 0.005% Ophthalmic Solution 1 Drop(s) Both EYES at bedtime  oxyCODONE    IR 5 milliGRAM(s) Oral once  pantoprazole    Tablet 40 milliGRAM(s) Oral every 24 hours  polyethylene glycol 3350 17 Gram(s) Oral every 12 hours  senna 2 Tablet(s) Oral at bedtime  sodium chloride 0.9%. 1000 milliLiter(s) (150 mL/Hr) IV Continuous <Continuous>    MEDICATIONS  (PRN):  aluminum hydroxide/magnesium hydroxide/simethicone Suspension 30 milliLiter(s) Oral every 4 hours PRN Dyspepsia  diphenhydrAMINE 25 milliGRAM(s) Oral daily PRN Allergy symptoms  LORazepam     Tablet 0.5 milliGRAM(s) Oral every 8 hours PRN Anxiety  melatonin 3 milliGRAM(s) Oral at bedtime PRN Insomnia  ondansetron Injectable 4 milliGRAM(s) IV Push every 8 hours PRN Nausea and/or Vomiting  oxyCODONE    IR 5 milliGRAM(s) Oral every 4 hours PRN Moderate Pain (4 - 6)  oxyCODONE    IR 10 milliGRAM(s) Oral every 4 hours PRN Severe Pain (7 - 10)   ----------------------------TRANSFER to TELEMETRY from 7W--------------------------    HOSPITAL COURSE:  Patient is a 63 year old female (RA) with PMH of OA, GERD, and sarcoidosis (diagnosed in 2008), presenting with worsening back pain and progressive lower limb weakness for the 3 days. On last Wed patient started to have neck and occipital headache with L. lower arm numbness; on Thursday the pain progressed down her back and numbness in both arms; on Friday pain more severe in lumbosacral area and numbness in all extremities. Patient had a flushot 3 weeks ago. She was admitted for further evaluation. On 10/12 EMG and LP w/ CSF sent by neurology, suspicious of GBS. IVIG 30g qd started. Patient NPO for PET scan on 10/13. Pain regimen adjusted to Morphine IV for moderate/severe pain and Dilaudid for breakthrough pain. New facial numbness noticed AM 10/13, ordered NIF and VC with RT and plan to transfer to telemetry for monitoring of respiratory status.    OVERNIGHT EVENTS: Dilaudid given 1 hour ahead of scheduled time for pain    SUBJECTIVE:  Patient seen and examined at bedside. Patient in constant pain, stating the pain meds (Dilaudid) helps with the pain but only lasts for minutes. Zofran is not very helpful for nausea but chocolate helped. No BM yet but wants to hold bowel regimen till after completion of PET scan.   No fever, chills, dizziness, headache, changes in vision, chest pain, dyspnea, dysuria, LE edema. Rest of 12 point Review of systems negative unless otherwise documented elsewhere in note.     Vital Signs Last 12 Hrs  T(F): 97.8 (10-13-23 @ 05:46), Max: 97.8 (10-12-23 @ 21:10)  HR: 73 (10-13-23 @ 05:46) (73 - 79)  BP: 174/88 (10-13-23 @ 05:46) (156/80 - 174/88)  BP(mean): --  RR: 16 (10-13-23 @ 05:46) (16 - 16)  SpO2: 97% (10-13-23 @ 05:46) (97% - 97%)  I&O's Summary      PHYSICAL EXAM:  Constitutional: NAD, in constant pain, resting in bed.  HEENT: NC/AT, PERRLA, EOMI, no conjunctival pallor or scleral icterus, MMM  Neck: Supple, no JVD  Respiratory: CTA B/L. No w/r/r.   Cardiovascular: RRR, normal S1 and S2, no m/r/g.   Gastrointestinal: +BS, soft NTND, no guarding or rebound tenderness, no palpable masses   Extremities: wwp; no cyanosis, clubbing or edema.   Vascular: Pulses equal and strong throughout.   Neurological: AAOx3, unsteady gait, 4/5 hip flexors, 4/5 knee flexors. Sensation intact.   Skin: No gross skin abnormalities or rashes          LABS:                        13.2   10.24 )-----------( 245      ( 12 Oct 2023 08:28 )             40.0     10-12    x   |  x   |  x   ----------------------------<  115<H>  x    |  x   |  x     Ca    8.7      12 Oct 2023 08:28  Phos  2.9     10-12  Mg     2.1     10-12    TPro  7.3  /  Alb  3.9  /  TBili  0.6  /  DBili  x   /  AST  16  /  ALT  16  /  AlkPhos  68  10-12      Urinalysis Basic - ( 12 Oct 2023 18:09 )    Color: x / Appearance: x / SG: x / pH: x  Gluc: 115 mg/dL / Ketone: x  / Bili: x / Urobili: x   Blood: x / Protein: x / Nitrite: x   Leuk Esterase: x / RBC: x / WBC x   Sq Epi: x / Non Sq Epi: x / Bacteria: x          RADIOLOGY & ADDITIONAL TESTS:    MEDICATIONS  (STANDING):  acetaminophen     Tablet .. 1000 milliGRAM(s) Oral every 8 hours  acetaminophen     Tablet .. 650 milliGRAM(s) Oral daily  enoxaparin Injectable 40 milliGRAM(s) SubCutaneous every 24 hours  immune   globulin 10% (GAMMAGARD) IVPB 30 Gram(s) IV Intermittent every 24 hours  latanoprost 0.005% Ophthalmic Solution 1 Drop(s) Both EYES at bedtime  oxyCODONE    IR 5 milliGRAM(s) Oral once  pantoprazole    Tablet 40 milliGRAM(s) Oral every 24 hours  polyethylene glycol 3350 17 Gram(s) Oral every 12 hours  senna 2 Tablet(s) Oral at bedtime  sodium chloride 0.9%. 1000 milliLiter(s) (150 mL/Hr) IV Continuous <Continuous>    MEDICATIONS  (PRN):  aluminum hydroxide/magnesium hydroxide/simethicone Suspension 30 milliLiter(s) Oral every 4 hours PRN Dyspepsia  diphenhydrAMINE 25 milliGRAM(s) Oral daily PRN Allergy symptoms  LORazepam     Tablet 0.5 milliGRAM(s) Oral every 8 hours PRN Anxiety  melatonin 3 milliGRAM(s) Oral at bedtime PRN Insomnia  ondansetron Injectable 4 milliGRAM(s) IV Push every 8 hours PRN Nausea and/or Vomiting  oxyCODONE    IR 5 milliGRAM(s) Oral every 4 hours PRN Moderate Pain (4 - 6)  oxyCODONE    IR 10 milliGRAM(s) Oral every 4 hours PRN Severe Pain (7 - 10)

## 2023-10-14 DIAGNOSIS — G61.0 GUILLAIN-BARRE SYNDROME: ICD-10-CM

## 2023-10-14 LAB
% ALBUMIN: 49.7 % — SIGNIFICANT CHANGE UP
% ALBUMIN: 56.6 % — SIGNIFICANT CHANGE UP
% ALPHA 1: 4.5 % — SIGNIFICANT CHANGE UP
% ALPHA 1: 4.6 % — SIGNIFICANT CHANGE UP
% ALPHA 2: 10.4 % — SIGNIFICANT CHANGE UP
% ALPHA 2: 10.8 % — SIGNIFICANT CHANGE UP
% BETA: 11.3 % — SIGNIFICANT CHANGE UP
% BETA: 12.7 % — SIGNIFICANT CHANGE UP
% GAMMA: 15.3 % — SIGNIFICANT CHANGE UP
% GAMMA: 24.1 % — SIGNIFICANT CHANGE UP
ALBUMIN SERPL ELPH-MCNC: 3.6 G/DL — SIGNIFICANT CHANGE UP (ref 3.3–5)
ALBUMIN SERPL ELPH-MCNC: 3.6 G/DL — SIGNIFICANT CHANGE UP (ref 3.3–5)
ALBUMIN SERPL ELPH-MCNC: 3.7 G/DL — SIGNIFICANT CHANGE UP (ref 3.6–5.5)
ALBUMIN SERPL ELPH-MCNC: 4.3 G/DL — SIGNIFICANT CHANGE UP (ref 3.6–5.5)
ALBUMIN/GLOB SERPL ELPH: 1 RATIO — SIGNIFICANT CHANGE UP
ALBUMIN/GLOB SERPL ELPH: 1.3 RATIO — SIGNIFICANT CHANGE UP
ALP SERPL-CCNC: 68 U/L — SIGNIFICANT CHANGE UP (ref 40–120)
ALP SERPL-CCNC: 70 U/L — SIGNIFICANT CHANGE UP (ref 40–120)
ALPHA1 GLOB SERPL ELPH-MCNC: 0.3 G/DL — SIGNIFICANT CHANGE UP (ref 0.1–0.4)
ALPHA1 GLOB SERPL ELPH-MCNC: 0.3 G/DL — SIGNIFICANT CHANGE UP (ref 0.1–0.4)
ALPHA2 GLOB SERPL ELPH-MCNC: 0.8 G/DL — SIGNIFICANT CHANGE UP (ref 0.5–1)
ALPHA2 GLOB SERPL ELPH-MCNC: 0.8 G/DL — SIGNIFICANT CHANGE UP (ref 0.5–1)
ALT FLD-CCNC: 16 U/L — SIGNIFICANT CHANGE UP (ref 10–45)
ALT FLD-CCNC: 18 U/L — SIGNIFICANT CHANGE UP (ref 10–45)
ANION GAP SERPL CALC-SCNC: 11 MMOL/L — SIGNIFICANT CHANGE UP (ref 5–17)
ANION GAP SERPL CALC-SCNC: 9 MMOL/L — SIGNIFICANT CHANGE UP (ref 5–17)
AST SERPL-CCNC: 16 U/L — SIGNIFICANT CHANGE UP (ref 10–40)
AST SERPL-CCNC: 16 U/L — SIGNIFICANT CHANGE UP (ref 10–40)
B-GLOBULIN SERPL ELPH-MCNC: 0.8 G/DL — SIGNIFICANT CHANGE UP (ref 0.5–1)
B-GLOBULIN SERPL ELPH-MCNC: 1 G/DL — SIGNIFICANT CHANGE UP (ref 0.5–1)
BASOPHILS # BLD AUTO: 0.03 K/UL — SIGNIFICANT CHANGE UP (ref 0–0.2)
BASOPHILS NFR BLD AUTO: 0.5 % — SIGNIFICANT CHANGE UP (ref 0–2)
BILIRUB SERPL-MCNC: 0.4 MG/DL — SIGNIFICANT CHANGE UP (ref 0.2–1.2)
BILIRUB SERPL-MCNC: 0.4 MG/DL — SIGNIFICANT CHANGE UP (ref 0.2–1.2)
BUN SERPL-MCNC: 8 MG/DL — SIGNIFICANT CHANGE UP (ref 7–23)
BUN SERPL-MCNC: 8 MG/DL — SIGNIFICANT CHANGE UP (ref 7–23)
CALCIUM SERPL-MCNC: 7.9 MG/DL — LOW (ref 8.4–10.5)
CALCIUM SERPL-MCNC: 8 MG/DL — LOW (ref 8.4–10.5)
CHLORIDE SERPL-SCNC: 95 MMOL/L — LOW (ref 96–108)
CHLORIDE SERPL-SCNC: 96 MMOL/L — SIGNIFICANT CHANGE UP (ref 96–108)
CO2 SERPL-SCNC: 21 MMOL/L — LOW (ref 22–31)
CO2 SERPL-SCNC: 24 MMOL/L — SIGNIFICANT CHANGE UP (ref 22–31)
CREAT SERPL-MCNC: 0.42 MG/DL — LOW (ref 0.5–1.3)
CREAT SERPL-MCNC: 0.48 MG/DL — LOW (ref 0.5–1.3)
EGFR: 107 ML/MIN/1.73M2 — SIGNIFICANT CHANGE UP
EGFR: 111 ML/MIN/1.73M2 — SIGNIFICANT CHANGE UP
EOSINOPHIL # BLD AUTO: 0.07 K/UL — SIGNIFICANT CHANGE UP (ref 0–0.5)
EOSINOPHIL NFR BLD AUTO: 1.1 % — SIGNIFICANT CHANGE UP (ref 0–6)
GAMMA GLOBULIN: 1.2 G/DL — SIGNIFICANT CHANGE UP (ref 0.6–1.6)
GAMMA GLOBULIN: 1.8 G/DL — HIGH (ref 0.6–1.6)
GLUCOSE BLDC GLUCOMTR-MCNC: 110 MG/DL — HIGH (ref 70–99)
GLUCOSE SERPL-MCNC: 115 MG/DL — HIGH (ref 70–99)
GLUCOSE SERPL-MCNC: 128 MG/DL — HIGH (ref 70–99)
HCT VFR BLD CALC: 40.8 % — SIGNIFICANT CHANGE UP (ref 34.5–45)
HCT VFR BLD CALC: 41.8 % — SIGNIFICANT CHANGE UP (ref 34.5–45)
HGB BLD-MCNC: 13.4 G/DL — SIGNIFICANT CHANGE UP (ref 11.5–15.5)
HGB BLD-MCNC: 14.1 G/DL — SIGNIFICANT CHANGE UP (ref 11.5–15.5)
IMM GRANULOCYTES NFR BLD AUTO: 0.3 % — SIGNIFICANT CHANGE UP (ref 0–0.9)
INTERPRETATION SERPL IFE-IMP: SIGNIFICANT CHANGE UP
LYMPHOCYTES # BLD AUTO: 0.71 K/UL — LOW (ref 1–3.3)
LYMPHOCYTES # BLD AUTO: 11.2 % — LOW (ref 13–44)
MAGNESIUM SERPL-MCNC: 2.3 MG/DL — SIGNIFICANT CHANGE UP (ref 1.6–2.6)
MAGNESIUM SERPL-MCNC: 2.4 MG/DL — SIGNIFICANT CHANGE UP (ref 1.6–2.6)
MCHC RBC-ENTMCNC: 25.7 PG — LOW (ref 27–34)
MCHC RBC-ENTMCNC: 26.1 PG — LOW (ref 27–34)
MCHC RBC-ENTMCNC: 32.8 GM/DL — SIGNIFICANT CHANGE UP (ref 32–36)
MCHC RBC-ENTMCNC: 33.7 GM/DL — SIGNIFICANT CHANGE UP (ref 32–36)
MCV RBC AUTO: 77.4 FL — LOW (ref 80–100)
MCV RBC AUTO: 78.3 FL — LOW (ref 80–100)
MONOCYTES # BLD AUTO: 0.35 K/UL — SIGNIFICANT CHANGE UP (ref 0–0.9)
MONOCYTES NFR BLD AUTO: 5.5 % — SIGNIFICANT CHANGE UP (ref 2–14)
NEUTROPHILS # BLD AUTO: 5.14 K/UL — SIGNIFICANT CHANGE UP (ref 1.8–7.4)
NEUTROPHILS NFR BLD AUTO: 81.4 % — HIGH (ref 43–77)
NRBC # BLD: 0 /100 WBCS — SIGNIFICANT CHANGE UP (ref 0–0)
NRBC # BLD: 0 /100 WBCS — SIGNIFICANT CHANGE UP (ref 0–0)
PHOSPHATE SERPL-MCNC: 2.1 MG/DL — LOW (ref 2.5–4.5)
PHOSPHATE SERPL-MCNC: 2.2 MG/DL — LOW (ref 2.5–4.5)
PLATELET # BLD AUTO: 120 K/UL — LOW (ref 150–400)
PLATELET # BLD AUTO: 214 K/UL — SIGNIFICANT CHANGE UP (ref 150–400)
POTASSIUM SERPL-MCNC: 3.5 MMOL/L — SIGNIFICANT CHANGE UP (ref 3.5–5.3)
POTASSIUM SERPL-MCNC: 3.6 MMOL/L — SIGNIFICANT CHANGE UP (ref 3.5–5.3)
POTASSIUM SERPL-SCNC: 3.5 MMOL/L — SIGNIFICANT CHANGE UP (ref 3.5–5.3)
POTASSIUM SERPL-SCNC: 3.6 MMOL/L — SIGNIFICANT CHANGE UP (ref 3.5–5.3)
PROT PATTERN SERPL ELPH-IMP: SIGNIFICANT CHANGE UP
PROT PATTERN SERPL ELPH-IMP: SIGNIFICANT CHANGE UP
PROT SERPL-MCNC: 8.3 G/DL — SIGNIFICANT CHANGE UP (ref 6–8.3)
PROT SERPL-MCNC: 8.5 G/DL — HIGH (ref 6–8.3)
RBC # BLD: 5.21 M/UL — HIGH (ref 3.8–5.2)
RBC # BLD: 5.4 M/UL — HIGH (ref 3.8–5.2)
RBC # FLD: 14 % — SIGNIFICANT CHANGE UP (ref 10.3–14.5)
RBC # FLD: 14.1 % — SIGNIFICANT CHANGE UP (ref 10.3–14.5)
SODIUM SERPL-SCNC: 128 MMOL/L — LOW (ref 135–145)
SODIUM SERPL-SCNC: 128 MMOL/L — LOW (ref 135–145)
WBC # BLD: 6.32 K/UL — SIGNIFICANT CHANGE UP (ref 3.8–10.5)
WBC # BLD: 6.97 K/UL — SIGNIFICANT CHANGE UP (ref 3.8–10.5)
WBC # FLD AUTO: 6.32 K/UL — SIGNIFICANT CHANGE UP (ref 3.8–10.5)
WBC # FLD AUTO: 6.97 K/UL — SIGNIFICANT CHANGE UP (ref 3.8–10.5)

## 2023-10-14 PROCEDURE — 76937 US GUIDE VASCULAR ACCESS: CPT | Mod: 26

## 2023-10-14 PROCEDURE — 36000 PLACE NEEDLE IN VEIN: CPT

## 2023-10-14 PROCEDURE — 99233 SBSQ HOSP IP/OBS HIGH 50: CPT | Mod: GC

## 2023-10-14 PROCEDURE — 72158 MRI LUMBAR SPINE W/O & W/DYE: CPT | Mod: 26

## 2023-10-14 PROCEDURE — 70553 MRI BRAIN STEM W/O & W/DYE: CPT | Mod: 26

## 2023-10-14 PROCEDURE — 78815 PET IMAGE W/CT SKULL-THIGH: CPT | Mod: 26,PI

## 2023-10-14 RX ORDER — LABETALOL HCL 100 MG
5 TABLET ORAL ONCE
Refills: 0 | Status: DISCONTINUED | OUTPATIENT
Start: 2023-10-14 | End: 2023-10-14

## 2023-10-14 RX ORDER — POTASSIUM CHLORIDE 20 MEQ
40 PACKET (EA) ORAL ONCE
Refills: 0 | Status: DISCONTINUED | OUTPATIENT
Start: 2023-10-14 | End: 2023-10-14

## 2023-10-14 RX ORDER — LABETALOL HCL 100 MG
5 TABLET ORAL ONCE
Refills: 0 | Status: COMPLETED | OUTPATIENT
Start: 2023-10-14 | End: 2023-10-14

## 2023-10-14 RX ORDER — LABETALOL HCL 100 MG
100 TABLET ORAL EVERY 12 HOURS
Refills: 0 | Status: DISCONTINUED | OUTPATIENT
Start: 2023-10-14 | End: 2023-10-15

## 2023-10-14 RX ORDER — POTASSIUM PHOSPHATE, MONOBASIC POTASSIUM PHOSPHATE, DIBASIC 236; 224 MG/ML; MG/ML
30 INJECTION, SOLUTION INTRAVENOUS ONCE
Refills: 0 | Status: COMPLETED | OUTPATIENT
Start: 2023-10-14 | End: 2023-10-14

## 2023-10-14 RX ADMIN — MORPHINE SULFATE 4 MILLIGRAM(S): 50 CAPSULE, EXTENDED RELEASE ORAL at 19:15

## 2023-10-14 RX ADMIN — Medication 1000 MILLIGRAM(S): at 00:18

## 2023-10-14 RX ADMIN — Medication 5 MILLIGRAM(S): at 07:32

## 2023-10-14 RX ADMIN — POTASSIUM PHOSPHATE, MONOBASIC POTASSIUM PHOSPHATE, DIBASIC 83.33 MILLIMOLE(S): 236; 224 INJECTION, SOLUTION INTRAVENOUS at 18:01

## 2023-10-14 RX ADMIN — MORPHINE SULFATE 4 MILLIGRAM(S): 50 CAPSULE, EXTENDED RELEASE ORAL at 23:21

## 2023-10-14 RX ADMIN — POLYETHYLENE GLYCOL 3350 17 GRAM(S): 17 POWDER, FOR SOLUTION ORAL at 15:03

## 2023-10-14 RX ADMIN — MORPHINE SULFATE 4 MILLIGRAM(S): 50 CAPSULE, EXTENDED RELEASE ORAL at 04:26

## 2023-10-14 RX ADMIN — MORPHINE SULFATE 4 MILLIGRAM(S): 50 CAPSULE, EXTENDED RELEASE ORAL at 13:20

## 2023-10-14 RX ADMIN — ENOXAPARIN SODIUM 40 MILLIGRAM(S): 100 INJECTION SUBCUTANEOUS at 20:04

## 2023-10-14 RX ADMIN — MORPHINE SULFATE 2 MILLIGRAM(S): 50 CAPSULE, EXTENDED RELEASE ORAL at 21:59

## 2023-10-14 RX ADMIN — Medication 5 MILLIGRAM(S): at 17:03

## 2023-10-14 RX ADMIN — Medication 5 MILLIGRAM(S): at 23:21

## 2023-10-14 RX ADMIN — Medication 100 MILLIGRAM(S): at 20:06

## 2023-10-14 RX ADMIN — MORPHINE SULFATE 2 MILLIGRAM(S): 50 CAPSULE, EXTENDED RELEASE ORAL at 02:20

## 2023-10-14 RX ADMIN — PANTOPRAZOLE SODIUM 40 MILLIGRAM(S): 20 TABLET, DELAYED RELEASE ORAL at 20:04

## 2023-10-14 RX ADMIN — MORPHINE SULFATE 4 MILLIGRAM(S): 50 CAPSULE, EXTENDED RELEASE ORAL at 04:41

## 2023-10-14 RX ADMIN — Medication 0.5 MILLIGRAM(S): at 08:55

## 2023-10-14 RX ADMIN — MORPHINE SULFATE 4 MILLIGRAM(S): 50 CAPSULE, EXTENDED RELEASE ORAL at 13:35

## 2023-10-14 RX ADMIN — MORPHINE SULFATE 4 MILLIGRAM(S): 50 CAPSULE, EXTENDED RELEASE ORAL at 23:36

## 2023-10-14 RX ADMIN — MORPHINE SULFATE 2 MILLIGRAM(S): 50 CAPSULE, EXTENDED RELEASE ORAL at 02:09

## 2023-10-14 RX ADMIN — MORPHINE SULFATE 2 MILLIGRAM(S): 50 CAPSULE, EXTENDED RELEASE ORAL at 22:20

## 2023-10-14 RX ADMIN — MORPHINE SULFATE 4 MILLIGRAM(S): 50 CAPSULE, EXTENDED RELEASE ORAL at 18:52

## 2023-10-14 RX ADMIN — IMMUNE GLOBULIN (HUMAN) 50 GRAM(S): 10 INJECTION INTRAVENOUS; SUBCUTANEOUS at 17:23

## 2023-10-14 RX ADMIN — SODIUM CHLORIDE 150 MILLILITER(S): 9 INJECTION INTRAMUSCULAR; INTRAVENOUS; SUBCUTANEOUS at 18:00

## 2023-10-14 NOTE — PROGRESS NOTE ADULT - PROBLEM SELECTOR PLAN 4
Na 128. Patient asymptomatic. Patient appear euvolemic on exam. Patient has been having severe and constant pain. 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  TSH 1.75 w/n/l.   Serum osmo low at 267, true hyponatremia  - f/u urine osmo and urine Na

## 2023-10-14 NOTE — PROGRESS NOTE ADULT - PROBLEM SELECTOR PLAN 2
Progressive weakness in lower limbs over last 3 days with continued urinary incontinence. Last admission 10/08 with MRI and CT didn't reveal acute pathology, only showed broad C6-7 disc bulging w/o any spinal compression, no contrast enchainment. Imaging also showed mediastinal LN. Pt on methylprednisolone 4mg for 6 day (on day 3/6- 3 pills 10/12, 2 pills 10/13, 1 pill 10/14).  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    - neuro consulted, recommendation appreciated  - s/p EMG and LP, suspecting GBS, start IVIG 30g qd, infuse over 6hr  - PT consulted  - hold steroid course pending neuro recs

## 2023-10-14 NOTE — PROGRESS NOTE ADULT - SUBJECTIVE AND OBJECTIVE BOX
INTERVAL HPI/OVERNIGHT EVENTS:  Patient was seen and examined at bedside. R facial droop present. Pt states she still feels weak.   ROS otherwise (-).     VITAL SIGNS:  T(F): 98.4 (10-14-23 @ 17:40)  HR: 73 (10-14-23 @ 18:30)  BP: 169/94 (10-14-23 @ 18:30)  RR: 20 (10-14-23 @ 18:30)  SpO2: 95% (10-14-23 @ 18:30)  Wt(kg): --      10-13-23 @ 07:01  -  10-14-23 @ 07:00  --------------------------------------------------------  IN: 1200 mL / OUT: 900 mL / NET: 300 mL    10-14-23 @ 07:01  -  10-14-23 @ 19:01  --------------------------------------------------------  IN: 2502.6 mL / OUT: 1800 mL / NET: 702.6 mL        PHYSICAL EXAM:    CoConstitutional: NAD, in constant pain, resting in bed.  HEENT: MMM, R facial droop   Neck: Supple, no JVD  Respiratory: CTA B/L. No w/r/r.   Cardiovascular: RRR, normal S1 and S2, no m/r/g.   Gastrointestinal: +BS, soft NTND, no guarding or rebound tenderness, no palpable masses   Extremities: wwp; no cyanosis, clubbing or edema.   Vascular: Pulses equal and strong throughout.   Neurological: AAOx3, unsteady gait, 4/5 hip flexors, 4/5 knee flexors. Sensation intact. , b/l LE weakness   Skin: No gross skin abnormalities or     MEDICATIONS  (STANDING):  acetaminophen     Tablet .. 1000 milliGRAM(s) Oral every 8 hours  acetaminophen     Tablet .. 650 milliGRAM(s) Oral daily  enoxaparin Injectable 40 milliGRAM(s) SubCutaneous every 24 hours  immune   globulin 10% (GAMMAGARD) IVPB 30 Gram(s) IV Intermittent every 24 hours  labetalol Injectable 5 milliGRAM(s) IV Push once  latanoprost 0.005% Ophthalmic Solution 1 Drop(s) Both EYES at bedtime  pantoprazole    Tablet 40 milliGRAM(s) Oral every 24 hours  polyethylene glycol 3350 17 Gram(s) Oral every 12 hours  senna 2 Tablet(s) Oral at bedtime  sodium chloride 0.9%. 1000 milliLiter(s) (150 mL/Hr) IV Continuous <Continuous>    MEDICATIONS  (PRN):  aluminum hydroxide/magnesium hydroxide/simethicone Suspension 30 milliLiter(s) Oral every 4 hours PRN Dyspepsia  diphenhydrAMINE 25 milliGRAM(s) Oral daily PRN Allergy symptoms  LORazepam     Tablet 0.5 milliGRAM(s) Oral every 8 hours PRN Anxiety  melatonin 3 milliGRAM(s) Oral at bedtime PRN Insomnia  morphine  - Injectable 4 milliGRAM(s) IV Push every 4 hours PRN Severe Pain (7 - 10)  morphine  - Injectable 2 milliGRAM(s) IV Push every 4 hours PRN Moderate Pain (4 - 6)  ondansetron Injectable 4 milliGRAM(s) IV Push every 8 hours PRN Nausea and/or Vomiting      Allergies    No Known Allergies    Intolerances        LABS:                        14.1   6.97  )-----------( 120      ( 14 Oct 2023 17:35 )             41.8     10-14    128<L>  |  96  |  8   ----------------------------<  115<H>  3.6   |  21<L>  |  0.42<L>    Ca    8.0<L>      14 Oct 2023 17:35  Phos  2.1     10-14  Mg     2.3     10-14    TPro  8.5<H>  /  Alb  3.6  /  TBili  0.4  /  DBili  x   /  AST  16  /  ALT  18  /  AlkPhos  70  10-14      Urinalysis Basic - ( 14 Oct 2023 17:35 )    Color: x / Appearance: x / SG: x / pH: x  Gluc: 115 mg/dL / Ketone: x  / Bili: x / Urobili: x   Blood: x / Protein: x / Nitrite: x   Leuk Esterase: x / RBC: x / WBC x   Sq Epi: x / Non Sq Epi: x / Bacteria: x        RADIOLOGY & ADDITIONAL TESTS:  Reviewed

## 2023-10-14 NOTE — PROGRESS NOTE ADULT - SUBJECTIVE AND OBJECTIVE BOX
Transfer note    Patient is a 63 year old female (RA) with PMH of OA, GERD, and sarcoidosis (diagnosed in 2008), presenting with worsening back pain and progressive lower limb weakness for the 3 days. On last Wed patient started to have neck and occipital headache with L. lower arm numbness; on Thursday the pain progressed down her back and numbness in both arms; on Friday pain more severe in lumbosacral area and numbness in all extremities. Patient had a flushot 3 weeks ago. She was admitted for further evaluation. On 10/12 EMG and LP w/ CSF sent by neurology, suspicious of GBS. IVIG 30g qd started. Patient NPO for PET scan on 10/13. Pain regimen adjusted to Morphine IV for moderate/severe pain and Dilaudid for breakthrough pain. New facial numbness noticed AM 10/13, ordered NIF and VC with RT and plan to transfer to telemetry for monitoring of respiratory status.    VITAL SIGNS:  Vital Signs Last 24 Hrs  T(C): 36.8 (13 Oct 2023 20:49), Max: 36.8 (13 Oct 2023 20:49)  T(F): 98.2 (13 Oct 2023 20:49), Max: 98.2 (13 Oct 2023 20:49)  HR: 72 (14 Oct 2023 04:08) (72 - 88)  BP: 160/83 (14 Oct 2023 04:08) (160/80 - 190/90)  BP(mean): 115 (14 Oct 2023 04:08) (115 - 136)  RR: 16 (14 Oct 2023 04:08) (16 - 20)  SpO2: 99% (14 Oct 2023 04:08) (95% - 99%)    Parameters below as of 14 Oct 2023 04:08  Patient On (Oxygen Delivery Method): room air        PHYSICAL EXAM:  Constitutional: NAD, in constant pain, resting in bed.  HEENT: NC/AT, PERRLA, EOMI, no conjunctival pallor or scleral icterus, MMM  Neck: Supple, no JVD  Respiratory: CTA B/L. No w/r/r.   Cardiovascular: RRR, normal S1 and S2, no m/r/g.   Gastrointestinal: +BS, soft NTND, no guarding or rebound tenderness, no palpable masses   Extremities: wwp; no cyanosis, clubbing or edema.   Vascular: Pulses equal and strong throughout.   Neurological: AAOx3, unsteady gait, 4/5 hip flexors, 4/5 knee flexors. Sensation intact.   Skin: No gross skin abnormalities or     MEDICATIONS:  MEDICATIONS  (STANDING):  acetaminophen     Tablet .. 650 milliGRAM(s) Oral daily  acetaminophen     Tablet .. 1000 milliGRAM(s) Oral every 8 hours  enoxaparin Injectable 40 milliGRAM(s) SubCutaneous every 24 hours  immune   globulin 10% (GAMMAGARD) IVPB 30 Gram(s) IV Intermittent every 24 hours  latanoprost 0.005% Ophthalmic Solution 1 Drop(s) Both EYES at bedtime  pantoprazole    Tablet 40 milliGRAM(s) Oral every 24 hours  polyethylene glycol 3350 17 Gram(s) Oral every 12 hours  senna 2 Tablet(s) Oral at bedtime  sodium chloride 0.9%. 1000 milliLiter(s) (150 mL/Hr) IV Continuous <Continuous>    MEDICATIONS  (PRN):  aluminum hydroxide/magnesium hydroxide/simethicone Suspension 30 milliLiter(s) Oral every 4 hours PRN Dyspepsia  diphenhydrAMINE 25 milliGRAM(s) Oral daily PRN Allergy symptoms  LORazepam     Tablet 0.5 milliGRAM(s) Oral every 8 hours PRN Anxiety  melatonin 3 milliGRAM(s) Oral at bedtime PRN Insomnia  morphine  - Injectable 4 milliGRAM(s) IV Push every 4 hours PRN Severe Pain (7 - 10)  morphine  - Injectable 2 milliGRAM(s) IV Push every 4 hours PRN Moderate Pain (4 - 6)  ondansetron Injectable 4 milliGRAM(s) IV Push every 8 hours PRN Nausea and/or Vomiting      ALLERGIES:  Allergies    No Known Allergies    Intolerances        LABS:                        12.8   6.28  )-----------( 216      ( 13 Oct 2023 08:25 )             39.2     10-13    127<L>  |  93<L>  |  10  ----------------------------<  123<H>  3.4<L>   |  24  |  0.46<L>    Ca    8.0<L>      13 Oct 2023 08:25  Phos  2.4     10-13  Mg     2.1     10-13    TPro  7.9  /  Alb  4.0  /  TBili  0.5  /  DBili  x   /  AST  17  /  ALT  20  /  AlkPhos  70  10-13        RADIOLOGY & ADDITIONAL TESTS: Reviewed.

## 2023-10-14 NOTE — PROGRESS NOTE ADULT - NSPROGADDITIONALINFOA_GEN_ALL_CORE
She was off the floor getting MRI during rounds   will f/u MRI brain, L spine  Continue IVIG  will follow

## 2023-10-14 NOTE — PROCEDURE NOTE - NSPROCDETAILS_GEN_ALL_CORE
location identified, draped/prepped, sterile technique used/blood seen on insertion/dressing applied/flushes easily/secured in place
location identified, draped/prepped, sterile technique used, needle inserted/introduced

## 2023-10-14 NOTE — PROGRESS NOTE ADULT - SUBJECTIVE AND OBJECTIVE BOX
OVERNIGHT EVENTS: ramón    SUBJECTIVE / INTERVAL HPI: Patient seen and examined at bedside.  Endorses feeling somewhat better although feels R facial droop is slightly worsened.  No difficulty breathing; continued back pain.    VITAL SIGNS:  Vital Signs Last 24 Hrs  T(C): 37.2 (14 Oct 2023 10:05), Max: 37.2 (14 Oct 2023 10:05)  T(F): 98.9 (14 Oct 2023 10:05), Max: 98.9 (14 Oct 2023 10:05)  HR: 77 (14 Oct 2023 08:25) (72 - 81)  BP: 152/88 (14 Oct 2023 08:25) (152/88 - 195/84)  BP(mean): 115 (14 Oct 2023 08:25) (110 - 136)  RR: 22 (14 Oct 2023 08:25) (16 - 22)  SpO2: 97% (14 Oct 2023 08:25) (95% - 99%)    Parameters below as of 14 Oct 2023 06:58  Patient On (Oxygen Delivery Method): room air      I&O's Summary    13 Oct 2023 07:01  -  14 Oct 2023 07:00  --------------------------------------------------------  IN: 1200 mL / OUT: 900 mL / NET: 300 mL    14 Oct 2023 07:01  -  14 Oct 2023 12:00  --------------------------------------------------------  IN: 0 mL / OUT: 1400 mL / NET: -1400 mL        PHYSICAL EXAM:      MEDICATIONS:  MEDICATIONS  (STANDING):  acetaminophen     Tablet .. 650 milliGRAM(s) Oral daily  acetaminophen     Tablet .. 1000 milliGRAM(s) Oral every 8 hours  enoxaparin Injectable 40 milliGRAM(s) SubCutaneous every 24 hours  immune   globulin 10% (GAMMAGARD) IVPB 30 Gram(s) IV Intermittent every 24 hours  latanoprost 0.005% Ophthalmic Solution 1 Drop(s) Both EYES at bedtime  pantoprazole    Tablet 40 milliGRAM(s) Oral every 24 hours  polyethylene glycol 3350 17 Gram(s) Oral every 12 hours  senna 2 Tablet(s) Oral at bedtime  sodium chloride 0.9%. 1000 milliLiter(s) (150 mL/Hr) IV Continuous <Continuous>    MEDICATIONS  (PRN):  aluminum hydroxide/magnesium hydroxide/simethicone Suspension 30 milliLiter(s) Oral every 4 hours PRN Dyspepsia  diphenhydrAMINE 25 milliGRAM(s) Oral daily PRN Allergy symptoms  LORazepam     Tablet 0.5 milliGRAM(s) Oral every 8 hours PRN Anxiety  melatonin 3 milliGRAM(s) Oral at bedtime PRN Insomnia  morphine  - Injectable 2 milliGRAM(s) IV Push every 4 hours PRN Moderate Pain (4 - 6)  morphine  - Injectable 4 milliGRAM(s) IV Push every 4 hours PRN Severe Pain (7 - 10)  ondansetron Injectable 4 milliGRAM(s) IV Push every 8 hours PRN Nausea and/or Vomiting      ALLERGIES:  Allergies    No Known Allergies    Intolerances        LABS:                        12.8   6.28  )-----------( 216      ( 13 Oct 2023 08:25 )             39.2     10-13    127<L>  |  93<L>  |  10  ----------------------------<  123<H>  3.4<L>   |  24  |  0.46<L>    Ca    8.0<L>      13 Oct 2023 08:25  Phos  2.4     10-13  Mg     2.1     10-13    TPro  7.9  /  Alb  4.0  /  TBili  0.5  /  DBili  x   /  AST  17  /  ALT  20  /  AlkPhos  70  10-13      Urinalysis Basic - ( 13 Oct 2023 08:25 )    Color: x / Appearance: x / SG: x / pH: x  Gluc: 123 mg/dL / Ketone: x  / Bili: x / Urobili: x   Blood: x / Protein: x / Nitrite: x   Leuk Esterase: x / RBC: x / WBC x   Sq Epi: x / Non Sq Epi: x / Bacteria: x      CAPILLARY BLOOD GLUCOSE      POCT Blood Glucose.: 110 mg/dL (14 Oct 2023 11:13)      RADIOLOGY & ADDITIONAL TESTS: Reviewed.

## 2023-10-14 NOTE — CHART NOTE - NSCHARTNOTEFT_GEN_A_CORE
Urine numbers consistent with SIADH  Needs fluid restriction  Increased Urine sodium, urine osmo greater than  serum osmo

## 2023-10-14 NOTE — PROGRESS NOTE ADULT - SUBJECTIVE AND OBJECTIVE BOX
Physical Medicine and Rehabilitation Progress Note :       Patient is a 63y old  Female who presents with a chief complaint of lower limb weakness (14 Oct 2023 09:11)      HPI:  63 year old female with PMH of OA, GERD, and sarcoidosis (diagnosed in 2008) presenting with worsening back pain and progressive lower limb weakness for the last three days. Pt was recently admitted on 10/8/23 for lower and upper extremity numbness and tingling. At the time, MRI and CT lumbar and thoracic which didn't reveal acute pathology, only showed broad C6-7 disc bulging w/o any spinal compression, no contrast enchainment. Imaging also showed mediastinal LN. Today, pt reports 9/10 tearing in quality lower back pain, increased weakness as she was unable to get off the toilet on her own. She also had to use a walker as she felt like she would fall otherwise. Pt also reports urinary incontinence during the day, reports no episodes of incontinence overnight. Pt endorses occipital headache and nausea. Last BM on sunday. Pt denies chest pain, sob, abd pain.  (11 Oct 2023 12:40)                            12.8   6.28  )-----------( 216      ( 13 Oct 2023 08:25 )             39.2       10-13    127<L>  |  93<L>  |  10  ----------------------------<  123<H>  3.4<L>   |  24  |  0.46<L>    Ca    8.0<L>      13 Oct 2023 08:25  Phos  2.4     10-13  Mg     2.1     10-13    TPro  7.9  /  Alb  4.0  /  TBili  0.5  /  DBili  x   /  AST  17  /  ALT  20  /  AlkPhos  70  10-13    Vital Signs Last 24 Hrs  T(C): 37.2 (14 Oct 2023 10:05), Max: 37.2 (14 Oct 2023 10:05)  T(F): 98.9 (14 Oct 2023 10:05), Max: 98.9 (14 Oct 2023 10:05)  HR: 75 (14 Oct 2023 12:55) (72 - 81)  BP: 158/90 (14 Oct 2023 12:55) (152/88 - 195/84)  BP(mean): 118 (14 Oct 2023 12:55) (110 - 136)  RR: 15 (14 Oct 2023 12:55) (15 - 22)  SpO2: 99% (14 Oct 2023 12:55) (95% - 99%)    Parameters below as of 14 Oct 2023 06:58  Patient On (Oxygen Delivery Method): room air        MEDICATIONS  (STANDING):  acetaminophen     Tablet .. 650 milliGRAM(s) Oral daily  acetaminophen     Tablet .. 1000 milliGRAM(s) Oral every 8 hours  enoxaparin Injectable 40 milliGRAM(s) SubCutaneous every 24 hours  immune   globulin 10% (GAMMAGARD) IVPB 30 Gram(s) IV Intermittent every 24 hours  latanoprost 0.005% Ophthalmic Solution 1 Drop(s) Both EYES at bedtime  pantoprazole    Tablet 40 milliGRAM(s) Oral every 24 hours  polyethylene glycol 3350 17 Gram(s) Oral every 12 hours  senna 2 Tablet(s) Oral at bedtime  sodium chloride 0.9%. 1000 milliLiter(s) (150 mL/Hr) IV Continuous <Continuous>    MEDICATIONS  (PRN):  aluminum hydroxide/magnesium hydroxide/simethicone Suspension 30 milliLiter(s) Oral every 4 hours PRN Dyspepsia  diphenhydrAMINE 25 milliGRAM(s) Oral daily PRN Allergy symptoms  LORazepam     Tablet 0.5 milliGRAM(s) Oral every 8 hours PRN Anxiety  melatonin 3 milliGRAM(s) Oral at bedtime PRN Insomnia  morphine  - Injectable 2 milliGRAM(s) IV Push every 4 hours PRN Moderate Pain (4 - 6)  morphine  - Injectable 4 milliGRAM(s) IV Push every 4 hours PRN Severe Pain (7 - 10)  ondansetron Injectable 4 milliGRAM(s) IV Push every 8 hours PRN Nausea and/or Vomiting          Initial Functional Status Assessment :     Previous Level of Function:     · Ambulation Skills	independent  · Transfer Skills	independent  · ADL Skills	independent  · Work/Leisure Activity	independent  · Additional Comments	Pt reports prior to admission living in a 2 family home with 10 steps to enter. Pt lives by herself on her level but her mother and son live in the same house, upon discharge pt can stay at her mothers level of the home with no steps to enter. Pt reports being indep with ADLs and mobility without AD. Since having recent progressing weakness, pt has been using her mothers rollator for amb.    Cognitive Status Examination:   · Orientation	oriented to person, place, time and situation  · Follows Commands and Answers Questions	100% of the time  · Personal Safety and Judgment	intact  · Short Term Memory	intact    Range of Motion Exam:   · Range of Motion Examination	bilateral lower extremity ROM was WFL (within functional limits); bilateral upper extremity ROM was WFL (within functional limits)    Manual Muscle Testing:   · Manual Muscle Testing Results	no strength deficits were identified    Bed Mobility: Sit to Supine:     · Level of Lea	supervision  · Physical Assist/Nonphysical Assist	1 person assist    Bed Mobility: Supine to Sit:     · Level of Lea	supervision  · Physical Assist/Nonphysical Assist	1 person assist    Transfer: Sit to Stand:     · Level of Lea	contact guard  · Physical Assist/Nonphysical Assist	1 person assist  · Weight-Bearing Restrictions	weight-bearing as tolerated  · Assistive Device	rolling walker    Transfer: Stand to Sit:     · Level of Lea	contact guard  · Physical Assist/Nonphysical Assist	1 person assist  · Weight-Bearing Restrictions	weight-bearing as tolerated  · Assistive Device	rolling walker    Gait Skills:     · Level of Lea	contact guard  · Physical Assist/Nonphysical Assist	1 person assist  · Weight-Bearing Restrictions	weight-bearing as tolerated  · Assistive Device	rolling walker  · Gait Distance	20 feet    Gait Analysis:     · Gait Pattern Used	3-point gait  · Gait Deviations Noted	increased time in double stance; decreased step length; decreased stride length; decreased weight-shifting ability  · Impairments Contributing to Gait Deviations	impaired balance; impaired motor control; impaired postural control; decreased strength    Balance Skills Assessment:     · Sitting Balance: Static	good balance  · Sitting Balance: Dynamic	good balance  · Standing Balance: Static	good balance  · Standing Balance: Dynamic	good balance  · Systems Impairment Contributing to Balance Disturbance	musculoskeletal  · Identified Impairments Contributing to Balance Disturbance	impaired motor control; impaired postural control; decreased strength    Sensory Examination:   Sensory Examination:    Grossly Intact:   · Gross Sensory Examination	Grossly Intact      Clinical Impressions:   · Criteria for Skilled Therapeutic Interventions	rehab potential; impairments found; therapy frequency; anticipated equipment needs at discharge; functional limitations in following categories; predicted duration of therapy intervention; anticipated discharge recommendation; risk reduction/prevention  · Impairments Found (describe specific impairments)	aerobic capacity/endurance; gait, locomotion, and balance; muscle strength; posture; gross motor; ergonomics and body mechanics  · Functional Limitations in Following Categories (describe specific limitations)	self-care; home management; community/leisure  · Risk Reduction/Prevention (Describe Specific Areas of risk reduction/prevention)	risk factors  · Risk Areas	fall          PM&R Impression : as above    Current disposition plan recommendation :  d/c home with home physical therapy

## 2023-10-14 NOTE — PROGRESS NOTE ADULT - PROBLEM SELECTOR PLAN 2
Report received from Kia Torre for continuation of care. 10/8: CT and MRI showed Extensive mediastinal, hilar, and paratracheal lymphadenopathy is present  in the chest. Enlarged thoracic lymphadenopathy was noted on the 06/08/2012 chest CT study which was attributed to the patient's known history of sarcoidosis at that time. Concern for rheumatologic vs malignant work up,  ESR, CRP, B12, RF w/n/l, Hep B/C negative, A1C 5.9.  - f/u CHRIS, SSA, TSH, ACE, immunoglobulin panel, B1 and B6 levels  - f/u SPEC with w immunofixation  - consider paraneoplastic w/up

## 2023-10-14 NOTE — PROGRESS NOTE ADULT - PROBLEM SELECTOR PLAN 1
Progressive weakness in lower limbs over last 3 days with continued urinary incontinence. Last admission 10/08 with MRI and CT didn't reveal acute pathology, only showed broad C6-7 disc bulging w/o any spinal compression, no contrast enchainment. Imaging also showed mediastinal LN. Pt on methylprednisolone 4mg for 6 day (on day 3/6- 3 pills 10/12, 2 pills 10/13, 1 pill 10/14).  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  10/14: Vital Capacity 1130 then 1510  - f/u Vital capacity q6h, should be greater than 20ml/kg  - neuro consulted, recommendation appreciated  - s/p EMG and LP, suspecting GBS, start IVIG 30g qd, infuse over 6hr  - PT consulted  - hold steroid course pending neuro recs

## 2023-10-14 NOTE — PROGRESS NOTE ADULT - PROBLEM SELECTOR PLAN 8
- BRBPR  - history of PHG in the past  - GI bleed pathway initiated  - endoscopic results as above: continue PPI and will need f/u for resected polyp results   [facility], [place]  Procedure Note          Intra-Aortic Balloon Pump Insertion:       Jaxon SAPNA Melendez  MRN# [id number]   January 21, 2021, 2:54 PM Indication: CAD           Procedure performed: January 21, 2021, 2:55 PM   Location: Cath lab   Catheter size: 50   Inserted: 11 inch introducer sheath   Catheter placed: Right femoral artery   Complications:: None   Assist initiated: 1:1 ratio   Percent augmentation: 100   Timing adjusted: Adjusted to achieve optimal assist   Verification of position: Fluoroscopy   Comments: None      Recorded by Esteban Fisher   N: regular w/ Ensure  E: Mg <2, Phosp <3, K <4  DVT ppx: Lovenox  GI: Protonix 40mg PO qd  C: Full Code  D: [facility]

## 2023-10-14 NOTE — PROGRESS NOTE ADULT - ASSESSMENT
I M    63 year old female with PMH of OA, GERD, and sarcoidosis (diagnosed in 2008) presenting with worsening back pain and progressive lower limb weakness for the last three days. Admitted for pain management.     Problem/Plan - 1:  ·  Problem: Lower extremity weakness.   ·  Plan: Progressive weakness in lower limbs over last 3 days with continued urinary incontinence. Last admission 10/08 with MRI and CT didn't reveal acute pathology, only showed broad C6-7 disc bulging w/o any spinal compression, no contrast enchainment. Imaging also showed mediastinal LN. Pt on methylprednisolone 4mg for 6 day (on day 3/6- 3 pills 10/12, 2 pills 10/13, 1 pill 10/14).  - neuro consulted, recommendation appreciated  - s/p EMG and LP, suspecting GBS, start IVIG 30g qd, infuse over 6hr  - PT consulted  - hold steroid course pending neuro recs.    Problem/Plan - 2:  ·  Problem: Mediastinal lymphadenopathy.   ·  Plan: 10/8: CT and MRI showed Extensive mediastinal, hilar, and paratracheal lymphadenopathy is present  in the chest. Enlarged thoracic lymphadenopathy was noted on the 06/08/2012 chest CT study which was attributed to the patient's known history of sarcoidosis at that time. Concern for rheumatologic vs malignant work up,  ESR, CRP, B12, RF w/n/l, Hep B/C negative, A1C 5.9.  - f/u CHRIS, SSA, TSH, ACE, immunoglobulin panel, B1 and B6 levels  - f/u SPEC with w immunofixation  - consider paraneoplastic w/up.    Problem/Plan - 3:  ·  Problem: Lower back pain.   ·  Plan: Lower lumbar back pain, that radiates down anterior part of b/l legs.   - Dilaudid 0.5mg q6 for severe pain  - acetaminophen 650 prn q6.    Problem/Plan - 4:  ·  Problem: Hyponatremia.   ·  Plan: Na 128. Patient asymptomatic. Patient appear euvolemic on exam. Patient has been having severe and constant pain. 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  TSH 1.75 w/n/l.   Serum osmo low at 267, true hyponatremia  - f/u urine osmo and urine Na.    Problem/Plan - 5:  ·  Problem: GERD (gastroesophageal reflux disease).   ·  Plan: at home protonix 40mg qd  -c/w home med.    Problem/Plan - 6:  ·  Problem: Sarcoidosis.   ·  Plan: Diagnosed in 2008. MRI imaging 10/8 with mediastinal LAD.    Problem/Plan - 7:  ·  Problem: Hashimoto's disease.   ·  Plan: Hx of hashimotos. Not on any meds. TSH 1.75 w/n/l.    Problem/Plan - 8:  ·  Problem: Prophylactic measure.   ·  Plan: N: regular w/ Ensure  E: Mg <2, Phosp <3, K <4  DVT ppx: Lovenox  GI: Protonix 40mg PO qd  C: Full Code  D: [facility].

## 2023-10-14 NOTE — PROVIDER CONTACT NOTE (OTHER) - DATE AND TIME:
Trent Fisher is a 55 y.o. male who presents today   Chief Complaint   Patient presents with    Follow-up     I am here today for my 6 mo follow up for low testosterone       Patient is a 60-year-old male presents to clinic today for testosterone follow-up. He is currently maintained on testosterone cypionate 100 mg / 0.5 mL weekly. Recent testosterone results 485.8, PSA 0.75, hemoglobin 17.3, hematocrit 53.8. He does state a significant decrease in fatigue and increase in libido. He is overall satisfied with his symptoms and improved on testosterone replacement therapy. He denies any lower urinary tract symptoms at this time. I did refer him to hematology for possible therapeutic phlebotomy. They were able to complete this once due to patient's symptoms of dizziness. He is now donating blood every 3 months at his place of employment. He denies any symptoms today.     Lab Results   Component Value Date    TESTOSTERONE 485.8 04/08/2022     Lab Results   Component Value Date    PSA 0.75 04/08/2022    PSA 0.45 02/08/2021     Lab Results   Component Value Date    WBC 10.12 (H) 01/07/2022    HGB 17.3 04/08/2022    HCT 53.8 (H) 04/08/2022    MCV 94.6 (H) 01/07/2022     01/07/2022       Past Medical History:   Diagnosis Date    BiPAP (biphasic positive airway pressure) dependence     16cm/12cm    Depression with anxiety     Heart murmur     Hyperlipidemia     Hypertension     Hypothyroidism     Obstructive sleep apnea treated with BiPAP     AHI:  43.2 per PSG, 2/2016       Past Surgical History:   Procedure Laterality Date    BACK SURGERY N/A 2005    anterior cervical fusion    CHOLECYSTECTOMY  2009   [facility] SINUS SURGERY  2008    TONSILLECTOMY  1990    VASECTOMY  2002       Current Outpatient Medications   Medication Sig Dispense Refill    ALPRAZolam (XANAX) 1 MG tablet TAKE 1 TABLET 3 TIMES DAILY AS NEEDED FOR SLEEP      SYRINGE-NEEDLE, DISP, 3 ML (B-D 3CC LUER-JAY SYR 23GX1\") 23G X 1\" 3 ML MISC
14-Oct-2023 17:48
14-Oct-2023 20:12
Alcohol use: Yes     Alcohol/week: 0.0 standard drinks     Comment: occasional     Drug use: Never    Sexual activity: Yes     Partners: Female   Other Topics Concern    Not on file   Social History Narrative    Not on file     Social Determinants of Health     Financial Resource Strain:     Difficulty of Paying Living Expenses: Not on file   Food Insecurity:     Worried About Running Out of Food in the Last Year: Not on file    Kaylan of Food in the Last Year: Not on file   Transportation Needs:     Lack of Transportation (Medical): Not on file    Lack of Transportation (Non-Medical): Not on file   Physical Activity:     Days of Exercise per Week: Not on file    Minutes of Exercise per Session: Not on file   Stress:     Feeling of Stress : Not on file   Social Connections:     Frequency of Communication with Friends and Family: Not on file    Frequency of Social Gatherings with Friends and Family: Not on file    Attends Denominational Services: Not on file    Active Member of [address] [place] [address] or Organizations: Not on file    Attends Club or Organization Meetings: Not on file    Marital Status: Not on file   Intimate Partner Violence:     Fear of Current or Ex-Partner: Not on file    Emotionally Abused: Not on file    Physically Abused: Not on file    Sexually Abused: Not on file   Housing Stability:     Unable to Pay for Housing in the Last Year: Not on file    Number of [place] in the Last Year: Not on file    Unstable Housing in the Last Year: Not on file       Family History   Adopted: Yes       REVIEW OF SYSTEMS:  Review of Systems   Constitutional: Negative for chills and fever. Gastrointestinal: Negative for abdominal distention, abdominal pain, nausea and vomiting. Genitourinary: Negative for difficulty urinating, dysuria, flank pain, frequency, hematuria and urgency. Musculoskeletal: Negative for back pain and gait problem. Psychiatric/Behavioral: Negative for agitation and confusion.
PHYSICAL EXAM:  /86   Temp 97.9 °F (36.6 °C) (Temporal)   Ht 5' 8\" (1.727 m)   Wt 213 lb 6.4 oz (96.8 kg)   BMI 32.45 kg/m²   Physical Exam  Vitals and nursing note reviewed. Constitutional:       General: He is not in acute distress. Appearance: Normal appearance. He is not ill-appearing. Pulmonary:      Effort: Pulmonary effort is normal. No respiratory distress. Abdominal:      General: There is no distension. Tenderness: There is no abdominal tenderness. There is no right CVA tenderness or left CVA tenderness. Genitourinary:     Comments: Deferred LINDA  Neurological:      Mental Status: He is alert and oriented to person, place, and time. Mental status is at baseline. Psychiatric:         Mood and Affect: Mood normal.         Behavior: Behavior normal.       DATA:    Results for orders placed or performed in visit on 04/13/22   POCT Urinalysis no Micro   Result Value Ref Range    Color, UA YELLOW     Clarity, UA CLEAR     Glucose, UA POC NEG     Bilirubin, UA NEG     Ketones, UA TRACE     Spec Grav, UA 1.030     Blood, UA POC NEG     pH, UA 6     Protein, UA POC TRACE     Urobilinogen, UA 0.2     Leukocytes, UA NEG     Nitrite, UA NEG     Appearance, Fluid Clear Clear, Slightly Cloudy     Lab Results   Component Value Date    PSA 0.75 04/08/2022    PSA 0.45 02/08/2021     Lab Results   Component Value Date    TESTOSTERONE 485.8 04/08/2022     Lab Results   Component Value Date    WBC 10.12 (H) 01/07/2022    HGB 17.3 04/08/2022    HCT 53.8 (H) 04/08/2022    MCV 94.6 (H) 01/07/2022     01/07/2022         1. Hypogonadism in male  History of low testosterone. Doing well on testosterone cypionate 0.5 mL weekly. We will continue this dosage. Testosterone within therapeutic range. PSA within normal limits. He did deferred LINDA today. We will have patient follow-up in approximately 6 months labs prior    - POCT Urinalysis no Micro  - CBC; Future  - Testosterone;  Future  -
Needles & Syringes MISC; 1 each by Does not apply route daily  Dispense: 100 each; Refill: 3  - Testosterone Cypionate 200 MG/ML SOLN; Inject 0.5 mLs as directed once a week  Dispense: 2 mL; Refill: 0    2. Secondary polycythemia  H&H stable at this time. Did undergo therapeutic phlebotomy with hematology since he was symptomatic at the time. He is now donating blood at his work approximately every 3 months. Orders Placed This Encounter   Procedures    CBC     Standing Status:   Future     Standing Expiration Date:   4/13/2023    Testosterone     Standing Status:   Future     Standing Expiration Date:   4/13/2023    POCT Urinalysis no Micro        Return in about 6 months (around 10/13/2022) for with labs prior. All information inputted into the note by the MA to include chief complaint, past medical history, past surgical history, medications, allergies, social and family history and review of systems has been reviewed and updated as needed by me. EMR Dragon/transcription disclaimer: Much of this documentt is electronic  transcription/translation of spoken language to printed text. The  electronic translation of spoken language may be erroneous, or at times,  nonsensical words or phrases may be inadvertently transcribed.  Although I  have reviewed the document for such errors, some may still exist.

## 2023-10-14 NOTE — PROGRESS NOTE ADULT - ASSESSMENT
63 year old female with PMH of OA, GERD, sarcoidosis, migraines presenting with worsening back pain associated bilateral upper and lower extremities numbness and tingling in distal parts of all her extremities for the last week. Her neurological exam is remarkable only for mild b/l proximal hip flexion and knee flexion vibration and temperature hypesthesia in b/l LE and increased patellar reflexes. MRI C and T spine w and w/o contrast was performed which didn't reveal acute pathology, only showed broad C6-7 disc bulging w/o any spinal compression, no contrast enchainment. Her hx of probable sarcoidosis, enlargement of mediastinal LN, unintentional weight loss needs oncological w/up. From neurological standpoint would benefit from MRI L-spine w and w/o, basic polyneuropathy w/up, whole body PET scan. CPK, ESR, CRP was unremarkable. A1C prediabetic. 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    Recommendations:     1. F/u:   - Serum ACE,   - CHRIS, SSA/B,   - RF,   - Hep B/C,   - B12 level, B1 and B6 levels,   - SPEP w immunofixation;     2. MRI brain and  L-spine w and w/o contrast (performed 10/14, f/u results)  3. whole body PET scan for LAD  4. NIF and vital capacity QD vs BID  5. Continue with IVIG 500mg/kg x4 days      The case was discussed w Dr. Lozano.   Thank you for the courtesy of the consult. Will continue to follow.

## 2023-10-14 NOTE — PROGRESS NOTE ADULT - PROBLEM SELECTOR PLAN 9
N: regular w/ Ensure  E: Mg <2, Phosp <3, K <4  DVT ppx: Lovenox  GI: Protonix 40mg PO qd  C: Full Code  D: [facility]

## 2023-10-15 DIAGNOSIS — I99.8 OTHER DISORDER OF CIRCULATORY SYSTEM: ICD-10-CM

## 2023-10-15 LAB
ALBUMIN SERPL ELPH-MCNC: 3.4 G/DL — SIGNIFICANT CHANGE UP (ref 3.3–5)
ALP SERPL-CCNC: 62 U/L — SIGNIFICANT CHANGE UP (ref 40–120)
ALT FLD-CCNC: 16 U/L — SIGNIFICANT CHANGE UP (ref 10–45)
ANION GAP SERPL CALC-SCNC: 8 MMOL/L — SIGNIFICANT CHANGE UP (ref 5–17)
AST SERPL-CCNC: 15 U/L — SIGNIFICANT CHANGE UP (ref 10–40)
BASOPHILS # BLD AUTO: 0.02 K/UL — SIGNIFICANT CHANGE UP (ref 0–0.2)
BASOPHILS NFR BLD AUTO: 0.4 % — SIGNIFICANT CHANGE UP (ref 0–2)
BILIRUB SERPL-MCNC: 0.4 MG/DL — SIGNIFICANT CHANGE UP (ref 0.2–1.2)
BUN SERPL-MCNC: 6 MG/DL — LOW (ref 7–23)
CALCIUM SERPL-MCNC: 7.9 MG/DL — LOW (ref 8.4–10.5)
CHLORIDE SERPL-SCNC: 99 MMOL/L — SIGNIFICANT CHANGE UP (ref 96–108)
CO2 SERPL-SCNC: 22 MMOL/L — SIGNIFICANT CHANGE UP (ref 22–31)
CREAT SERPL-MCNC: 0.34 MG/DL — LOW (ref 0.5–1.3)
EGFR: 116 ML/MIN/1.73M2 — SIGNIFICANT CHANGE UP
EOSINOPHIL # BLD AUTO: 0.09 K/UL — SIGNIFICANT CHANGE UP (ref 0–0.5)
EOSINOPHIL NFR BLD AUTO: 1.7 % — SIGNIFICANT CHANGE UP (ref 0–6)
GLUCOSE SERPL-MCNC: 123 MG/DL — HIGH (ref 70–99)
HCT VFR BLD CALC: 40.9 % — SIGNIFICANT CHANGE UP (ref 34.5–45)
HGB BLD-MCNC: 13.6 G/DL — SIGNIFICANT CHANGE UP (ref 11.5–15.5)
IMM GRANULOCYTES NFR BLD AUTO: 0.4 % — SIGNIFICANT CHANGE UP (ref 0–0.9)
LYMPHOCYTES # BLD AUTO: 0.82 K/UL — LOW (ref 1–3.3)
LYMPHOCYTES # BLD AUTO: 15.7 % — SIGNIFICANT CHANGE UP (ref 13–44)
MAGNESIUM SERPL-MCNC: 2.1 MG/DL — SIGNIFICANT CHANGE UP (ref 1.6–2.6)
MCHC RBC-ENTMCNC: 26 PG — LOW (ref 27–34)
MCHC RBC-ENTMCNC: 33.3 GM/DL — SIGNIFICANT CHANGE UP (ref 32–36)
MCV RBC AUTO: 78.1 FL — LOW (ref 80–100)
MONOCYTES # BLD AUTO: 0.39 K/UL — SIGNIFICANT CHANGE UP (ref 0–0.9)
MONOCYTES NFR BLD AUTO: 7.5 % — SIGNIFICANT CHANGE UP (ref 2–14)
NEUTROPHILS # BLD AUTO: 3.89 K/UL — SIGNIFICANT CHANGE UP (ref 1.8–7.4)
NEUTROPHILS NFR BLD AUTO: 74.3 % — SIGNIFICANT CHANGE UP (ref 43–77)
NRBC # BLD: 0 /100 WBCS — SIGNIFICANT CHANGE UP (ref 0–0)
PHOSPHATE SERPL-MCNC: 2.4 MG/DL — LOW (ref 2.5–4.5)
PLATELET # BLD AUTO: 187 K/UL — SIGNIFICANT CHANGE UP (ref 150–400)
POTASSIUM SERPL-MCNC: 3.7 MMOL/L — SIGNIFICANT CHANGE UP (ref 3.5–5.3)
POTASSIUM SERPL-SCNC: 3.7 MMOL/L — SIGNIFICANT CHANGE UP (ref 3.5–5.3)
PROT SERPL-MCNC: 8.8 G/DL — HIGH (ref 6–8.3)
RBC # BLD: 5.24 M/UL — HIGH (ref 3.8–5.2)
RBC # FLD: 14.2 % — SIGNIFICANT CHANGE UP (ref 10.3–14.5)
SODIUM SERPL-SCNC: 129 MMOL/L — LOW (ref 135–145)
SSA-52 (RO52) (ENA) ANTIBODY, IGG: 9 AU/ML — SIGNIFICANT CHANGE UP (ref 0–40)
SSA-60 (RO60) (ENA) ANTIBODY, IGG: 1 AU/ML — SIGNIFICANT CHANGE UP (ref 0–40)
WBC # BLD: 5.23 K/UL — SIGNIFICANT CHANGE UP (ref 3.8–10.5)
WBC # FLD AUTO: 5.23 K/UL — SIGNIFICANT CHANGE UP (ref 3.8–10.5)

## 2023-10-15 PROCEDURE — 99233 SBSQ HOSP IP/OBS HIGH 50: CPT

## 2023-10-15 PROCEDURE — 99233 SBSQ HOSP IP/OBS HIGH 50: CPT | Mod: GC

## 2023-10-15 RX ORDER — KETOROLAC TROMETHAMINE 30 MG/ML
15 SYRINGE (ML) INJECTION ONCE
Refills: 0 | Status: DISCONTINUED | OUTPATIENT
Start: 2023-10-15 | End: 2023-10-15

## 2023-10-15 RX ORDER — METOPROLOL TARTRATE 50 MG
5 TABLET ORAL ONCE
Refills: 0 | Status: COMPLETED | OUTPATIENT
Start: 2023-10-15 | End: 2023-10-15

## 2023-10-15 RX ORDER — GABAPENTIN 400 MG/1
300 CAPSULE ORAL EVERY 8 HOURS
Refills: 0 | Status: DISCONTINUED | OUTPATIENT
Start: 2023-10-15 | End: 2023-10-18

## 2023-10-15 RX ORDER — AMLODIPINE BESYLATE 2.5 MG/1
10 TABLET ORAL EVERY 24 HOURS
Refills: 0 | Status: DISCONTINUED | OUTPATIENT
Start: 2023-10-15 | End: 2023-10-15

## 2023-10-15 RX ORDER — LABETALOL HCL 100 MG
200 TABLET ORAL EVERY 12 HOURS
Refills: 0 | Status: DISCONTINUED | OUTPATIENT
Start: 2023-10-15 | End: 2023-10-15

## 2023-10-15 RX ORDER — LACTULOSE 10 G/15ML
10 SOLUTION ORAL DAILY
Refills: 0 | Status: DISCONTINUED | OUTPATIENT
Start: 2023-10-15 | End: 2023-10-25

## 2023-10-15 RX ORDER — MORPHINE SULFATE 50 MG/1
2 CAPSULE, EXTENDED RELEASE ORAL ONCE
Refills: 0 | Status: DISCONTINUED | OUTPATIENT
Start: 2023-10-15 | End: 2023-10-15

## 2023-10-15 RX ORDER — POTASSIUM PHOSPHATE, MONOBASIC POTASSIUM PHOSPHATE, DIBASIC 236; 224 MG/ML; MG/ML
15 INJECTION, SOLUTION INTRAVENOUS ONCE
Refills: 0 | Status: COMPLETED | OUTPATIENT
Start: 2023-10-15 | End: 2023-10-15

## 2023-10-15 RX ORDER — LIDOCAINE 4 G/100G
1 CREAM TOPICAL EVERY 24 HOURS
Refills: 0 | Status: DISCONTINUED | OUTPATIENT
Start: 2023-10-15 | End: 2023-10-25

## 2023-10-15 RX ADMIN — GABAPENTIN 300 MILLIGRAM(S): 400 CAPSULE ORAL at 14:16

## 2023-10-15 RX ADMIN — MORPHINE SULFATE 2 MILLIGRAM(S): 50 CAPSULE, EXTENDED RELEASE ORAL at 09:46

## 2023-10-15 RX ADMIN — POLYETHYLENE GLYCOL 3350 17 GRAM(S): 17 POWDER, FOR SOLUTION ORAL at 21:36

## 2023-10-15 RX ADMIN — PANTOPRAZOLE SODIUM 40 MILLIGRAM(S): 20 TABLET, DELAYED RELEASE ORAL at 19:04

## 2023-10-15 RX ADMIN — ENOXAPARIN SODIUM 40 MILLIGRAM(S): 100 INJECTION SUBCUTANEOUS at 19:04

## 2023-10-15 RX ADMIN — Medication 0.5 MILLIGRAM(S): at 00:29

## 2023-10-15 RX ADMIN — GABAPENTIN 300 MILLIGRAM(S): 400 CAPSULE ORAL at 04:52

## 2023-10-15 RX ADMIN — LATANOPROST 1 DROP(S): 0.05 SOLUTION/ DROPS OPHTHALMIC; TOPICAL at 21:36

## 2023-10-15 RX ADMIN — MORPHINE SULFATE 2 MILLIGRAM(S): 50 CAPSULE, EXTENDED RELEASE ORAL at 09:25

## 2023-10-15 RX ADMIN — Medication 3 MILLIGRAM(S): at 00:29

## 2023-10-15 RX ADMIN — MORPHINE SULFATE 4 MILLIGRAM(S): 50 CAPSULE, EXTENDED RELEASE ORAL at 03:22

## 2023-10-15 RX ADMIN — Medication 100 MILLIGRAM(S): at 08:30

## 2023-10-15 RX ADMIN — MORPHINE SULFATE 2 MILLIGRAM(S): 50 CAPSULE, EXTENDED RELEASE ORAL at 15:03

## 2023-10-15 RX ADMIN — IMMUNE GLOBULIN (HUMAN) 50 GRAM(S): 10 INJECTION INTRAVENOUS; SUBCUTANEOUS at 16:06

## 2023-10-15 RX ADMIN — MORPHINE SULFATE 4 MILLIGRAM(S): 50 CAPSULE, EXTENDED RELEASE ORAL at 03:07

## 2023-10-15 RX ADMIN — Medication 1000 MILLIGRAM(S): at 05:51

## 2023-10-15 RX ADMIN — MORPHINE SULFATE 2 MILLIGRAM(S): 50 CAPSULE, EXTENDED RELEASE ORAL at 04:52

## 2023-10-15 RX ADMIN — LIDOCAINE 1 PATCH: 4 CREAM TOPICAL at 08:32

## 2023-10-15 RX ADMIN — Medication 1000 MILLIGRAM(S): at 22:37

## 2023-10-15 RX ADMIN — AMLODIPINE BESYLATE 10 MILLIGRAM(S): 2.5 TABLET ORAL at 11:51

## 2023-10-15 RX ADMIN — LIDOCAINE 1 PATCH: 4 CREAM TOPICAL at 00:29

## 2023-10-15 RX ADMIN — Medication 1000 MILLIGRAM(S): at 04:51

## 2023-10-15 RX ADMIN — MORPHINE SULFATE 2 MILLIGRAM(S): 50 CAPSULE, EXTENDED RELEASE ORAL at 02:09

## 2023-10-15 RX ADMIN — MORPHINE SULFATE 2 MILLIGRAM(S): 50 CAPSULE, EXTENDED RELEASE ORAL at 02:24

## 2023-10-15 RX ADMIN — POTASSIUM PHOSPHATE, MONOBASIC POTASSIUM PHOSPHATE, DIBASIC 62.5 MILLIMOLE(S): 236; 224 INJECTION, SOLUTION INTRAVENOUS at 08:30

## 2023-10-15 RX ADMIN — Medication 1000 MILLIGRAM(S): at 11:44

## 2023-10-15 RX ADMIN — Medication 1000 MILLIGRAM(S): at 12:27

## 2023-10-15 RX ADMIN — MORPHINE SULFATE 2 MILLIGRAM(S): 50 CAPSULE, EXTENDED RELEASE ORAL at 05:07

## 2023-10-15 RX ADMIN — LACTULOSE 10 GRAM(S): 10 SOLUTION ORAL at 10:40

## 2023-10-15 RX ADMIN — SENNA PLUS 2 TABLET(S): 8.6 TABLET ORAL at 21:37

## 2023-10-15 RX ADMIN — MORPHINE SULFATE 2 MILLIGRAM(S): 50 CAPSULE, EXTENDED RELEASE ORAL at 14:15

## 2023-10-15 RX ADMIN — GABAPENTIN 300 MILLIGRAM(S): 400 CAPSULE ORAL at 21:37

## 2023-10-15 RX ADMIN — MORPHINE SULFATE 2 MILLIGRAM(S): 50 CAPSULE, EXTENDED RELEASE ORAL at 00:28

## 2023-10-15 RX ADMIN — Medication 5 MILLIGRAM(S): at 06:42

## 2023-10-15 RX ADMIN — Medication 1000 MILLIGRAM(S): at 21:37

## 2023-10-15 RX ADMIN — MORPHINE SULFATE 2 MILLIGRAM(S): 50 CAPSULE, EXTENDED RELEASE ORAL at 00:43

## 2023-10-15 RX ADMIN — LIDOCAINE 1 PATCH: 4 CREAM TOPICAL at 12:13

## 2023-10-15 NOTE — PROGRESS NOTE ADULT - PROBLEM SELECTOR PLAN 2
Progressive weakness in lower limbs over last 3 days with continued urinary incontinence. Last admission 10/08 with MRI and CT didn't reveal acute pathology, only showed broad C6-7 disc bulging w/o any spinal compression, no contrast enchainment. Imaging also showed mediastinal LN. Pt on methylprednisolone 4mg for 6 day (on day 3/6- 3 pills 10/12, 2 pills 10/13, 1 pill 10/14).  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    - neuro consulted, recommendation appreciated  - s/p EMG and LP, suspecting GBS, start IVIG 30g qd, infuse over 6hr  - PT consulted  - hold steroid course pending neuro recs Progressive weakness in lower limbs over last 3 days with continued urinary incontinence. Last admission 10/08 with MRI and CT didn't reveal acute pathology, only showed broad C6-7 disc bulging w/o any spinal compression, no contrast enchainment. Imaging also showed mediastinal LN. Pt on methylprednisolone 4mg for 6 day (on day 3/6- 3 pills 10/12, 2 pills 10/13, 1 pill 10/14).  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 s/p EMG and LP, suspecting GBS.     Plan  -c/w IVIG 30g qd, infuse over 6hr for 4 days  - neuro consulted, recommendation appreciated  - PT consulted  - hold steroid course

## 2023-10-15 NOTE — PROGRESS NOTE ADULT - PROBLEM SELECTOR PLAN 1
Progressive weakness in lower limbs over last 3 days with continued urinary incontinence. Last admission 10/08 with MRI and CT didn't reveal acute pathology, only showed broad C6-7 disc bulging w/o any spinal compression, no contrast enchainment. Imaging also showed mediastinal LN. Pt on methylprednisolone 4mg for 6 day (on day 3/6- 3 pills 10/12, 2 pills 10/13, 1 pill 10/14).  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  10/14: Vital Capacity 1130 then 1510  - f/u Vital capacity q6h, should be greater than 20ml/kg  - neuro consulted, recommendation appreciated  - s/p EMG and LP, suspecting GBS, start IVIG 30g qd, infuse over 6hr  - PT consulted  - hold steroid course pending neuro recs Progressive weakness in lower limbs over last 3 days with continued urinary incontinence. Last admission 10/08 with MRI and CT didn't reveal acute pathology, only showed broad C6-7 disc bulging w/o any spinal compression, no contrast enchainment. Imaging also showed mediastinal LN. Pt on methylprednisolone 4mg for 6 day (on day 3/6- 3 pills 10/12, 2 pills 10/13, 1 pill 10/14).  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  s/p EMG and LP, suspecting GBS  Mr head: No acute infarct or other acute abnormality. No abnormal enhancement  MRI of lumbar spine on 10/14:  compatible with active neurosarcoidosis, other etiologies include Guillain-[place] syndrome and other inflammatory,  infectious, or neoplastic causes.  10/14: Vital Capacity 1130 then 1510    Plan  - f/u Vital capacity q6h, should be greater than 20ml/kg  - neuro consulted, recommendation appreciated  -c/w IVIG 30g qd, infuse over 6hr for 4 days  - PT consulted  - hold steroid course

## 2023-10-15 NOTE — PROGRESS NOTE ADULT - PROBLEM SELECTOR PLAN 8
Hx of hashimotos. Not on any meds. TSH 1.75 w/n/l Diagnosed in 2008. MRI imaging 10/8 with mediastinal LAD

## 2023-10-15 NOTE — PROGRESS NOTE ADULT - PROBLEM SELECTOR PLAN 10
N: regular w/ Ensure  E: Mg <2, Phosp <3, K <4  DVT ppx: Lovenox  GI: Protonix 40mg PO qd  C: Full Code  D: [facility]

## 2023-10-15 NOTE — PROGRESS NOTE ADULT - ASSESSMENT
63 year old female with PMH of OA, GERD, sarcoidosis, migraines presenting with worsening back pain associated bilateral upper and lower extremities numbness and tingling in distal parts of all her extremities for the last week. Her neurological exam is worsened from yesterday, with more pronounced facial bulbar symptoms, progressive LE weakness, and now absent LE reflexes.  MRI C and T spine w and w/o contrast was performed which didn't reveal acute pathology, only showed broad C6-7 disc bulging w/o any spinal compression, no contrast enchainment. Her hx of probable sarcoidosis, enlargement of mediastinal LN, unintentional weight loss needs oncological w/up. MRI L-spine showed extensive leptomeningeal enhancement along the periphery of the   conus medullaris with enhancement of the cauda equina nerve roots. compatible with active neurosarcoidosis, or Guillain-[place] syndrome and other inflammatory, infectious, or neoplastic causes.  CPK, ESR, CRP was unremarkable. A1C prediabetic. 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   - NIF and vital capacity QD.  Pt at high risk for intubation.   - Complete IVIG 500mg/kg x4 days      The case was discussed w Dr. Lozano.         63 year old female with PMH of OA, GERD, sarcoidosis, migraines presenting with worsening back pain associated bilateral upper and lower extremities numbness and tingling in distal parts of all her extremities for the last week. Her neurological exam is worsened from yesterday, with more pronounced facial bulbar symptoms, progressive LE weakness, and now absent LE reflexes.  MRI C and T spine w and w/o contrast was performed which didn't reveal acute pathology, only showed broad C6-7 disc bulging w/o any spinal compression, no contrast enchainment. Her hx of probable sarcoidosis, enlargement of mediastinal LN, unintentional weight loss needs oncological w/up. MRI L-spine showed extensive leptomeningeal enhancement along the periphery of the   conus medullaris with enhancement of the cauda equina nerve roots. compatible with active neurosarcoidosis, or Guillain-[place] syndrome and other inflammatory, infectious, or neoplastic causes.  CPK, ESR, CRP was unremarkable. A1C prediabetic. 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   - NIF and vital capacity QD.  Pt at high risk for intubation.   - Complete IVIG 500mg/kg x4 days  - c/w Gabapentin 300mg TID      The case was discussed w Dr. Lozano.         63 year old female with PMH of OA, GERD, sarcoidosis, migraines presenting with worsening back pain associated bilateral upper and lower extremities numbness and tingling in distal parts of all her extremities for the last week. Her neurological exam is worsened from yesterday, with more pronounced facial bulbar symptoms, progressive LE weakness, and now absent LE reflexes.  MRI C and T spine w and w/o contrast was performed which didn't reveal acute pathology, only showed broad C6-7 disc bulging w/o any spinal compression, no contrast enchainment. Her hx of probable sarcoidosis, enlargement of mediastinal LN, unintentional weight loss needs oncological w/up. MRI L-spine showed extensive leptomeningeal enhancement along the periphery of the   conus medullaris with enhancement of the cauda equina nerve roots. compatible with active neurosarcoidosis, or Guillain-[place] syndrome and other inflammatory, infectious, or neoplastic causes.  CPK, ESR, CRP was unremarkable. A1C prediabetic. 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  Pt at high risk for intubation given progression of facial / bulbar weakness  - Complete IVIG 500mg/kg x4 days  - c/w Gabapentin 300mg TID      The case was discussed w Dr. Lozano.

## 2023-10-15 NOTE — PROGRESS NOTE ADULT - PROBLEM SELECTOR PLAN 9
N: regular w/ Ensure  E: Mg <2, Phosp <3, K <4  DVT ppx: Lovenox  GI: Protonix 40mg PO qd  C: Full Code  D: [facility] Hx of hashimotos. Not on any meds. TSH 1.75 w/n/l

## 2023-10-15 NOTE — PROGRESS NOTE ADULT - SUBJECTIVE AND OBJECTIVE BOX
**INCOMPLETE NOTE    OVERNIGHT EVENTS:    SUBJECTIVE:  Patient seen and examined at bedside.    Vital Signs Last 12 Hrs  T(F): 97.5 (10-15-23 @ 05:30), Max: 98.4 (10-14-23 @ 17:40)  HR: 67 (10-15-23 @ 05:26) (67 - 81)  BP: 198/96 (10-15-23 @ 05:26) (164/91 - 199/98)  BP(mean): 138 (10-15-23 @ 05:26) (122 - 140)  RR: 20 (10-15-23 @ 05:26) (14 - 26)  SpO2: 99% (10-15-23 @ 05:26) (60% - 99%)  I&O's Summary    13 Oct 2023 07:01  -  14 Oct 2023 07:00  --------------------------------------------------------  IN: 1200 mL / OUT: 900 mL / NET: 300 mL    14 Oct 2023 07:01  -  15 Oct 2023 05:38  --------------------------------------------------------  IN: 3969.2 mL / OUT: 2900 mL / NET: 1069.2 mL        PHYSICAL EXAM:  Constitutional: NAD, comfortable in bed.  HEENT: NC/AT, PERRLA, EOMI, no conjunctival pallor or scleral icterus, MMM  Neck: Supple, no JVD  Respiratory: CTA B/L. No w/r/r.   Cardiovascular: RRR, normal S1 and S2, no m/r/g.   Gastrointestinal: +BS, soft NTND, no guarding or rebound tenderness, no palpable masses   Extremities: wwp; no cyanosis, clubbing or edema.   Vascular: Pulses equal and strong throughout.   Neurological: AAOx3, no CN deficits, strength and sensation intact throughout.   Skin: No gross skin abnormalities or rashes        LABS:                        13.6   5.23  )-----------( 187      ( 15 Oct 2023 04:42 )             40.9     10-15    129<L>  |  99  |  6<L>  ----------------------------<  123<H>  3.7   |  22  |  0.34<L>    Ca    7.9<L>      15 Oct 2023 04:42  Phos  2.4     10-15  Mg     2.1     10-15    TPro  8.8<H>  /  Alb  3.4  /  TBili  0.4  /  DBili  x   /  AST  15  /  ALT  16  /  AlkPhos  62  10-15      Urinalysis Basic - ( 15 Oct 2023 04:42 )    Color: x / Appearance: x / SG: x / pH: x  Gluc: 123 mg/dL / Ketone: x  / Bili: x / Urobili: x   Blood: x / Protein: x / Nitrite: x   Leuk Esterase: x / RBC: x / WBC x   Sq Epi: x / Non Sq Epi: x / Bacteria: x          RADIOLOGY & ADDITIONAL TESTS:    MEDICATIONS  (STANDING):  acetaminophen     Tablet .. 650 milliGRAM(s) Oral daily  acetaminophen     Tablet .. 1000 milliGRAM(s) Oral every 8 hours  enoxaparin Injectable 40 milliGRAM(s) SubCutaneous every 24 hours  gabapentin 300 milliGRAM(s) Oral every 8 hours  immune   globulin 10% (GAMMAGARD) IVPB 30 Gram(s) IV Intermittent every 24 hours  labetalol 100 milliGRAM(s) Oral every 12 hours  latanoprost 0.005% Ophthalmic Solution 1 Drop(s) Both EYES at bedtime  lidocaine   4% Patch 1 Patch Transdermal every 24 hours  pantoprazole    Tablet 40 milliGRAM(s) Oral every 24 hours  polyethylene glycol 3350 17 Gram(s) Oral every 12 hours  senna 2 Tablet(s) Oral at bedtime  sodium chloride 0.9%. 1000 milliLiter(s) (150 mL/Hr) IV Continuous <Continuous>    MEDICATIONS  (PRN):  aluminum hydroxide/magnesium hydroxide/simethicone Suspension 30 milliLiter(s) Oral every 4 hours PRN Dyspepsia  diphenhydrAMINE 25 milliGRAM(s) Oral daily PRN Allergy symptoms  LORazepam     Tablet 0.5 milliGRAM(s) Oral every 8 hours PRN Anxiety  melatonin 3 milliGRAM(s) Oral at bedtime PRN Insomnia  morphine  - Injectable 2 milliGRAM(s) IV Push every 4 hours PRN Moderate Pain (4 - 6)  morphine  - Injectable 4 milliGRAM(s) IV Push every 4 hours PRN Severe Pain (7 - 10)  ondansetron Injectable 4 milliGRAM(s) IV Push every 8 hours PRN Nausea and/or Vomiting   OVERNIGHT EVENTS: BP was elevated overnight to 200s and labetalol 5mg IV, and Metoprolol 5mg IV in AM.     SUBJECTIVE:  Patient seen and examined at bedside. She reports she feels well, but uncomfortable especially due to the facial droop. She denies headache, dizziness, changes to vision, chest pain, palpitations, dysuria, diarrhea.     Vital Signs Last 12 Hrs  T(F): 97.5 (10-15-23 @ 05:30), Max: 98.4 (10-14-23 @ 17:40)  HR: 67 (10-15-23 @ 05:26) (67 - 81)  BP: 198/96 (10-15-23 @ 05:26) (164/91 - 199/98)  BP(mean): 138 (10-15-23 @ 05:26) (122 - 140)  RR: 20 (10-15-23 @ 05:26) (14 - 26)  SpO2: 99% (10-15-23 @ 05:26) (60% - 99%)  I&O's Summary    13 Oct 2023 07:01  -  14 Oct 2023 07:00  --------------------------------------------------------  IN: 1200 mL / OUT: 900 mL / NET: 300 mL    14 Oct 2023 07:01  -  15 Oct 2023 05:38  --------------------------------------------------------  IN: 3969.2 mL / OUT: 2900 mL / NET: 1069.2 mL        PHYSICAL EXAM:  CConstitutional: NAD, resting in bed appears comfortable.  HEENT: MMM, R facial droop   Neck: Supple, no JVD  Respiratory: CTA B/L. No w/r/r.   Cardiovascular: RRR, normal S1 and S2, no m/r/g.   Gastrointestinal: +BS, soft NTND, no guarding or rebound tenderness, no palpable masses   Extremities: wwp; no cyanosis, clubbing or edema.   Vascular: Pulses equal and strong throughout.   Neurological: AAOx3, 3/5 hip flexors, 3/5 knee flexors. 4/5 Upper extremity weakness. Sensation intact. b/l LE weakness   Skin: No overt skin abnormalities or rashes on exposed skin        LABS:                        13.6   5.23  )-----------( 187      ( 15 Oct 2023 04:42 )             40.9     10-15    129<L>  |  99  |  6<L>  ----------------------------<  123<H>  3.7   |  22  |  0.34<L>    Ca    7.9<L>      15 Oct 2023 04:42  Phos  2.4     10-15  Mg     2.1     10-15    TPro  8.8<H>  /  Alb  3.4  /  TBili  0.4  /  DBili  x   /  AST  15  /  ALT  16  /  AlkPhos  62  10-15      Urinalysis Basic - ( 15 Oct 2023 04:42 )    Color: x / Appearance: x / SG: x / pH: x  Gluc: 123 mg/dL / Ketone: x  / Bili: x / Urobili: x   Blood: x / Protein: x / Nitrite: x   Leuk Esterase: x / RBC: x / WBC x   Sq Epi: x / Non Sq Epi: x / Bacteria: x          RADIOLOGY & ADDITIONAL TESTS:    MEDICATIONS  (STANDING):  acetaminophen     Tablet .. 650 milliGRAM(s) Oral daily  acetaminophen     Tablet .. 1000 milliGRAM(s) Oral every 8 hours  enoxaparin Injectable 40 milliGRAM(s) SubCutaneous every 24 hours  gabapentin 300 milliGRAM(s) Oral every 8 hours  immune   globulin 10% (GAMMAGARD) IVPB 30 Gram(s) IV Intermittent every 24 hours  labetalol 100 milliGRAM(s) Oral every 12 hours  latanoprost 0.005% Ophthalmic Solution 1 Drop(s) Both EYES at bedtime  lidocaine   4% Patch 1 Patch Transdermal every 24 hours  pantoprazole    Tablet 40 milliGRAM(s) Oral every 24 hours  polyethylene glycol 3350 17 Gram(s) Oral every 12 hours  senna 2 Tablet(s) Oral at bedtime  sodium chloride 0.9%. 1000 milliLiter(s) (150 mL/Hr) IV Continuous <Continuous>    MEDICATIONS  (PRN):  aluminum hydroxide/magnesium hydroxide/simethicone Suspension 30 milliLiter(s) Oral every 4 hours PRN Dyspepsia  diphenhydrAMINE 25 milliGRAM(s) Oral daily PRN Allergy symptoms  LORazepam     Tablet 0.5 milliGRAM(s) Oral every 8 hours PRN Anxiety  melatonin 3 milliGRAM(s) Oral at bedtime PRN Insomnia  morphine  - Injectable 2 milliGRAM(s) IV Push every 4 hours PRN Moderate Pain (4 - 6)  morphine  - Injectable 4 milliGRAM(s) IV Push every 4 hours PRN Severe Pain (7 - 10)  ondansetron Injectable 4 milliGRAM(s) IV Push every 8 hours PRN Nausea and/or Vomiting

## 2023-10-15 NOTE — PROGRESS NOTE ADULT - SUBJECTIVE AND OBJECTIVE BOX
OVERNIGHT EVENTS:     SUBJECTIVE / INTERVAL HPI:   Endorses worsening of low back pain. Feels than R facial droop is slightly worsened, and now has difficulty swallowing.  No difficulty breathing    Vital Signs Last 24 Hrs  T(C): 37 (15 Oct 2023 10:00), Max: 37 (15 Oct 2023 10:00)  T(F): 98.6 (15 Oct 2023 10:00), Max: 98.6 (15 Oct 2023 10:00)  HR: 80 (15 Oct 2023 10:44) (67 - 86)  BP: 179/95 (15 Oct 2023 10:44) (158/90 - 218/102)  BP(mean): 132 (15 Oct 2023 10:44) (117 - 147)  ABP: --  ABP(mean): --  RR: 18 (15 Oct 2023 10:44) (14 - 26)  SpO2: 95% (15 Oct 2023 10:44) (60% - 99%)    O2 Parameters below as of 15 Oct 2023 10:44  Patient On (Oxygen Delivery Method): room air            I&O's Summary    13 Oct 2023 07:01  -  14 Oct 2023 07:00  --------------------------------------------------------  IN: 1200 mL / OUT: 900 mL / NET: 300 mL    14 Oct 2023 07:01  -  14 Oct 2023 12:00  --------------------------------------------------------  IN: 0 mL / OUT: 1400 mL / NET: -1400 mL          NEUROLOGICAL EXAMINATION:  GENERAL:  Appearance is consistent with chronologic age.   COGNITION/LANGUAGE:  Awake, alert, and oriented to person, place, time and date. Recent and remote memory intact.  Fund of knowledge is appropriate.  Mildly Nondysarthric.    CRANIAL NERVES:   - Eyes:  Visual acuity intact. Pupils equal round and reactive, no RAPD. EOMI w/o nystagmus, skew or reported double vision. Normal visual field on confrontation.  - R sided facial palsy. Pt unable to close R eye, elevate R eyebrow, or seal her mouth. Marked R uvula deviation, weak b/l palate elevation    MOTOR EXAM: R shoulder 4/5. L shoulder 5/5. Triceps 4/5 b/l. Bicept 5/5 b/l :   (R/L) 3/5 UE; 3/5 LE.  No observable drift. Normal tone and bulk. No tenderness, twitching, tremors or involuntary movements.  SENSORY EXAM:  Intact to light touch and pinprick, pain, temperature and proprioception and vibration in all extremities.  REFLEXES:   2+ b/l biceps, triceps, 0+ at patella and achilles.  Plantar response mute b/l.  Jaw jerk, Maday, clonus absent.  CEREBELLUM:  Finger to nose intact.  No dysmetria.           MEDICATIONS  (STANDING):  acetaminophen     Tablet .. 1000 milliGRAM(s) Oral every 8 hours  enoxaparin Injectable 40 milliGRAM(s) SubCutaneous every 24 hours  gabapentin 300 milliGRAM(s) Oral every 8 hours  immune   globulin 10% (GAMMAGARD) IVPB 30 Gram(s) IV Intermittent every 24 hours  lactulose Syrup 10 Gram(s) Oral daily  latanoprost 0.005% Ophthalmic Solution 1 Drop(s) Both EYES at bedtime  lidocaine   4% Patch 1 Patch Transdermal every 24 hours  pantoprazole    Tablet 40 milliGRAM(s) Oral every 24 hours  polyethylene glycol 3350 17 Gram(s) Oral every 12 hours  senna 2 Tablet(s) Oral at bedtime  sodium chloride 0.9%. 1000 milliLiter(s) (150 mL/Hr) IV Continuous <Continuous>    MEDICATIONS  (PRN):  aluminum hydroxide/magnesium hydroxide/simethicone Suspension 30 milliLiter(s) Oral every 4 hours PRN Dyspepsia  diphenhydrAMINE 25 milliGRAM(s) Oral daily PRN Allergy symptoms  LORazepam     Tablet 0.5 milliGRAM(s) Oral every 8 hours PRN Anxiety  melatonin 3 milliGRAM(s) Oral at bedtime PRN Insomnia  morphine  - Injectable 2 milliGRAM(s) IV Push every 4 hours PRN Moderate Pain (4 - 6)  morphine  - Injectable 4 milliGRAM(s) IV Push every 4 hours PRN Severe Pain (7 - 10)  ondansetron Injectable 4 milliGRAM(s) IV Push every 8 hours PRN Nausea and/or Vomiting      ALLERGIES:  Allergies    No Known Allergies    Intolerances        LABS:                        12.8   6.28  )-----------( 216      ( 13 Oct 2023 08:25 )             39.2     10-13    127<L>  |  93<L>  |  10  ----------------------------<  123<H>  3.4<L>   |  24  |  0.46<L>    Ca    8.0<L>      13 Oct 2023 08:25  Phos  2.4     10-13  Mg     2.1     10-13    TPro  7.9  /  Alb  4.0  /  TBili  0.5  /  DBili  x   /  AST  17  /  ALT  20  /  AlkPhos  70  10-13      Urinalysis Basic - ( 13 Oct 2023 08:25 )    Color: x / Appearance: x / SG: x / pH: x  Gluc: 123 mg/dL / Ketone: x  / Bili: x / Urobili: x   Blood: x / Protein: x / Nitrite: x   Leuk Esterase: x / RBC: x / WBC x   Sq Epi: x / Non Sq Epi: x / Bacteria: x      CAPILLARY BLOOD GLUCOSE      POCT Blood Glucose.: 110 mg/dL (14 Oct 2023 11:13)      RADIOLOGY & ADDITIONAL TESTS: Reviewed.   OVERNIGHT EVENTS:     SUBJECTIVE / INTERVAL HPI:   Endorses worsening of low back pain. Feels that R facial droop is slightly worsened, and now has difficulty swallowing.  No difficulty breathing    Vital Signs Last 24 Hrs  T(C): 37 (15 Oct 2023 10:00), Max: 37 (15 Oct 2023 10:00)  T(F): 98.6 (15 Oct 2023 10:00), Max: 98.6 (15 Oct 2023 10:00)  HR: 80 (15 Oct 2023 10:44) (67 - 86)  BP: 179/95 (15 Oct 2023 10:44) (158/90 - 218/102)  BP(mean): 132 (15 Oct 2023 10:44) (117 - 147)  ABP: --  ABP(mean): --  RR: 18 (15 Oct 2023 10:44) (14 - 26)  SpO2: 95% (15 Oct 2023 10:44) (60% - 99%)    O2 Parameters below as of 15 Oct 2023 10:44  Patient On (Oxygen Delivery Method): room air            I&O's Summary    13 Oct 2023 07:01  -  14 Oct 2023 07:00  --------------------------------------------------------  IN: 1200 mL / OUT: 900 mL / NET: 300 mL    14 Oct 2023 07:01  -  14 Oct 2023 12:00  --------------------------------------------------------  IN: 0 mL / OUT: 1400 mL / NET: -1400 mL          NEUROLOGICAL EXAMINATION:  GENERAL:  Appearance is consistent with chronologic age.   COGNITION/LANGUAGE:  Awake, alert, and oriented to person, place, time and date. Recent and remote memory intact.  Fund of knowledge is appropriate.  Mildly Nondysarthric.    CRANIAL NERVES:   - Eyes:  Visual acuity intact. Pupils equal round and reactive, no RAPD. EOMI w/o nystagmus, skew or reported double vision. Normal visual field on confrontation.  - severe R and moderate L facial weakness, upper and lower - unable to close R eye, elevate R eyebrow, or seal her mouth. Marked R uvula deviation, weak b/l palate elevation    MOTOR EXAM: R shoulder 4/5. L shoulder 5/5. Triceps 4/5 b/l. Biceps 5/5 b/l; hip flexion 4-/5, knee flexion 4/5, knee extension and ankle dorsiflexion 5/5.  No observable drift. Normal tone and bulk. No tenderness, twitching, tremors or involuntary movements.  SENSORY EXAM:  diminished vibration in toes and ankles b/l   REFLEXES:   2+ b/l biceps, triceps, 0+ at patella and achilles.  Plantar response mute b/l.  Jaw jerk, Maday, clonus absent.  CEREBELLUM:  Finger to nose intact.  No dysmetria.           MEDICATIONS  (STANDING):  acetaminophen     Tablet .. 1000 milliGRAM(s) Oral every 8 hours  enoxaparin Injectable 40 milliGRAM(s) SubCutaneous every 24 hours  gabapentin 300 milliGRAM(s) Oral every 8 hours  immune   globulin 10% (GAMMAGARD) IVPB 30 Gram(s) IV Intermittent every 24 hours  lactulose Syrup 10 Gram(s) Oral daily  latanoprost 0.005% Ophthalmic Solution 1 Drop(s) Both EYES at bedtime  lidocaine   4% Patch 1 Patch Transdermal every 24 hours  pantoprazole    Tablet 40 milliGRAM(s) Oral every 24 hours  polyethylene glycol 3350 17 Gram(s) Oral every 12 hours  senna 2 Tablet(s) Oral at bedtime  sodium chloride 0.9%. 1000 milliLiter(s) (150 mL/Hr) IV Continuous <Continuous>    MEDICATIONS  (PRN):  aluminum hydroxide/magnesium hydroxide/simethicone Suspension 30 milliLiter(s) Oral every 4 hours PRN Dyspepsia  diphenhydrAMINE 25 milliGRAM(s) Oral daily PRN Allergy symptoms  LORazepam     Tablet 0.5 milliGRAM(s) Oral every 8 hours PRN Anxiety  melatonin 3 milliGRAM(s) Oral at bedtime PRN Insomnia  morphine  - Injectable 2 milliGRAM(s) IV Push every 4 hours PRN Moderate Pain (4 - 6)  morphine  - Injectable 4 milliGRAM(s) IV Push every 4 hours PRN Severe Pain (7 - 10)  ondansetron Injectable 4 milliGRAM(s) IV Push every 8 hours PRN Nausea and/or Vomiting      ALLERGIES:  Allergies    No Known Allergies    Intolerances        LABS:                        12.8   6.28  )-----------( 216      ( 13 Oct 2023 08:25 )             39.2     10-13    127<L>  |  93<L>  |  10  ----------------------------<  123<H>  3.4<L>   |  24  |  0.46<L>    Ca    8.0<L>      13 Oct 2023 08:25  Phos  2.4     10-13  Mg     2.1     10-13    TPro  7.9  /  Alb  4.0  /  TBili  0.5  /  DBili  x   /  AST  17  /  ALT  20  /  AlkPhos  70  10-13      Urinalysis Basic - ( 13 Oct 2023 08:25 )    Color: x / Appearance: x / SG: x / pH: x  Gluc: 123 mg/dL / Ketone: x  / Bili: x / Urobili: x   Blood: x / Protein: x / Nitrite: x   Leuk Esterase: x / RBC: x / WBC x   Sq Epi: x / Non Sq Epi: x / Bacteria: x      CAPILLARY BLOOD GLUCOSE      POCT Blood Glucose.: 110 mg/dL (14 Oct 2023 11:13)      RADIOLOGY & ADDITIONAL TESTS: Reviewed.

## 2023-10-15 NOTE — PROGRESS NOTE ADULT - PROBLEM SELECTOR PLAN 3
10/8: CT and MRI showed Extensive mediastinal, hilar, and paratracheal lymphadenopathy is present  in the chest. Enlarged thoracic lymphadenopathy was noted on the 06/08/2012 chest CT study which was attributed to the patient's known history of sarcoidosis at that time. Concern for rheumatologic vs malignant work up,  ESR, CRP, B12, RF w/n/l, Hep B/C negative, A1C 5.9.  - f/u CHRIS, SSA, TSH, ACE, immunoglobulin panel, B1 and B6 levels  - f/u SPEC with w immunofixation  - consider paraneoplastic w/up 10/8: CT and MRI showed Extensive mediastinal, hilar, and paratracheal lymphadenopathy is present  in the chest. Enlarged thoracic lymphadenopathy was noted on the 06/08/2012 chest CT study which was attributed to the patient's known history of sarcoidosis at that time. Concern for rheumatologic vs malignant work up,  ESR, CRP, B12, RF w/n/l, Hep A/B/C negative, A1C 5.9. CHRIS elevated 1:160, SSA wnl, TSH 1.75 w/n/l. Immunoglobulin panel wnl. Immunnofixation no monoclonal band   MRI as above     Plan  - f/u, ACE, B1 and B6 levels  -f/u PET scan  - consider paraneoplastic w/up BP gradually rising to 200s. Patient was started on labetalol 100 P.O BID. On 10/14-10/15 overnight BP in 220s s/p labetalol 5mg IV, Metoprolol 5mg IV in am. BP remained elevated to 190s, Patient received Amlodipine 10mg and BP dropped to 96/92.     Plan:   -Hold off on anti-Hypertensive given low BP  -Monitor BP and restart anti-HTN as needed

## 2023-10-15 NOTE — PROGRESS NOTE ADULT - PROBLEM SELECTOR PLAN 6
at home protonix 40mg qd  -c/w home med Na 128. Patient asymptomatic. Patient appear euvolemic on exam. Patient has been having severe and constant pain. 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  TSH 1.75 w/n/l.   Serum osmo low at 267, hypotonic hyponatremia. Ulytes c/w SIADH i/so sarcoidosis     Plan  - f/u BMP

## 2023-10-15 NOTE — PROGRESS NOTE ADULT - PROBLEM SELECTOR PLAN 4
Lower lumbar back pain, that radiates down anterior part of b/l legs.   - Dilaudid 0.5mg q6 for severe pain  - acetaminophen 650 prn q6 Lower lumbar back pain, that radiates down anterior part of b/l legs.     Plan  - Dilaudid 0.5mg q6 for severe pain  - acetaminophen 650 prn q6 10/8: CT and MRI showed Extensive mediastinal, hilar, and paratracheal lymphadenopathy is present  in the chest. Enlarged thoracic lymphadenopathy was noted on the 06/08/2012 chest CT study which was attributed to the patient's known history of sarcoidosis at that time. Concern for rheumatologic vs malignant work up,  ESR, CRP, B12, RF w/n/l, Hep A/B/C negative, A1C 5.9. CHRIS elevated 1:160, SSA wnl, TSH 1.75 w/n/l. Immunoglobulin panel wnl. Immunnofixation no monoclonal band   MRI as above     Plan  - f/u, ACE, B1 and B6 levels  -f/u PET scan  - consider paraneoplastic w/up

## 2023-10-15 NOTE — PROGRESS NOTE ADULT - PROBLEM SELECTOR PLAN 5
Na 128. Patient asymptomatic. Patient appear euvolemic on exam. Patient has been having severe and constant pain. 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  TSH 1.75 w/n/l.   Serum osmo low at 267, true hyponatremia  - f/u urine osmo and urine Na Na 128. Patient asymptomatic. Patient appear euvolemic on exam. Patient has been having severe and constant pain. 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  TSH 1.75 w/n/l.   Serum osmo low at 267, hypotonic hyponatremia. Ulytes c/w SIADH i/so sarcoidosis     Plan  - f/u BMP Lower lumbar back pain, that radiates down anterior part of b/l legs.     Plan  - Dilaudid 0.5mg q6 for severe pain  - acetaminophen 650 prn q6

## 2023-10-16 DIAGNOSIS — R29.810 FACIAL WEAKNESS: ICD-10-CM

## 2023-10-16 LAB
ACE SERPL-CCNC: 35 U/L — SIGNIFICANT CHANGE UP (ref 14–82)
ALBUMIN SERPL ELPH-MCNC: 3 G/DL — LOW (ref 3.3–5)
ALBUMIN SERPL ELPH-MCNC: 3 G/DL — LOW (ref 3.3–5)
ALP SERPL-CCNC: 57 U/L — SIGNIFICANT CHANGE UP (ref 40–120)
ALP SERPL-CCNC: 57 U/L — SIGNIFICANT CHANGE UP (ref 40–120)
ALT FLD-CCNC: 28 U/L — SIGNIFICANT CHANGE UP (ref 10–45)
ALT FLD-CCNC: 28 U/L — SIGNIFICANT CHANGE UP (ref 10–45)
ANION GAP SERPL CALC-SCNC: 7 MMOL/L — SIGNIFICANT CHANGE UP (ref 5–17)
ANION GAP SERPL CALC-SCNC: 7 MMOL/L — SIGNIFICANT CHANGE UP (ref 5–17)
AST SERPL-CCNC: 29 U/L — SIGNIFICANT CHANGE UP (ref 10–40)
AST SERPL-CCNC: 29 U/L — SIGNIFICANT CHANGE UP (ref 10–40)
BILIRUB SERPL-MCNC: 0.3 MG/DL — SIGNIFICANT CHANGE UP (ref 0.2–1.2)
BILIRUB SERPL-MCNC: 0.3 MG/DL — SIGNIFICANT CHANGE UP (ref 0.2–1.2)
BLD GP AB SCN SERPL QL: NEGATIVE — SIGNIFICANT CHANGE UP
BLD GP AB SCN SERPL QL: NEGATIVE — SIGNIFICANT CHANGE UP
BUN SERPL-MCNC: 9 MG/DL — SIGNIFICANT CHANGE UP (ref 7–23)
BUN SERPL-MCNC: 9 MG/DL — SIGNIFICANT CHANGE UP (ref 7–23)
CALCIUM SERPL-MCNC: 7.8 MG/DL — LOW (ref 8.4–10.5)
CALCIUM SERPL-MCNC: 7.8 MG/DL — LOW (ref 8.4–10.5)
CHLORIDE SERPL-SCNC: 102 MMOL/L — SIGNIFICANT CHANGE UP (ref 96–108)
CHLORIDE SERPL-SCNC: 102 MMOL/L — SIGNIFICANT CHANGE UP (ref 96–108)
CO2 SERPL-SCNC: 22 MMOL/L — SIGNIFICANT CHANGE UP (ref 22–31)
CO2 SERPL-SCNC: 22 MMOL/L — SIGNIFICANT CHANGE UP (ref 22–31)
CREAT SERPL-MCNC: 0.43 MG/DL — LOW (ref 0.5–1.3)
CREAT SERPL-MCNC: 0.43 MG/DL — LOW (ref 0.5–1.3)
EGFR: 110 ML/MIN/1.73M2 — SIGNIFICANT CHANGE UP
EGFR: 110 ML/MIN/1.73M2 — SIGNIFICANT CHANGE UP
GLUCOSE SERPL-MCNC: 116 MG/DL — HIGH (ref 70–99)
GLUCOSE SERPL-MCNC: 116 MG/DL — HIGH (ref 70–99)
HCT VFR BLD CALC: 37.7 % — SIGNIFICANT CHANGE UP (ref 34.5–45)
HCT VFR BLD CALC: 37.7 % — SIGNIFICANT CHANGE UP (ref 34.5–45)
HGB BLD-MCNC: 12.4 G/DL — SIGNIFICANT CHANGE UP (ref 11.5–15.5)
HGB BLD-MCNC: 12.4 G/DL — SIGNIFICANT CHANGE UP (ref 11.5–15.5)
MAGNESIUM SERPL-MCNC: 2 MG/DL — SIGNIFICANT CHANGE UP (ref 1.6–2.6)
MAGNESIUM SERPL-MCNC: 2 MG/DL — SIGNIFICANT CHANGE UP (ref 1.6–2.6)
MCHC RBC-ENTMCNC: 25.8 PG — LOW (ref 27–34)
MCHC RBC-ENTMCNC: 25.8 PG — LOW (ref 27–34)
MCHC RBC-ENTMCNC: 32.9 GM/DL — SIGNIFICANT CHANGE UP (ref 32–36)
MCHC RBC-ENTMCNC: 32.9 GM/DL — SIGNIFICANT CHANGE UP (ref 32–36)
MCV RBC AUTO: 78.5 FL — LOW (ref 80–100)
MCV RBC AUTO: 78.5 FL — LOW (ref 80–100)
NRBC # BLD: 0 /100 WBCS — SIGNIFICANT CHANGE UP (ref 0–0)
NRBC # BLD: 0 /100 WBCS — SIGNIFICANT CHANGE UP (ref 0–0)
PHOSPHATE SERPL-MCNC: 2.8 MG/DL — SIGNIFICANT CHANGE UP (ref 2.5–4.5)
PHOSPHATE SERPL-MCNC: 2.8 MG/DL — SIGNIFICANT CHANGE UP (ref 2.5–4.5)
PLATELET # BLD AUTO: 193 K/UL — SIGNIFICANT CHANGE UP (ref 150–400)
PLATELET # BLD AUTO: 193 K/UL — SIGNIFICANT CHANGE UP (ref 150–400)
POTASSIUM SERPL-MCNC: 3.5 MMOL/L — SIGNIFICANT CHANGE UP (ref 3.5–5.3)
POTASSIUM SERPL-MCNC: 3.5 MMOL/L — SIGNIFICANT CHANGE UP (ref 3.5–5.3)
POTASSIUM SERPL-SCNC: 3.5 MMOL/L — SIGNIFICANT CHANGE UP (ref 3.5–5.3)
POTASSIUM SERPL-SCNC: 3.5 MMOL/L — SIGNIFICANT CHANGE UP (ref 3.5–5.3)
PROT SERPL-MCNC: 7.9 G/DL — SIGNIFICANT CHANGE UP (ref 6–8.3)
PROT SERPL-MCNC: 7.9 G/DL — SIGNIFICANT CHANGE UP (ref 6–8.3)
RBC # BLD: 4.8 M/UL — SIGNIFICANT CHANGE UP (ref 3.8–5.2)
RBC # BLD: 4.8 M/UL — SIGNIFICANT CHANGE UP (ref 3.8–5.2)
RBC # FLD: 14.3 % — SIGNIFICANT CHANGE UP (ref 10.3–14.5)
RBC # FLD: 14.3 % — SIGNIFICANT CHANGE UP (ref 10.3–14.5)
RH IG SCN BLD-IMP: POSITIVE — SIGNIFICANT CHANGE UP
RH IG SCN BLD-IMP: POSITIVE — SIGNIFICANT CHANGE UP
SODIUM SERPL-SCNC: 131 MMOL/L — LOW (ref 135–145)
SODIUM SERPL-SCNC: 131 MMOL/L — LOW (ref 135–145)
VIT B1 SERPL-MCNC: 142.9 NMOL/L — SIGNIFICANT CHANGE UP (ref 66.5–200)
WBC # BLD: 3.59 K/UL — LOW (ref 3.8–10.5)
WBC # BLD: 3.59 K/UL — LOW (ref 3.8–10.5)
WBC # FLD AUTO: 3.59 K/UL — LOW (ref 3.8–10.5)
WBC # FLD AUTO: 3.59 K/UL — LOW (ref 3.8–10.5)

## 2023-10-16 PROCEDURE — 99233 SBSQ HOSP IP/OBS HIGH 50: CPT

## 2023-10-16 PROCEDURE — 99233 SBSQ HOSP IP/OBS HIGH 50: CPT | Mod: GC

## 2023-10-16 RX ORDER — LABETALOL HCL 100 MG
5 TABLET ORAL ONCE
Refills: 0 | Status: COMPLETED | OUTPATIENT
Start: 2023-10-16 | End: 2023-10-16

## 2023-10-16 RX ORDER — MORPHINE SULFATE 50 MG/1
4 CAPSULE, EXTENDED RELEASE ORAL ONCE
Refills: 0 | Status: DISCONTINUED | OUTPATIENT
Start: 2023-10-16 | End: 2023-10-16

## 2023-10-16 RX ORDER — SODIUM,POTASSIUM PHOSPHATES 278-250MG
1 POWDER IN PACKET (EA) ORAL ONCE
Refills: 0 | Status: COMPLETED | OUTPATIENT
Start: 2023-10-16 | End: 2023-10-16

## 2023-10-16 RX ADMIN — Medication 1 PACKET(S): at 09:11

## 2023-10-16 RX ADMIN — POLYETHYLENE GLYCOL 3350 17 GRAM(S): 17 POWDER, FOR SOLUTION ORAL at 09:11

## 2023-10-16 RX ADMIN — MORPHINE SULFATE 4 MILLIGRAM(S): 50 CAPSULE, EXTENDED RELEASE ORAL at 09:21

## 2023-10-16 RX ADMIN — MORPHINE SULFATE 4 MILLIGRAM(S): 50 CAPSULE, EXTENDED RELEASE ORAL at 09:59

## 2023-10-16 RX ADMIN — LACTULOSE 10 GRAM(S): 10 SOLUTION ORAL at 09:12

## 2023-10-16 RX ADMIN — Medication 1000 MILLIGRAM(S): at 13:03

## 2023-10-16 RX ADMIN — Medication 1000 MILLIGRAM(S): at 14:21

## 2023-10-16 RX ADMIN — SODIUM CHLORIDE 150 MILLILITER(S): 9 INJECTION INTRAMUSCULAR; INTRAVENOUS; SUBCUTANEOUS at 23:33

## 2023-10-16 RX ADMIN — MORPHINE SULFATE 4 MILLIGRAM(S): 50 CAPSULE, EXTENDED RELEASE ORAL at 18:03

## 2023-10-16 RX ADMIN — Medication 1000 MILLIGRAM(S): at 07:07

## 2023-10-16 RX ADMIN — GABAPENTIN 300 MILLIGRAM(S): 400 CAPSULE ORAL at 21:39

## 2023-10-16 RX ADMIN — PANTOPRAZOLE SODIUM 40 MILLIGRAM(S): 20 TABLET, DELAYED RELEASE ORAL at 06:07

## 2023-10-16 RX ADMIN — GABAPENTIN 300 MILLIGRAM(S): 400 CAPSULE ORAL at 13:03

## 2023-10-16 RX ADMIN — ENOXAPARIN SODIUM 40 MILLIGRAM(S): 100 INJECTION SUBCUTANEOUS at 17:46

## 2023-10-16 RX ADMIN — LATANOPROST 1 DROP(S): 0.05 SOLUTION/ DROPS OPHTHALMIC; TOPICAL at 23:39

## 2023-10-16 RX ADMIN — Medication 5 MILLIGRAM(S): at 17:01

## 2023-10-16 RX ADMIN — LIDOCAINE 1 PATCH: 4 CREAM TOPICAL at 12:00

## 2023-10-16 RX ADMIN — Medication 1 DROP(S): at 17:46

## 2023-10-16 RX ADMIN — Medication 1000 MILLIGRAM(S): at 06:07

## 2023-10-16 RX ADMIN — Medication 1000 MILLIGRAM(S): at 22:08

## 2023-10-16 RX ADMIN — Medication 1000 MILLIGRAM(S): at 23:00

## 2023-10-16 RX ADMIN — GABAPENTIN 300 MILLIGRAM(S): 400 CAPSULE ORAL at 06:08

## 2023-10-16 RX ADMIN — MORPHINE SULFATE 4 MILLIGRAM(S): 50 CAPSULE, EXTENDED RELEASE ORAL at 03:06

## 2023-10-16 RX ADMIN — MORPHINE SULFATE 2 MILLIGRAM(S): 50 CAPSULE, EXTENDED RELEASE ORAL at 23:23

## 2023-10-16 RX ADMIN — MORPHINE SULFATE 4 MILLIGRAM(S): 50 CAPSULE, EXTENDED RELEASE ORAL at 14:55

## 2023-10-16 RX ADMIN — MORPHINE SULFATE 4 MILLIGRAM(S): 50 CAPSULE, EXTENDED RELEASE ORAL at 03:21

## 2023-10-16 RX ADMIN — MORPHINE SULFATE 4 MILLIGRAM(S): 50 CAPSULE, EXTENDED RELEASE ORAL at 14:10

## 2023-10-16 RX ADMIN — LIDOCAINE 1 PATCH: 4 CREAM TOPICAL at 23:31

## 2023-10-16 RX ADMIN — Medication 1 DROP(S): at 11:53

## 2023-10-16 RX ADMIN — MORPHINE SULFATE 4 MILLIGRAM(S): 50 CAPSULE, EXTENDED RELEASE ORAL at 17:46

## 2023-10-16 RX ADMIN — LIDOCAINE 1 PATCH: 4 CREAM TOPICAL at 00:08

## 2023-10-16 RX ADMIN — LIDOCAINE 1 PATCH: 4 CREAM TOPICAL at 06:49

## 2023-10-16 NOTE — PROGRESS NOTE ADULT - TIME BILLING
Patient seen and examined;  Neuro no real change ;  Speech and Swallow to see patient  BP stable off medication  Physical therapy   Check PETCT results   Fluid restriction SIADH

## 2023-10-16 NOTE — SWALLOW BEDSIDE ASSESSMENT ADULT - SLP PERTINENT HISTORY OF CURRENT PROBLEM
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 Now s/p 4 days of IVIG 2g/kg, with evidence of disease progression. 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 Now s/p 4 days of IVIG 2g/kg, with evidence of disease progression.

## 2023-10-16 NOTE — PROGRESS NOTE ADULT - SUBJECTIVE AND OBJECTIVE BOX
INTERVAL HPI/OVERNIGHT EVENTS:  Interim reviewed;  Neuro status no real change ;  Right facial persists   Speech and swallow to see Yun  BP issues noted over weekend       MEDICATIONS  (STANDING):  acetaminophen     Tablet .. 1000 milliGRAM(s) Oral every 8 hours  artificial tears (preservative free) Ophthalmic Solution 1 Drop(s) Both EYES every 6 hours  enoxaparin Injectable 40 milliGRAM(s) SubCutaneous every 24 hours  gabapentin 300 milliGRAM(s) Oral every 8 hours  lactulose Syrup 10 Gram(s) Oral daily  latanoprost 0.005% Ophthalmic Solution 1 Drop(s) Both EYES at bedtime  lidocaine   4% Patch 1 Patch Transdermal every 24 hours  pantoprazole    Tablet 40 milliGRAM(s) Oral every 24 hours  polyethylene glycol 3350 17 Gram(s) Oral every 12 hours  potassium phosphate / sodium phosphate Powder (PHOS-NaK) 1 Packet(s) Oral once  senna 2 Tablet(s) Oral at bedtime  sodium chloride 0.9%. 1000 milliLiter(s) (150 mL/Hr) IV Continuous <Continuous>    MEDICATIONS  (PRN):  aluminum hydroxide/magnesium hydroxide/simethicone Suspension 30 milliLiter(s) Oral every 4 hours PRN Dyspepsia  diphenhydrAMINE 25 milliGRAM(s) Oral daily PRN Allergy symptoms  LORazepam     Tablet 0.5 milliGRAM(s) Oral every 8 hours PRN Anxiety  melatonin 3 milliGRAM(s) Oral at bedtime PRN Insomnia  morphine  - Injectable 2 milliGRAM(s) IV Push every 4 hours PRN Moderate Pain (4 - 6)  morphine  - Injectable 4 milliGRAM(s) IV Push every 4 hours PRN Severe Pain (7 - 10)  ondansetron Injectable 4 milliGRAM(s) IV Push every 8 hours PRN Nausea and/or Vomiting      Allergies    No Known Allergies    Intolerances        Vital Signs Last 24 Hrs  T(C): 36.6 (16 Oct 2023 06:09), Max: 37 (15 Oct 2023 10:00)  T(F): 97.8 (16 Oct 2023 06:09), Max: 98.6 (15 Oct 2023 10:00)  HR: 84 (16 Oct 2023 08:11) (68 - 84)  BP: 159/82 (16 Oct 2023 08:11) (96/62 - 185/92)  BP(mean): 111 (16 Oct 2023 08:11) (75 - 132)  RR: 19 (16 Oct 2023 08:11) (13 - 28)  SpO2: 97% (16 Oct 2023 08:11) (95% - 97%)    Parameters below as of 16 Oct 2023 08:11  Patient On (Oxygen Delivery Method): room air               Constitutional:  Awake and alert; Tearful during visit       Eyes: EMILY    ENMT: Negative    Neck: Supple    Back:  no tenderness     Respiratory:  clear     Cardiovascular: S1 S2    Gastrointestinal:  soft     Genitourinary:    Extremities:  no edema     Vascular:    Neurological:  Right facial  Leg strength without change      Skin:    Lymph Nodes:            10-15 @ 07:01  -  10-16 @ 07:00  --------------------------------------------------------  IN: 2249.5 mL / OUT: 3900 mL / NET: -1650.5 mL      LABS:                        13.6   5.23  )-----------( 187      ( 15 Oct 2023 04:42 )             40.9     10-15    129<L>  |  99  |  6<L>  ----------------------------<  123<H>  3.7   |  22  |  0.34<L>    Ca    7.9<L>      15 Oct 2023 04:42  Phos  2.4     10-15  Mg     2.1     10-15    TPro  8.8<H>  /  Alb  3.4  /  TBili  0.4  /  DBili  x   /  AST  15  /  ALT  16  /  AlkPhos  62  10-15      Urinalysis Basic - ( 15 Oct 2023 04:42 )    Color: x / Appearance: x / SG: x / pH: x  Gluc: 123 mg/dL / Ketone: x  / Bili: x / Urobili: x   Blood: x / Protein: x / Nitrite: x   Leuk Esterase: x / RBC: x / WBC x   Sq Epi: x / Non Sq Epi: x / Bacteria: x        RADIOLOGY & ADDITIONAL TESTS:

## 2023-10-16 NOTE — PROGRESS NOTE ADULT - PROBLEM SELECTOR PLAN 2
Progressive weakness in lower limbs over last 3 days with continued urinary incontinence. Last admission 10/08 with MRI and CT didn't reveal acute pathology, only showed broad C6-7 disc bulging w/o any spinal compression, no contrast enchainment. Imaging also showed mediastinal LN. Pt on methylprednisolone 4mg for 6 day (on day 3/6- 3 pills 10/12, 2 pills 10/13, 1 pill 10/14).  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 s/p EMG and LP, suspecting GBS.     Plan  -s/p IVIG 30g qd, infuse over 6hr for 4 days  - f/u neuro rec  - PT consulted  - hold steroid course

## 2023-10-16 NOTE — PROGRESS NOTE ADULT - PROBLEM SELECTOR PLAN 5
Lower lumbar back pain, that radiates down anterior part of b/l legs.     Plan  - Dilaudid 0.5mg q6 for severe pain  - acetaminophen 650 prn q6

## 2023-10-16 NOTE — SWALLOW BEDSIDE ASSESSMENT ADULT - SWALLOW EVAL: DIAGNOSIS
Pt presents with acute and progressive oral phase dysphagia r/t lower motor neuron dysfunction. Deficits primarily appeared to impact bolus containment. Although airway protection was clinically judged to be WFL, unable to exclude subclinical dysphagia and/or silent aspiration at bedside. Consequently, pt would benefit from instrumental swallow study to further assess swallow physiology. Pt appears safe to continue with currently diet given currently stable respiratory status and independence with oral care and feeding.

## 2023-10-16 NOTE — PROGRESS NOTE ADULT - PROBLEM SELECTOR PLAN 3
BP gradually rising to 200s. Patient was started on labetalol 100 P.O BID. On 10/14-10/15 overnight BP in 220s s/p labetalol 5mg IV, Metoprolol 5mg IV in am. BP remained elevated to 190s, Patient received Amlodipine 10mg and BP dropped to 96/92.     Plan:   -Hold off on anti-Hypertensive given low BP  -Monitor BP and restart anti-HTN as needed

## 2023-10-16 NOTE — SWALLOW BEDSIDE ASSESSMENT ADULT - NS SPL SWALLOW CLINIC TRIAL FT
Oral phase was significant for mild anterior bolus loss of liquids 2/2 absent labial seal. Pt benefited from slight posterior head tilt and/or manually sealing her lips to improve oral containment. Only a trace amount of solid residue remained within the b/l buccal cavities.   Laryngeal movement palpated with movement appearing brisk and timely.   No clinical overt s/s of aspiration appreciated. Pt denied globus sensation.  No reported or visible nasal regurgitation.

## 2023-10-16 NOTE — SWALLOW BEDSIDE ASSESSMENT ADULT - SWALLOW EVAL: PROGNOSIS
Prognosis is dependent on response to immunotherapy and etiology of LMN dysfunction as diagnosis is still ongoing

## 2023-10-16 NOTE — PROGRESS NOTE ADULT - ASSESSMENT
63 year old female with PMH of OA, GERD, sarcoidosis, migraines presenting with worsening back pain associated bilateral upper and lower extremities numbness and tingling in distal parts of all her extremities for the last week. Her neurological exam is worsened from yesterday, with more pronounced facial bulbar symptoms, progressive LE weakness, and now absent LE reflexes.  MRI C and T spine w and w/o contrast was performed which didn't reveal acute pathology, only showed broad C6-7 disc bulging w/o any spinal compression, no contrast enchainment. Her hx of probable sarcoidosis, enlargement of mediastinal LN, unintentional weight loss needs oncological w/up. MRI L-spine showed extensive leptomeningeal enhancement along the periphery of the   conus medullaris with enhancement of the cauda equina nerve roots. compatible with active neurosarcoidosis, or Guillain-[place] syndrome and other inflammatory, infectious, or neoplastic causes.  CPK, ESR, CRP was unremarkable. A1C prediabetic. 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  Pt at high risk for intubation given progression of facial / bulbar weakness  - Completed IVIG 500mg/kg x4 days  - c/w Gabapentin 300mg TID      The case was discussed w Dr. Lozano.         63 year old female with PMH of OA, GERD, sarcoidosis, migraines presenting with worsening back pain associated bilateral upper and lower extremities numbness and tingling in distal parts of all her extremities for the last week. Her neurological exam is worsened from yesterday, with more pronounced facial bulbar symptoms, progressive LE weakness, and now absent LE reflexes.  MRI C and T spine w and w/o contrast was performed which didn't reveal acute pathology, only showed broad C6-7 disc bulging w/o any spinal compression, no contrast enchainment. Her hx of probable sarcoidosis, enlargement of mediastinal LN, unintentional weight loss needs oncological w/up. MRI L-spine showed extensive leptomeningeal enhancement along the periphery of the   conus medullaris with enhancement of the cauda equina nerve roots. compatible with active neurosarcoidosis, or Guillain-[place] syndrome and other inflammatory, infectious, or neoplastic causes.  CPK, ESR, CRP was unremarkable. A1C prediabetic. 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 Now s/p 4 days of IVIG 2g/kg, with evidence of disease progression including facial/bulbar and neck flexion.     Recommendations:   - Speech and swallow evaluation given her recent worsening facial weakness and swallowing   - Would increase NIF and vital capacity Q4hrs .  Pt remains at high risk for intubation given progression of facial / bulbar weakness  - Completed IVIG 500mg/kg x4 days  - c/w Gabapentin 300mg TID      Case discussed with Dr. Gonsalves. Thank you for the courtesy of this consult. Will continue to follow.       63 year old female with PMH of OA, GERD, sarcoidosis, migraines presenting with worsening back pain associated bilateral upper and lower extremities numbness and tingling in distal parts of all her extremities for the last week. Her neurological exam is worsened from yesterday, with more pronounced facial bulbar symptoms, progressive LE weakness, and now absent LE reflexes.  MRI C and T spine w and w/o contrast was performed which didn't reveal acute pathology, only showed broad C6-7 disc bulging w/o any spinal compression, no contrast enchainment. Her hx of probable sarcoidosis, enlargement of mediastinal LN, unintentional weight loss needs oncological w/up. MRI L-spine showed extensive leptomeningeal enhancement along the periphery of the   conus medullaris with enhancement of the cauda equina nerve roots. compatible with active neurosarcoidosis, or Guillain-[place] syndrome and other inflammatory, infectious, or neoplastic causes.  CPK, ESR, CRP was unremarkable. A1C prediabetic. 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 Now s/p 4 days of IVIG 2g/kg, with evidence of disease progression including facial/bulbar and neck flexion.     Recommendations:   - Speech and swallow evaluation given her recent worsening facial weakness and swallowing   - Would increase NIF and vital capacity Q4hrs .  Pt remains at high risk for respiratory involvement/deteoriation given rapid progression of facial / bulbar weakness  - Completed IVIG 500mg/kg x4 days  - c/w Gabapentin 300mg TID      Case discussed with Dr. Gonsalves. Thank you for the courtesy of this consult. Will continue to follow.

## 2023-10-16 NOTE — SWALLOW BEDSIDE ASSESSMENT ADULT - SPECIFY REASON(S)
Consulted to assess swallowing given concern for Guillian [place] syndrome and worsening paralysis/neuropathy, leading to dysphagia.

## 2023-10-16 NOTE — PROGRESS NOTE ADULT - PROBLEM SELECTOR PLAN 6
Na 128. Patient asymptomatic. Patient appear euvolemic on exam. Patient has been having severe and constant pain. 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  TSH 1.75 w/n/l.   Serum osmo low at 267, hypotonic hyponatremia. Ulytes c/w SIADH i/so sarcoidosis     Plan  - f/u BMP

## 2023-10-16 NOTE — PROGRESS NOTE ADULT - PROBLEM SELECTOR PLAN 4
10/8: CT and MRI showed Extensive mediastinal, hilar, and paratracheal lymphadenopathy is present  in the chest. Enlarged thoracic lymphadenopathy was noted on the 06/08/2012 chest CT study which was attributed to the patient's known history of sarcoidosis at that time. Concern for rheumatologic vs malignant work up,  ESR, CRP, B12, RF w/n/l, Hep A/B/C negative, A1C 5.9. CHRIS elevated 1:160, SSA wnl, TSH 1.75 w/n/l. Immunoglobulin panel wnl. Immunnofixation no monoclonal band   MRI as above     Plan  -f/u PET scan  - consider paraneoplastic w/up

## 2023-10-16 NOTE — PROGRESS NOTE ADULT - PROBLEM SELECTOR PLAN 1
Progressive weakness in lower limbs over last 3 days with continued urinary incontinence. Last admission 10/08 with MRI and CT didn't reveal acute pathology, only showed broad C6-7 disc bulging w/o any spinal compression, no contrast enchainment. Imaging also showed mediastinal LN. Pt on methylprednisolone 4mg for 6 day (on day 3/6- 3 pills 10/12, 2 pills 10/13, 1 pill 10/14).  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  s/p EMG and LP, suspecting GBS  Mr head: No acute infarct or other acute abnormality. No abnormal enhancement  MRI of lumbar spine on 10/14:  compatible with active neurosarcoidosis, other etiologies include Guillain-[place] syndrome and other inflammatory,  infectious, or neoplastic causes.  10/14: Vital Capacity 1130, 1510  10/15: VC 1340, f/u q4h   Plan  - f/u neurology recs   - f/u Vital capacity q4h, should be greater than 20ml/kg  - neuro consulted, recommendation appreciated  - s/p IVIG 30g qd, infused over 6hr for 4 days  - PT consulted  - hold steroid course

## 2023-10-16 NOTE — PROGRESS NOTE ADULT - SUBJECTIVE AND OBJECTIVE BOX
INTERVAL HPI/OVERNIGHT EVENTS:  Patient was seen and examined at bedside. As per nurse and patient, no o/n events, patient resting comfortably. Pt noted to be dysarthric this morning. Pt states she is in moderate pain and attributes it to laying in bed all day for the past few days.   No other  complaints at this time. Patient denies: fever, chills, lightheadedness, CP, palpitations, SOB, cough, N/V. ROS otherwise negative.    VITAL SIGNS:  T(F): 97.5 (10-16-23 @ 09:34)  HR: 84 (10-16-23 @ 12:00)  BP: 149/83 (10-16-23 @ 12:00)  RR: 20 (10-16-23 @ 12:00)  SpO2: 97% (10-16-23 @ 12:00)  Wt(kg): --      10-15-23 @ 07:01  -  10-16-23 @ 07:00  --------------------------------------------------------  IN: 2249.5 mL / OUT: 3900 mL / NET: -1650.5 mL    10-16-23 @ 07:01  -  10-16-23 @ 13:55  --------------------------------------------------------  IN: 963 mL / OUT: 0 mL / NET: 963 mL        PHYSICAL EXAM:  Constitutional: resting comfortably in bed; NAD  CConstitutional: NAD, resting in bed appears comfortable.  HEENT: MMM, R facial droop   Neck: Supple, no JVD  Respiratory: CTA B/L. No w/r/r.   Cardiovascular: RRR, normal S1 and S2, no m/r/g.   Gastrointestinal: +BS, soft NTND, no guarding or rebound tenderness, no palpable masses   Extremities: wwp; no cyanosis, clubbing or edema.   Vascular: Pulses equal and strong throughout.   NEUROLOGICAL EXAMINATION:  GENERAL:  Appearance is consistent with chronologic age. Able to count to 27 in one breath. On repeat 36.   COGNITION/LANGUAGE:  Awake, alert, and oriented to person, place, time and date. Recent and remote memory intact.  Fund of knowledge is appropriate. No aphasia. Mildly dysarthric.    CRANIAL NERVES:   - Eyes:  Visual acuity intact. Pupils equal round and reactive, no RAPD. EOMI w/o nystagmus, skew or reported double vision. Normal visual field on confrontation.  - severe R and moderate L facial weakness, upper and lower - unable to close R eye, elevate R eyebrow, or seal her mouth. Marked R uvula deviation, weak b/l palate elevation     MEDICATIONS  (STANDING):  acetaminophen     Tablet .. 1000 milliGRAM(s) Oral every 8 hours  artificial tears (preservative free) Ophthalmic Solution 1 Drop(s) Both EYES every 6 hours  enoxaparin Injectable 40 milliGRAM(s) SubCutaneous every 24 hours  gabapentin 300 milliGRAM(s) Oral every 8 hours  lactulose Syrup 10 Gram(s) Oral daily  latanoprost 0.005% Ophthalmic Solution 1 Drop(s) Both EYES at bedtime  lidocaine   4% Patch 1 Patch Transdermal every 24 hours  pantoprazole    Tablet 40 milliGRAM(s) Oral every 24 hours  polyethylene glycol 3350 17 Gram(s) Oral every 12 hours  senna 2 Tablet(s) Oral at bedtime  sodium chloride 0.9%. 1000 milliLiter(s) (150 mL/Hr) IV Continuous <Continuous>    MEDICATIONS  (PRN):  aluminum hydroxide/magnesium hydroxide/simethicone Suspension 30 milliLiter(s) Oral every 4 hours PRN Dyspepsia  diphenhydrAMINE 25 milliGRAM(s) Oral daily PRN Allergy symptoms  LORazepam     Tablet 0.5 milliGRAM(s) Oral every 8 hours PRN Anxiety  melatonin 3 milliGRAM(s) Oral at bedtime PRN Insomnia  morphine  - Injectable 4 milliGRAM(s) IV Push every 4 hours PRN Severe Pain (7 - 10)  morphine  - Injectable 2 milliGRAM(s) IV Push every 4 hours PRN Moderate Pain (4 - 6)  ondansetron Injectable 4 milliGRAM(s) IV Push every 8 hours PRN Nausea and/or Vomiting      Allergies    No Known Allergies    Intolerances        LABS:                        13.6   5.23  )-----------( 187      ( 15 Oct 2023 04:42 )             40.9     10-15    129<L>  |  99  |  6<L>  ----------------------------<  123<H>  3.7   |  22  |  0.34<L>    Ca    7.9<L>      15 Oct 2023 04:42  Phos  2.4     10-15  Mg     2.1     10-15    TPro  8.8<H>  /  Alb  3.4  /  TBili  0.4  /  DBili  x   /  AST  15  /  ALT  16  /  AlkPhos  62  10-15      Urinalysis Basic - ( 15 Oct 2023 04:42 )    Color: x / Appearance: x / SG: x / pH: x  Gluc: 123 mg/dL / Ketone: x  / Bili: x / Urobili: x   Blood: x / Protein: x / Nitrite: x   Leuk Esterase: x / RBC: x / WBC x   Sq Epi: x / Non Sq Epi: x / Bacteria: x        RADIOLOGY & ADDITIONAL TESTS:  Reviewed

## 2023-10-16 NOTE — PROGRESS NOTE ADULT - SUBJECTIVE AND OBJECTIVE BOX
Patient updated per Dr. Oconnor message. She states that she started feeling much better after 2 doses of the Omnicef and all of her UTI symptoms have resolved. She has 1 dose remaining and instructed to finish the medication. Patient verbalized understanding and had no questions at this time.   Neurology Progress Note    Interval History:  The patient was seen and examined at the bedside.       PAST MEDICAL & SURGICAL HISTORY:  Ovarian cyst    GERD (gastroesophageal reflux disease)    Glaucoma    Sarcoidosis    Sinusitis    Osteoporosis    Migraines    Hashimoto's disease    Kidney stones    Torn meniscus  right    History of arthroscopy of shoulder  rotator cuff repair, right    History of umbilical hernia repair    S/P correction of deviated nasal septum    H/O sinus surgery            Medications:  acetaminophen     Tablet .. 1000 milliGRAM(s) Oral every 8 hours  aluminum hydroxide/magnesium hydroxide/simethicone Suspension 30 milliLiter(s) Oral every 4 hours PRN  artificial tears (preservative free) Ophthalmic Solution 1 Drop(s) Both EYES every 6 hours  diphenhydrAMINE 25 milliGRAM(s) Oral daily PRN  enoxaparin Injectable 40 milliGRAM(s) SubCutaneous every 24 hours  gabapentin 300 milliGRAM(s) Oral every 8 hours  lactulose Syrup 10 Gram(s) Oral daily  latanoprost 0.005% Ophthalmic Solution 1 Drop(s) Both EYES at bedtime  lidocaine   4% Patch 1 Patch Transdermal every 24 hours  LORazepam     Tablet 0.5 milliGRAM(s) Oral every 8 hours PRN  melatonin 3 milliGRAM(s) Oral at bedtime PRN  morphine  - Injectable 4 milliGRAM(s) IV Push every 4 hours PRN  morphine  - Injectable 2 milliGRAM(s) IV Push every 4 hours PRN  ondansetron Injectable 4 milliGRAM(s) IV Push every 8 hours PRN  pantoprazole    Tablet 40 milliGRAM(s) Oral every 24 hours  polyethylene glycol 3350 17 Gram(s) Oral every 12 hours  senna 2 Tablet(s) Oral at bedtime  sodium chloride 0.9%. 1000 milliLiter(s) IV Continuous <Continuous>      Vital Signs Last 24 Hrs  T(C): 36.4 (16 Oct 2023 09:34), Max: 36.8 (15 Oct 2023 18:07)  T(F): 97.5 (16 Oct 2023 09:34), Max: 98.2 (15 Oct 2023 18:07)  HR: 84 (16 Oct 2023 08:11) (68 - 84)  BP: 159/82 (16 Oct 2023 08:11) (96/62 - 167/97)  BP(mean): 111 (16 Oct 2023 08:11) (75 - 126)  RR: 19 (16 Oct 2023 08:11) (13 - 28)  SpO2: 97% (16 Oct 2023 08:11) (95% - 97%)    Parameters below as of 16 Oct 2023 08:11  Patient On (Oxygen Delivery Method): room air    NEUROLOGICAL EXAMINATION:  GENERAL:  Appearance is consistent with chronologic age.   COGNITION/LANGUAGE:  Awake, alert, and oriented to person, place, time and date. Recent and remote memory intact.  Fund of knowledge is appropriate. No aphasia. Mildly dysarthric.    CRANIAL NERVES:   - Eyes:  Visual acuity intact. Pupils equal round and reactive, no RAPD. EOMI w/o nystagmus, skew or reported double vision. Normal visual field on confrontation.  - severe R and moderate L facial weakness, upper and lower - unable to close R eye, elevate R eyebrow, or seal her mouth. Marked R uvula deviation, weak b/l palate elevation    MOTOR EXAM:                              Right | Left    Shoulder abductors:    4|5   Shoulder adductors:    5|5   Elbow flexors:             5|5   Elbow extensors:         5|5   Palmar flexion:            5|5   Palmar dorsiflexion:     5|5   Hip flexors:              4-|4-   Hip extensors:            5|5   Knee extensors:          5|5   Knee flexors:            4-|4-   Foot dorsiflexors:        4|4   Foot plantarflexors:     5|5      DTRs:             Right | Left   Biceps:                 2+|2+     Triceps:                2+|2+   Brachioradialis:     2+|2+     Patellar:              3+|3+   Achilles:              0+|0+   Plantar responses equivocal b/l.        No observable drift. Normal tone and bulk. No tenderness, twitching, tremors or involuntary movements.  SENSORY EXAM:  diminished vibration in toes and ankles b/l   REFLEXES:   2+ b/l biceps, triceps, 0+ at patella and achilles.  Plantar response mute b/l.  Jaw jerk, Maday, clonus absent.  CEREBELLUM:  Finger to nose intact.  No dysmetria.        Labs:  CBC Full  -  ( 15 Oct 2023 04:42 )  WBC Count : 5.23 K/uL  RBC Count : 5.24 M/uL  Hemoglobin : 13.6 g/dL  Hematocrit : 40.9 %  Platelet Count - Automated : 187 K/uL  Mean Cell Volume : 78.1 fl  Mean Cell Hemoglobin : 26.0 pg  Mean Cell Hemoglobin Concentration : 33.3 gm/dL  Auto Neutrophil # : 3.89 K/uL  Auto Lymphocyte # : 0.82 K/uL  Auto Monocyte # : 0.39 K/uL  Auto Eosinophil # : 0.09 K/uL  Auto Basophil # : 0.02 K/uL  Auto Neutrophil % : 74.3 %  Auto Lymphocyte % : 15.7 %  Auto Monocyte % : 7.5 %  Auto Eosinophil % : 1.7 %  Auto Basophil % : 0.4 %    10-15    129<L>  |  99  |  6<L>  ----------------------------<  123<H>  3.7   |  22  |  0.34<L>    Ca    7.9<L>      15 Oct 2023 04:42  Phos  2.4     10-15  Mg     2.1     10-15    TPro  8.8<H>  /  Alb  3.4  /  TBili  0.4  /  DBili  x   /  AST  15  /  ALT  16  /  AlkPhos  62  10-15    LIVER FUNCTIONS - ( 15 Oct 2023 04:42 )  Alb: 3.4 g/dL / Pro: 8.8 g/dL / ALK PHOS: 62 U/L / ALT: 16 U/L / AST: 15 U/L / GGT: x             Urinalysis Basic - ( 15 Oct 2023 04:42 )    Color: x / Appearance: x / SG: x / pH: x  Gluc: 123 mg/dL / Ketone: x  / Bili: x / Urobili: x   Blood: x / Protein: x / Nitrite: x   Leuk Esterase: x / RBC: x / WBC x   Sq Epi: x / Non Sq Epi: x / Bacteria: x        Assessment:  This is a 63y Female w/ h/o     Plan: Neurology Progress Note    Interval History:  The patient was seen and examined at the bedside.       PAST MEDICAL & SURGICAL HISTORY:  Ovarian cyst    GERD (gastroesophageal reflux disease)    Glaucoma    Sarcoidosis    Sinusitis    Osteoporosis    Migraines    Hashimoto's disease    Kidney stones    Torn meniscus  right    History of arthroscopy of shoulder  rotator cuff repair, right    History of umbilical hernia repair    S/P correction of deviated nasal septum    H/O sinus surgery            Medications:  acetaminophen     Tablet .. 1000 milliGRAM(s) Oral every 8 hours  aluminum hydroxide/magnesium hydroxide/simethicone Suspension 30 milliLiter(s) Oral every 4 hours PRN  artificial tears (preservative free) Ophthalmic Solution 1 Drop(s) Both EYES every 6 hours  diphenhydrAMINE 25 milliGRAM(s) Oral daily PRN  enoxaparin Injectable 40 milliGRAM(s) SubCutaneous every 24 hours  gabapentin 300 milliGRAM(s) Oral every 8 hours  lactulose Syrup 10 Gram(s) Oral daily  latanoprost 0.005% Ophthalmic Solution 1 Drop(s) Both EYES at bedtime  lidocaine   4% Patch 1 Patch Transdermal every 24 hours  LORazepam     Tablet 0.5 milliGRAM(s) Oral every 8 hours PRN  melatonin 3 milliGRAM(s) Oral at bedtime PRN  morphine  - Injectable 4 milliGRAM(s) IV Push every 4 hours PRN  morphine  - Injectable 2 milliGRAM(s) IV Push every 4 hours PRN  ondansetron Injectable 4 milliGRAM(s) IV Push every 8 hours PRN  pantoprazole    Tablet 40 milliGRAM(s) Oral every 24 hours  polyethylene glycol 3350 17 Gram(s) Oral every 12 hours  senna 2 Tablet(s) Oral at bedtime  sodium chloride 0.9%. 1000 milliLiter(s) IV Continuous <Continuous>      Vital Signs Last 24 Hrs  T(C): 36.4 (16 Oct 2023 09:34), Max: 36.8 (15 Oct 2023 18:07)  T(F): 97.5 (16 Oct 2023 09:34), Max: 98.2 (15 Oct 2023 18:07)  HR: 84 (16 Oct 2023 08:11) (68 - 84)  BP: 159/82 (16 Oct 2023 08:11) (96/62 - 167/97)  BP(mean): 111 (16 Oct 2023 08:11) (75 - 126)  RR: 19 (16 Oct 2023 08:11) (13 - 28)  SpO2: 97% (16 Oct 2023 08:11) (95% - 97%)    Parameters below as of 16 Oct 2023 08:11  Patient On (Oxygen Delivery Method): room air    NEUROLOGICAL EXAMINATION:  GENERAL:  Appearance is consistent with chronologic age. Able to count to 27 in one breath. On repeat 36.   COGNITION/LANGUAGE:  Awake, alert, and oriented to person, place, time and date. Recent and remote memory intact.  Fund of knowledge is appropriate. No aphasia. Mildly dysarthric.    CRANIAL NERVES:   - Eyes:  Visual acuity intact. Pupils equal round and reactive, no RAPD. EOMI w/o nystagmus, skew or reported double vision. Normal visual field on confrontation.  - severe R and moderate L facial weakness, upper and lower - unable to close R eye, elevate R eyebrow, or seal her mouth. Marked R uvula deviation, weak b/l palate elevation    MOTOR EXAM:                              Right | Left    Shoulder abductors:    4|5   Shoulder adductors:    5|5   Elbow flexors:             5|5   Elbow extensors:         5|5   Palmar flexion:            5|5   Palmar dorsiflexion:     5|5   Hip flexors:                4-|4-   Hip extensors:            5|5   Knee extensors:          5|5   Knee flexors:             4-|4-   Foot dorsiflexors:        4|4   Foot plantarflexors:     5|5      DTRs:             Right | Left   Biceps:                 1+|1+     Triceps:                1+|1+   Brachioradialis:     1+|1+     Patellar:                0 | 0   Achilles:                0 | 0     Plantar responses mute b/l.  Head flexion is 4/5. Extension is 5/5.       No observable drift. Normal tone and bulk. No tenderness, twitching, tremors or involuntary movements.  SENSORY EXAM:  diminished vibration in toes and ankles b/l   REFLEXES:   2+ b/l biceps, triceps, 0+ at patella and achilles.  Plantar response mute b/l.  Jaw jerk, Maday, clonus absent.  CEREBELLUM:  Finger to nose intact.  No dysmetria.        Labs:  CBC Full  -  ( 15 Oct 2023 04:42 )  WBC Count : 5.23 K/uL  RBC Count : 5.24 M/uL  Hemoglobin : 13.6 g/dL  Hematocrit : 40.9 %  Platelet Count - Automated : 187 K/uL  Mean Cell Volume : 78.1 fl  Mean Cell Hemoglobin : 26.0 pg  Mean Cell Hemoglobin Concentration : 33.3 gm/dL  Auto Neutrophil # : 3.89 K/uL  Auto Lymphocyte # : 0.82 K/uL  Auto Monocyte # : 0.39 K/uL  Auto Eosinophil # : 0.09 K/uL  Auto Basophil # : 0.02 K/uL  Auto Neutrophil % : 74.3 %  Auto Lymphocyte % : 15.7 %  Auto Monocyte % : 7.5 %  Auto Eosinophil % : 1.7 %  Auto Basophil % : 0.4 %    10-15    129<L>  |  99  |  6<L>  ----------------------------<  123<H>  3.7   |  22  |  0.34<L>    Ca    7.9<L>      15 Oct 2023 04:42  Phos  2.4     10-15  Mg     2.1     10-15    TPro  8.8<H>  /  Alb  3.4  /  TBili  0.4  /  DBili  x   /  AST  15  /  ALT  16  /  AlkPhos  62  10-15    LIVER FUNCTIONS - ( 15 Oct 2023 04:42 )  Alb: 3.4 g/dL / Pro: 8.8 g/dL / ALK PHOS: 62 U/L / ALT: 16 U/L / AST: 15 U/L / GGT: x             Urinalysis Basic - ( 15 Oct 2023 04:42 )    Color: x / Appearance: x / SG: x / pH: x  Gluc: 123 mg/dL / Ketone: x  / Bili: x / Urobili: x   Blood: x / Protein: x / Nitrite: x   Leuk Esterase: x / RBC: x / WBC x   Sq Epi: x / Non Sq Epi: x / Bacteria: x        Assessment:  This is a 63y Female w/ h/o     Plan:

## 2023-10-16 NOTE — SWALLOW BEDSIDE ASSESSMENT ADULT - SLP GENERAL OBSERVATIONS
Awake, in bed, breathing RA, mild flaccid dysarthria i/s/o LMN weakness/paralysis, no dysphonia. Pt endorsed the acute onset of oral phase deficits over the last 1-2 days, primarily impacting bolus containment, mastication, and oral bolus clearance. According to pt, she has had to manually close her lips to reduce anterior spillage. Oral diet has been limited to mostly purees given oral phase deficits.

## 2023-10-17 LAB
ALBUMIN SERPL ELPH-MCNC: 3.3 G/DL — SIGNIFICANT CHANGE UP (ref 3.3–5)
ALBUMIN SERPL ELPH-MCNC: 3.3 G/DL — SIGNIFICANT CHANGE UP (ref 3.3–5)
ALP SERPL-CCNC: 60 U/L — SIGNIFICANT CHANGE UP (ref 40–120)
ALP SERPL-CCNC: 60 U/L — SIGNIFICANT CHANGE UP (ref 40–120)
ALT FLD-CCNC: 41 U/L — SIGNIFICANT CHANGE UP (ref 10–45)
ALT FLD-CCNC: 41 U/L — SIGNIFICANT CHANGE UP (ref 10–45)
ANION GAP SERPL CALC-SCNC: 8 MMOL/L — SIGNIFICANT CHANGE UP (ref 5–17)
ANION GAP SERPL CALC-SCNC: 8 MMOL/L — SIGNIFICANT CHANGE UP (ref 5–17)
AST SERPL-CCNC: 45 U/L — HIGH (ref 10–40)
AST SERPL-CCNC: 45 U/L — HIGH (ref 10–40)
BASOPHILS # BLD AUTO: 0.02 K/UL — SIGNIFICANT CHANGE UP (ref 0–0.2)
BASOPHILS # BLD AUTO: 0.02 K/UL — SIGNIFICANT CHANGE UP (ref 0–0.2)
BASOPHILS NFR BLD AUTO: 0.6 % — SIGNIFICANT CHANGE UP (ref 0–2)
BASOPHILS NFR BLD AUTO: 0.6 % — SIGNIFICANT CHANGE UP (ref 0–2)
BILIRUB SERPL-MCNC: 0.4 MG/DL — SIGNIFICANT CHANGE UP (ref 0.2–1.2)
BILIRUB SERPL-MCNC: 0.4 MG/DL — SIGNIFICANT CHANGE UP (ref 0.2–1.2)
BUN SERPL-MCNC: 8 MG/DL — SIGNIFICANT CHANGE UP (ref 7–23)
BUN SERPL-MCNC: 8 MG/DL — SIGNIFICANT CHANGE UP (ref 7–23)
CALCIUM SERPL-MCNC: 8 MG/DL — LOW (ref 8.4–10.5)
CALCIUM SERPL-MCNC: 8 MG/DL — LOW (ref 8.4–10.5)
CHLORIDE SERPL-SCNC: 104 MMOL/L — SIGNIFICANT CHANGE UP (ref 96–108)
CHLORIDE SERPL-SCNC: 104 MMOL/L — SIGNIFICANT CHANGE UP (ref 96–108)
CO2 SERPL-SCNC: 21 MMOL/L — LOW (ref 22–31)
CO2 SERPL-SCNC: 21 MMOL/L — LOW (ref 22–31)
CREAT SERPL-MCNC: 0.42 MG/DL — LOW (ref 0.5–1.3)
CREAT SERPL-MCNC: 0.42 MG/DL — LOW (ref 0.5–1.3)
EGFR: 111 ML/MIN/1.73M2 — SIGNIFICANT CHANGE UP
EGFR: 111 ML/MIN/1.73M2 — SIGNIFICANT CHANGE UP
EOSINOPHIL # BLD AUTO: 0.11 K/UL — SIGNIFICANT CHANGE UP (ref 0–0.5)
EOSINOPHIL # BLD AUTO: 0.11 K/UL — SIGNIFICANT CHANGE UP (ref 0–0.5)
EOSINOPHIL NFR BLD AUTO: 3.5 % — SIGNIFICANT CHANGE UP (ref 0–6)
EOSINOPHIL NFR BLD AUTO: 3.5 % — SIGNIFICANT CHANGE UP (ref 0–6)
GLUCOSE SERPL-MCNC: 104 MG/DL — HIGH (ref 70–99)
GLUCOSE SERPL-MCNC: 104 MG/DL — HIGH (ref 70–99)
HCT VFR BLD CALC: 39.1 % — SIGNIFICANT CHANGE UP (ref 34.5–45)
HCT VFR BLD CALC: 39.1 % — SIGNIFICANT CHANGE UP (ref 34.5–45)
HGB BLD-MCNC: 12.8 G/DL — SIGNIFICANT CHANGE UP (ref 11.5–15.5)
HGB BLD-MCNC: 12.8 G/DL — SIGNIFICANT CHANGE UP (ref 11.5–15.5)
IMM GRANULOCYTES NFR BLD AUTO: 0.3 % — SIGNIFICANT CHANGE UP (ref 0–0.9)
IMM GRANULOCYTES NFR BLD AUTO: 0.3 % — SIGNIFICANT CHANGE UP (ref 0–0.9)
INNER EAR 68KD AB FLD QL: <1.5 U/L — SIGNIFICANT CHANGE UP (ref 0–3.1)
INNER EAR 68KD AB FLD QL: <1.5 U/L — SIGNIFICANT CHANGE UP (ref 0–3.1)
LYMPHOCYTES # BLD AUTO: 0.72 K/UL — LOW (ref 1–3.3)
LYMPHOCYTES # BLD AUTO: 0.72 K/UL — LOW (ref 1–3.3)
LYMPHOCYTES # BLD AUTO: 22.9 % — SIGNIFICANT CHANGE UP (ref 13–44)
LYMPHOCYTES # BLD AUTO: 22.9 % — SIGNIFICANT CHANGE UP (ref 13–44)
MAGNESIUM SERPL-MCNC: 2 MG/DL — SIGNIFICANT CHANGE UP (ref 1.6–2.6)
MAGNESIUM SERPL-MCNC: 2 MG/DL — SIGNIFICANT CHANGE UP (ref 1.6–2.6)
MCHC RBC-ENTMCNC: 25.5 PG — LOW (ref 27–34)
MCHC RBC-ENTMCNC: 25.5 PG — LOW (ref 27–34)
MCHC RBC-ENTMCNC: 32.7 GM/DL — SIGNIFICANT CHANGE UP (ref 32–36)
MCHC RBC-ENTMCNC: 32.7 GM/DL — SIGNIFICANT CHANGE UP (ref 32–36)
MCV RBC AUTO: 78 FL — LOW (ref 80–100)
MCV RBC AUTO: 78 FL — LOW (ref 80–100)
MONOCYTES # BLD AUTO: 0.37 K/UL — SIGNIFICANT CHANGE UP (ref 0–0.9)
MONOCYTES # BLD AUTO: 0.37 K/UL — SIGNIFICANT CHANGE UP (ref 0–0.9)
MONOCYTES NFR BLD AUTO: 11.8 % — SIGNIFICANT CHANGE UP (ref 2–14)
MONOCYTES NFR BLD AUTO: 11.8 % — SIGNIFICANT CHANGE UP (ref 2–14)
NEUTROPHILS # BLD AUTO: 1.91 K/UL — SIGNIFICANT CHANGE UP (ref 1.8–7.4)
NEUTROPHILS # BLD AUTO: 1.91 K/UL — SIGNIFICANT CHANGE UP (ref 1.8–7.4)
NEUTROPHILS NFR BLD AUTO: 60.9 % — SIGNIFICANT CHANGE UP (ref 43–77)
NEUTROPHILS NFR BLD AUTO: 60.9 % — SIGNIFICANT CHANGE UP (ref 43–77)
NRBC # BLD: 0 /100 WBCS — SIGNIFICANT CHANGE UP (ref 0–0)
NRBC # BLD: 0 /100 WBCS — SIGNIFICANT CHANGE UP (ref 0–0)
PHOSPHATE SERPL-MCNC: 2.7 MG/DL — SIGNIFICANT CHANGE UP (ref 2.5–4.5)
PHOSPHATE SERPL-MCNC: 2.7 MG/DL — SIGNIFICANT CHANGE UP (ref 2.5–4.5)
PLATELET # BLD AUTO: 158 K/UL — SIGNIFICANT CHANGE UP (ref 150–400)
PLATELET # BLD AUTO: 158 K/UL — SIGNIFICANT CHANGE UP (ref 150–400)
POTASSIUM SERPL-MCNC: 3.8 MMOL/L — SIGNIFICANT CHANGE UP (ref 3.5–5.3)
POTASSIUM SERPL-MCNC: 3.8 MMOL/L — SIGNIFICANT CHANGE UP (ref 3.5–5.3)
POTASSIUM SERPL-SCNC: 3.8 MMOL/L — SIGNIFICANT CHANGE UP (ref 3.5–5.3)
POTASSIUM SERPL-SCNC: 3.8 MMOL/L — SIGNIFICANT CHANGE UP (ref 3.5–5.3)
PROT SERPL-MCNC: 8 G/DL — SIGNIFICANT CHANGE UP (ref 6–8.3)
PROT SERPL-MCNC: 8 G/DL — SIGNIFICANT CHANGE UP (ref 6–8.3)
RBC # BLD: 5.01 M/UL — SIGNIFICANT CHANGE UP (ref 3.8–5.2)
RBC # BLD: 5.01 M/UL — SIGNIFICANT CHANGE UP (ref 3.8–5.2)
RBC # FLD: 14.5 % — SIGNIFICANT CHANGE UP (ref 10.3–14.5)
RBC # FLD: 14.5 % — SIGNIFICANT CHANGE UP (ref 10.3–14.5)
SODIUM SERPL-SCNC: 133 MMOL/L — LOW (ref 135–145)
SODIUM SERPL-SCNC: 133 MMOL/L — LOW (ref 135–145)
WBC # BLD: 3.14 K/UL — LOW (ref 3.8–10.5)
WBC # BLD: 3.14 K/UL — LOW (ref 3.8–10.5)
WBC # FLD AUTO: 3.14 K/UL — LOW (ref 3.8–10.5)
WBC # FLD AUTO: 3.14 K/UL — LOW (ref 3.8–10.5)

## 2023-10-17 PROCEDURE — 99233 SBSQ HOSP IP/OBS HIGH 50: CPT

## 2023-10-17 PROCEDURE — 99232 SBSQ HOSP IP/OBS MODERATE 35: CPT | Mod: GC

## 2023-10-17 RX ORDER — LABETALOL HCL 100 MG
5 TABLET ORAL ONCE
Refills: 0 | Status: COMPLETED | OUTPATIENT
Start: 2023-10-17 | End: 2023-10-17

## 2023-10-17 RX ORDER — LABETALOL HCL 100 MG
10 TABLET ORAL ONCE
Refills: 0 | Status: COMPLETED | OUTPATIENT
Start: 2023-10-17 | End: 2023-10-17

## 2023-10-17 RX ADMIN — ENOXAPARIN SODIUM 40 MILLIGRAM(S): 100 INJECTION SUBCUTANEOUS at 17:50

## 2023-10-17 RX ADMIN — Medication 1 DROP(S): at 12:36

## 2023-10-17 RX ADMIN — MORPHINE SULFATE 2 MILLIGRAM(S): 50 CAPSULE, EXTENDED RELEASE ORAL at 10:07

## 2023-10-17 RX ADMIN — LIDOCAINE 1 PATCH: 4 CREAM TOPICAL at 05:48

## 2023-10-17 RX ADMIN — GABAPENTIN 300 MILLIGRAM(S): 400 CAPSULE ORAL at 13:13

## 2023-10-17 RX ADMIN — LIDOCAINE 1 PATCH: 4 CREAM TOPICAL at 12:09

## 2023-10-17 RX ADMIN — MORPHINE SULFATE 4 MILLIGRAM(S): 50 CAPSULE, EXTENDED RELEASE ORAL at 03:30

## 2023-10-17 RX ADMIN — Medication 1000 MILLIGRAM(S): at 06:00

## 2023-10-17 RX ADMIN — LACTULOSE 10 GRAM(S): 10 SOLUTION ORAL at 09:42

## 2023-10-17 RX ADMIN — Medication 10 MILLIGRAM(S): at 21:26

## 2023-10-17 RX ADMIN — LATANOPROST 1 DROP(S): 0.05 SOLUTION/ DROPS OPHTHALMIC; TOPICAL at 23:43

## 2023-10-17 RX ADMIN — SODIUM CHLORIDE 150 MILLILITER(S): 9 INJECTION INTRAMUSCULAR; INTRAVENOUS; SUBCUTANEOUS at 05:56

## 2023-10-17 RX ADMIN — SENNA PLUS 2 TABLET(S): 8.6 TABLET ORAL at 21:40

## 2023-10-17 RX ADMIN — GABAPENTIN 300 MILLIGRAM(S): 400 CAPSULE ORAL at 21:27

## 2023-10-17 RX ADMIN — Medication 1000 MILLIGRAM(S): at 05:55

## 2023-10-17 RX ADMIN — Medication 1000 MILLIGRAM(S): at 22:30

## 2023-10-17 RX ADMIN — MORPHINE SULFATE 2 MILLIGRAM(S): 50 CAPSULE, EXTENDED RELEASE ORAL at 09:41

## 2023-10-17 RX ADMIN — POLYETHYLENE GLYCOL 3350 17 GRAM(S): 17 POWDER, FOR SOLUTION ORAL at 09:41

## 2023-10-17 RX ADMIN — Medication 1000 MILLIGRAM(S): at 21:27

## 2023-10-17 RX ADMIN — MORPHINE SULFATE 4 MILLIGRAM(S): 50 CAPSULE, EXTENDED RELEASE ORAL at 02:56

## 2023-10-17 RX ADMIN — Medication 1000 MILLIGRAM(S): at 13:13

## 2023-10-17 RX ADMIN — Medication 1000 MILLIGRAM(S): at 13:51

## 2023-10-17 RX ADMIN — Medication 1 DROP(S): at 17:48

## 2023-10-17 RX ADMIN — POLYETHYLENE GLYCOL 3350 17 GRAM(S): 17 POWDER, FOR SOLUTION ORAL at 21:40

## 2023-10-17 RX ADMIN — Medication 1 DROP(S): at 07:42

## 2023-10-17 RX ADMIN — GABAPENTIN 300 MILLIGRAM(S): 400 CAPSULE ORAL at 05:56

## 2023-10-17 RX ADMIN — MORPHINE SULFATE 2 MILLIGRAM(S): 50 CAPSULE, EXTENDED RELEASE ORAL at 00:30

## 2023-10-17 RX ADMIN — PANTOPRAZOLE SODIUM 40 MILLIGRAM(S): 20 TABLET, DELAYED RELEASE ORAL at 05:56

## 2023-10-17 RX ADMIN — Medication 5 MILLIGRAM(S): at 17:49

## 2023-10-17 NOTE — OCCUPATIONAL THERAPY INITIAL EVALUATION ADULT - PERTINENT HX OF CURRENT PROBLEM, REHAB EVAL
63 year old female with PMH of OA, GERD, and sarcoidosis (diagnosed in 2008) presenting with worsening back pain and progressive lower limb weakness for the last three days. Pt was recently admitted on 10/8/23 for lower and upper extremity numbness and tingling. At the time, MRI and CT lumbar and thoracic which didn't reveal acute pathology, only showed broad C6-7 disc bulging w/o any spinal compression, no contrast enchainment. Imaging also showed mediastinal LN. Today, pt reports 9/10 tearing in quality lower back pain, increased weakness as she was unable to get off the toilet on her own. She also had to use a walker as she felt like she would fall otherwise. Pt also reports urinary incontinence during the day, reports no episodes of incontinence overnight.

## 2023-10-17 NOTE — OCCUPATIONAL THERAPY INITIAL EVALUATION ADULT - MD ORDER
Worsening back pain and progressive lower limb weakness x3days  Lower extremity weakness  +GBS (Guillain [place] syndrome) - Progressive weakness in lower limbs over last 3 days with continued urinary incontinence.

## 2023-10-17 NOTE — OCCUPATIONAL THERAPY INITIAL EVALUATION ADULT - GENERAL OBSERVATIONS, REHAB EVAL
Pt's RN Jeannette aware of intent to eval/tx; cleared Pt. Pt received in supine - +telemetry, heplock, purewick, bed alarm, IVs

## 2023-10-17 NOTE — OCCUPATIONAL THERAPY INITIAL EVALUATION ADULT - LEVEL OF INDEPENDENCE: STAND/SIT, REHAB EVAL
Stop Aleve, Ibuprofen and Advil 3 days prior to surgery.    Lantus cut down to 30 units the night before surgery.  NO Novolog day of surgery - may restart when you are back to eating.    Keep monitoring blood sugars and monitor for hypoglycemia.  Hypoglycemia Treatment/Prevention:    Avoid refined foods heavy in sugars or refined (white) flour.  Examples would be baked goods, breads, white pastas, white rice, sugar beverages, alcohol,etc.    If you do eat meals with these ingredients, try to eat smaller amounts and combine them with lean meats or other proteins.  Eating some nuts like almonds or a small amount of non-fat cottage cheese between meals is often helpful as well.      Spread your calories out throughout the day more.  Instead of 2 or 3 meals, eat the same amount of calories spread out over 5 or 6 meals.      If you become symptomatic, have something to eat like an apple or milk.  This should help resolve the symptoms.    As a general rule, the healthier your lifestyle is and the closer you are to your ideal weight, the less likely you will be to have problems with this.  In some cases, these symptoms can indicate a predisposition to diabetes in the future so it is important to have a fasting blood sugar checked yearly.        
moderate assist (50% patients effort)

## 2023-10-17 NOTE — PROGRESS NOTE ADULT - ASSESSMENT
Neurology    63 year old female with PMH of OA, GERD, sarcoidosis, migraines presenting with worsening back pain associated bilateral upper and lower extremities numbness and tingling in distal parts of all her extremities for the last week. Her neurological exam is worsened from yesterday, with more pronounced facial bulbar symptoms, progressive LE weakness, and now absent LE reflexes. Of note, had flu shot 2 weeks prior to presentation. MRI C and T spine w and w/o contrast was performed which didn't reveal acute pathology, only showed broad C6-7 disc bulging w/o any spinal compression, no contrast enchainment. Her hx of probable sarcoidosis, enlargement of mediastinal LN, unintentional weight loss needs oncological w/up. MRI L-spine showed extensive leptomeningeal enhancement along the periphery of the   conus medullaris with enhancement of the cauda equina nerve roots. compatible with active neurosarcoidosis, or Guillain-[place] syndrome and other inflammatory, infectious, or neoplastic causes.  CPK, ESR, CRP was unremarkable. A1C prediabetic. 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 Now s/p 4 days of IVIG 2g/kg, with continued evidence of disease progression including facial/bulbar symptoms and neck flexion.     Recommendations:   - Would consider opthalmology consult regarding blurry vision  - Elevated BP noted. Dysautonomia is an expected complication, would treat as needed.   - Continue NIF and vital capacity Q4hrs .  Pt remains at high risk for respiratory involvement/deteoriation given rapid progression of facial / bulbar weakness  - Completed IVIG 500mg/kg x4 days  - c/w Gabapentin 300mg TID

## 2023-10-17 NOTE — PROGRESS NOTE ADULT - SUBJECTIVE AND OBJECTIVE BOX
INTERVAL HPI/OVERNIGHT EVENTS:  Chief complaint body pain; Morphine somewhat effective  Otherwise neuro status stable;  Respiratory status stable; Swallowing no issue   BP increased at times      MEDICATIONS  (STANDING):  acetaminophen     Tablet .. 1000 milliGRAM(s) Oral every 8 hours  artificial tears (preservative free) Ophthalmic Solution 1 Drop(s) Both EYES every 6 hours  enoxaparin Injectable 40 milliGRAM(s) SubCutaneous every 24 hours  gabapentin 300 milliGRAM(s) Oral every 8 hours  lactulose Syrup 10 Gram(s) Oral daily  latanoprost 0.005% Ophthalmic Solution 1 Drop(s) Both EYES at bedtime  lidocaine   4% Patch 1 Patch Transdermal every 24 hours  pantoprazole    Tablet 40 milliGRAM(s) Oral every 24 hours  polyethylene glycol 3350 17 Gram(s) Oral every 12 hours  senna 2 Tablet(s) Oral at bedtime    MEDICATIONS  (PRN):  aluminum hydroxide/magnesium hydroxide/simethicone Suspension 30 milliLiter(s) Oral every 4 hours PRN Dyspepsia  diphenhydrAMINE 25 milliGRAM(s) Oral daily PRN Allergy symptoms  LORazepam     Tablet 0.5 milliGRAM(s) Oral every 8 hours PRN Anxiety  melatonin 3 milliGRAM(s) Oral at bedtime PRN Insomnia  morphine  - Injectable 2 milliGRAM(s) IV Push every 4 hours PRN Moderate Pain (4 - 6)  morphine  - Injectable 4 milliGRAM(s) IV Push every 4 hours PRN Severe Pain (7 - 10)  ondansetron Injectable 4 milliGRAM(s) IV Push every 8 hours PRN Nausea and/or Vomiting      Allergies    No Known Allergies    Intolerances        Vital Signs Last 24 Hrs  T(C): 37.4 (17 Oct 2023 05:21), Max: 37.4 (17 Oct 2023 05:21)  T(F): 99.4 (17 Oct 2023 05:21), Max: 99.4 (17 Oct 2023 05:21)  HR: 85 (17 Oct 2023 08:54) (80 - 101)  BP: 164/75 (17 Oct 2023 08:54) (147/78 - 199/96)  BP(mean): 108 (17 Oct 2023 08:54) (106 - 138)  RR: 19 (17 Oct 2023 08:54) (18 - 22)  SpO2: 96% (17 Oct 2023 08:54) (95% - 98%)    Parameters below as of 17 Oct 2023 08:54  Patient On (Oxygen Delivery Method): room air              Constitutional: Awake     Eyes: EMILY    ENMT: Negative    Neck: Supple    Back:  no tenderness     Respiratory:  clear     Cardiovascular: S1 S2      Gastrointestinal:  soft     Genitourinary:    Extremities:  no edema     Vascular:    Neurological:  Left facial;      Skin:    Lymph Nodes:            10-16 @ 07:01  -  10-17 @ 07:00  --------------------------------------------------------  IN: 3363 mL / OUT: 1300 mL / NET: 2063 mL      LABS:                        12.8   3.14  )-----------( 158      ( 17 Oct 2023 05:30 )             39.1     10-17    133<L>  |  104  |  8   ----------------------------<  104<H>  3.8   |  21<L>  |  0.42<L>    Ca    8.0<L>      17 Oct 2023 05:30  Phos  2.7     10-17  Mg     2.0     10-17    TPro  8.0  /  Alb  3.3  /  TBili  0.4  /  DBili  x   /  AST  45<H>  /  ALT  41  /  AlkPhos  60  10-17      Urinalysis Basic - ( 17 Oct 2023 05:30 )    Color: x / Appearance: x / SG: x / pH: x  Gluc: 104 mg/dL / Ketone: x  / Bili: x / Urobili: x   Blood: x / Protein: x / Nitrite: x   Leuk Esterase: x / RBC: x / WBC x   Sq Epi: x / Non Sq Epi: x / Bacteria: x        RADIOLOGY & ADDITIONAL TESTS:

## 2023-10-17 NOTE — OCCUPATIONAL THERAPY INITIAL EVALUATION ADULT - PERSONAL SAFETY AND JUDGMENT, REHAB EVAL
Requires max cues to increase safety awareness w/ functional activities 2/2 decreased insight/judgement/significant weakness at this time/impaired

## 2023-10-17 NOTE — PROGRESS NOTE ADULT - SUBJECTIVE AND OBJECTIVE BOX
Neurology Progress Note    Interval History:  The patient was seen and examined at the bedside. Blury vision overnight, no other acute complaints.       PAST MEDICAL & SURGICAL HISTORY:  Ovarian cyst    GERD (gastroesophageal reflux disease)    Glaucoma    Sarcoidosis    Sinusitis    Osteoporosis    Migraines    Hashimoto's disease    Kidney stones    Torn meniscus  right    History of arthroscopy of shoulder  rotator cuff repair, right    History of umbilical hernia repair    S/P correction of deviated nasal septum    H/O sinus surgery            Medications:  acetaminophen     Tablet .. 1000 milliGRAM(s) Oral every 8 hours  aluminum hydroxide/magnesium hydroxide/simethicone Suspension 30 milliLiter(s) Oral every 4 hours PRN  artificial tears (preservative free) Ophthalmic Solution 1 Drop(s) Both EYES every 6 hours  diphenhydrAMINE 25 milliGRAM(s) Oral daily PRN  enoxaparin Injectable 40 milliGRAM(s) SubCutaneous every 24 hours  gabapentin 300 milliGRAM(s) Oral every 8 hours  lactulose Syrup 10 Gram(s) Oral daily  latanoprost 0.005% Ophthalmic Solution 1 Drop(s) Both EYES at bedtime  lidocaine   4% Patch 1 Patch Transdermal every 24 hours  LORazepam     Tablet 0.5 milliGRAM(s) Oral every 8 hours PRN  melatonin 3 milliGRAM(s) Oral at bedtime PRN  morphine  - Injectable 2 milliGRAM(s) IV Push every 4 hours PRN  morphine  - Injectable 4 milliGRAM(s) IV Push every 4 hours PRN  ondansetron Injectable 4 milliGRAM(s) IV Push every 8 hours PRN  pantoprazole    Tablet 40 milliGRAM(s) Oral every 24 hours  polyethylene glycol 3350 17 Gram(s) Oral every 12 hours  senna 2 Tablet(s) Oral at bedtime      Vital Signs Last 24 Hrs  T(C): 37.4 (17 Oct 2023 05:21), Max: 37.4 (17 Oct 2023 05:21)  T(F): 99.4 (17 Oct 2023 05:21), Max: 99.4 (17 Oct 2023 05:21)  HR: 89 (17 Oct 2023 10:49) (80 - 101)  BP: 170/77 (17 Oct 2023 10:49) (147/78 - 199/96)  BP(mean): 111 (17 Oct 2023 10:49) (106 - 138)  RR: 18 (17 Oct 2023 10:49) (18 - 22)  SpO2: 96% (17 Oct 2023 10:49) (95% - 98%)    Parameters below as of 17 Oct 2023 10:49  Patient On (Oxygen Delivery Method): room air      NEUROLOGICAL EXAMINATION:  GENERAL:  Appearance is consistent with chronologic age. Able to count to >30 in one breath.    COGNITION/LANGUAGE:  Awake, alert, and oriented to person, place, time and date. Recent and remote memory intact.  Fund of knowledge is appropriate. No aphasia. Mildly dysarthric.    CRANIAL NERVES:   - Eyes:  Pupils equal round and reactive, no RAPD. EOMI w/o nystagmus, skew or reported double vision. Normal visual field.  - severe R and moderate L facial weakness, upper and lower - unable to close R eye, elevate R eyebrow, or seal her mouth. Marked R uvula deviation, weak b/l palate elevation    MOTOR EXAM:                              Right | Left    Shoulder abductors:    4|5   Shoulder adductors:    5|5   Elbow flexors:             5|5   Elbow extensors:         5|5   Palmar flexion:            5|5   Palmar dorsiflexion:     5|5   Hip flexors:                4-|4-   Hip extensors:            5|5   Knee extensors:          5|5   Knee flexors:             4-|4-   Foot dorsiflexors:        4|4   Foot plantarflexors:     5|5      DTRs:             Right | Left   Biceps:                 1+|1+     Triceps:                1+|1+   Brachioradialis:     1+|1+     Patellar:                0 | 0   Achilles:                0 | 0     Plantar responses mute b/l.  Head flexion is 4+/5. Extension is 5/5.       No observable drift. Normal tone and bulk. No tenderness, twitching, tremors or involuntary movements.  SENSORY EXAM:  diminished vibration in toes and ankles b/l   REFLEXES: .  Plantar response mute b/l.  Jaw jerk, Mad[place], clonus absent.  CEREBELLUM:  Finger to nose intact.  No dysmetria.      Labs:  CBC Full  -  ( 17 Oct 2023 05:30 )  WBC Count : 3.14 K/uL  RBC Count : 5.01 M/uL  Hemoglobin : 12.8 g/dL  Hematocrit : 39.1 %  Platelet Count - Automated : 158 K/uL  Mean Cell Volume : 78.0 fl  Mean Cell Hemoglobin : 25.5 pg  Mean Cell Hemoglobin Concentration : 32.7 gm/dL  Auto Neutrophil # : 1.91 K/uL  Auto Lymphocyte # : 0.72 K/uL  Auto Monocyte # : 0.37 K/uL  Auto Eosinophil # : 0.11 K/uL  Auto Basophil # : 0.02 K/uL  Auto Neutrophil % : 60.9 %  Auto Lymphocyte % : 22.9 %  Auto Monocyte % : 11.8 %  Auto Eosinophil % : 3.5 %  Auto Basophil % : 0.6 %    10-17    133<L>  |  104  |  8   ----------------------------<  104<H>  3.8   |  21<L>  |  0.42<L>    Ca    8.0<L>      17 Oct 2023 05:30  Phos  2.7     10-17  Mg     2.0     10-17    TPro  8.0  /  Alb  3.3  /  TBili  0.4  /  DBili  x   /  AST  45<H>  /  ALT  41  /  AlkPhos  60  10-17    LIVER FUNCTIONS - ( 17 Oct 2023 05:30 )  Alb: 3.3 g/dL / Pro: 8.0 g/dL / ALK PHOS: 60 U/L / ALT: 41 U/L / AST: 45 U/L / GGT: x             Urinalysis Basic - ( 17 Oct 2023 05:30 )    Color: x / Appearance: x / SG: x / pH: x  Gluc: 104 mg/dL / Ketone: x  / Bili: x / Urobili: x   Blood: x / Protein: x / Nitrite: x   Leuk Esterase: x / RBC: x / WBC x   Sq Epi: x / Non Sq Epi: x / Bacteria: x        Assessment:  This is a 63y Female w/ h/o     Plan:

## 2023-10-17 NOTE — OCCUPATIONAL THERAPY INITIAL EVALUATION ADULT - ADDITIONAL COMMENTS
Pt lives w/ her mother in private house w/ ~10 stairs to negotiate. Pt states that she was independent prior to admission and endorsed using RW 2/2 worsening LE weakness.

## 2023-10-17 NOTE — OCCUPATIONAL THERAPY INITIAL EVALUATION ADULT - GROSSLY INTACT, SENSORY
Pt reports feelings of numbness to b/l UE, LE digits - *intact to L/t w/ assessment/Left UE/Right UE/Left LE/Right LE/Grossly Intact

## 2023-10-17 NOTE — PROGRESS NOTE ADULT - ASSESSMENT
63 year old female with PMH of OA, GERD, sarcoidosis, migraines presenting with worsening back pain associated bilateral upper and lower extremities numbness and tingling in distal parts of all her extremities for the last week. Her neurological exam is worsened from yesterday, with more pronounced facial bulbar symptoms, progressive LE weakness, and now absent LE reflexes. Of note, had flu shot 2 weeks prior to presentation. MRI C and T spine w and w/o contrast was performed which didn't reveal acute pathology, only showed broad C6-7 disc bulging w/o any spinal compression, no contrast enchainment. Her hx of probable sarcoidosis, enlargement of mediastinal LN, unintentional weight loss needs oncological w/up. MRI L-spine showed extensive leptomeningeal enhancement along the periphery of the   conus medullaris with enhancement of the cauda equina nerve roots. compatible with active neurosarcoidosis, or Guillain-[place] syndrome and other inflammatory, infectious, or neoplastic causes.  CPK, ESR, CRP was unremarkable. A1C prediabetic. EMG findings consistent with demyelinating disease, CSF with elevated protein supporting likely Guillain [place].    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 Now s/p 4 days of IVIG 2g/kg, with continued evidence of disease progression including facial/bulbar symptoms and neck flexion.     Recommendations:   - Would consider opthalmology consult regarding blurry vision  - Elevated BP noted. Dysautonomia is an expected complication, would treat as needed.   - Continue NIF and vital capacity Q4hrs .  Pt remains at high risk for respiratory involvement/deteoriation given rapid progression of facial / bulbar weakness  - Completed IVIG 500mg/kg x4 days  - c/w Gabapentin 300mg TID      Case discussed with Dr. Gonsalves. Thank you for the courtesy of this consult. Will continue to follow.       63 year old female with PMH of OA, GERD, sarcoidosis, migraines presenting with worsening back pain associated bilateral upper and lower extremities numbness and tingling in distal parts of all her extremities for the last week. Her neurological exam is worsened from yesterday, with more pronounced facial bulbar symptoms, progressive LE weakness, and now absent LE reflexes. Of note, had flu shot 2 weeks prior to presentation. MRI C and T spine w and w/o contrast was performed which didn't reveal acute pathology, only showed broad C6-7 disc bulging w/o any spinal compression, no contrast enchainment. Her hx of probable sarcoidosis, enlargement of mediastinal LN, unintentional weight loss needs oncological w/up. MRI L-spine showed extensive leptomeningeal enhancement along the periphery of the   conus medullaris with enhancement of the cauda equina nerve roots. compatible with active neurosarcoidosis, or Guillain-[place] syndrome and other inflammatory, infectious, or neoplastic causes.  CPK, ESR, CRP was unremarkable. A1C prediabetic. 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 Now s/p 4 days of IVIG 2g/kg, with continued evidence of disease progression including facial/bulbar symptoms and neck flexion.     Recommendations:   - Would recommend opthalmology consult regarding blurry vision  - Elevated BP noted. Dysautonomia is an expected complication, would treat as needed.   - Continue NIF and vital capacity Q4hrs .  Pt remains at high risk for respiratory involvement/deteoriation given rapid progression of facial / bulbar weakness  - Completed IVIG 500mg/kg x4 days  - c/w Gabapentin 300mg TID      Case discussed with Dr. Gonsalves. Thank you for the courtesy of this consult. Will continue to follow.       63 year old female with PMH of OA, GERD, sarcoidosis, migraines presenting with worsening back pain associated bilateral upper and lower extremities numbness and tingling in distal parts of all her extremities for the last week. Her neurological exam is worsened from yesterday, with more pronounced facial bulbar symptoms, progressive LE weakness, and now absent LE reflexes. Of note, had flu shot 2 weeks prior to presentation. MRI C and T spine w and w/o contrast was performed which didn't reveal acute pathology, only showed broad C6-7 disc bulging w/o any spinal compression, no contrast enchainment. Her hx of probable sarcoidosis, enlargement of mediastinal LN, unintentional weight loss needs oncological w/up. MRI L-spine showed extensive leptomeningeal enhancement along the periphery of the   conus medullaris with enhancement of the cauda equina nerve roots. compatible with active neurosarcoidosis, or Guillain-[place] syndrome and other inflammatory, infectious, or neoplastic causes.  CPK, ESR, CRP was unremarkable. A1C prediabetic. 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 Now s/p 4 days of IVIG 2g/kg, with continued evidence of disease progression including facial/bulbar symptoms and neck flexion.     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 Dysautonomia is an expected complication, would treat as needed.   - Continue NIF and vital capacity Q4hrs .  Pt remains at high risk for respiratory involvement/deteoriation given rapid progression of facial / bulbar weakness  - Completed IVIG 500mg/kg x4 days  - c/w Gabapentin 300mg TID      Case discussed with Dr. Gonsalves. Recommendations communicated to primary team Thank you for the courtesy of this consult. Will continue to follow.

## 2023-10-17 NOTE — PROGRESS NOTE ADULT - PROBLEM SELECTOR PLAN 3
BP gradually rising to 200s. Patient was started on labetalol 100 P.O BID. On 10/14-10/15 overnight BP in 220s s/p labetalol 5mg IV, Metoprolol 5mg IV in am. BP remained elevated to 190s, Patient received Amlodipine 10mg and BP dropped to 96/92.     Plan:   -Hold off on anti-Hypertensive given low BP  -Monitor BP and restart anti-HTN as needed BP gradually rising to 200s. Patient was started on labetalol 100 P.O BID. On 10/14-10/15 overnight BP in 220s s/p labetalol 5mg IV, Metoprolol 5mg IV in am. BP remained elevated to 190s, Patient received Amlodipine 10mg and BP dropped to 96/92.   10/17 BP 180s/100s  Plan:   -Monitor BP and restart anti-HTN as needed

## 2023-10-17 NOTE — PROGRESS NOTE ADULT - SUBJECTIVE AND OBJECTIVE BOX
INTERVAL HPI/OVERNIGHT EVENTS:  o/n: Patient reports progression to blurred vision, no double vision or respiratory complaints at this time. Neuro aware. 9:00 pm FVC 1940->2100, IF -22 -> -26. Patient reports she would like to be placed on an oral long acting opiate for pain control to try to reduce the number of IV pushes that she needs.     \Patient was seen and examined at bedside. As per nurse and patient, no o/n events, patient resting comfortably. No complaints at this time. Patient denies: fever, chills, lightheadedness, weakness, CP, palpitations, SOB, cough, N/V. ROS otherwise negative.    VITAL SIGNS:  T(F): 98.3 (10-17-23 @ 14:25)  HR: 100 (10-17-23 @ 16:11)  BP: 181/107 (10-17-23 @ 16:11)  RR: 17 (10-17-23 @ 16:11)  SpO2: 93% (10-17-23 @ 16:11)  Wt(kg): --      10-16-23 @ 07:01  -  10-17-23 @ 07:00  --------------------------------------------------------  IN: 3363 mL / OUT: 1300 mL / NET: 2063 mL    10-17-23 @ 07:01  -  10-17-23 @ 16:45  --------------------------------------------------------  IN: 736 mL / OUT: 400 mL / NET: 336 mL        PHYSICAL EXAM:    Constitutional: resting comfortably in bed; NAD  HEENT: NC/AT, PER, anicteric sclera, no nasal discharge; MMM  Neck: supple; no JVD or thyromegaly  Respiratory: CTA B/L; no W/R/R, no retractions  Cardiac: +S1/S2; RRR; no M/R/G  Gastrointestinal: soft, NT/ND; no rebound or guarding  Back: spine midline, no bony tenderness or step-offs; no CVAT B/L  Extremities: WWP, no clubbing or cyanosis; no peripheral edema  Musculoskeletal: NROM x4; no joint swelling, tenderness or erythema  Vascular: 2+ radial, DP/PT pulses B/L  Dermatologic: skin warm, dry and intact; no rashes, wounds, or scars  NEUROLOGICAL EXAMINATION:  GENERAL:  Appearance is consistent with chronologic age. Able to count to >30 in one breath.    COGNITION/LANGUAGE:  Awake, alert, and oriented to person, place, time and date. Recent and remote memory intact.  Fund of knowledge is appropriate. No aphasia. Mildly dysarthric.    CRANIAL NERVES:   - Eyes:  Pupils equal round and reactive, no RAPD. EOMI w/o nystagmus, skew or reported double vision. Normal visual field.  - severe R and moderate L facial weakness, upper and lower - unable to close R eye, elevate R eyebrow, or seal her mouth. Marked R uvula deviation, weak b/l palate elevation      MEDICATIONS  (STANDING):  acetaminophen     Tablet .. 1000 milliGRAM(s) Oral every 8 hours  artificial tears (preservative free) Ophthalmic Solution 1 Drop(s) Both EYES every 6 hours  enoxaparin Injectable 40 milliGRAM(s) SubCutaneous every 24 hours  gabapentin 300 milliGRAM(s) Oral every 8 hours  lactulose Syrup 10 Gram(s) Oral daily  latanoprost 0.005% Ophthalmic Solution 1 Drop(s) Both EYES at bedtime  lidocaine   4% Patch 1 Patch Transdermal every 24 hours  pantoprazole    Tablet 40 milliGRAM(s) Oral every 24 hours  polyethylene glycol 3350 17 Gram(s) Oral every 12 hours  senna 2 Tablet(s) Oral at bedtime    MEDICATIONS  (PRN):  aluminum hydroxide/magnesium hydroxide/simethicone Suspension 30 milliLiter(s) Oral every 4 hours PRN Dyspepsia  diphenhydrAMINE 25 milliGRAM(s) Oral daily PRN Allergy symptoms  LORazepam     Tablet 0.5 milliGRAM(s) Oral every 8 hours PRN Anxiety  melatonin 3 milliGRAM(s) Oral at bedtime PRN Insomnia  morphine  - Injectable 2 milliGRAM(s) IV Push every 4 hours PRN Moderate Pain (4 - 6)  morphine  - Injectable 4 milliGRAM(s) IV Push every 4 hours PRN Severe Pain (7 - 10)  ondansetron Injectable 4 milliGRAM(s) IV Push every 8 hours PRN Nausea and/or Vomiting      Allergies    No Known Allergies    Intolerances        LABS:                        12.8   3.14  )-----------( 158      ( 17 Oct 2023 05:30 )             39.1     10-17    133<L>  |  104  |  8   ----------------------------<  104<H>  3.8   |  21<L>  |  0.42<L>    Ca    8.0<L>      17 Oct 2023 05:30  Phos  2.7     10-17  Mg     2.0     10-17    TPro  8.0  /  Alb  3.3  /  TBili  0.4  /  DBili  x   /  AST  45<H>  /  ALT  41  /  AlkPhos  60  10-17      Urinalysis Basic - ( 17 Oct 2023 05:30 )    Color: x / Appearance: x / SG: x / pH: x  Gluc: 104 mg/dL / Ketone: x  / Bili: x / Urobili: x   Blood: x / Protein: x / Nitrite: x   Leuk Esterase: x / RBC: x / WBC x   Sq Epi: x / Non Sq Epi: x / Bacteria: x        RADIOLOGY & ADDITIONAL TESTS:  Reviewed

## 2023-10-17 NOTE — OCCUPATIONAL THERAPY INITIAL EVALUATION ADULT - SENSORY TESTS
CN Testing: b/l frontalis intact; b/l buccinator intact; smile symmetrical; tongue protrusion at midline; b/l eyes open/close impaired - +difficulty closing b/l eyes shut; b/l shoulder elevation intact

## 2023-10-17 NOTE — PROGRESS NOTE ADULT - PROBLEM SELECTOR PLAN 1
Progressive weakness in lower limbs over last 3 days with continued urinary incontinence. Last admission 10/08 with MRI and CT didn't reveal acute pathology, only showed broad C6-7 disc bulging w/o any spinal compression, no contrast enchainment. Imaging also showed mediastinal LN. Pt on methylprednisolone 4mg for 6 day (on day 3/6- 3 pills 10/12, 2 pills 10/13, 1 pill 10/14).  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  s/p EMG and LP, suspecting GBS  Mr head: No acute infarct or other acute abnormality. No abnormal enhancement  MRI of lumbar spine on 10/14:  compatible with active neurosarcoidosis, other etiologies include Guillain-[place] syndrome and other inflammatory,  infectious, or neoplastic causes.  10/14: Vital Capacity 1130, 1510  10/15: VC 1340, f/u q4h   10/17: 2250  Plan  - f/u neurology recs   - f/u Vital capacity q4h, should be greater than 20ml/kg  - neuro consulted, recommendation appreciated  - s/p IVIG 30g qd, infused over 6hr for 4 days  - PT consulted  - hold steroid course

## 2023-10-17 NOTE — PROGRESS NOTE ADULT - PROBLEM SELECTOR PLAN 2
Progressive weakness in lower limbs over last 3 days with continued urinary incontinence. Last admission 10/08 with MRI and CT didn't reveal acute pathology, only showed broad C6-7 disc bulging w/o any spinal compression, no contrast enchainment. Imaging also showed mediastinal LN. Pt on methylprednisolone 4mg for 6 day (on day 3/6- 3 pills 10/12, 2 pills 10/13, 1 pill 10/14).  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 s/p EMG and LP, suspecting GBS.     Plan  -s/p IVIG 30g qd, infuse over 6hr for 4 days  - f/u neuro rec  - PT consulted  - hold steroid course

## 2023-10-17 NOTE — PROGRESS NOTE ADULT - SUBJECTIVE AND OBJECTIVE BOX
Physical Medicine and Rehabilitation Progress Note :       Patient is a 63y old  Female who presents with a chief complaint of lower limb weakness (17 Oct 2023 11:30)      HPI:  63 year old female with PMH of OA, GERD, and sarcoidosis (diagnosed in 2008) presenting with worsening back pain and progressive lower limb weakness for the last three days. Pt was recently admitted on 10/8/23 for lower and upper extremity numbness and tingling. At the time, MRI and CT lumbar and thoracic which didn't reveal acute pathology, only showed broad C6-7 disc bulging w/o any spinal compression, no contrast enchainment. Imaging also showed mediastinal LN. Today, pt reports 9/10 tearing in quality lower back pain, increased weakness as she was unable to get off the toilet on her own. She also had to use a walker as she felt like she would fall otherwise. Pt also reports urinary incontinence during the day, reports no episodes of incontinence overnight. Pt endorses occipital headache and nausea. Last BM on sunday. Pt denies chest pain, sob, abd pain.  (11 Oct 2023 12:40)                            12.8   3.14  )-----------( 158      ( 17 Oct 2023 05:30 )             39.1       10-17    133<L>  |  104  |  8   ----------------------------<  104<H>  3.8   |  21<L>  |  0.42<L>    Ca    8.0<L>      17 Oct 2023 05:30  Phos  2.7     10-17  Mg     2.0     10-17    TPro  8.0  /  Alb  3.3  /  TBili  0.4  /  DBili  x   /  AST  45<H>  /  ALT  41  /  AlkPhos  60  10-17    Vital Signs Last 24 Hrs  T(C): 37.4 (17 Oct 2023 05:21), Max: 37.4 (17 Oct 2023 05:21)  T(F): 99.4 (17 Oct 2023 05:21), Max: 99.4 (17 Oct 2023 05:21)  HR: 89 (17 Oct 2023 10:49) (80 - 101)  BP: 170/77 (17 Oct 2023 10:49) (147/78 - 199/96)  BP(mean): 111 (17 Oct 2023 10:49) (106 - 138)  RR: 18 (17 Oct 2023 10:49) (18 - 22)  SpO2: 96% (17 Oct 2023 10:49) (95% - 98%)    Parameters below as of 17 Oct 2023 10:49  Patient On (Oxygen Delivery Method): room air        MEDICATIONS  (STANDING):  acetaminophen     Tablet .. 1000 milliGRAM(s) Oral every 8 hours  artificial tears (preservative free) Ophthalmic Solution 1 Drop(s) Both EYES every 6 hours  enoxaparin Injectable 40 milliGRAM(s) SubCutaneous every 24 hours  gabapentin 300 milliGRAM(s) Oral every 8 hours  lactulose Syrup 10 Gram(s) Oral daily  latanoprost 0.005% Ophthalmic Solution 1 Drop(s) Both EYES at bedtime  lidocaine   4% Patch 1 Patch Transdermal every 24 hours  pantoprazole    Tablet 40 milliGRAM(s) Oral every 24 hours  polyethylene glycol 3350 17 Gram(s) Oral every 12 hours  senna 2 Tablet(s) Oral at bedtime    MEDICATIONS  (PRN):  aluminum hydroxide/magnesium hydroxide/simethicone Suspension 30 milliLiter(s) Oral every 4 hours PRN Dyspepsia  diphenhydrAMINE 25 milliGRAM(s) Oral daily PRN Allergy symptoms  LORazepam     Tablet 0.5 milliGRAM(s) Oral every 8 hours PRN Anxiety  melatonin 3 milliGRAM(s) Oral at bedtime PRN Insomnia  morphine  - Injectable 2 milliGRAM(s) IV Push every 4 hours PRN Moderate Pain (4 - 6)  morphine  - Injectable 4 milliGRAM(s) IV Push every 4 hours PRN Severe Pain (7 - 10)  ondansetron Injectable 4 milliGRAM(s) IV Push every 8 hours PRN Nausea and/or Vomiting        Functional Status Assessment :         Previous Level of Function:     · Bed Mobility/Transfers	independent  · Bathing	independent  · Upper Body Dressing	independent  · Lower Body Dressing	independent  · Grooming	independent  · Toileting	independent  · Eating	independent  · Home Management Skills	independent  · Additional Comments	Pt lives w/ her mother in private house w/ ~10 stairs to negotiate. Pt states that she was independent prior to admission and endorsed using RW 2/2 worsening LE weakness.      Cognitive Status Examination:   · Level of Consciousness	alert  · Orientation	oriented to person, place, time and situation  · Follow Commands/Answers Questions	100% of the time; Dysarthric  · Personal Safety and Judgment	impaired; Requires max cues to increase safety awareness w/ functional activities 2/2 decreased insight/judgement/significant weakness at this time  · Short Term Memory	intact  · Long Term Memory	intact    Range of Motion Exam:   · Range of Motion Examination, Upper Extremity	bilateral UE Passive ROM was WFL  (within functional limits); bilateral UE Active ROM was WFL  (within functional limits)  · Range of Motion Examination, Lower Extremity	bilateral LE Passive ROM was WFL  (within functional limits); bilateral LE Active ROM was WFL  (within functional limits)    Manual Muscle Testing:   · Manual Muscle Testing Results	BUEs 4/5; B/L hips 3+/5; b/l knees, ankles 4/5    Muscle Tone Assessment:   · Muscle Tone Assessment	hypotonic; BLEs    Bed Mobility: Rolling/Turning:     · Level of [place]	minimum assist (75% patients effort)  · Physical Assist/Nonphysical Assist	2 person assist; verbal cues    Bed Mobility: Scooting/Bridging:     · Level of [place]	moderate assist (50% patients effort)  · Physical Assist/Nonphysical Assist	2 person assist; verbal cues    Bed Mobility: Sit to Supine:     · Level of [place]	minimum assist (75% patients effort)  · Physical Assist/Nonphysical Assist	2 person assist; verbal cues    Bed Mobility: Supine to Sit:     · Level of [place]	minimum assist (75% patients effort)  · Physical Assist/Nonphysical Assist	2 person assist; verbal cues    Bed Mobility Analysis:     · Bed Mobility Limitations	decreased ability to use legs for bridging/pushing  · Impairments Contributing to Impaired Bed Mobility	decreased strength; pain; decreased flexibility; impaired coordination    Transfer: Sit to Stand:     · Level of [place]	moderate assist (50% patients effort)  · Physical Assist/Nonphysical Assist	2 person assist; verbal cues  · Assistive Device	rolling walker    Transfer: Stand to Sit:     · Level of [place]	moderate assist (50% patients effort)  · Physical Assist/Nonphysical Assist	2 person assist; verbal cues  · Assistive Device	rolling walker    Sit/Stand Transfer Safety Analysis:     · Transfer Safety Concerns Noted	decreased sequencing ability; decreased safety awareness  · Impairments Contributing to Impaired Transfers	decreased flexibility; impaired coordination; impaired balance; impaired postural control; decreased ROM; decreased strength    Balance Skills Assessment:     · Sitting Balance: Static	fair balance  · Sitting Balance: Dynamic	fair minus  · Sit-to-Stand Balance	poor plus  · Standing Balance: Static	poor plus  · Standing Balance: Dynamic	poor balance  · Systems Impairment Contributing to Balance Disturbance	neuromuscular  · Identified Impairments Contributing to Balance Disturbance	decreased strength; impaired postural control; decreased ROM; impaired coordination    Sensory Examination:     Grossly Intact:   · Gross Sensory Examination	Grossly Intact; Left UE; Right UE; Left LE; Right LE; Pt reports feelings of numbness to b/l UE, LE digits - *intact to L/t w/ assessment    · Balance Skills	Pt took x4 steps at bedside w/ Max Ax2, RW, marched in place x10 (Max Ax2)    · Coordination Assessed	finger to nose; Grossly intact BUEs      Proprioception:   · Coordination Assessed	finger to nose; Grossly intact BUEs    · Sensory Tests	CN Testing: b/l frontalis intact; b/l buccinator intact; smile symmetrical; tongue protrusion at midline; b/l eyes open/close impaired - +difficulty closing b/l eyes shut; b/l shoulder elevation intact    Visual Assessment:   Visual Assessment:    Visual Assessment:   · Visual Tracking	normal  · Visual Neglect	none  · Visual Field Cuts	none  · Eye Muscle Balance	normal  · Visual Scanning	WFL  · Visual Convergence	normal    Fine Motor Coordination:     Grossly Intact:   · Grossly Intact	Left UE; Right UE      Fine Motor Coordination:   · Left Hand, Finger to Nose	mild impairment  · Right Hand, Finger to Nose	mild impairment  · Left Hand, Manipulation of Objects	mild impairment  · Right Hand, Manipulation of Objects	mild impairment    Upper Body Dressing Training:     · Level of [place]	maximum assist (25% patients effort)  · Physical Assist/Nonphysical Assist	1 person assist; verbal cues    Lower Body Dressing Training:     · Level of [place]	maximum assist (25% patients effort)  · Physical Assist/Nonphysical Assist	1 person assist; verbal cues    Toilet Hygiene Training:     · Level of [place]	dependent (less than 25% patients effort)    Grooming Training:     · Level of [place]	minimum assist (75% patients effort)  · Physical Assist/Nonphysical Assist	1 person assist; verbal cues        PM&R Impression : as above    Current disposition plan recommendation :    acute rehab placement

## 2023-10-17 NOTE — PROGRESS NOTE ADULT - PROBLEM SELECTOR PLAN 10
N: regular w/ Ensure  E: Mg <2, Phosp <3, K <4  DVT ppx: Lovenox  GI: Protonix 40mg PO qd  C: Full Code  D: [facility]

## 2023-10-18 LAB
ALBUMIN SERPL ELPH-MCNC: 3.4 G/DL — SIGNIFICANT CHANGE UP (ref 3.3–5)
ALBUMIN SERPL ELPH-MCNC: 3.4 G/DL — SIGNIFICANT CHANGE UP (ref 3.3–5)
ALP SERPL-CCNC: 64 U/L — SIGNIFICANT CHANGE UP (ref 40–120)
ALP SERPL-CCNC: 64 U/L — SIGNIFICANT CHANGE UP (ref 40–120)
ALT FLD-CCNC: 71 U/L — HIGH (ref 10–45)
ALT FLD-CCNC: 71 U/L — HIGH (ref 10–45)
ANION GAP SERPL CALC-SCNC: 10 MMOL/L — SIGNIFICANT CHANGE UP (ref 5–17)
ANION GAP SERPL CALC-SCNC: 10 MMOL/L — SIGNIFICANT CHANGE UP (ref 5–17)
AST SERPL-CCNC: 63 U/L — HIGH (ref 10–40)
AST SERPL-CCNC: 63 U/L — HIGH (ref 10–40)
BASOPHILS # BLD AUTO: 0.03 K/UL — SIGNIFICANT CHANGE UP (ref 0–0.2)
BASOPHILS # BLD AUTO: 0.03 K/UL — SIGNIFICANT CHANGE UP (ref 0–0.2)
BASOPHILS NFR BLD AUTO: 0.9 % — SIGNIFICANT CHANGE UP (ref 0–2)
BASOPHILS NFR BLD AUTO: 0.9 % — SIGNIFICANT CHANGE UP (ref 0–2)
BILIRUB SERPL-MCNC: 0.3 MG/DL — SIGNIFICANT CHANGE UP (ref 0.2–1.2)
BILIRUB SERPL-MCNC: 0.3 MG/DL — SIGNIFICANT CHANGE UP (ref 0.2–1.2)
BUN SERPL-MCNC: 11 MG/DL — SIGNIFICANT CHANGE UP (ref 7–23)
BUN SERPL-MCNC: 11 MG/DL — SIGNIFICANT CHANGE UP (ref 7–23)
CALCIUM SERPL-MCNC: 9.1 MG/DL — SIGNIFICANT CHANGE UP (ref 8.4–10.5)
CALCIUM SERPL-MCNC: 9.1 MG/DL — SIGNIFICANT CHANGE UP (ref 8.4–10.5)
CHLORIDE SERPL-SCNC: 100 MMOL/L — SIGNIFICANT CHANGE UP (ref 96–108)
CHLORIDE SERPL-SCNC: 100 MMOL/L — SIGNIFICANT CHANGE UP (ref 96–108)
CO2 SERPL-SCNC: 24 MMOL/L — SIGNIFICANT CHANGE UP (ref 22–31)
CO2 SERPL-SCNC: 24 MMOL/L — SIGNIFICANT CHANGE UP (ref 22–31)
CREAT SERPL-MCNC: 0.47 MG/DL — LOW (ref 0.5–1.3)
CREAT SERPL-MCNC: 0.47 MG/DL — LOW (ref 0.5–1.3)
EGFR: 108 ML/MIN/1.73M2 — SIGNIFICANT CHANGE UP
EGFR: 108 ML/MIN/1.73M2 — SIGNIFICANT CHANGE UP
EOSINOPHIL # BLD AUTO: 0.13 K/UL — SIGNIFICANT CHANGE UP (ref 0–0.5)
EOSINOPHIL # BLD AUTO: 0.13 K/UL — SIGNIFICANT CHANGE UP (ref 0–0.5)
EOSINOPHIL NFR BLD AUTO: 3.9 % — SIGNIFICANT CHANGE UP (ref 0–6)
EOSINOPHIL NFR BLD AUTO: 3.9 % — SIGNIFICANT CHANGE UP (ref 0–6)
GLUCOSE SERPL-MCNC: 106 MG/DL — HIGH (ref 70–99)
GLUCOSE SERPL-MCNC: 106 MG/DL — HIGH (ref 70–99)
HCT VFR BLD CALC: 41.3 % — SIGNIFICANT CHANGE UP (ref 34.5–45)
HCT VFR BLD CALC: 41.3 % — SIGNIFICANT CHANGE UP (ref 34.5–45)
HGB BLD-MCNC: 13.6 G/DL — SIGNIFICANT CHANGE UP (ref 11.5–15.5)
HGB BLD-MCNC: 13.6 G/DL — SIGNIFICANT CHANGE UP (ref 11.5–15.5)
IMM GRANULOCYTES NFR BLD AUTO: 0.3 % — SIGNIFICANT CHANGE UP (ref 0–0.9)
IMM GRANULOCYTES NFR BLD AUTO: 0.3 % — SIGNIFICANT CHANGE UP (ref 0–0.9)
LYMPHOCYTES # BLD AUTO: 0.86 K/UL — LOW (ref 1–3.3)
LYMPHOCYTES # BLD AUTO: 0.86 K/UL — LOW (ref 1–3.3)
LYMPHOCYTES # BLD AUTO: 25.7 % — SIGNIFICANT CHANGE UP (ref 13–44)
LYMPHOCYTES # BLD AUTO: 25.7 % — SIGNIFICANT CHANGE UP (ref 13–44)
MAGNESIUM SERPL-MCNC: 2 MG/DL — SIGNIFICANT CHANGE UP (ref 1.6–2.6)
MAGNESIUM SERPL-MCNC: 2 MG/DL — SIGNIFICANT CHANGE UP (ref 1.6–2.6)
MCHC RBC-ENTMCNC: 25.6 PG — LOW (ref 27–34)
MCHC RBC-ENTMCNC: 25.6 PG — LOW (ref 27–34)
MCHC RBC-ENTMCNC: 32.9 GM/DL — SIGNIFICANT CHANGE UP (ref 32–36)
MCHC RBC-ENTMCNC: 32.9 GM/DL — SIGNIFICANT CHANGE UP (ref 32–36)
MCV RBC AUTO: 77.6 FL — LOW (ref 80–100)
MCV RBC AUTO: 77.6 FL — LOW (ref 80–100)
MONOCYTES # BLD AUTO: 0.34 K/UL — SIGNIFICANT CHANGE UP (ref 0–0.9)
MONOCYTES # BLD AUTO: 0.34 K/UL — SIGNIFICANT CHANGE UP (ref 0–0.9)
MONOCYTES NFR BLD AUTO: 10.1 % — SIGNIFICANT CHANGE UP (ref 2–14)
MONOCYTES NFR BLD AUTO: 10.1 % — SIGNIFICANT CHANGE UP (ref 2–14)
NEUTROPHILS # BLD AUTO: 1.98 K/UL — SIGNIFICANT CHANGE UP (ref 1.8–7.4)
NEUTROPHILS # BLD AUTO: 1.98 K/UL — SIGNIFICANT CHANGE UP (ref 1.8–7.4)
NEUTROPHILS NFR BLD AUTO: 59.1 % — SIGNIFICANT CHANGE UP (ref 43–77)
NEUTROPHILS NFR BLD AUTO: 59.1 % — SIGNIFICANT CHANGE UP (ref 43–77)
NRBC # BLD: 0 /100 WBCS — SIGNIFICANT CHANGE UP (ref 0–0)
NRBC # BLD: 0 /100 WBCS — SIGNIFICANT CHANGE UP (ref 0–0)
PHOSPHATE SERPL-MCNC: 5.5 MG/DL — HIGH (ref 2.5–4.5)
PHOSPHATE SERPL-MCNC: 5.5 MG/DL — HIGH (ref 2.5–4.5)
PLATELET # BLD AUTO: 199 K/UL — SIGNIFICANT CHANGE UP (ref 150–400)
PLATELET # BLD AUTO: 199 K/UL — SIGNIFICANT CHANGE UP (ref 150–400)
POTASSIUM SERPL-MCNC: 3.9 MMOL/L — SIGNIFICANT CHANGE UP (ref 3.5–5.3)
POTASSIUM SERPL-MCNC: 3.9 MMOL/L — SIGNIFICANT CHANGE UP (ref 3.5–5.3)
POTASSIUM SERPL-SCNC: 3.9 MMOL/L — SIGNIFICANT CHANGE UP (ref 3.5–5.3)
POTASSIUM SERPL-SCNC: 3.9 MMOL/L — SIGNIFICANT CHANGE UP (ref 3.5–5.3)
PROT SERPL-MCNC: 8.6 G/DL — HIGH (ref 6–8.3)
PROT SERPL-MCNC: 8.6 G/DL — HIGH (ref 6–8.3)
RBC # BLD: 5.32 M/UL — HIGH (ref 3.8–5.2)
RBC # BLD: 5.32 M/UL — HIGH (ref 3.8–5.2)
RBC # FLD: 14.6 % — HIGH (ref 10.3–14.5)
RBC # FLD: 14.6 % — HIGH (ref 10.3–14.5)
SODIUM SERPL-SCNC: 134 MMOL/L — LOW (ref 135–145)
SODIUM SERPL-SCNC: 134 MMOL/L — LOW (ref 135–145)
WBC # BLD: 3.35 K/UL — LOW (ref 3.8–10.5)
WBC # BLD: 3.35 K/UL — LOW (ref 3.8–10.5)
WBC # FLD AUTO: 3.35 K/UL — LOW (ref 3.8–10.5)
WBC # FLD AUTO: 3.35 K/UL — LOW (ref 3.8–10.5)

## 2023-10-18 PROCEDURE — 99233 SBSQ HOSP IP/OBS HIGH 50: CPT

## 2023-10-18 PROCEDURE — 99232 SBSQ HOSP IP/OBS MODERATE 35: CPT | Mod: GC

## 2023-10-18 RX ORDER — KETOROLAC TROMETHAMINE 30 MG/ML
15 SYRINGE (ML) INJECTION EVERY 12 HOURS
Refills: 0 | Status: DISCONTINUED | OUTPATIENT
Start: 2023-10-18 | End: 2023-10-22

## 2023-10-18 RX ORDER — GABAPENTIN 400 MG/1
600 CAPSULE ORAL EVERY 24 HOURS
Refills: 0 | Status: DISCONTINUED | OUTPATIENT
Start: 2023-10-18 | End: 2023-10-25

## 2023-10-18 RX ORDER — IBUPROFEN 200 MG
600 TABLET ORAL EVERY 12 HOURS
Refills: 0 | Status: DISCONTINUED | OUTPATIENT
Start: 2023-10-18 | End: 2023-10-22

## 2023-10-18 RX ORDER — BIMATOPROST 0.3 MG/ML
1 SOLUTION/ DROPS OPHTHALMIC AT BEDTIME
Refills: 0 | Status: DISCONTINUED | OUTPATIENT
Start: 2023-10-18 | End: 2023-10-25

## 2023-10-18 RX ORDER — GABAPENTIN 400 MG/1
600 CAPSULE ORAL THREE TIMES A DAY
Refills: 0 | Status: DISCONTINUED | OUTPATIENT
Start: 2023-10-18 | End: 2023-10-18

## 2023-10-18 RX ORDER — LATANOPROST 0.05 MG/ML
1 SOLUTION/ DROPS OPHTHALMIC; TOPICAL AT BEDTIME
Refills: 0 | Status: DISCONTINUED | OUTPATIENT
Start: 2023-10-18 | End: 2023-10-18

## 2023-10-18 RX ORDER — AMLODIPINE BESYLATE 2.5 MG/1
2.5 TABLET ORAL EVERY 24 HOURS
Refills: 0 | Status: DISCONTINUED | OUTPATIENT
Start: 2023-10-19 | End: 2023-10-19

## 2023-10-18 RX ORDER — GABAPENTIN 400 MG/1
300 CAPSULE ORAL
Refills: 0 | Status: DISCONTINUED | OUTPATIENT
Start: 2023-10-19 | End: 2023-10-25

## 2023-10-18 RX ORDER — ACETAMINOPHEN 500 MG
650 TABLET ORAL EVERY 12 HOURS
Refills: 0 | Status: DISCONTINUED | OUTPATIENT
Start: 2023-10-18 | End: 2023-10-19

## 2023-10-18 RX ADMIN — Medication 1 DROP(S): at 23:34

## 2023-10-18 RX ADMIN — LIDOCAINE 1 PATCH: 4 CREAM TOPICAL at 00:27

## 2023-10-18 RX ADMIN — SENNA PLUS 2 TABLET(S): 8.6 TABLET ORAL at 21:28

## 2023-10-18 RX ADMIN — Medication 1 DROP(S): at 17:25

## 2023-10-18 RX ADMIN — Medication 1 DROP(S): at 12:46

## 2023-10-18 RX ADMIN — GABAPENTIN 300 MILLIGRAM(S): 400 CAPSULE ORAL at 06:28

## 2023-10-18 RX ADMIN — GABAPENTIN 600 MILLIGRAM(S): 400 CAPSULE ORAL at 21:28

## 2023-10-18 RX ADMIN — Medication 3 MILLIGRAM(S): at 21:28

## 2023-10-18 RX ADMIN — ENOXAPARIN SODIUM 40 MILLIGRAM(S): 100 INJECTION SUBCUTANEOUS at 17:25

## 2023-10-18 RX ADMIN — Medication 1000 MILLIGRAM(S): at 07:43

## 2023-10-18 RX ADMIN — Medication 1 DROP(S): at 06:28

## 2023-10-18 RX ADMIN — GABAPENTIN 300 MILLIGRAM(S): 400 CAPSULE ORAL at 14:13

## 2023-10-18 RX ADMIN — LACTULOSE 10 GRAM(S): 10 SOLUTION ORAL at 12:46

## 2023-10-18 RX ADMIN — PANTOPRAZOLE SODIUM 40 MILLIGRAM(S): 20 TABLET, DELAYED RELEASE ORAL at 06:28

## 2023-10-18 RX ADMIN — Medication 1000 MILLIGRAM(S): at 06:28

## 2023-10-18 RX ADMIN — BIMATOPROST 1 DROP(S): 0.3 SOLUTION/ DROPS OPHTHALMIC at 23:34

## 2023-10-18 RX ADMIN — LIDOCAINE 1 PATCH: 4 CREAM TOPICAL at 06:24

## 2023-10-18 RX ADMIN — Medication 1 DROP(S): at 00:27

## 2023-10-18 RX ADMIN — Medication 1 APPLICATION(S): at 17:24

## 2023-10-18 RX ADMIN — LIDOCAINE 1 PATCH: 4 CREAM TOPICAL at 12:54

## 2023-10-18 RX ADMIN — POLYETHYLENE GLYCOL 3350 17 GRAM(S): 17 POWDER, FOR SOLUTION ORAL at 12:46

## 2023-10-18 NOTE — PROGRESS NOTE ADULT - PROBLEM SELECTOR PLAN 1
Progressive weakness in lower limbs over 3 days with continued urinary incontinence. Last admission 10/08 with MRI and CT didn't reveal acute pathology, only showed broad C6-7 disc bulging w/o any spinal compression, no contrast enchainment. Imaging also showed mediastinal LN. Pt on methylprednisolone 4mg for 6 day (on day 3/6- 3 pills 10/12, 2 pills 10/13, 1 pill 10/14).  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  s/p EMG and LP, suspecting GBS  Mr head: No acute infarct or other acute abnormality. No abnormal enhancement  MRI of lumbar spine on 10/14:  compatible with active neurosarcoidosis, other etiologies include Guillain-[place] syndrome and other inflammatory,  infectious, or neoplastic causes.  10/14: Vital Capacity 1130, 1510  10/15: VC 1340, f/u q4h   10/17: 2250  Plan  - f/u neurology recs   - f/u Vital capacity q4h, should be greater than 20ml/kg  - neuro consulted, recommendation appreciated  - s/p IVIG 30g qd, infused over 6hr for 4 days  - PT consulted  - hold steroid course. Progressive weakness in lower limbs over 3 days with continued urinary incontinence. Last admission 10/08 with MRI and CT didn't reveal acute pathology, only showed broad C6-7 disc bulging w/o any spinal compression, no contrast enchainment. Imaging also showed mediastinal LN. Pt on methylprednisolone 4mg for 6 day (on day 3/6- 3 pills 10/12, 2 pills 10/13, 1 pill 10/14).  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  s/p EMG and LP, suspecting GBS  Mr head: No acute infarct or other acute abnormality. No abnormal enhancement  MRI of lumbar spine on 10/14:  compatible with active neurosarcoidosis, other etiologies include Guillain-[place] syndrome and other inflammatory,  infectious, or neoplastic causes.  10/14: Vital Capacity 1130, 1510  10/15: VC 1340, f/u q4h   10/17: VC 2250  10/18: VC 6501-0892, NIF -30  Plan  - f/u neurology recs   - f/u Vital capacity q4h, should be greater than 20ml/kg  - neuro consulted, recommendation appreciated  - s/p IVIG 30g qd, infused over 6hr for 4 days  - PT consulted  - hold steroid course.

## 2023-10-18 NOTE — PROGRESS NOTE ADULT - PROBLEM SELECTOR PLAN 5
10/8: CT and MRI showed Extensive mediastinal, hilar, and paratracheal lymphadenopathy is present  in the chest. Enlarged thoracic lymphadenopathy was noted on the 06/08/2012 chest CT study which was attributed to the patient's known history of sarcoidosis at that time. Concern for rheumatologic vs malignant work up,  ESR, CRP, B12, RF w/n/l, Hep A/B/C negative, A1C 5.9. CHRIS elevated 1:160, SSA wnl, TSH 1.75 w/n/l. Immunoglobulin panel wnl. Immunnofixation no monoclonal band   MRI as above     Plan  -f/u PET scan  - consider paraneoplastic w/up.

## 2023-10-18 NOTE — PROGRESS NOTE ADULT - ASSESSMENT
63 year old female with PMH of OA, GERD, sarcoidosis, migraines presenting with worsening back pain associated bilateral upper and lower extremities numbness and tingling in distal parts of all her extremities for the last week. Her neurological exam is worsened from yesterday, with more pronounced facial bulbar symptoms, progressive LE weakness, and now absent LE reflexes. Of note, had flu shot 2 weeks prior to presentation. MRI C and T spine w and w/o contrast was performed which didn't reveal acute pathology, only showed broad C6-7 disc bulging w/o any spinal compression, no contrast enchainment. Her hx of probable sarcoidosis, enlargement of mediastinal LN, unintentional weight loss needs oncological w/up. MRI L-spine showed extensive leptomeningeal enhancement along the periphery of the   conus medullaris with enhancement of the cauda equina nerve roots. compatible with active neurosarcoidosis, or Guillain-[place] syndrome and other inflammatory, infectious, or neoplastic causes.  CPK, ESR, CRP was unremarkable. A1C prediabetic. 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 There has previously been some evidence of disease progression, with involvement of CN 7 and 10, as well as blurred vision on 10/17 but today symptoms are stable, possibly plateaued with improvements in neck and finger strength today.    Recommendations:   - Elevated BP noted. 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 Gonsalves. Recommendations communicated to primary team Thank you for the courtesy of this consult. Will continue to follow.       63 year old female with PMH of OA, GERD, sarcoidosis, migraines presenting with worsening back pain associated bilateral upper and lower extremities numbness and tingling in distal parts of all her extremities for the last week. Her neurological exam is worsened from yesterday, with more pronounced facial bulbar symptoms, progressive LE weakness, and now absent LE reflexes. Of note, had flu shot 2 weeks prior to presentation. MRI C and T spine w and w/o contrast was performed which didn't reveal acute pathology, only showed broad C6-7 disc bulging w/o any spinal compression, no contrast enchainment. Her hx of probable sarcoidosis, enlargement of mediastinal LN, unintentional weight loss needs oncological w/up. MRI L-spine showed extensive leptomeningeal enhancement along the periphery of the   conus medullaris with enhancement of the cauda equina nerve roots. compatible with active neurosarcoidosis, or Guillain-[place] syndrome and other inflammatory, infectious, or neoplastic causes.  CPK, ESR, CRP was unremarkable. A1C prediabetic. 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 There has previously been some evidence of disease progression, with involvement of CN 7 and 10, as well as blurred vision on 10/17 but today symptoms are stable, possibly plateaued with improvements in neck and finger strength today.    Recommendations:   - Transaminitis noted. While this may be the result of IVIG, would consider limiting Tylenol as pain is likely neuropathic  - Can increase gabapentin to 600 TID  - Elevated BP noted. 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 Gonsalves.  Thank you for the courtesy of this consult. Will continue to follow.       63 year old female with PMH of OA, GERD, sarcoidosis, migraines presenting with worsening back pain associated bilateral upper and lower extremities numbness and tingling in distal parts of all her extremities for the last week. Her neurological exam is worsened from yesterday, with more pronounced facial bulbar symptoms, progressive LE weakness, and now absent LE reflexes. Of note, had flu shot 2 weeks prior to presentation. MRI C and T spine w and w/o contrast was performed which didn't reveal acute pathology, only showed broad C6-7 disc bulging w/o any spinal compression, no contrast enchainment. Her hx of probable sarcoidosis, enlargement of mediastinal LN, unintentional weight loss needs oncological w/up. MRI L-spine showed extensive leptomeningeal enhancement along the periphery of the   conus medullaris with enhancement of the cauda equina nerve roots. compatible with active neurosarcoidosis, or Guillain-[place] syndrome and other inflammatory, infectious, or neoplastic causes.  CPK, ESR, CRP was unremarkable. A1C prediabetic. 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 There has previously been some evidence of disease progression, with involvement of CN 7 and 10, as well as blurred vision on 10/17 but today symptoms are stable, possibly plateaued with improvements in neck and finger strength today.    Recommendations:   - Transaminitis noted. While this may be the result of IVIG, would consider limiting Tylenol as pain is likely neuropathic  - Can increase gabapentin to 600 TID  - Elevated BP noted. 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 Gonsalves.  Thank you for the courtesy of this consult. Will continue to follow.       63 year old female with PMH of OA, GERD, sarcoidosis, migraines presenting with worsening back pain associated bilateral upper and lower extremities numbness and tingling in distal parts of all her extremities for the last week. Her neurological exam is worsened from yesterday, with more pronounced facial bulbar symptoms, progressive LE weakness, and now absent LE reflexes. Of note, had flu shot 2 weeks prior to presentation. MRI C and T spine w and w/o contrast was performed which didn't reveal acute pathology, only showed broad C6-7 disc bulging w/o any spinal compression, no contrast enchainment. Her hx of probable sarcoidosis, enlargement of mediastinal LN, unintentional weight loss needs oncological w/up. MRI L-spine showed extensive leptomeningeal enhancement along the periphery of the   conus medullaris with enhancement of the cauda equina nerve roots. compatible with active neurosarcoidosis, or Guillain-[place] syndrome and other inflammatory, infectious, or neoplastic causes.  CPK, ESR, CRP was unremarkable. A1C prediabetic. 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 There has previously been some evidence of disease progression, with involvement of CN 7 and 10, as well as blurred vision on 10/17 but today symptoms are stable, possibly plateaued with improvements in neck and finger strength today.    Recommendations:   - Transaminitis noted. While this may be the result of IVIG, would eliminate Tylenol as pain is likely neuropathic  - Can increase gabapentin to 300mg/300mg/600mg   - Elevated BP noted. 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 Gonsalves.  Thank you for the courtesy of this consult. Will continue to follow.

## 2023-10-18 NOTE — CHART NOTE - NSCHARTNOTEFT_GEN_A_CORE
Admitting Diagnosis:   Patient is a 63y old  Female who presents with a chief complaint of lower limb weakness (18 Oct 2023 10:25)      PAST MEDICAL & SURGICAL HISTORY:  Ovarian cyst      GERD (gastroesophageal reflux disease)      Glaucoma      Sarcoidosis      Sinusitis      Osteoporosis      Migraines      Hashimoto's disease      Kidney stones      Torn meniscus  right      History of arthroscopy of shoulder  rotator cuff repair, right      History of umbilical hernia repair      S/P correction of deviated nasal septum      H/O sinus surgery          Current Nutrition Order: Regular, 1.5L FR + Ensure Plus HP BID [provides 350 kcals, 20g protein each]    PO Intake: Good (%) [  ]  Fair (50-75%) [ X  ] Poor (<25%) [   ]    GI Issues: +constipation, last BM noted 10/8?    Pain: denies pain/discomfort     Skin Integrity: Alan score 18    Labs:   10-18    134<L>  |  100  |  11  ----------------------------<  106<H>  3.9   |  24  |  0.47<L>    Ca    9.1      18 Oct 2023 05:30  Phos  5.5     10-18  Mg     2.0     10-18    TPro  8.6<H>  /  Alb  3.4  /  TBili  0.3  /  DBili  x   /  AST  63<H>  /  ALT  71<H>  /  AlkPhos  64  10-18    CAPILLARY BLOOD GLUCOSE          Medications:  MEDICATIONS  (STANDING):  acetaminophen     Tablet .. 1000 milliGRAM(s) Oral every 8 hours  artificial tears (preservative free) Ophthalmic Solution 1 Drop(s) Both EYES every 6 hours  enoxaparin Injectable 40 milliGRAM(s) SubCutaneous every 24 hours  gabapentin 300 milliGRAM(s) Oral every 8 hours  lactulose Syrup 10 Gram(s) Oral daily  latanoprost 0.005% Ophthalmic Solution 1 Drop(s) Both EYES at bedtime  lidocaine   4% Patch 1 Patch Transdermal every 24 hours  pantoprazole    Tablet 40 milliGRAM(s) Oral every 24 hours  polyethylene glycol 3350 17 Gram(s) Oral every 12 hours  senna 2 Tablet(s) Oral at bedtime    MEDICATIONS  (PRN):  aluminum hydroxide/magnesium hydroxide/simethicone Suspension 30 milliLiter(s) Oral every 4 hours PRN Dyspepsia  diphenhydrAMINE 25 milliGRAM(s) Oral daily PRN Allergy symptoms  LORazepam     Tablet 0.5 milliGRAM(s) Oral every 8 hours PRN Anxiety  melatonin 3 milliGRAM(s) Oral at bedtime PRN Insomnia  morphine  - Injectable 2 milliGRAM(s) IV Push every 4 hours PRN Moderate Pain (4 - 6)  morphine  - Injectable 4 milliGRAM(s) IV Push every 4 hours PRN Severe Pain (7 - 10)  ondansetron Injectable 4 milliGRAM(s) IV Push every 8 hours PRN Nausea and/or Vomiting      Weight:  Daily     Daily     Weight Change:     Estimated energy needs:     Subjective:   63 year old female with PMH of OA, GERD, and sarcoidosis (diagnosed in 2008) presenting with worsening back pain and progressive lower limb weakness for the last three days. Admitted for pain management.    Chart reviewed. Pt seen at bedside on 5 LA, currently ordered for     Previous Nutrition Diagnosis:    Active [   ]  Resolved [   ]    If resolved, new PES:     Goal:  Pt will meet at least 75% of protein & energy needs via most appropriate route for nutrition     Recommendations:  - consider increasing bowel regimen    Education:     Risk Level: High [   ] Moderate [   ] Low [   ] Admitting Diagnosis:   Patient is a 63y old  Female who presents with a chief complaint of lower limb weakness (18 Oct 2023 10:25)      PAST MEDICAL & SURGICAL HISTORY:  Ovarian cyst      GERD (gastroesophageal reflux disease)      Glaucoma      Sarcoidosis      Sinusitis      Osteoporosis      Migraines      Hashimoto's disease      Kidney stones      Torn meniscus  right      History of arthroscopy of shoulder  rotator cuff repair, right      History of umbilical hernia repair      S/P correction of deviated nasal septum      H/O sinus surgery          Current Nutrition Order: Regular, 1.5L FR + Ensure Plus HP BID [provides 350 kcals, 20g protein each]    PO Intake: Good (%) [  ]  Fair (50-75%) [ X  ] Poor (<25%) [   ]    GI Issues: +constipation, last BM noted 10/8?    Pain: denies pain/discomfort     Skin Integrity: Alan score 18    Labs:   10-18    134<L>  |  100  |  11  ----------------------------<  106<H>  3.9   |  24  |  0.47<L>    Ca    9.1      18 Oct 2023 05:30  Phos  5.5     10-18  Mg     2.0     10-18    TPro  8.6<H>  /  Alb  3.4  /  TBili  0.3  /  DBili  x   /  AST  63<H>  /  ALT  71<H>  /  AlkPhos  64  10-18    CAPILLARY BLOOD GLUCOSE          Medications:  MEDICATIONS  (STANDING):  acetaminophen     Tablet .. 1000 milliGRAM(s) Oral every 8 hours  artificial tears (preservative free) Ophthalmic Solution 1 Drop(s) Both EYES every 6 hours  enoxaparin Injectable 40 milliGRAM(s) SubCutaneous every 24 hours  gabapentin 300 milliGRAM(s) Oral every 8 hours  lactulose Syrup 10 Gram(s) Oral daily  latanoprost 0.005% Ophthalmic Solution 1 Drop(s) Both EYES at bedtime  lidocaine   4% Patch 1 Patch Transdermal every 24 hours  pantoprazole    Tablet 40 milliGRAM(s) Oral every 24 hours  polyethylene glycol 3350 17 Gram(s) Oral every 12 hours  senna 2 Tablet(s) Oral at bedtime    MEDICATIONS  (PRN):  aluminum hydroxide/magnesium hydroxide/simethicone Suspension 30 milliLiter(s) Oral every 4 hours PRN Dyspepsia  diphenhydrAMINE 25 milliGRAM(s) Oral daily PRN Allergy symptoms  LORazepam     Tablet 0.5 milliGRAM(s) Oral every 8 hours PRN Anxiety  melatonin 3 milliGRAM(s) Oral at bedtime PRN Insomnia  morphine  - Injectable 2 milliGRAM(s) IV Push every 4 hours PRN Moderate Pain (4 - 6)  morphine  - Injectable 4 milliGRAM(s) IV Push every 4 hours PRN Severe Pain (7 - 10)  ondansetron Injectable 4 milliGRAM(s) IV Push every 8 hours PRN Nausea and/or Vomiting      Weight: 59.7 kg [11 Oct 2023]    Weight Change: no other weights obtained    Estimated energy needs:   Current body wt [59.7kg] used for energy calculations as pt falls within % IBW. Needs estimated for age and adjusted for current clinical status    Calories: 0039-9740 kcals based on 25-30 kcals/kg  Protein: 71.6-89.6g based on 1.2-1.5g protein/kg  *Fluid needs per team    Subjective:   63 year old female with PMH of OA, GERD, and sarcoidosis (diagnosed in 2008) presenting with worsening back pain and progressive lower limb weakness for the last three days. Admitted for pain management.    Chart reviewed. Pt seen at bedside on 5 LA, currently ordered for Regular PO diet with 1.5L Fluid restriction & Ensure Plus HP BID [provides 700kcals 40g protein daily]. Pt endorses fair appetite & PO intake, diet tolerated well, drinks Ensures. ? constipation- on bowel regimen. Labs reviewed- ongoing low sodium, likely SIADH per MD, on fluid restriction. Creat 0/47 <L>, Glu 106 <H>, AST & ALT elevated. Phos 5.5 <H>, monitor. Discussed with team. RDN will continue to monitor, reassess, and intervene as appropriate.     Previous Nutrition Diagnosis:   Inadequate Oral Intake related to pt condition as evidenced by intake <50% needs x3 days    Active [ X  ]  Resolved [   ]    If resolved, new PES:     Goal:  Pt will meet at least 75% of protein & energy needs via most appropriate route for nutrition     Recommendations:  1. c/w current diet order  - monitor intake & tolerance  - consider liberalization to regular diet to improve PO intake/allow for more menu options  2. Ensure Plus HP BID; provides 350 kcals, 20g protein each  3. Hydration per team  - monitor fluid/volume status- adjust fluid restriction prn  4. GI  - consider increasing bowel regimen  5. Monitor chemistry, GI function, skin integrity  6. Pain & bowel regimen per team    Education: obtaining adequate kcals/protein    Risk Level: High [   ] Moderate [ X  ] Low [   ]

## 2023-10-18 NOTE — PROGRESS NOTE ADULT - PROBLEM SELECTOR PLAN 3
Progressive weakness in lower limbs over last 3 days with continued urinary incontinence. Last admission 10/08 with MRI and CT didn't reveal acute pathology, only showed broad C6-7 disc bulging w/o any spinal compression, no contrast enchainment. Imaging also showed mediastinal LN. Pt on methylprednisolone 4mg for 6 day (on day 3/6- 3 pills 10/12, 2 pills 10/13, 1 pill 10/14).  MG findings consistent with demyelinating disease, CSF with elevated protein supporting likely Guillain [place]. s/p EMG and LP, suspecting GBS.     Plan  - s/p IVIG 30g qd, infuse over 6hr for 4 days  - increased gabapentin to 600mg TID  - decreased acetaminophen dosage to 650mg q12h PRN mild pain given transaminitis  - start ibuprofen 600 q12h PRN mild pain, alternating with acetaminophen  - start Toradol 15mg IV for breakthrough pain  - PT consulted  - hold steroid course

## 2023-10-18 NOTE — PROGRESS NOTE ADULT - PROBLEM SELECTOR PLAN 4
BP gradually rising to 200s. Patient was started on labetalol 100 P.O BID. On 10/14-10/15 overnight BP in 220s s/p labetalol 5mg IV, Metoprolol 5mg IV in am. BP remained elevated to 190s, Patient received Amlodipine 10mg and BP dropped to 96/92.   10/17 BP 180s/100s  Plan:   - Monitor BP and restart anti-HTN as needed

## 2023-10-18 NOTE — PROGRESS NOTE ADULT - SUBJECTIVE AND OBJECTIVE BOX
Neurology Progress Note    Interval History:  The patient was seen and examined at the bedside.       PAST MEDICAL & SURGICAL HISTORY:  Ovarian cyst    GERD (gastroesophageal reflux disease)    Glaucoma    Sarcoidosis    Sinusitis    Osteoporosis    Migraines    Hashimoto's disease    Kidney stones    Torn meniscus  right    History of arthroscopy of shoulder  rotator cuff repair, right    History of umbilical hernia repair    S/P correction of deviated nasal septum    H/O sinus surgery            Medications:  acetaminophen     Tablet .. 1000 milliGRAM(s) Oral every 8 hours  aluminum hydroxide/magnesium hydroxide/simethicone Suspension 30 milliLiter(s) Oral every 4 hours PRN  artificial tears (preservative free) Ophthalmic Solution 1 Drop(s) Both EYES every 6 hours  diphenhydrAMINE 25 milliGRAM(s) Oral daily PRN  enoxaparin Injectable 40 milliGRAM(s) SubCutaneous every 24 hours  gabapentin 300 milliGRAM(s) Oral every 8 hours  lactulose Syrup 10 Gram(s) Oral daily  latanoprost 0.005% Ophthalmic Solution 1 Drop(s) Both EYES at bedtime  lidocaine   4% Patch 1 Patch Transdermal every 24 hours  LORazepam     Tablet 0.5 milliGRAM(s) Oral every 8 hours PRN  melatonin 3 milliGRAM(s) Oral at bedtime PRN  morphine  - Injectable 4 milliGRAM(s) IV Push every 4 hours PRN  morphine  - Injectable 2 milliGRAM(s) IV Push every 4 hours PRN  ondansetron Injectable 4 milliGRAM(s) IV Push every 8 hours PRN  pantoprazole    Tablet 40 milliGRAM(s) Oral every 24 hours  polyethylene glycol 3350 17 Gram(s) Oral every 12 hours  senna 2 Tablet(s) Oral at bedtime      Vital Signs Last 24 Hrs  T(C): 36.7 (18 Oct 2023 09:55), Max: 36.8 (17 Oct 2023 14:25)  T(F): 98.1 (18 Oct 2023 09:55), Max: 98.3 (17 Oct 2023 14:25)  HR: 91 (18 Oct 2023 12:50) (71 - 100)  BP: 107/59 (18 Oct 2023 12:50) (107/59 - 189/91)  BP(mean): 75 (18 Oct 2023 12:50) (75 - 139)  RR: 22 (18 Oct 2023 12:50) (17 - 28)  SpO2: 96% (18 Oct 2023 12:50) (93% - 97%)    Parameters below as of 18 Oct 2023 12:50  Patient On (Oxygen Delivery Method): room air    NEUROLOGICAL EXAMINATION:  GENERAL:  Appearance is consistent with chronologic age. Able to count to >30 in one breath.    COGNITION/LANGUAGE:  Awake, alert, and oriented to person, place, time and date. Recent and remote memory intact.  Fund of knowledge is appropriate. No aphasia. Mildly dysarthric.    CRANIAL NERVES:   - Eyes:  Pupils equal round and reactive, no RAPD. EOMI w/o nystagmus, skew or reported double vision. Normal visual field.  - severe R and moderate L facial weakness, upper and lower - unable to close R eye, elevate R eyebrow, or seal her mouth. Marked R uvula deviation, weak b/l palate elevation    MOTOR EXAM:                              Right | Left    Shoulder abductors:    4|5   Shoulder adductors:    5|5   Elbow flexors:             5|5   Elbow extensors:         5|5   Palmar flexion:            5|5   Palmar dorsiflexion:     5|5   Hip flexors:                4-|4-   Hip extensors:            5|5   Knee extensors:          5|5   Knee flexors:             4-|4-   Foot dorsiflexors:        4|4   Foot plantarflexors:     5|5      DTRs:             Right | Left   Biceps:                 1+|1+     Triceps:                1+|1+   Brachioradialis:     1+|1+     Patellar:                0 | 0   Achilles:                0 | 0     Plantar responses mute b/l.  Head flexion is 4+/5. Extension is 5/5.       No observable drift. Normal tone and bulk. No tenderness, twitching, tremors or involuntary movements.  SENSORY EXAM:  diminished vibration in toes and ankles b/l   REFLEXES: .  Plantar response mute b/l.  Jaw jerk, Maday, clonus absent.  CEREBELLUM:  Finger to nose intact.  No dysmetria.      Labs:  CBC Full  -  ( 18 Oct 2023 05:30 )  WBC Count : 3.35 K/uL  RBC Count : 5.32 M/uL  Hemoglobin : 13.6 g/dL  Hematocrit : 41.3 %  Platelet Count - Automated : 199 K/uL  Mean Cell Volume : 77.6 fl  Mean Cell Hemoglobin : 25.6 pg  Mean Cell Hemoglobin Concentration : 32.9 gm/dL  Auto Neutrophil # : 1.98 K/uL  Auto Lymphocyte # : 0.86 K/uL  Auto Monocyte # : 0.34 K/uL  Auto Eosinophil # : 0.13 K/uL  Auto Basophil # : 0.03 K/uL  Auto Neutrophil % : 59.1 %  Auto Lymphocyte % : 25.7 %  Auto Monocyte % : 10.1 %  Auto Eosinophil % : 3.9 %  Auto Basophil % : 0.9 %    10-18    134<L>  |  100  |  11  ----------------------------<  106<H>  3.9   |  24  |  0.47<L>    Ca    9.1      18 Oct 2023 05:30  Phos  5.5     10-18  Mg     2.0     10-18    TPro  8.6<H>  /  Alb  3.4  /  TBili  0.3  /  DBili  x   /  AST  63<H>  /  ALT  71<H>  /  AlkPhos  64  10-18    LIVER FUNCTIONS - ( 18 Oct 2023 05:30 )  Alb: 3.4 g/dL / Pro: 8.6 g/dL / ALK PHOS: 64 U/L / ALT: 71 U/L / AST: 63 U/L / GGT: x             Urinalysis Basic - ( 18 Oct 2023 05:30 )    Color: x / Appearance: x / SG: x / pH: x  Gluc: 106 mg/dL / Ketone: x  / Bili: x / Urobili: x   Blood: x / Protein: x / Nitrite: x   Leuk Esterase: x / RBC: x / WBC x   Sq Epi: x / Non Sq Epi: x / Bacteria: x        Assessment:  This is a 63y Female w/ h/o     Plan: Neurology Progress Note    Interval History:  The patient was seen and examined at the bedside.       PAST MEDICAL & SURGICAL HISTORY:  Ovarian cyst    GERD (gastroesophageal reflux disease)    Glaucoma    Sarcoidosis    Sinusitis    Osteoporosis    Migraines    Hashimoto's disease    Kidney stones    Torn meniscus  right    History of arthroscopy of shoulder  rotator cuff repair, right    History of umbilical hernia repair    S/P correction of deviated nasal septum    H/O sinus surgery            Medications:  acetaminophen     Tablet .. 1000 milliGRAM(s) Oral every 8 hours  aluminum hydroxide/magnesium hydroxide/simethicone Suspension 30 milliLiter(s) Oral every 4 hours PRN  artificial tears (preservative free) Ophthalmic Solution 1 Drop(s) Both EYES every 6 hours  diphenhydrAMINE 25 milliGRAM(s) Oral daily PRN  enoxaparin Injectable 40 milliGRAM(s) SubCutaneous every 24 hours  gabapentin 300 milliGRAM(s) Oral every 8 hours  lactulose Syrup 10 Gram(s) Oral daily  latanoprost 0.005% Ophthalmic Solution 1 Drop(s) Both EYES at bedtime  lidocaine   4% Patch 1 Patch Transdermal every 24 hours  LORazepam     Tablet 0.5 milliGRAM(s) Oral every 8 hours PRN  melatonin 3 milliGRAM(s) Oral at bedtime PRN  morphine  - Injectable 4 milliGRAM(s) IV Push every 4 hours PRN  morphine  - Injectable 2 milliGRAM(s) IV Push every 4 hours PRN  ondansetron Injectable 4 milliGRAM(s) IV Push every 8 hours PRN  pantoprazole    Tablet 40 milliGRAM(s) Oral every 24 hours  polyethylene glycol 3350 17 Gram(s) Oral every 12 hours  senna 2 Tablet(s) Oral at bedtime      Vital Signs Last 24 Hrs  T(C): 36.7 (18 Oct 2023 09:55), Max: 36.8 (17 Oct 2023 14:25)  T(F): 98.1 (18 Oct 2023 09:55), Max: 98.3 (17 Oct 2023 14:25)  HR: 91 (18 Oct 2023 12:50) (71 - 100)  BP: 107/59 (18 Oct 2023 12:50) (107/59 - 189/91)  BP(mean): 75 (18 Oct 2023 12:50) (75 - 139)  RR: 22 (18 Oct 2023 12:50) (17 - 28)  SpO2: 96% (18 Oct 2023 12:50) (93% - 97%)    Parameters below as of 18 Oct 2023 12:50  Patient On (Oxygen Delivery Method): room air    NEUROLOGICAL EXAMINATION:  GENERAL:  Appearance is consistent with chronologic age. Able to count to 30 in one breath.    COGNITION/LANGUAGE:  Awake, alert, and oriented to person, place, time and date. Recent and remote memory intact.  Fund of knowledge is appropriate. No aphasia. Mildly dysarthric.    CRANIAL NERVES:   - Eyes:  Pupils equal round and reactive, no RAPD. EOMI w/o nystagmus, skew or reported double vision. Normal visual field.  - severe R and moderate L facial weakness, upper and lower - unable to close R eye, elevate R eyebrow, or seal her mouth. Marked R uvula deviation, weak b/l palate elevation    MOTOR EXAM:                              Right | Left    Shoulder abductors:    4|5   Shoulder adductors:    5|5   Elbow flexors:             5|5   Elbow extensors:         5|5   Palmar flexion:            5|5   Palmar dorsiflexion:     5|5   Finger Abduction         5|5  Finger Adduction         5|5  Hip flexors:                4-|4-   Hip extensors:            5|5   Knee extensors:          5|5   Knee flexors:             4-|4-   Foot dorsiflexors:        4|4   Foot plantarflexors:     5|5      DTRs:             Right | Left   Biceps:                 1+|1+     Triceps:                1+|1+   Brachioradialis:     1+|1+     Patellar:                0 | 0   Achilles:                0 | 0     Plantar responses mute b/l.  Head flexion is 5/5. Extension is 5/5.       No observable drift. Normal tone and bulk. No tenderness, twitching, tremors or involuntary movements.  SENSORY EXAM:  diminished vibration in toes and ankles b/l   REFLEXES: .  Plantar response mute b/l.  Jaw jerk, Maday, clonus absent.  CEREBELLUM:  Finger to nose intact.  No dysmetria.      Labs:  CBC Full  -  ( 18 Oct 2023 05:30 )  WBC Count : 3.35 K/uL  RBC Count : 5.32 M/uL  Hemoglobin : 13.6 g/dL  Hematocrit : 41.3 %  Platelet Count - Automated : 199 K/uL  Mean Cell Volume : 77.6 fl  Mean Cell Hemoglobin : 25.6 pg  Mean Cell Hemoglobin Concentration : 32.9 gm/dL  Auto Neutrophil # : 1.98 K/uL  Auto Lymphocyte # : 0.86 K/uL  Auto Monocyte # : 0.34 K/uL  Auto Eosinophil # : 0.13 K/uL  Auto Basophil # : 0.03 K/uL  Auto Neutrophil % : 59.1 %  Auto Lymphocyte % : 25.7 %  Auto Monocyte % : 10.1 %  Auto Eosinophil % : 3.9 %  Auto Basophil % : 0.9 %    10-18    134<L>  |  100  |  11  ----------------------------<  106<H>  3.9   |  24  |  0.47<L>    Ca    9.1      18 Oct 2023 05:30  Phos  5.5     10-18  Mg     2.0     10-18    TPro  8.6<H>  /  Alb  3.4  /  TBili  0.3  /  DBili  x   /  AST  63<H>  /  ALT  71<H>  /  AlkPhos  64  10-18    LIVER FUNCTIONS - ( 18 Oct 2023 05:30 )  Alb: 3.4 g/dL / Pro: 8.6 g/dL / ALK PHOS: 64 U/L / ALT: 71 U/L / AST: 63 U/L / GGT: x             Urinalysis Basic - ( 18 Oct 2023 05:30 )    Color: x / Appearance: x / SG: x / pH: x  Gluc: 106 mg/dL / Ketone: x  / Bili: x / Urobili: x   Blood: x / Protein: x / Nitrite: x   Leuk Esterase: x / RBC: x / WBC x   Sq Epi: x / Non Sq Epi: x / Bacteria: x        Assessment:  This is a 63y Female w/ h/o     Plan:

## 2023-10-18 NOTE — PROGRESS NOTE ADULT - PROBLEM SELECTOR PLAN 6
Na 128. Patient asymptomatic. Patient appear euvolemic on exam. Patient has been having severe and constant pain. 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  TSH 1.75 w/n/l.   Serum osmo low at 267, hypotonic hyponatremia. Ulytes c/w SIADH i/so sarcoidosis     Plan  - f/u BMP.

## 2023-10-18 NOTE — PROGRESS NOTE ADULT - SUBJECTIVE AND OBJECTIVE BOX
INTERVAL HPI/OVERNIGHT EVENTS:  Awake and alert;  Tolerating diet  Good Vital Capacity ; Close to 3 liters       MEDICATIONS  (STANDING):  acetaminophen     Tablet .. 1000 milliGRAM(s) Oral every 8 hours  artificial tears (preservative free) Ophthalmic Solution 1 Drop(s) Both EYES every 6 hours  enoxaparin Injectable 40 milliGRAM(s) SubCutaneous every 24 hours  gabapentin 300 milliGRAM(s) Oral every 8 hours  lactulose Syrup 10 Gram(s) Oral daily  latanoprost 0.005% Ophthalmic Solution 1 Drop(s) Both EYES at bedtime  lidocaine   4% Patch 1 Patch Transdermal every 24 hours  pantoprazole    Tablet 40 milliGRAM(s) Oral every 24 hours  polyethylene glycol 3350 17 Gram(s) Oral every 12 hours  senna 2 Tablet(s) Oral at bedtime    MEDICATIONS  (PRN):  aluminum hydroxide/magnesium hydroxide/simethicone Suspension 30 milliLiter(s) Oral every 4 hours PRN Dyspepsia  diphenhydrAMINE 25 milliGRAM(s) Oral daily PRN Allergy symptoms  LORazepam     Tablet 0.5 milliGRAM(s) Oral every 8 hours PRN Anxiety  melatonin 3 milliGRAM(s) Oral at bedtime PRN Insomnia  morphine  - Injectable 4 milliGRAM(s) IV Push every 4 hours PRN Severe Pain (7 - 10)  morphine  - Injectable 2 milliGRAM(s) IV Push every 4 hours PRN Moderate Pain (4 - 6)  ondansetron Injectable 4 milliGRAM(s) IV Push every 8 hours PRN Nausea and/or Vomiting      Allergies    No Known Allergies    Intolerances        Vital Signs Last 24 Hrs  T(C): 36.5 (18 Oct 2023 05:17), Max: 36.8 (17 Oct 2023 14:25)  T(F): 97.7 (18 Oct 2023 05:17), Max: 98.3 (17 Oct 2023 14:25)  HR: 85 (18 Oct 2023 08:27) (71 - 100)  BP: 113/72 (18 Oct 2023 08:27) (113/72 - 189/91)  BP(mean): 88 (18 Oct 2023 08:27) (88 - 139)  RR: 28 (18 Oct 2023 08:27) (17 - 28)  SpO2: 95% (18 Oct 2023 08:27) (93% - 97%)    Parameters below as of 18 Oct 2023 08:27  Patient On (Oxygen Delivery Method): room air              Constitutional: Awake     Eyes: EMILY    ENMT: Negative    Neck: Supple    Back:  no tenderness     Respiratory:  clear     Cardiovascular: S1 S2    Gastrointestinal: soft      Genitourinary:    Extremities:  no edema     Vascular:    Neurological:  no new findings     Skin:    Lymph Nodes:            10-17 @ 07:01  -  10-18 @ 07:00  --------------------------------------------------------  IN: 736 mL / OUT: 400 mL / NET: 336 mL    10-18 @ 07:01  -  10-18 @ 10:25  --------------------------------------------------------  IN: 0 mL / OUT: 700 mL / NET: -700 mL      LABS:                        13.6   3.35  )-----------( 199      ( 18 Oct 2023 05:30 )             41.3     10-18    134<L>  |  100  |  11  ----------------------------<  106<H>  3.9   |  24  |  0.47<L>    Ca    9.1      18 Oct 2023 05:30  Phos  5.5     10-18  Mg     2.0     10-18    TPro  8.6<H>  /  Alb  3.4  /  TBili  0.3  /  DBili  x   /  AST  63<H>  /  ALT  71<H>  /  AlkPhos  64  10-18      Urinalysis Basic - ( 18 Oct 2023 05:30 )    Color: x / Appearance: x / SG: x / pH: x  Gluc: 106 mg/dL / Ketone: x  / Bili: x / Urobili: x   Blood: x / Protein: x / Nitrite: x   Leuk Esterase: x / RBC: x / WBC x   Sq Epi: x / Non Sq Epi: x / Bacteria: x        RADIOLOGY & ADDITIONAL TESTS:   OVERNIGHT EVENTS: [place]    SUBJECTIVE: Patient seen and examined at bedside. Complained about blurred vision and facial weakness, stable from yesterday. Pain has been well controlled. Denies shortness of breath.    Vital Signs Last 12 Hrs  T(F): 98.4 (10-18-23 @ 13:47), Max: 98.4 (10-18-23 @ 13:47)  HR: 112 (10-18-23 @ 15:30) (71 - 112)  BP: 145/81 (10-18-23 @ 15:30) (107/59 - 184/88)  BP(mean): 101 (10-18-23 @ 15:30) (75 - 123)  RR: 41 (10-18-23 @ 15:30) (18 - 41)  SpO2: 95% (10-18-23 @ 15:30) (94% - 96%)  I&O's Summary    17 Oct 2023 07:01  -  18 Oct 2023 07:00  --------------------------------------------------------  IN: 736 mL / OUT: 400 mL / NET: 336 mL    18 Oct 2023 07:01  -  18 Oct 2023 15:59  --------------------------------------------------------  IN: 0 mL / OUT: 700 mL / NET: -700 mL        PHYSICAL EXAM:  Constitutional: NAD, comfortable in bed.  HEENT: NC/AT, PERRLA, EOMI, no conjunctival pallor or scleral icterus, MMM  Neck: Supple, no JVD  Respiratory: CTA B/L. No w/r/r.   Cardiovascular: RRR, normal S1 and S2, no m/r/g.   Gastrointestinal: +BS, soft NTND, no guarding or rebound tenderness, no palpable masses   Extremities: wwp; no cyanosis, clubbing or edema.   Vascular: Pulses equal and strong throughout.   Neurological: AAOx3, mild dysarthria, left facial droop, unable to close L eye, could not elevate both eyebrows. strength and sensation intact throughout.   Skin: No gross skin abnormalities or rashes        LABS:                        13.6   3.35  )-----------( 199      ( 18 Oct 2023 05:30 )             41.3     10-18    134<L>  |  100  |  11  ----------------------------<  106<H>  3.9   |  24  |  0.47<L>    Ca    9.1      18 Oct 2023 05:30  Phos  5.5     10-18  Mg     2.0     10-18    TPro  8.6<H>  /  Alb  3.4  /  TBili  0.3  /  DBili  x   /  AST  63<H>  /  ALT  71<H>  /  AlkPhos  64  10-18      Urinalysis Basic - ( 18 Oct 2023 05:30 )    Color: x / Appearance: x / SG: x / pH: x  Gluc: 106 mg/dL / Ketone: x  / Bili: x / Urobili: x   Blood: x / Protein: x / Nitrite: x   Leuk Esterase: x / RBC: x / WBC x   Sq Epi: x / Non Sq Epi: x / Bacteria: x      RADIOLOGY & ADDITIONAL TESTS:    MEDICATIONS  (STANDING):  artificial tears (preservative free) Ophthalmic Solution 1 Drop(s) Both EYES every 6 hours  enoxaparin Injectable 40 milliGRAM(s) SubCutaneous every 24 hours  gabapentin 600 milliGRAM(s) Oral three times a day  lactulose Syrup 10 Gram(s) Oral daily  latanoprost 0.005% Ophthalmic Solution 1 Drop(s) Both EYES at bedtime  lidocaine   4% Patch 1 Patch Transdermal every 24 hours  pantoprazole    Tablet 40 milliGRAM(s) Oral every 24 hours  polyethylene glycol 3350 17 Gram(s) Oral every 12 hours  senna 2 Tablet(s) Oral at bedtime    MEDICATIONS  (PRN):  acetaminophen     Tablet .. 650 milliGRAM(s) Oral every 12 hours PRN Temp greater or equal to 38C (100.4F), Mild Pain (1 - 3)  aluminum hydroxide/magnesium hydroxide/simethicone Suspension 30 milliLiter(s) Oral every 4 hours PRN Dyspepsia  diphenhydrAMINE 25 milliGRAM(s) Oral daily PRN Allergy symptoms  ibuprofen  Tablet. 600 milliGRAM(s) Oral every 12 hours PRN Temp greater or equal to 38C (100.4F), Mild Pain (1 - 3)  ketorolac   Injectable 15 milliGRAM(s) IV Push every 12 hours PRN Severe Pain (7 - 10)  LORazepam     Tablet 0.5 milliGRAM(s) Oral every 8 hours PRN Anxiety  melatonin 3 milliGRAM(s) Oral at bedtime PRN Insomnia  morphine  - Injectable 2 milliGRAM(s) IV Push every 4 hours PRN Moderate Pain (4 - 6)  morphine  - Injectable 4 milliGRAM(s) IV Push every 4 hours PRN Severe Pain (7 - 10)  ondansetron Injectable 4 milliGRAM(s) IV Push every 8 hours PRN Nausea and/or Vomiting

## 2023-10-18 NOTE — PROGRESS NOTE ADULT - PROBLEM SELECTOR PLAN 2
Progressive weakness in lower limbs over last 3 days with continued urinary incontinence. Last admission 10/08 with MRI and CT didn't reveal acute pathology, only showed broad C6-7 disc bulging w/o any spinal compression, no contrast enchainment. Imaging also showed mediastinal LN. Pt on methylprednisolone 4mg for 6 day (on day 3/6- 3 pills 10/12, 2 pills 10/13, 1 pill 10/14).  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 s/p EMG and LP, suspecting GBS.     Plan  -s/p IVIG 30g qd, infuse over 6hr for 4 days  - f/u neuro rec  - PT consulted  - hold steroid course.

## 2023-10-19 LAB
ALBUMIN SERPL ELPH-MCNC: 3.3 G/DL — SIGNIFICANT CHANGE UP (ref 3.3–5)
ALBUMIN SERPL ELPH-MCNC: 3.3 G/DL — SIGNIFICANT CHANGE UP (ref 3.3–5)
ALP SERPL-CCNC: 67 U/L — SIGNIFICANT CHANGE UP (ref 40–120)
ALP SERPL-CCNC: 67 U/L — SIGNIFICANT CHANGE UP (ref 40–120)
ALT FLD-CCNC: 121 U/L — HIGH (ref 10–45)
ALT FLD-CCNC: 121 U/L — HIGH (ref 10–45)
ANION GAP SERPL CALC-SCNC: 10 MMOL/L — SIGNIFICANT CHANGE UP (ref 5–17)
ANION GAP SERPL CALC-SCNC: 10 MMOL/L — SIGNIFICANT CHANGE UP (ref 5–17)
AST SERPL-CCNC: 111 U/L — HIGH (ref 10–40)
AST SERPL-CCNC: 111 U/L — HIGH (ref 10–40)
BASOPHILS # BLD AUTO: 0.04 K/UL — SIGNIFICANT CHANGE UP (ref 0–0.2)
BASOPHILS # BLD AUTO: 0.04 K/UL — SIGNIFICANT CHANGE UP (ref 0–0.2)
BASOPHILS NFR BLD AUTO: 1.1 % — SIGNIFICANT CHANGE UP (ref 0–2)
BASOPHILS NFR BLD AUTO: 1.1 % — SIGNIFICANT CHANGE UP (ref 0–2)
BILIRUB SERPL-MCNC: 0.3 MG/DL — SIGNIFICANT CHANGE UP (ref 0.2–1.2)
BILIRUB SERPL-MCNC: 0.3 MG/DL — SIGNIFICANT CHANGE UP (ref 0.2–1.2)
BUN SERPL-MCNC: 19 MG/DL — SIGNIFICANT CHANGE UP (ref 7–23)
BUN SERPL-MCNC: 19 MG/DL — SIGNIFICANT CHANGE UP (ref 7–23)
CALCIUM SERPL-MCNC: 8.9 MG/DL — SIGNIFICANT CHANGE UP (ref 8.4–10.5)
CALCIUM SERPL-MCNC: 8.9 MG/DL — SIGNIFICANT CHANGE UP (ref 8.4–10.5)
CHLORIDE SERPL-SCNC: 99 MMOL/L — SIGNIFICANT CHANGE UP (ref 96–108)
CHLORIDE SERPL-SCNC: 99 MMOL/L — SIGNIFICANT CHANGE UP (ref 96–108)
CO2 SERPL-SCNC: 25 MMOL/L — SIGNIFICANT CHANGE UP (ref 22–31)
CO2 SERPL-SCNC: 25 MMOL/L — SIGNIFICANT CHANGE UP (ref 22–31)
CREAT SERPL-MCNC: 0.61 MG/DL — SIGNIFICANT CHANGE UP (ref 0.5–1.3)
CREAT SERPL-MCNC: 0.61 MG/DL — SIGNIFICANT CHANGE UP (ref 0.5–1.3)
EGFR: 101 ML/MIN/1.73M2 — SIGNIFICANT CHANGE UP
EGFR: 101 ML/MIN/1.73M2 — SIGNIFICANT CHANGE UP
EOSINOPHIL # BLD AUTO: 0.14 K/UL — SIGNIFICANT CHANGE UP (ref 0–0.5)
EOSINOPHIL # BLD AUTO: 0.14 K/UL — SIGNIFICANT CHANGE UP (ref 0–0.5)
EOSINOPHIL NFR BLD AUTO: 3.7 % — SIGNIFICANT CHANGE UP (ref 0–6)
EOSINOPHIL NFR BLD AUTO: 3.7 % — SIGNIFICANT CHANGE UP (ref 0–6)
GLUCOSE SERPL-MCNC: 103 MG/DL — HIGH (ref 70–99)
GLUCOSE SERPL-MCNC: 103 MG/DL — HIGH (ref 70–99)
HCT VFR BLD CALC: 42.2 % — SIGNIFICANT CHANGE UP (ref 34.5–45)
HCT VFR BLD CALC: 42.2 % — SIGNIFICANT CHANGE UP (ref 34.5–45)
HGB BLD-MCNC: 14 G/DL — SIGNIFICANT CHANGE UP (ref 11.5–15.5)
HGB BLD-MCNC: 14 G/DL — SIGNIFICANT CHANGE UP (ref 11.5–15.5)
IMM GRANULOCYTES NFR BLD AUTO: 0.3 % — SIGNIFICANT CHANGE UP (ref 0–0.9)
IMM GRANULOCYTES NFR BLD AUTO: 0.3 % — SIGNIFICANT CHANGE UP (ref 0–0.9)
LYMPHOCYTES # BLD AUTO: 1.01 K/UL — SIGNIFICANT CHANGE UP (ref 1–3.3)
LYMPHOCYTES # BLD AUTO: 1.01 K/UL — SIGNIFICANT CHANGE UP (ref 1–3.3)
LYMPHOCYTES # BLD AUTO: 26.7 % — SIGNIFICANT CHANGE UP (ref 13–44)
LYMPHOCYTES # BLD AUTO: 26.7 % — SIGNIFICANT CHANGE UP (ref 13–44)
MAGNESIUM SERPL-MCNC: 2.2 MG/DL — SIGNIFICANT CHANGE UP (ref 1.6–2.6)
MAGNESIUM SERPL-MCNC: 2.2 MG/DL — SIGNIFICANT CHANGE UP (ref 1.6–2.6)
MCHC RBC-ENTMCNC: 26 PG — LOW (ref 27–34)
MCHC RBC-ENTMCNC: 26 PG — LOW (ref 27–34)
MCHC RBC-ENTMCNC: 33.2 GM/DL — SIGNIFICANT CHANGE UP (ref 32–36)
MCHC RBC-ENTMCNC: 33.2 GM/DL — SIGNIFICANT CHANGE UP (ref 32–36)
MCV RBC AUTO: 78.4 FL — LOW (ref 80–100)
MCV RBC AUTO: 78.4 FL — LOW (ref 80–100)
MONOCYTES # BLD AUTO: 0.47 K/UL — SIGNIFICANT CHANGE UP (ref 0–0.9)
MONOCYTES # BLD AUTO: 0.47 K/UL — SIGNIFICANT CHANGE UP (ref 0–0.9)
MONOCYTES NFR BLD AUTO: 12.4 % — SIGNIFICANT CHANGE UP (ref 2–14)
MONOCYTES NFR BLD AUTO: 12.4 % — SIGNIFICANT CHANGE UP (ref 2–14)
NEUTROPHILS # BLD AUTO: 2.11 K/UL — SIGNIFICANT CHANGE UP (ref 1.8–7.4)
NEUTROPHILS # BLD AUTO: 2.11 K/UL — SIGNIFICANT CHANGE UP (ref 1.8–7.4)
NEUTROPHILS NFR BLD AUTO: 55.8 % — SIGNIFICANT CHANGE UP (ref 43–77)
NEUTROPHILS NFR BLD AUTO: 55.8 % — SIGNIFICANT CHANGE UP (ref 43–77)
NRBC # BLD: 0 /100 WBCS — SIGNIFICANT CHANGE UP (ref 0–0)
NRBC # BLD: 0 /100 WBCS — SIGNIFICANT CHANGE UP (ref 0–0)
PHOSPHATE SERPL-MCNC: 4.6 MG/DL — HIGH (ref 2.5–4.5)
PHOSPHATE SERPL-MCNC: 4.6 MG/DL — HIGH (ref 2.5–4.5)
PLATELET # BLD AUTO: 167 K/UL — SIGNIFICANT CHANGE UP (ref 150–400)
PLATELET # BLD AUTO: 167 K/UL — SIGNIFICANT CHANGE UP (ref 150–400)
POTASSIUM SERPL-MCNC: 4 MMOL/L — SIGNIFICANT CHANGE UP (ref 3.5–5.3)
POTASSIUM SERPL-MCNC: 4 MMOL/L — SIGNIFICANT CHANGE UP (ref 3.5–5.3)
POTASSIUM SERPL-SCNC: 4 MMOL/L — SIGNIFICANT CHANGE UP (ref 3.5–5.3)
POTASSIUM SERPL-SCNC: 4 MMOL/L — SIGNIFICANT CHANGE UP (ref 3.5–5.3)
PROT SERPL-MCNC: 8.1 G/DL — SIGNIFICANT CHANGE UP (ref 6–8.3)
PROT SERPL-MCNC: 8.1 G/DL — SIGNIFICANT CHANGE UP (ref 6–8.3)
RBC # BLD: 5.38 M/UL — HIGH (ref 3.8–5.2)
RBC # BLD: 5.38 M/UL — HIGH (ref 3.8–5.2)
RBC # FLD: 14.8 % — HIGH (ref 10.3–14.5)
RBC # FLD: 14.8 % — HIGH (ref 10.3–14.5)
SODIUM SERPL-SCNC: 134 MMOL/L — LOW (ref 135–145)
SODIUM SERPL-SCNC: 134 MMOL/L — LOW (ref 135–145)
WBC # BLD: 3.78 K/UL — LOW (ref 3.8–10.5)
WBC # BLD: 3.78 K/UL — LOW (ref 3.8–10.5)
WBC # FLD AUTO: 3.78 K/UL — LOW (ref 3.8–10.5)
WBC # FLD AUTO: 3.78 K/UL — LOW (ref 3.8–10.5)

## 2023-10-19 PROCEDURE — 99232 SBSQ HOSP IP/OBS MODERATE 35: CPT | Mod: GC

## 2023-10-19 PROCEDURE — 99232 SBSQ HOSP IP/OBS MODERATE 35: CPT

## 2023-10-19 RX ADMIN — GABAPENTIN 600 MILLIGRAM(S): 400 CAPSULE ORAL at 21:14

## 2023-10-19 RX ADMIN — POLYETHYLENE GLYCOL 3350 17 GRAM(S): 17 POWDER, FOR SOLUTION ORAL at 11:15

## 2023-10-19 RX ADMIN — ENOXAPARIN SODIUM 40 MILLIGRAM(S): 100 INJECTION SUBCUTANEOUS at 17:15

## 2023-10-19 RX ADMIN — Medication 3 MILLIGRAM(S): at 21:16

## 2023-10-19 RX ADMIN — Medication 1 APPLICATION(S): at 21:14

## 2023-10-19 RX ADMIN — Medication 1 DROP(S): at 17:15

## 2023-10-19 RX ADMIN — LIDOCAINE 1 PATCH: 4 CREAM TOPICAL at 14:11

## 2023-10-19 RX ADMIN — GABAPENTIN 300 MILLIGRAM(S): 400 CAPSULE ORAL at 05:03

## 2023-10-19 RX ADMIN — LIDOCAINE 1 PATCH: 4 CREAM TOPICAL at 00:21

## 2023-10-19 RX ADMIN — LIDOCAINE 1 PATCH: 4 CREAM TOPICAL at 06:18

## 2023-10-19 RX ADMIN — Medication 1 DROP(S): at 11:16

## 2023-10-19 RX ADMIN — PANTOPRAZOLE SODIUM 40 MILLIGRAM(S): 20 TABLET, DELAYED RELEASE ORAL at 05:03

## 2023-10-19 RX ADMIN — GABAPENTIN 300 MILLIGRAM(S): 400 CAPSULE ORAL at 14:09

## 2023-10-19 RX ADMIN — LACTULOSE 10 GRAM(S): 10 SOLUTION ORAL at 11:15

## 2023-10-19 RX ADMIN — BIMATOPROST 1 DROP(S): 0.3 SOLUTION/ DROPS OPHTHALMIC at 21:15

## 2023-10-19 RX ADMIN — SENNA PLUS 2 TABLET(S): 8.6 TABLET ORAL at 21:14

## 2023-10-19 RX ADMIN — Medication 1 DROP(S): at 05:03

## 2023-10-19 NOTE — PROGRESS NOTE ADULT - SUBJECTIVE AND OBJECTIVE BOX
INTERVAL HPI/OVERNIGHT EVENTS:  Awake and alert ;  Had a great night sleep last night   Facial nerve findings improved   Able to blink  Swallowing stable;  Respiratory stable       MEDICATIONS  (STANDING):  artificial tears (preservative free) Ophthalmic Solution 1 Drop(s) Both EYES every 6 hours  bimatoprost 0.01% Ophthalmic Solution 1 Drop(s) Both EYES at bedtime  enoxaparin Injectable 40 milliGRAM(s) SubCutaneous every 24 hours  gabapentin 600 milliGRAM(s) Oral every 24 hours  gabapentin 300 milliGRAM(s) Oral <User Schedule>  lactulose Syrup 10 Gram(s) Oral daily  lidocaine   4% Patch 1 Patch Transdermal every 24 hours  pantoprazole    Tablet 40 milliGRAM(s) Oral every 24 hours  petrolatum Ophthalmic Ointment 1 Application(s) Both EYES daily  polyethylene glycol 3350 17 Gram(s) Oral every 12 hours  senna 2 Tablet(s) Oral at bedtime    MEDICATIONS  (PRN):  acetaminophen     Tablet .. 650 milliGRAM(s) Oral every 12 hours PRN Temp greater or equal to 38C (100.4F), Mild Pain (1 - 3)  aluminum hydroxide/magnesium hydroxide/simethicone Suspension 30 milliLiter(s) Oral every 4 hours PRN Dyspepsia  diphenhydrAMINE 25 milliGRAM(s) Oral daily PRN Allergy symptoms  ibuprofen  Tablet. 600 milliGRAM(s) Oral every 12 hours PRN Temp greater or equal to 38C (100.4F), Mild Pain (1 - 3)  ketorolac   Injectable 15 milliGRAM(s) IV Push every 12 hours PRN Severe Pain (7 - 10)  melatonin 3 milliGRAM(s) Oral at bedtime PRN Insomnia  morphine  - Injectable 2 milliGRAM(s) IV Push every 4 hours PRN Moderate Pain (4 - 6)  morphine  - Injectable 4 milliGRAM(s) IV Push every 4 hours PRN Severe Pain (7 - 10)  ondansetron Injectable 4 milliGRAM(s) IV Push every 8 hours PRN Nausea and/or Vomiting      Allergies    No Known Allergies    Intolerances        Vital Signs Last 24 Hrs  T(C): 37 (19 Oct 2023 09:15), Max: 37 (18 Oct 2023 18:56)  T(F): 98.6 (19 Oct 2023 09:15), Max: 98.6 (18 Oct 2023 18:56)  HR: 90 (19 Oct 2023 08:30) (78 - 112)  BP: 108/64 (19 Oct 2023 08:30) (105/67 - 145/81)  BP(mean): 81 (19 Oct 2023 08:30) (75 - 108)  RR: 21 (19 Oct 2023 08:30) (17 - 41)  SpO2: 94% (19 Oct 2023 08:30) (94% - 97%)    Parameters below as of 19 Oct 2023 08:30  Patient On (Oxygen Delivery Method): room air              Constitutional:   Awake     Eyes: EMILY    ENMT: Negative    Neck: Supple    Back:  no tenderness     Respiratory:  clear     Cardiovascular: S1 S2    Gastrointestinal: soft     Genitourinary:    Extremities:  no edema     Vascular:    Neurological: Improved     Skin:    Lymph Nodes:            10-18 @ 07:01  -  10-19 @ 07:00  --------------------------------------------------------  IN: 0 mL / OUT: 700 mL / NET: -700 mL      LABS:                        14.0   3.78  )-----------( 167      ( 19 Oct 2023 05:30 )             42.2     10-19    134<L>  |  99  |  19  ----------------------------<  103<H>  4.0   |  25  |  0.61    Ca    8.9      19 Oct 2023 05:30  Phos  4.6     10-19  Mg     2.2     10-19    TPro  8.1  /  Alb  3.3  /  TBili  0.3  /  DBili  x   /  AST  111<H>  /  ALT  121<H>  /  AlkPhos  67  10-19      Urinalysis Basic - ( 19 Oct 2023 05:30 )    Color: x / Appearance: x / SG: x / pH: x  Gluc: 103 mg/dL / Ketone: x  / Bili: x / Urobili: x   Blood: x / Protein: x / Nitrite: x   Leuk Esterase: x / RBC: x / WBC x   Sq Epi: x / Non Sq Epi: x / Bacteria: x        RADIOLOGY & ADDITIONAL TESTS:

## 2023-10-19 NOTE — PROGRESS NOTE ADULT - PROBLEM SELECTOR PLAN 1
Progressive weakness in lower limbs over 3 days with continued urinary incontinence. Last admission 10/08 with MRI and CT didn't reveal acute pathology, only showed broad C6-7 disc bulging w/o any spinal compression, no contrast enchainment. Imaging also showed mediastinal LN. Pt on methylprednisolone 4mg for 6 day (on day 3/6- 3 pills 10/12, 2 pills 10/13, 1 pill 10/14).  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  s/p EMG and LP, suspecting GBS  Mr head: No acute infarct or other acute abnormality. No abnormal enhancement  MRI of lumbar spine on 10/14:  compatible with active neurosarcoidosis, other etiologies include Guillain-[place] syndrome and other inflammatory,  infectious, or neoplastic causes.  10/14: Vital Capacity 1130, 1510  10/15: VC 1340, f/u q4h   10/17: VC 2250  10/18: VC 3951-8676, NIF -30  Plan  - f/u neurology recs   - f/u Vital capacity q4h, should be greater than 20ml/kg  - neuro consulted, recommendation appreciated  - s/p IVIG 30g qd, infused over 6hr for 4 days  - PT consulted  - hold steroid course.

## 2023-10-19 NOTE — PROGRESS NOTE ADULT - PROBLEM SELECTOR PLAN 10
N: regular w/ Ensure  E: Mg <2, Phosp <3, K <4  DVT ppx: Lovenox  GI: Protonix 40mg PO qd  C: Full Code  D: Telemetry

## 2023-10-19 NOTE — PROGRESS NOTE ADULT - ASSESSMENT
63 year old female with PMH of OA, GERD, sarcoidosis, migraines presenting with worsening back pain associated bilateral upper and lower extremities numbness and tingling in distal parts of all her extremities for the last week. Her neurological exam is worsened from yesterday, with more pronounced facial bulbar symptoms, progressive LE weakness, and now absent LE reflexes. Of note, had flu shot 2 weeks prior to presentation. MRI C and T spine w and w/o contrast was performed which didn't reveal acute pathology, only showed broad C6-7 disc bulging w/o any spinal compression, no contrast enchainment. Her hx of probable sarcoidosis, enlargement of mediastinal LN, unintentional weight loss needs oncological w/up. MRI L-spine showed extensive leptomeningeal enhancement along the periphery of the   conus medullaris with enhancement of the cauda equina nerve roots. compatible with active neurosarcoidosis, or Guillain-[place] syndrome and other inflammatory, infectious, or neoplastic causes.  CPK, ESR, CRP was unremarkable. A1C prediabetic. 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 There has previously been some evidence of disease progression after the IVIG, with involvement of CN 7 and 10, as well as blurred vision on 10/17 but since 10/18 symptoms are stable, possibly plateaued with resolution of neck weakness, improvement in facial weakness,     Recommendations:     - Can c/w gabapentin to 300mg/300mg/600mg   - Elevated BP noted. Dysautonomia is an expected complication, continue anti-hypertensive to goal SBP <140  - C/w artifical tears and petrolatum Ophthalmic ointment b/l eyes  - Continue NIF and vital capacity Q4hrs   - Transaminitis noted. While this may be the result of IVIG, would eliminate Tylenol as pain is likely neuropathic  - Completed IVIG 500mg/kg x4 days      Case discussed with Dr. Gonsalves.  Thank you for the courtesy of this consult. Will continue to follow.       63 year old female with PMH of OA, GERD, sarcoidosis, migraines presenting with worsening back pain associated bilateral upper and lower extremities numbness and tingling in distal parts of all her extremities for the last week. Her neurological exam is worsened from yesterday, with more pronounced facial bulbar symptoms, progressive LE weakness, and now absent LE reflexes. Of note, had flu shot 2 weeks prior to presentation. MRI C and T spine w and w/o contrast was performed which didn't reveal acute pathology, only showed broad C6-7 disc bulging w/o any spinal compression, no contrast enchainment. Her hx of probable sarcoidosis, enlargement of mediastinal LN, unintentional weight loss needs oncological w/up. MRI L-spine showed extensive leptomeningeal enhancement along the periphery of the   conus medullaris with enhancement of the cauda equina nerve roots. compatible with active neurosarcoidosis, or Guillain-[place] syndrome and other inflammatory, infectious, or neoplastic causes.  CPK, ESR, CRP was unremarkable. A1C prediabetic. 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 There has previously been some evidence of disease progression after the IVIG, with involvement of CN 7 and 10, as well as blurred vision on 10/17 but since 10/18 symptoms are stable, possibly plateaued with resolution of neck weakness, improvement in facial weakness,     Recommendations:     - Can c/w gabapentin to 300mg/300mg/600mg   - Elevated BP noted. Dysautonomia is an expected complication, continue anti-hypertensive to goal SBP <140  - C/w artifical tears and petrolatum Ophthalmic ointment b/l eyes  - Continue NIF and vital capacity Q4hrs   - Transaminitis noted. While this may be the result of IVIG, would eliminate Tylenol as pain is likely neuropathic  - Completed IVIG 500mg/kg x4 days      Case discussed with Dr. Gonsalves.  Thank you for the courtesy of this consult. Will continue to follow.

## 2023-10-19 NOTE — PROGRESS NOTE ADULT - ASSESSMENT
Neurology    63 year old female with PMH of OA, GERD, sarcoidosis, migraines presenting with worsening back pain associated bilateral upper and lower extremities numbness and tingling in distal parts of all her extremities for the last week. Her neurological exam is worsened from yesterday, with more pronounced facial bulbar symptoms, progressive LE weakness, and now absent LE reflexes. Of note, had flu shot 2 weeks prior to presentation. MRI C and T spine w and w/o contrast was performed which didn't reveal acute pathology, only showed broad C6-7 disc bulging w/o any spinal compression, no contrast enchainment. Her hx of probable sarcoidosis, enlargement of mediastinal LN, unintentional weight loss needs oncological w/up. MRI L-spine showed extensive leptomeningeal enhancement along the periphery of the   conus medullaris with enhancement of the cauda equina nerve roots. compatible with active neurosarcoidosis, or Guillain-[place] syndrome and other inflammatory, infectious, or neoplastic causes.  CPK, ESR, CRP was unremarkable. A1C prediabetic. 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 There has previously been some evidence of disease progression, with involvement of CN 7 and 10, as well as blurred vision on 10/17 but today symptoms are stable, possibly plateaued with improvements in neck and finger strength today.    Recommendations:   - Transaminitis noted. While this may be the result of IVIG, would eliminate Tylenol as pain is likely neuropathic  - Can increase gabapentin to 300mg/300mg/600mg   - Elevated BP noted. 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 Gonsalves.  Thank you for the courtesy of this consult. Will continue to follow.

## 2023-10-19 NOTE — PROGRESS NOTE ADULT - SUBJECTIVE AND OBJECTIVE BOX
Neurology Progress Note    Interval History:  The patient was seen and examined at the bedside.       PAST MEDICAL & SURGICAL HISTORY:  Ovarian cyst    GERD (gastroesophageal reflux disease)    Glaucoma    Sarcoidosis    Sinusitis    Osteoporosis    Migraines    Hashimoto's disease    Kidney stones    Torn meniscus  right    History of arthroscopy of shoulder  rotator cuff repair, right    History of umbilical hernia repair    S/P correction of deviated nasal septum    H/O sinus surgery            Medications:  aluminum hydroxide/magnesium hydroxide/simethicone Suspension 30 milliLiter(s) Oral every 4 hours PRN  artificial tears (preservative free) Ophthalmic Solution 1 Drop(s) Both EYES every 6 hours  bimatoprost 0.01% Ophthalmic Solution 1 Drop(s) Both EYES at bedtime  diphenhydrAMINE 25 milliGRAM(s) Oral daily PRN  enoxaparin Injectable 40 milliGRAM(s) SubCutaneous every 24 hours  gabapentin 600 milliGRAM(s) Oral every 24 hours  gabapentin 300 milliGRAM(s) Oral <User Schedule>  ibuprofen  Tablet. 600 milliGRAM(s) Oral every 12 hours PRN  ketorolac   Injectable 15 milliGRAM(s) IV Push every 12 hours PRN  lactulose Syrup 10 Gram(s) Oral daily  lidocaine   4% Patch 1 Patch Transdermal every 24 hours  melatonin 3 milliGRAM(s) Oral at bedtime PRN  morphine  - Injectable 4 milliGRAM(s) IV Push every 4 hours PRN  morphine  - Injectable 2 milliGRAM(s) IV Push every 4 hours PRN  ondansetron Injectable 4 milliGRAM(s) IV Push every 8 hours PRN  pantoprazole    Tablet 40 milliGRAM(s) Oral every 24 hours  petrolatum Ophthalmic Ointment 1 Application(s) Both EYES daily  polyethylene glycol 3350 17 Gram(s) Oral every 12 hours  senna 2 Tablet(s) Oral at bedtime      Vital Signs Last 24 Hrs  T(C): 37.2 (19 Oct 2023 13:44), Max: 37.2 (19 Oct 2023 13:44)  T(F): 98.9 (19 Oct 2023 13:44), Max: 98.9 (19 Oct 2023 13:44)  HR: 97 (19 Oct 2023 13:18) (78 - 112)  BP: 148/89 (19 Oct 2023 13:18) (90/57 - 155/88)  BP(mean): 112 (19 Oct 2023 13:18) (69 - 114)  RR: 18 (19 Oct 2023 13:18) (17 - 41)  SpO2: 96% (19 Oct 2023 13:18) (94% - 97%)    Parameters below as of 19 Oct 2023 13:18  Patient On (Oxygen Delivery Method): room air      NEUROLOGICAL EXAMINATION:  GENERAL:  Appearance is consistent with chronologic age. Able to count to 30 in one breath.    COGNITION/LANGUAGE:  Awake, alert, and oriented to person, place, time and date. Recent and remote memory intact.  Fund of knowledge is appropriate. No aphasia. Mildly dysarthric.    CRANIAL NERVES:   - Eyes:  Pupils equal round and reactive, no RAPD. EOMI w/o nystagmus, skew or reported double vision. Normal visual field.  - severe L>R facial weakness, upper and lower - now able to close R eye, slight facial creases with smile b/l. Still unable to close L eye. Marked R uvula deviation, weak b/l palate elevation    MOTOR EXAM:                              Right | Left    Shoulder abductors:    4|5   Shoulder adductors:    5|5   Elbow flexors:             5|5   Elbow extensors:         5|5   Palmar flexion:            5|5   Palmar dorsiflexion:     5|5   Finger Abduction         5|5  Finger Adduction         5|5  Hip flexors:                4|4   Hip extensors:            5|5   Knee extensors:          5|5   Knee flexors:             4|4   Foot dorsiflexors:        4|4   Foot plantarflexors:     5|5      DTRs:             Right | Left   Biceps:                 1+|1+     Triceps:                1+|1+   Brachioradialis:     1+|1+     Patellar:                0 | 0   Achilles:                0 | 0     Plantar responses mute b/l.  Head flexion is 5/5. Extension is 5/5.       No observable drift. Normal tone and bulk. No tenderness, twitching, tremors or involuntary movements.  SENSORY EXAM:  diminished vibration in toes and ankles b/l   REFLEXES: .  Plantar response mute b/l.  Jaw jerk, [place], clonus absent.  CEREBELLUM:  Finger to nose intact.  No dysmetria.      Labs:  CBC Full  -  ( 19 Oct 2023 05:30 )  WBC Count : 3.78 K/uL  RBC Count : 5.38 M/uL  Hemoglobin : 14.0 g/dL  Hematocrit : 42.2 %  Platelet Count - Automated : 167 K/uL  Mean Cell Volume : 78.4 fl  Mean Cell Hemoglobin : 26.0 pg  Mean Cell Hemoglobin Concentration : 33.2 gm/dL  Auto Neutrophil # : 2.11 K/uL  Auto Lymphocyte # : 1.01 K/uL  Auto Monocyte # : 0.47 K/uL  Auto Eosinophil # : 0.14 K/uL  Auto Basophil # : 0.04 K/uL  Auto Neutrophil % : 55.8 %  Auto Lymphocyte % : 26.7 %  Auto Monocyte % : 12.4 %  Auto Eosinophil % : 3.7 %  Auto Basophil % : 1.1 %    10-19    134<L>  |  99  |  19  ----------------------------<  103<H>  4.0   |  25  |  0.61    Ca    8.9      19 Oct 2023 05:30  Phos  4.6     10-19  Mg     2.2     10-19    TPro  8.1  /  Alb  3.3  /  TBili  0.3  /  DBili  x   /  AST  111<H>  /  ALT  121<H>  /  AlkPhos  67  10-19    LIVER FUNCTIONS - ( 19 Oct 2023 05:30 )  Alb: 3.3 g/dL / Pro: 8.1 g/dL / ALK PHOS: 67 U/L / ALT: 121 U/L / AST: 111 U/L / GGT: x             Urinalysis Basic - ( 19 Oct 2023 05:30 )    Color: x / Appearance: x / SG: x / pH: x  Gluc: 103 mg/dL / Ketone: x  / Bili: x / Urobili: x   Blood: x / Protein: x / Nitrite: x   Leuk Esterase: x / RBC: x / WBC x   Sq Epi: x / Non Sq Epi: x / Bacteria: x        Assessment:  This is a 63y Female w/ h/o     Plan:

## 2023-10-19 NOTE — PROGRESS NOTE ADULT - PROBLEM SELECTOR PLAN 2
Progressive weakness in lower limbs over last 3 days with continued urinary incontinence. Last admission 10/08 with MRI and CT didn't reveal acute pathology, only showed broad C6-7 disc bulging w/o any spinal compression, no contrast enchainment. Imaging also showed mediastinal LN. Pt on methylprednisolone 4mg for 6 day (on day 3/6- 3 pills 10/12, 2 pills 10/13, 1 pill 10/14).  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 s/p EMG and LP, suspecting GBS.     Plan  -s/p IVIG 30g qd, infuse over 6hr for 4 days  - f/u neuro rec  - PT consulted  - hold steroid course.

## 2023-10-19 NOTE — PROGRESS NOTE ADULT - SUBJECTIVE AND OBJECTIVE BOX
OVERNIGHT EVENTS: ALBAN    SUBJECTIVE:  Patient seen and examined at bedside. No complaints, asking if they can be discharged home instead of stepping down to floor.  No fever, chills, dizziness, headache, changes in vision, chest pain, dyspnea, nausea or vomiting, dysuria, changes in bowel movements, LE edema. Rest of 12 point Review of systems negative unless otherwise documented elsewhere in note.     Vital Signs Last 12 Hrs  T(F): 98.6 (10-19-23 @ 09:15), Max: 98.6 (10-19-23 @ 09:15)  HR: 109 (10-19-23 @ 11:30) (86 - 109)  BP: 155/88 (10-19-23 @ 11:30) (90/57 - 155/88)  BP(mean): 114 (10-19-23 @ 11:30) (69 - 114)  RR: 18 (10-19-23 @ 11:30) (17 - 21)  SpO2: 94% (10-19-23 @ 08:30) (94% - 97%)  I&O's Summary    18 Oct 2023 07:01  -  19 Oct 2023 07:00  --------------------------------------------------------  IN: 0 mL / OUT: 700 mL / NET: -700 mL    19 Oct 2023 07:01  -  19 Oct 2023 13:06  --------------------------------------------------------  IN: 375 mL / OUT: 800 mL / NET: -425 mL        PHYSICAL EXAM:  Constitutional: NAD, comfortable in bed.  HEENT: NC/AT, PERRLA, EOMI, no conjunctival pallor or scleral icterus, MMM  Neck: Supple, no JVD  Respiratory: CTA B/L. No w/r/r.   Cardiovascular: RRR, normal S1 and S2, no m/r/g.   Gastrointestinal: +BS, soft NTND, no guarding or rebound tenderness, no palpable masses   Extremities: wwp; no cyanosis, clubbing or edema.   Vascular: Pulses equal and strong throughout.   Neurological: AAOx3, mild dysarthria, left facial droop, could not elevate both eyebrows.  Skin: No gross skin abnormalities or rashes        LABS:                        14.0   3.78  )-----------( 167      ( 19 Oct 2023 05:30 )             42.2     10-19    134<L>  |  99  |  19  ----------------------------<  103<H>  4.0   |  25  |  0.61    Ca    8.9      19 Oct 2023 05:30  Phos  4.6     10-19  Mg     2.2     10-19    TPro  8.1  /  Alb  3.3  /  TBili  0.3  /  DBili  x   /  AST  111<H>  /  ALT  121<H>  /  AlkPhos  67  10-19      Urinalysis Basic - ( 19 Oct 2023 05:30 )    Color: x / Appearance: x / SG: x / pH: x  Gluc: 103 mg/dL / Ketone: x  / Bili: x / Urobili: x   Blood: x / Protein: x / Nitrite: x   Leuk Esterase: x / RBC: x / WBC x   Sq Epi: x / Non Sq Epi: x / Bacteria: x          RADIOLOGY & ADDITIONAL TESTS:    MEDICATIONS  (STANDING):  artificial tears (preservative free) Ophthalmic Solution 1 Drop(s) Both EYES every 6 hours  bimatoprost 0.01% Ophthalmic Solution 1 Drop(s) Both EYES at bedtime  enoxaparin Injectable 40 milliGRAM(s) SubCutaneous every 24 hours  gabapentin 300 milliGRAM(s) Oral <User Schedule>  gabapentin 600 milliGRAM(s) Oral every 24 hours  lactulose Syrup 10 Gram(s) Oral daily  lidocaine   4% Patch 1 Patch Transdermal every 24 hours  pantoprazole    Tablet 40 milliGRAM(s) Oral every 24 hours  petrolatum Ophthalmic Ointment 1 Application(s) Both EYES daily  polyethylene glycol 3350 17 Gram(s) Oral every 12 hours  senna 2 Tablet(s) Oral at bedtime    MEDICATIONS  (PRN):  acetaminophen     Tablet .. 650 milliGRAM(s) Oral every 12 hours PRN Temp greater or equal to 38C (100.4F), Mild Pain (1 - 3)  aluminum hydroxide/magnesium hydroxide/simethicone Suspension 30 milliLiter(s) Oral every 4 hours PRN Dyspepsia  diphenhydrAMINE 25 milliGRAM(s) Oral daily PRN Allergy symptoms  ibuprofen  Tablet. 600 milliGRAM(s) Oral every 12 hours PRN Temp greater or equal to 38C (100.4F), Mild Pain (1 - 3)  ketorolac   Injectable 15 milliGRAM(s) IV Push every 12 hours PRN Severe Pain (7 - 10)  melatonin 3 milliGRAM(s) Oral at bedtime PRN Insomnia  morphine  - Injectable 2 milliGRAM(s) IV Push every 4 hours PRN Moderate Pain (4 - 6)  morphine  - Injectable 4 milliGRAM(s) IV Push every 4 hours PRN Severe Pain (7 - 10)  ondansetron Injectable 4 milliGRAM(s) IV Push every 8 hours PRN Nausea and/or Vomiting

## 2023-10-19 NOTE — PROGRESS NOTE ADULT - PROBLEM SELECTOR PLAN 3
Progressive weakness in lower limbs over last 3 days with continued urinary incontinence. Last admission 10/08 with MRI and CT didn't reveal acute pathology, only showed broad C6-7 disc bulging w/o any spinal compression, no contrast enchainment. Imaging also showed mediastinal LN. Pt on methylprednisolone 4mg for 6 day (on day 3/6- 3 pills 10/12, 2 pills 10/13, 1 pill 10/14).  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 s/p EMG and LP, suspecting GBS.     Plan  - s/p IVIG 30g qd, infuse over 6hr for 4 days  - increased gabapentin to 600mg TID  - decreased acetaminophen dosage to 650mg q12h PRN mild pain given transaminitis  - start ibuprofen 600 q12h PRN mild pain, alternating with acetaminophen  - start Toradol 15mg IV for breakthrough pain  - PT consulted  - hold steroid course

## 2023-10-19 NOTE — PROGRESS NOTE ADULT - TIME BILLING
Patient seen and examined;  Discussed with case management ;  Will plan discharge for Saturday   Increased fluids to 2 liters Patient seen and examined;  Discussed with case management ;  Will plan discharge for Saturday   Increased fluids to 2 liters  LFT elevation noted;  Could be from IVIG ; Continue to follow

## 2023-10-19 NOTE — PROGRESS NOTE ADULT - SUBJECTIVE AND OBJECTIVE BOX
Physical Medicine and Rehabilitation Progress Note :       Patient is a 63y old  Female who presents with a chief complaint of lower limb weakness (19 Oct 2023 10:33)      HPI:  63 year old female with PMH of OA, GERD, and sarcoidosis (diagnosed in 2008) presenting with worsening back pain and progressive lower limb weakness for the last three days. Pt was recently admitted on 10/8/23 for lower and upper extremity numbness and tingling. At the time, MRI and CT lumbar and thoracic which didn't reveal acute pathology, only showed broad C6-7 disc bulging w/o any spinal compression, no contrast enchainment. Imaging also showed mediastinal LN. Today, pt reports 9/10 tearing in quality lower back pain, increased weakness as she was unable to get off the toilet on her own. She also had to use a walker as she felt like she would fall otherwise. Pt also reports urinary incontinence during the day, reports no episodes of incontinence overnight. Pt endorses occipital headache and nausea. Last BM on sunday. Pt denies chest pain, sob, abd pain.  (11 Oct 2023 12:40)                            14.0   3.78  )-----------( 167      ( 19 Oct 2023 05:30 )             42.2       10-19    134<L>  |  99  |  19  ----------------------------<  103<H>  4.0   |  25  |  0.61    Ca    8.9      19 Oct 2023 05:30  Phos  4.6     10-19  Mg     2.2     10-19    TPro  8.1  /  Alb  3.3  /  TBili  0.3  /  DBili  x   /  AST  111<H>  /  ALT  121<H>  /  AlkPhos  67  10-19    Vital Signs Last 24 Hrs  T(C): 37 (19 Oct 2023 09:15), Max: 37 (18 Oct 2023 18:56)  T(F): 98.6 (19 Oct 2023 09:15), Max: 98.6 (18 Oct 2023 18:56)  HR: 109 (19 Oct 2023 11:30) (78 - 112)  BP: 155/88 (19 Oct 2023 11:30) (90/57 - 155/88)  BP(mean): 114 (19 Oct 2023 11:30) (69 - 114)  RR: 18 (19 Oct 2023 11:30) (17 - 41)  SpO2: 94% (19 Oct 2023 08:30) (94% - 97%)    Parameters below as of 19 Oct 2023 08:30  Patient On (Oxygen Delivery Method): room air        MEDICATIONS  (STANDING):  artificial tears (preservative free) Ophthalmic Solution 1 Drop(s) Both EYES every 6 hours  bimatoprost 0.01% Ophthalmic Solution 1 Drop(s) Both EYES at bedtime  enoxaparin Injectable 40 milliGRAM(s) SubCutaneous every 24 hours  gabapentin 300 milliGRAM(s) Oral <User Schedule>  gabapentin 600 milliGRAM(s) Oral every 24 hours  lactulose Syrup 10 Gram(s) Oral daily  lidocaine   4% Patch 1 Patch Transdermal every 24 hours  pantoprazole    Tablet 40 milliGRAM(s) Oral every 24 hours  petrolatum Ophthalmic Ointment 1 Application(s) Both EYES daily  polyethylene glycol 3350 17 Gram(s) Oral every 12 hours  senna 2 Tablet(s) Oral at bedtime    MEDICATIONS  (PRN):  acetaminophen     Tablet .. 650 milliGRAM(s) Oral every 12 hours PRN Temp greater or equal to 38C (100.4F), Mild Pain (1 - 3)  aluminum hydroxide/magnesium hydroxide/simethicone Suspension 30 milliLiter(s) Oral every 4 hours PRN Dyspepsia  diphenhydrAMINE 25 milliGRAM(s) Oral daily PRN Allergy symptoms  ibuprofen  Tablet. 600 milliGRAM(s) Oral every 12 hours PRN Temp greater or equal to 38C (100.4F), Mild Pain (1 - 3)  ketorolac   Injectable 15 milliGRAM(s) IV Push every 12 hours PRN Severe Pain (7 - 10)  melatonin 3 milliGRAM(s) Oral at bedtime PRN Insomnia  morphine  - Injectable 4 milliGRAM(s) IV Push every 4 hours PRN Severe Pain (7 - 10)  morphine  - Injectable 2 milliGRAM(s) IV Push every 4 hours PRN Moderate Pain (4 - 6)  ondansetron Injectable 4 milliGRAM(s) IV Push every 8 hours PRN Nausea and/or Vomiting          Functional Status Assessment :         Pain Assessment/Number Scale (0-10) Adult  Presence of Pain: denies pain/discomfort (Rating = 0)  Pain Rating (0-10): Rest: 0 (no pain/absence of nonverbal indicators of pain)  Pain Rating (0-10): Activity: 0 (no pain/absence of nonverbal indicators of pain)    Safety      [facility] Functional Assessment: Basic Mobility  Type of Assessment: Daily assessment  Turning from your back to your side while in a flat bed without using bedrails?: 3 = A little assistance  Moving from lying on your back to sitting on the flat side of a flat bed without using bedrails?: 3 = A little assistance  Moving to and from a bed to a chair (including a wheelchair)?: 3 = A little assistance  Standing up from a chair using your arms (e.g. wheelchair or bedside chair)?: 3 = A little assistance  Walking in hospital room?: 3 = A little assistance  Climbing 3-5 steps with a railing?: 2 = A lot of assistance  Score: 17   Row Comment: Ask the patient "How much help from another person do you currently need? (If the patient hasn't done an activity recently, how much help from another person do you think he/she needs if he/she tried?)    Cognitive/Neuro      Cognitive/Neuro/Behavioral  Cognitive/Neuro/Behavioral [WDL Definition: Alert; opens eyes spontaneously; arouses to voice or touch; oriented x 4; follows commands; speech spontaneous, logical; purposeful motor response; behavior appropriate to situation]: WDL except  Speech: dysarthric    Language Assistance  Preferred Language to Address Healthcare Preferred Language to Address Healthcare: English    Therapeutic Interventions      Bed Mobility  Bed Mobility Training Sit-to-Supine: stand-by assist  Bed Mobility Training Limitations: decreased ability to use arms for pushing/pulling;  decreased ability to use legs for bridging/pushing;  impaired ability to control trunk for mobility;  utilized her UEs to lift LEs into bed    Sit-Stand Transfer Training  Transfer Training Sit-to-Stand Transfer: minimum assist (75% patient effort);  contact guard;  verbal cues;  nonverbal cues (demo/gestures);  1 person assist;  rolling walker  Transfer Training Stand-to-Sit Transfer: minimum assist (75% patient effort);  verbal cues;  nonverbal cues (demo/gestures);  rolling walker  Sit-to-Stand Transfer Training Transfer Safety Analysis: decreased weight-shifting ability;  decreased strength;  impaired balance;  impaired postural control;  static stand with contact guard/min assist, increased to min/mod as she fatigued and had decreased hip control/stability    Gait Training  Gait Training: minimum assist (75% patient effort);  verbal cues;  nonverbal cues (demo/gestures);  rolling walker;  10 feet;  x2  Gait Analysis: decreased balance, narrow base of support, reciprocal gait, no buckling however decrease hip and knee stability as she fatigued, 1 seated rest break    Therapeutic Exercise  Therapeutic Exercise Detail: bilateral shoulder flex 4-/5, bilateral elbow flex 4-/5, bilateral elbow ext 4-/5, right hip flex, knee ext and DF 4/5, left hip flex, knee ext 3+/5, DF 4/5 standing marches x 10 with focus on hip/knee stability + eccentric control, standing tandem stance balance activity with eyes closed (unable to fully close)- no UE support ~ 30 seconds, bilateral UE reaching for target in standing x 5+reported numbness bilateral LEs lower leg to feet           PM&R Impression : as above    Current disposition plan recommendation :    acute rehab placement

## 2023-10-20 DIAGNOSIS — R74.01 ELEVATION OF LEVELS OF LIVER TRANSAMINASE LEVELS: ICD-10-CM

## 2023-10-20 LAB
ANION GAP SERPL CALC-SCNC: 10 MMOL/L — SIGNIFICANT CHANGE UP (ref 5–17)
ANION GAP SERPL CALC-SCNC: 10 MMOL/L — SIGNIFICANT CHANGE UP (ref 5–17)
BUN SERPL-MCNC: 18 MG/DL — SIGNIFICANT CHANGE UP (ref 7–23)
BUN SERPL-MCNC: 18 MG/DL — SIGNIFICANT CHANGE UP (ref 7–23)
CALCIUM SERPL-MCNC: 9 MG/DL — SIGNIFICANT CHANGE UP (ref 8.4–10.5)
CALCIUM SERPL-MCNC: 9 MG/DL — SIGNIFICANT CHANGE UP (ref 8.4–10.5)
CHLORIDE SERPL-SCNC: 102 MMOL/L — SIGNIFICANT CHANGE UP (ref 96–108)
CHLORIDE SERPL-SCNC: 102 MMOL/L — SIGNIFICANT CHANGE UP (ref 96–108)
CO2 SERPL-SCNC: 25 MMOL/L — SIGNIFICANT CHANGE UP (ref 22–31)
CO2 SERPL-SCNC: 25 MMOL/L — SIGNIFICANT CHANGE UP (ref 22–31)
CREAT SERPL-MCNC: 0.6 MG/DL — SIGNIFICANT CHANGE UP (ref 0.5–1.3)
CREAT SERPL-MCNC: 0.6 MG/DL — SIGNIFICANT CHANGE UP (ref 0.5–1.3)
EGFR: 101 ML/MIN/1.73M2 — SIGNIFICANT CHANGE UP
EGFR: 101 ML/MIN/1.73M2 — SIGNIFICANT CHANGE UP
GLUCOSE SERPL-MCNC: 109 MG/DL — HIGH (ref 70–99)
GLUCOSE SERPL-MCNC: 109 MG/DL — HIGH (ref 70–99)
HCT VFR BLD CALC: 38.6 % — SIGNIFICANT CHANGE UP (ref 34.5–45)
HCT VFR BLD CALC: 38.6 % — SIGNIFICANT CHANGE UP (ref 34.5–45)
HGB BLD-MCNC: 12.8 G/DL — SIGNIFICANT CHANGE UP (ref 11.5–15.5)
HGB BLD-MCNC: 12.8 G/DL — SIGNIFICANT CHANGE UP (ref 11.5–15.5)
MAGNESIUM SERPL-MCNC: 2.3 MG/DL — SIGNIFICANT CHANGE UP (ref 1.6–2.6)
MAGNESIUM SERPL-MCNC: 2.3 MG/DL — SIGNIFICANT CHANGE UP (ref 1.6–2.6)
MCHC RBC-ENTMCNC: 25.8 PG — LOW (ref 27–34)
MCHC RBC-ENTMCNC: 25.8 PG — LOW (ref 27–34)
MCHC RBC-ENTMCNC: 33.2 GM/DL — SIGNIFICANT CHANGE UP (ref 32–36)
MCHC RBC-ENTMCNC: 33.2 GM/DL — SIGNIFICANT CHANGE UP (ref 32–36)
MCV RBC AUTO: 77.8 FL — LOW (ref 80–100)
MCV RBC AUTO: 77.8 FL — LOW (ref 80–100)
NRBC # BLD: 0 /100 WBCS — SIGNIFICANT CHANGE UP (ref 0–0)
NRBC # BLD: 0 /100 WBCS — SIGNIFICANT CHANGE UP (ref 0–0)
PHOSPHATE SERPL-MCNC: 3.9 MG/DL — SIGNIFICANT CHANGE UP (ref 2.5–4.5)
PHOSPHATE SERPL-MCNC: 3.9 MG/DL — SIGNIFICANT CHANGE UP (ref 2.5–4.5)
PLATELET # BLD AUTO: 174 K/UL — SIGNIFICANT CHANGE UP (ref 150–400)
PLATELET # BLD AUTO: 174 K/UL — SIGNIFICANT CHANGE UP (ref 150–400)
POTASSIUM SERPL-MCNC: 3.6 MMOL/L — SIGNIFICANT CHANGE UP (ref 3.5–5.3)
POTASSIUM SERPL-MCNC: 3.6 MMOL/L — SIGNIFICANT CHANGE UP (ref 3.5–5.3)
POTASSIUM SERPL-SCNC: 3.6 MMOL/L — SIGNIFICANT CHANGE UP (ref 3.5–5.3)
POTASSIUM SERPL-SCNC: 3.6 MMOL/L — SIGNIFICANT CHANGE UP (ref 3.5–5.3)
PYRIDOXAL PHOS SERPL-MCNC: 7.9 UG/L — SIGNIFICANT CHANGE UP (ref 3.4–65.2)
PYRIDOXAL PHOS SERPL-MCNC: 7.9 UG/L — SIGNIFICANT CHANGE UP (ref 3.4–65.2)
RBC # BLD: 4.96 M/UL — SIGNIFICANT CHANGE UP (ref 3.8–5.2)
RBC # BLD: 4.96 M/UL — SIGNIFICANT CHANGE UP (ref 3.8–5.2)
RBC # FLD: 14.7 % — HIGH (ref 10.3–14.5)
RBC # FLD: 14.7 % — HIGH (ref 10.3–14.5)
SODIUM SERPL-SCNC: 137 MMOL/L — SIGNIFICANT CHANGE UP (ref 135–145)
SODIUM SERPL-SCNC: 137 MMOL/L — SIGNIFICANT CHANGE UP (ref 135–145)
WBC # BLD: 3.62 K/UL — LOW (ref 3.8–10.5)
WBC # BLD: 3.62 K/UL — LOW (ref 3.8–10.5)
WBC # FLD AUTO: 3.62 K/UL — LOW (ref 3.8–10.5)
WBC # FLD AUTO: 3.62 K/UL — LOW (ref 3.8–10.5)

## 2023-10-20 PROCEDURE — 99232 SBSQ HOSP IP/OBS MODERATE 35: CPT

## 2023-10-20 PROCEDURE — 99232 SBSQ HOSP IP/OBS MODERATE 35: CPT | Mod: GC

## 2023-10-20 RX ORDER — POTASSIUM CHLORIDE 20 MEQ
40 PACKET (EA) ORAL ONCE
Refills: 0 | Status: COMPLETED | OUTPATIENT
Start: 2023-10-20 | End: 2023-10-20

## 2023-10-20 RX ADMIN — Medication 1 DROP(S): at 12:13

## 2023-10-20 RX ADMIN — POLYETHYLENE GLYCOL 3350 17 GRAM(S): 17 POWDER, FOR SOLUTION ORAL at 23:41

## 2023-10-20 RX ADMIN — Medication 600 MILLIGRAM(S): at 01:15

## 2023-10-20 RX ADMIN — GABAPENTIN 600 MILLIGRAM(S): 400 CAPSULE ORAL at 23:40

## 2023-10-20 RX ADMIN — Medication 1 DROP(S): at 05:56

## 2023-10-20 RX ADMIN — Medication 600 MILLIGRAM(S): at 00:17

## 2023-10-20 RX ADMIN — PANTOPRAZOLE SODIUM 40 MILLIGRAM(S): 20 TABLET, DELAYED RELEASE ORAL at 06:09

## 2023-10-20 RX ADMIN — Medication 600 MILLIGRAM(S): at 23:41

## 2023-10-20 RX ADMIN — ENOXAPARIN SODIUM 40 MILLIGRAM(S): 100 INJECTION SUBCUTANEOUS at 19:51

## 2023-10-20 RX ADMIN — LIDOCAINE 1 PATCH: 4 CREAM TOPICAL at 12:15

## 2023-10-20 RX ADMIN — GABAPENTIN 300 MILLIGRAM(S): 400 CAPSULE ORAL at 05:55

## 2023-10-20 RX ADMIN — Medication 15 MILLIGRAM(S): at 06:10

## 2023-10-20 RX ADMIN — LIDOCAINE 1 PATCH: 4 CREAM TOPICAL at 00:17

## 2023-10-20 RX ADMIN — SENNA PLUS 2 TABLET(S): 8.6 TABLET ORAL at 23:43

## 2023-10-20 RX ADMIN — Medication 15 MILLIGRAM(S): at 05:56

## 2023-10-20 RX ADMIN — LIDOCAINE 1 PATCH: 4 CREAM TOPICAL at 23:42

## 2023-10-20 RX ADMIN — LIDOCAINE 1 PATCH: 4 CREAM TOPICAL at 06:09

## 2023-10-20 RX ADMIN — GABAPENTIN 300 MILLIGRAM(S): 400 CAPSULE ORAL at 14:43

## 2023-10-20 RX ADMIN — Medication 1 DROP(S): at 00:17

## 2023-10-20 RX ADMIN — Medication 40 MILLIEQUIVALENT(S): at 14:43

## 2023-10-20 RX ADMIN — Medication 1 DROP(S): at 20:16

## 2023-10-20 NOTE — PROGRESS NOTE ADULT - PROBLEM SELECTOR PLAN 3
Progressive weakness in lower limbs over last 3 days with continued urinary incontinence. Last admission 10/08 with MRI and CT didn't reveal acute pathology, only showed broad C6-7 disc bulging w/o any spinal compression, no contrast enchainment. Imaging also showed mediastinal LN. Pt on methylprednisolone 4mg for 6 day (on day 3/6- 3 pills 10/12, 2 pills 10/13, 1 pill 10/14).  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 s/p EMG and LP, suspecting GBS.     Plan  - s/p IVIG 30g qd, infuse over 6hr for 4 days  - increased gabapentin to 600mg TID  - decreased acetaminophen dosage to 650mg q12h PRN mild pain given transaminitis  - start ibuprofen 600 q12h PRN mild pain, alternating with acetaminophen  - start Toradol 15mg IV for breakthrough pain  - PT consulted  - hold steroid course Progressive weakness in lower limbs over last 3 days with continued urinary incontinence. Last admission 10/08 with MRI and CT didn't reveal acute pathology, only showed broad C6-7 disc bulging w/o any spinal compression, no contrast enchainment. Imaging also showed mediastinal LN. Pt on methylprednisolone 4mg for 6 day (on day 3/6- 3 pills 10/12, 2 pills 10/13, 1 pill 10/14).  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 s/p EMG and LP, suspecting GBS.     Plan  - s/p IVIG 30g qd, infuse over 6hr for 4 days  - increased gabapentin to 300mg/300mg/600mg   - dc'd Tylenol given transaminitis  - start ibuprofen 600 q12h PRN mild pain, alternating with acetaminophen  - start Toradol 15mg IV for breakthrough pain  - morphine PRN  - PT consulted  - hold steroid course

## 2023-10-20 NOTE — CHART NOTE - NSCHARTNOTEFT_GEN_A_CORE
Patient has been evaluated by PT and OT in patient for Acute Rehab. She is indicated and capable of tolerating 3 hours of PT a day for 3-5 days/week.

## 2023-10-20 NOTE — PROGRESS NOTE ADULT - SUBJECTIVE AND OBJECTIVE BOX
Neurology Progress Note    Interval History:  The patient was seen and examined at the bedside.       PAST MEDICAL & SURGICAL HISTORY:  Ovarian cyst    GERD (gastroesophageal reflux disease)    Glaucoma    Sarcoidosis    Sinusitis    Osteoporosis    Migraines    Hashimoto's disease    Kidney stones    Torn meniscus  right    History of arthroscopy of shoulder  rotator cuff repair, right    History of umbilical hernia repair    S/P correction of deviated nasal septum    H/O sinus surgery            Medications:  aluminum hydroxide/magnesium hydroxide/simethicone Suspension 30 milliLiter(s) Oral every 4 hours PRN  artificial tears (preservative free) Ophthalmic Solution 1 Drop(s) Both EYES every 6 hours  bimatoprost 0.01% Ophthalmic Solution 1 Drop(s) Both EYES at bedtime  diphenhydrAMINE 25 milliGRAM(s) Oral daily PRN  enoxaparin Injectable 40 milliGRAM(s) SubCutaneous every 24 hours  gabapentin 300 milliGRAM(s) Oral <User Schedule>  gabapentin 600 milliGRAM(s) Oral every 24 hours  ibuprofen  Tablet. 600 milliGRAM(s) Oral every 12 hours PRN  ketorolac   Injectable 15 milliGRAM(s) IV Push every 12 hours PRN  lactulose Syrup 10 Gram(s) Oral daily  lidocaine   4% Patch 1 Patch Transdermal every 24 hours  melatonin 3 milliGRAM(s) Oral at bedtime PRN  morphine  - Injectable 2 milliGRAM(s) IV Push every 4 hours PRN  morphine  - Injectable 4 milliGRAM(s) IV Push every 4 hours PRN  ondansetron Injectable 4 milliGRAM(s) IV Push every 8 hours PRN  pantoprazole    Tablet 40 milliGRAM(s) Oral every 24 hours  petrolatum Ophthalmic Ointment 1 Application(s) Both EYES daily  polyethylene glycol 3350 17 Gram(s) Oral every 12 hours  senna 2 Tablet(s) Oral at bedtime      Vital Signs Last 24 Hrs  T(C): 36.7 (20 Oct 2023 04:00), Max: 37.2 (19 Oct 2023 13:44)  T(F): 98.1 (20 Oct 2023 04:00), Max: 98.9 (19 Oct 2023 13:44)  HR: 89 (20 Oct 2023 08:06) (84 - 112)  BP: 117/79 (20 Oct 2023 08:06) (90/57 - 166/87)  BP(mean): 94 (20 Oct 2023 08:06) (69 - 119)  RR: 16 (20 Oct 2023 08:06) (16 - 27)  SpO2: 97% (20 Oct 2023 08:06) (95% - 98%)    Parameters below as of 20 Oct 2023 08:06  Patient On (Oxygen Delivery Method): room air      NEUROLOGICAL EXAMINATION:  GENERAL:  Appearance is consistent with chronologic age. Able to count to >30 in one breath.    COGNITION/LANGUAGE:  Awake, alert, and oriented to person, place, time and date. Recent and remote memory intact.  Fund of knowledge is appropriate. No aphasia. Improving mild dysarthria.    CRANIAL NERVES:   - Eyes:  Pupils equal round and reactive, no RAPD. EOMI w/o nystagmus, skew or reported double vision. Normal visual field.  - Continued L>R facial weakness, upper and lower - now able to completely close R eye, facial creases with smile b/l. Still unable to close L eye. R uvula deviation.  MOTOR EXAM:                              Right | Left    Shoulder abductors:    5|5   Shoulder adductors:    5|5   Elbow flexors:             5|5   Elbow extensors:         5|5   Palmar flexion:            5|5   Palmar dorsiflexion:     5|5   Finger Abduction         5|5  Finger Adduction         5|5  Hip flexors:                 4+|4+  Hip extensors:            5|5   Knee extensors:          5|5   Knee flexors:              4+|4+   Foot dorsiflexors:        4+|4+   Foot plantarflexors:     5|5      DTRs:             Right | Left   Biceps:                 1+|1+     Triceps:                1+|1+   Brachioradialis:     1+|1+     Patellar:                0 | 0   Achilles:                0 | 0     Plantar responses mute b/l.  Head flexion is 5/5. Extension is 5/5.       No observable drift. Normal tone and bulk. No tenderness, twitching, tremors or involuntary movements.  SENSORY EXAM:  diminished vibration in toes and ankles b/l   REFLEXES: .  Plantar response mute b/l.  Jaw jerk, Maday, clonus absent.  CEREBELLUM:  Finger to nose intact.  No dysmetria.    Labs:  CBC Full  -  ( 20 Oct 2023 08:00 )  WBC Count : 3.62 K/uL  RBC Count : 4.96 M/uL  Hemoglobin : 12.8 g/dL  Hematocrit : 38.6 %  Platelet Count - Automated : 174 K/uL  Mean Cell Volume : 77.8 fl  Mean Cell Hemoglobin : 25.8 pg  Mean Cell Hemoglobin Concentration : 33.2 gm/dL  Auto Neutrophil # : x  Auto Lymphocyte # : x  Auto Monocyte # : x  Auto Eosinophil # : x  Auto Basophil # : x  Auto Neutrophil % : x  Auto Lymphocyte % : x  Auto Monocyte % : x  Auto Eosinophil % : x  Auto Basophil % : x    10-20    137  |  102  |  18  ----------------------------<  109<H>  3.6   |  25  |  0.60    Ca    9.0      20 Oct 2023 08:00  Phos  3.9     10-20  Mg     2.3     10-20    TPro  8.1  /  Alb  3.3  /  TBili  0.3  /  DBili  x   /  AST  111<H>  /  ALT  121<H>  /  AlkPhos  67  10-19    LIVER FUNCTIONS - ( 19 Oct 2023 05:30 )  Alb: 3.3 g/dL / Pro: 8.1 g/dL / ALK PHOS: 67 U/L / ALT: 121 U/L / AST: 111 U/L / GGT: x             Urinalysis Basic - ( 20 Oct 2023 08:00 )    Color: x / Appearance: x / SG: x / pH: x  Gluc: 109 mg/dL / Ketone: x  / Bili: x / Urobili: x   Blood: x / Protein: x / Nitrite: x   Leuk Esterase: x / RBC: x / WBC x   Sq Epi: x / Non Sq Epi: x / Bacteria: x        Assessment:  This is a 63y Female w/ h/o     Plan:

## 2023-10-20 NOTE — PROGRESS NOTE ADULT - SUBJECTIVE AND OBJECTIVE BOX
STEPDOWN FROM 7L TO 7W     Hospital Course:   63 year old female with PMH of OA, GERD, and sarcoidosis (diagnosed in 2008), presenting 10/11 with worsening back pain and progressive lower limb weakness for 3 days. Patient started to have neck and occipital headache with L. lower arm numbness; the pain progressed down her back and numbness in both arms; numbness progressed to all extremities. Patient had a flushot 3 weeks before. She was admitted for further evaluation. On 10/12 EMG and LP w/ CSF sent by neurology, suspicious of GBS. IVIG 30g qd started. New facial numbness noticed AM 10/13, ordered NIF and VC with RT, transferred to telemetry for high risk for intubation given progression of facial/bulbar weakness. On unit BP elevated i/s/o pain and sympathetic stim 2/2 GBS, s/p labetalol. MRI head and spine completed, PET CT done. Pain managed with morphine, warm compresses, gabapentin, tylenol, and toradol. On 10/16, patient reports blurred vision. Dysarthria noticed, together with upper & lower facial weakness L>R. NIF and VC frequency to q4h. Completed IVIG 500mg/kg x4 days. Since 10/17, continued improvement on neuro exam, but AST/ALT uptrending, most likely 2/2 IVIG. NIF and FVC stable. Patient not on oxygen and worked with PT. Stable to be stepped down to [facility] for further PT/OT, pain management, and SAEID placement.    SUBJECTIVE:    VITAL SIGNS:  T(F): 98.9 (10-20-23 @ 14:02)  HR: 101 (10-20-23 @ 12:15)  BP: 133/82 (10-20-23 @ 12:15)  RR: 16 (10-20-23 @ 12:15)  SpO2: 96% (10-20-23 @ 12:15)    PHYSICAL EXAM:    Constitutional: NAD  HEENT: PERRL, EOMI, sclera non-icteric, neck supple, trachea midline, no masses, no JVD, MMM, good dentition  Respiratory: CTA b/l, good air entry b/l, no wheezing, no rhonchi, no rales, without accessory muscle use and no intercostal retractions  Cardiovascular: RRR, normal S1S2, no M/R/G  Gastrointestinal: abdomen soft, NTND, no masses palpable, BS normal  Extremities: Warm, well perfused, pulses equal bilateral upper and lower extremities, no edema, no clubbing  Neurological: AAOx3, CN Grossly intact  Skin: Normal temperature, warm, dry    MEDICATIONS  (STANDING):  artificial tears (preservative free) Ophthalmic Solution 1 Drop(s) Both EYES every 6 hours  bimatoprost 0.01% Ophthalmic Solution 1 Drop(s) Both EYES at bedtime  enoxaparin Injectable 40 milliGRAM(s) SubCutaneous every 24 hours  gabapentin 300 milliGRAM(s) Oral <User Schedule>  gabapentin 600 milliGRAM(s) Oral every 24 hours  lactulose Syrup 10 Gram(s) Oral daily  lidocaine   4% Patch 1 Patch Transdermal every 24 hours  pantoprazole    Tablet 40 milliGRAM(s) Oral every 24 hours  petrolatum Ophthalmic Ointment 1 Application(s) Both EYES daily  polyethylene glycol 3350 17 Gram(s) Oral every 12 hours  senna 2 Tablet(s) Oral at bedtime    MEDICATIONS  (PRN):  aluminum hydroxide/magnesium hydroxide/simethicone Suspension 30 milliLiter(s) Oral every 4 hours PRN Dyspepsia  diphenhydrAMINE 25 milliGRAM(s) Oral daily PRN Allergy symptoms  ibuprofen  Tablet. 600 milliGRAM(s) Oral every 12 hours PRN Temp greater or equal to 38C (100.4F), Mild Pain (1 - 3)  ketorolac   Injectable 15 milliGRAM(s) IV Push every 12 hours PRN Severe Pain (7 - 10)  melatonin 3 milliGRAM(s) Oral at bedtime PRN Insomnia  morphine  - Injectable 2 milliGRAM(s) IV Push every 4 hours PRN Moderate Pain (4 - 6)  morphine  - Injectable 4 milliGRAM(s) IV Push every 4 hours PRN Severe Pain (7 - 10)  ondansetron Injectable 4 milliGRAM(s) IV Push every 8 hours PRN Nausea and/or Vomiting    Allergies    No Known Allergies    Intolerances    LABS:                        12.8   3.62  )-----------( 174      ( 20 Oct 2023 08:00 )             38.6     10-20    137  |  102  |  18  ----------------------------<  109<H>  3.6   |  25  |  0.60    Ca    9.0      20 Oct 2023 08:00  Phos  3.9     10-20  Mg     2.3     10-20    TPro  8.1  /  Alb  3.3  /  TBili  0.3  /  DBili  x   /  AST  111<H>  /  ALT  121<H>  /  AlkPhos  67  10-19    Urinalysis Basic - ( 20 Oct 2023 08:00 )    Color: x / Appearance: x / SG: x / pH: x  Gluc: 109 mg/dL / Ketone: x  / Bili: x / Urobili: x   Blood: x / Protein: x / Nitrite: x   Leuk Esterase: x / RBC: x / WBC x   Sq Epi: x / Non Sq Epi: x / Bacteria: x    RADIOLOGY & ADDITIONAL TESTS:  Reviewed STEPDOWN FROM 7L TO 7W     Hospital Course:   63 year old female with PMH of OA, GERD, and sarcoidosis (diagnosed in 2008), presenting 10/11 with worsening back pain and progressive lower limb weakness for 3 days. Patient started to have neck and occipital headache with L. lower arm numbness; the pain progressed down her back and numbness in both arms; numbness progressed to all extremities. Patient had a flushot 3 weeks before. She was admitted for further evaluation. On 10/12 EMG and LP w/ CSF sent by neurology, suspicious of GBS. IVIG 30g qd started. New facial numbness noticed AM 10/13, ordered NIF and VC with RT, transferred to telemetry for high risk for intubation given progression of facial/bulbar weakness. On unit BP elevated i/s/o pain and sympathetic stim 2/2 GBS, s/p labetalol. MRI head and spine completed, PET CT done. Pain managed with morphine, warm compresses, gabapentin, tylenol, and toradol. On 10/16, patient reports blurred vision. Dysarthria noticed, together with upper & lower facial weakness L>R. NIF and VC frequency to q4h. Completed IVIG 500mg/kg x4 days. Since 10/17, continued improvement on neuro exam, but AST/ALT uptrending, most likely 2/2 IVIG. NIF and FVC stable. Patient not on oxygen and worked with PT. Stable to be stepped down to [facility] for further PT/OT, pain management, and SAEID placement.    SUBJECTIVE:  In good spirits, feels as if she is improving, back pain still present    VITAL SIGNS:  T(F): 98.9 (10-20-23 @ 14:02)  HR: 101 (10-20-23 @ 12:15)  BP: 133/82 (10-20-23 @ 12:15)  RR: 16 (10-20-23 @ 12:15)  SpO2: 96% (10-20-23 @ 12:15)    PHYSICAL EXAM:    Constitutional: NAD, Pleasant appearing   HEENT: PERRL, EOMI, sclera non-icteric, neck supple, no JVD, MMM, good dentition  Respiratory: CTA b/l, good air entry b/l, no wheezing, no rhonchi, no rales, without accessory muscle use and no intercostal retractions  Cardiovascular: RRR, normal S1S2, no M/R/G  Gastrointestinal: abdomen soft, NTND, no masses palpable, BS normal  Extremities: Warm, well perfused, pulses equal bilateral upper and lower extremities, no edema, no clubbing  MSK: No spinal tenderness   Skin: Normal temperature, warm, dry    NEURO:   Gen: AOx3, memory is intact. Able to count to >30 in one breath.   Cranial Nerves: Eyes equal and reactive, normal eomi, normal visual field, BL facial weakness L>>R (cannot close her left eye, left sided facial droop, b/l buccinator       GENERAL:  Appearance is consistent with chronologic age. Able to count to >30 in one breath.    COGNITION/LANGUAGE:  Awake, alert, and oriented to person, place, time and date. Recent and remote memory intact.  Fund of knowledge is appropriate. No aphasia. Improving mild dysarthria.    CRANIAL NERVES:   - Eyes:  Pupils equal round and reactive, no RAPD. EOMI w/o nystagmus, skew or reported double vision. Normal visual field.  - Continued L>R facial weakness, upper and lower - now able to completely close R eye, facial creases with smile b/l. Still unable to close L eye. R uvula deviation.  MOTOR EXAM:                              Right | Left    Shoulder abductors:    5|5   Shoulder adductors:    5|5   Elbow flexors:             5|5   Elbow extensors:         5|5   Palmar flexion:            5|5   Palmar dorsiflexion:     5|5   Finger Abduction         5|5  Finger Adduction         5|5  Hip flexors:                 4+|4+  Hip extensors:            5|5   Knee extensors:          5|5   Knee flexors:              4+|4+   Foot dorsiflexors:        4+|4+   Foot plantarflexors:     5|5      DTRs:             Right | Left   Biceps:                 1+|1+     Triceps:                1+|1+   Brachioradialis:     1+|1+     Patellar:                0 | 0   Achilles:                0 | 0     Plantar responses mute b/l.  Head flexion is 5/5. Extension is 5/5.       No observable drift. Normal tone and bulk. No tenderness, twitching, tremors or involuntary movements.  SENSORY EXAM:  diminished vibration in toes and ankles b/l   REFLEXES: .  Plantar response mute b/l.  Jaw jerk, Maday, clonus absent.  CEREBELLUM:  Finger to nose intact.  No dysmetria.      MEDICATIONS  (STANDING):  artificial tears (preservative free) Ophthalmic Solution 1 Drop(s) Both EYES every 6 hours  bimatoprost 0.01% Ophthalmic Solution 1 Drop(s) Both EYES at bedtime  enoxaparin Injectable 40 milliGRAM(s) SubCutaneous every 24 hours  gabapentin 300 milliGRAM(s) Oral <User Schedule>  gabapentin 600 milliGRAM(s) Oral every 24 hours  lactulose Syrup 10 Gram(s) Oral daily  lidocaine   4% Patch 1 Patch Transdermal every 24 hours  pantoprazole    Tablet 40 milliGRAM(s) Oral every 24 hours  petrolatum Ophthalmic Ointment 1 Application(s) Both EYES daily  polyethylene glycol 3350 17 Gram(s) Oral every 12 hours  senna 2 Tablet(s) Oral at bedtime    MEDICATIONS  (PRN):  aluminum hydroxide/magnesium hydroxide/simethicone Suspension 30 milliLiter(s) Oral every 4 hours PRN Dyspepsia  diphenhydrAMINE 25 milliGRAM(s) Oral daily PRN Allergy symptoms  ibuprofen  Tablet. 600 milliGRAM(s) Oral every 12 hours PRN Temp greater or equal to 38C (100.4F), Mild Pain (1 - 3)  ketorolac   Injectable 15 milliGRAM(s) IV Push every 12 hours PRN Severe Pain (7 - 10)  melatonin 3 milliGRAM(s) Oral at bedtime PRN Insomnia  morphine  - Injectable 2 milliGRAM(s) IV Push every 4 hours PRN Moderate Pain (4 - 6)  morphine  - Injectable 4 milliGRAM(s) IV Push every 4 hours PRN Severe Pain (7 - 10)  ondansetron Injectable 4 milliGRAM(s) IV Push every 8 hours PRN Nausea and/or Vomiting    Allergies    No Known Allergies    Intolerances    LABS:                        12.8   3.62  )-----------( 174      ( 20 Oct 2023 08:00 )             38.6     10-20    137  |  102  |  18  ----------------------------<  109<H>  3.6   |  25  |  0.60    Ca    9.0      20 Oct 2023 08:00  Phos  3.9     10-20  Mg     2.3     10-20    TPro  8.1  /  Alb  3.3  /  TBili  0.3  /  DBili  x   /  AST  111<H>  /  ALT  121<H>  /  AlkPhos  67  10-19    Urinalysis Basic - ( 20 Oct 2023 08:00 )    Color: x / Appearance: x / SG: x / pH: x  Gluc: 109 mg/dL / Ketone: x  / Bili: x / Urobili: x   Blood: x / Protein: x / Nitrite: x   Leuk Esterase: x / RBC: x / WBC x   Sq Epi: x / Non Sq Epi: x / Bacteria: x    RADIOLOGY & ADDITIONAL TESTS:  Reviewed STEPDOWN FROM 7L TO 7W     Hospital Course:   63 year old female with PMH of OA, GERD, and sarcoidosis (diagnosed in 2008), presenting 10/11 with worsening back pain and progressive lower limb weakness for 3 days. Patient started to have neck and occipital headache with L. lower arm numbness; the pain progressed down her back and numbness in both arms; numbness progressed to all extremities. Patient had a flushot 3 weeks before. She was admitted for further evaluation. On 10/12 EMG and LP w/ CSF sent by neurology, suspicious of GBS. IVIG 30g qd started from 10/13-17. New facial numbness noticed AM 10/13, ordered NIF and VC with RT, transferred to telemetry for high risk for intubation given progression of facial/bulbar weakness. On unit BP elevated i/s/o pain and sympathetic stim 2/2 GBS, s/p labetalol. MRI head and spine completed, PET CT done. Pain managed with morphine, warm compresses, gabapentin, tylenol, and toradol. On 10/16, patient reports blurred vision. Dysarthria noticed, together with upper & lower facial weakness L>R. NIF and VC frequency to q4h. Completed IVIG 500mg/kg x4 days. Since 10/17, continued improvement on neuro exam, but AST/ALT uptrending, most likely 2/2 IVIG. NIF and FVC stable. Patient not on oxygen and worked with PT. Stable to be stepped down to [facility] for further PT/OT, pain management, and SAEID placement.    SUBJECTIVE:  In good spirits, feels as if she is improving, back pain still present    VITAL SIGNS:  T(F): 98.9 (10-20-23 @ 14:02)  HR: 101 (10-20-23 @ 12:15)  BP: 133/82 (10-20-23 @ 12:15)  RR: 16 (10-20-23 @ 12:15)  SpO2: 96% (10-20-23 @ 12:15)    PHYSICAL EXAM:    Constitutional: NAD, Pleasant appearing   HEENT: PERRL, EOMI, sclera non-icteric, neck supple, no JVD, MMM, good dentition  Respiratory: CTA b/l, good air entry b/l, no wheezing, no rhonchi, no rales, without accessory muscle use and no intercostal retractions  Cardiovascular: RRR, normal S1S2, no M/R/G  Gastrointestinal: abdomen soft, NTND, no masses palpable, BS normal  Extremities: Warm, well perfused, pulses equal bilateral upper and lower extremities, no edema, no clubbing  MSK: No spinal tenderness   Skin: Normal temperature, warm, dry    NEURO:   Gen: AOx3, memory is intact. Able to count to >30 in one breath.   Cranial Nerves: Eyes equal and reactive, normal eomi, normal visual field, BL facial weakness L>>R (cannot close her left eye, left sided facial droop, b/l buccinator weakness, difficulties raising brow)  Strength: RUE 4+/5 in all muscle groups, LUE 5/5, BL LE 4+/5 proximally and 5/5 distally  Sensory: Diminished sensation in BL palms, intact throughout rest of UE and LE   DTRs: 2/4 right tricep, 1/4 remaining UE DTRs, 0/4 in BL LE    BULBAR: Neck muscles intact, some dysarthria, good inspiratory efforts, NIFs WNL     MEDICATIONS  (STANDING):  artificial tears (preservative free) Ophthalmic Solution 1 Drop(s) Both EYES every 6 hours  bimatoprost 0.01% Ophthalmic Solution 1 Drop(s) Both EYES at bedtime  enoxaparin Injectable 40 milliGRAM(s) SubCutaneous every 24 hours  gabapentin 300 milliGRAM(s) Oral <User Schedule>  gabapentin 600 milliGRAM(s) Oral every 24 hours  lactulose Syrup 10 Gram(s) Oral daily  lidocaine   4% Patch 1 Patch Transdermal every 24 hours  pantoprazole    Tablet 40 milliGRAM(s) Oral every 24 hours  petrolatum Ophthalmic Ointment 1 Application(s) Both EYES daily  polyethylene glycol 3350 17 Gram(s) Oral every 12 hours  senna 2 Tablet(s) Oral at bedtime    MEDICATIONS  (PRN):  aluminum hydroxide/magnesium hydroxide/simethicone Suspension 30 milliLiter(s) Oral every 4 hours PRN Dyspepsia  diphenhydrAMINE 25 milliGRAM(s) Oral daily PRN Allergy symptoms  ibuprofen  Tablet. 600 milliGRAM(s) Oral every 12 hours PRN Temp greater or equal to 38C (100.4F), Mild Pain (1 - 3)  ketorolac   Injectable 15 milliGRAM(s) IV Push every 12 hours PRN Severe Pain (7 - 10)  melatonin 3 milliGRAM(s) Oral at bedtime PRN Insomnia  morphine  - Injectable 2 milliGRAM(s) IV Push every 4 hours PRN Moderate Pain (4 - 6)  morphine  - Injectable 4 milliGRAM(s) IV Push every 4 hours PRN Severe Pain (7 - 10)  ondansetron Injectable 4 milliGRAM(s) IV Push every 8 hours PRN Nausea and/or Vomiting    Allergies    No Known Allergies    Intolerances    LABS:                        12.8   3.62  )-----------( 174      ( 20 Oct 2023 08:00 )             38.6     10-20    137  |  102  |  18  ----------------------------<  109<H>  3.6   |  25  |  0.60    Ca    9.0      20 Oct 2023 08:00  Phos  3.9     10-20  Mg     2.3     10-20    TPro  8.1  /  Alb  3.3  /  TBili  0.3  /  DBili  x   /  AST  111<H>  /  ALT  121<H>  /  AlkPhos  67  10-19    Urinalysis Basic - ( 20 Oct 2023 08:00 )    Color: x / Appearance: x / SG: x / pH: x  Gluc: 109 mg/dL / Ketone: x  / Bili: x / Urobili: x   Blood: x / Protein: x / Nitrite: x   Leuk Esterase: x / RBC: x / WBC x   Sq Epi: x / Non Sq Epi: x / Bacteria: x    RADIOLOGY & ADDITIONAL TESTS:  Reviewed

## 2023-10-20 NOTE — PROGRESS NOTE ADULT - PROBLEM SELECTOR PLAN 4
BP gradually rising to 200s. Patient was started on labetalol 100 P.O BID. On 10/14-10/15 overnight BP in 220s s/p labetalol 5mg IV, Metoprolol 5mg IV in am. BP remained elevated to 190s, Patient received Amlodipine 10mg and BP dropped to 96/92.     Plan:   - Per neurology, dysautonomia is an expected complication  - continue anti-hypertensive to goal SBP <140

## 2023-10-20 NOTE — PROGRESS NOTE ADULT - PROBLEM SELECTOR PLAN 2
Progressive weakness in lower limbs over last 3 days with continued urinary incontinence. Last admission 10/08 with MRI and CT didn't reveal acute pathology, only showed broad C6-7 disc bulging w/o any spinal compression, no contrast enchainment. Imaging also showed mediastinal LN. Pt on methylprednisolone 4mg for 6 day (on day 3/6- 3 pills 10/12, 2 pills 10/13, 1 pill 10/14).  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 s/p EMG and LP, suspecting GBS.     Plan  -s/p IVIG 30g qd, infuse over 6hr for 4 days  - f/u neuro rec  - PT consulted  - hold steroid course. Progressive weakness in lower limbs over last 3 days with continued urinary incontinence. Last admission 10/08 with MRI and CT didn't reveal acute pathology, only showed broad C6-7 disc bulging w/o any spinal compression, no contrast enchainment. Imaging also showed mediastinal LN. Pt on methylprednisolone 4mg for 6 day (on day 3/6- 3 pills 10/12, 2 pills 10/13, 1 pill 10/14).  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     Plan  - s/p IVIG 30g qd, infuse over 6hr for 4 days  - f/u neuro rec  - PT consulted  - hold steroid course.

## 2023-10-20 NOTE — PROGRESS NOTE ADULT - PROBLEM SELECTOR PLAN 9
Hx of hashimotos. Not on any meds. TSH 1.75 w/n/l. Diagnosed in 2008. MRI imaging 10/8 with mediastinal LAD.

## 2023-10-20 NOTE — PROGRESS NOTE ADULT - PROBLEM SELECTOR PLAN 5
10/8: CT and MRI showed Extensive mediastinal, hilar, and paratracheal lymphadenopathy is present  in the chest. Enlarged thoracic lymphadenopathy was noted on the 06/08/2012 chest CT study which was attributed to the patient's known history of sarcoidosis at that time. Concern for rheumatologic vs malignant work up,  ESR, CRP, B12, RF w/n/l, Hep A/B/C negative, A1C 5.9. CHRIS elevated 1:160, SSA wnl, TSH 1.75 w/n/l. Immunoglobulin panel wnl. Immunnofixation no monoclonal band   MRI as above     Plan  -f/u PET scan  - consider paraneoplastic w/up. Likely 2/2 IVIG  - dc'd Tylenol   - CTM Likely 2/2 IVIG treatment    Plan:  - dc'd Tylenol   - f/u CMP

## 2023-10-20 NOTE — PROGRESS NOTE ADULT - ASSESSMENT
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

## 2023-10-20 NOTE — PROGRESS NOTE ADULT - PROBLEM SELECTOR PLAN 1
Progressive weakness in lower limbs over 3 days with continued urinary incontinence. 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  MRI head: No acute infarct or other acute abnormality. No abnormal enhancement. MRI of lumbar spine on 10/14:  compatible with active neurosarcoidosis, other etiologies include Guillain-Emblem syndrome and other inflammatory,  infectious, or neoplastic causes. Imaging showed mediastinal LN.  Daily NIFs and Vital Capacity improving. Still with weakness and bulbar involvement but improving     Plan  - neurology following, appeciate recs   - f/u Vital Capacity bid should be greater than 20ml/kg  - s/p IVIG 30g qd x4 days -10/16  - PT consulted. Patient can tolerate 3 hours of PT  - Can c/w gabapentin to 300mg/300mg/600mg   - Elevated BP noted. Dysautonomia is an expected complication, continue anti-hypertensive to goal SBP <140  - C/w artifical tears and petrolatum Ophthalmic ointment b/l eyes

## 2023-10-20 NOTE — PROGRESS NOTE ADULT - PROBLEM SELECTOR PLAN 10
N: regular w/ Ensure  E: Mg <2, Phosp <3, K <4  DVT ppx: Lovenox  GI: Protonix 40mg PO qd  C: Full Code  D: Telemetry Hx of hashimotos. Not on any meds. TSH 1.75 w/n/l. Hx of Hashimoto's. Not on any meds. TSH 1.75 w/n/l.

## 2023-10-20 NOTE — PROGRESS NOTE ADULT - ASSESSMENT
63 year old female with PMH of OA, GERD, sarcoidosis, migraines presenting with worsening back pain associated bilateral upper and lower extremities numbness and tingling in distal parts of all her extremities for the last week. Her neurological exam is worsened from yesterday, with more pronounced facial bulbar symptoms, progressive LE weakness, and now absent LE reflexes. Of note, had flu shot 2 weeks prior to presentation. MRI C and T spine w and w/o contrast was performed which didn't reveal acute pathology, only showed broad C6-7 disc bulging w/o any spinal compression, no contrast enchainment. Her hx of probable sarcoidosis, enlargement of mediastinal LN, unintentional weight loss needs oncological w/up. MRI L-spine showed extensive leptomeningeal enhancement along the periphery of the   conus medullaris with enhancement of the cauda equina nerve roots. compatible with active neurosarcoidosis, or Guillain-Cleveland syndrome and other inflammatory, infectious, or neoplastic causes.  CPK, ESR, CRP was unremarkable. A1C prediabetic. 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 There has previously been some evidence of disease progression after the IVIG, with involvement of CN 7 and 10, as well as blurred vision on 10/17 but since 10/18 symptoms are stable, possibly plateaued with resolution of neck weakness, improvement in facial weakness,     Recommendations:     - Can c/w gabapentin to 300mg/300mg/600mg   - Elevated BP noted. Dysautonomia is an expected complication, continue anti-hypertensive to goal SBP <140  - C/w artifical tears and petrolatum Ophthalmic ointment b/l eyes  - Continue NIF and vital capacity Q4hrs   - Transaminitis noted. While this may be the result of IVIG, would eliminate Tylenol as pain is likely neuropathic  - Completed IVIG 500mg/kg x4 days      Case discussed with Dr. Gonsalves.  Thank you for the courtesy of this consult. Will continue to follow.       63 year old female with PMH of OA, GERD, sarcoidosis, migraines presenting with worsening back pain associated bilateral upper and lower extremities numbness and tingling in distal parts of all her extremities for the last week. Her neurological exam is worsened from yesterday, with more pronounced facial bulbar symptoms, progressive LE weakness, and now absent LE reflexes. Of note, had flu shot 2 weeks prior to presentation. MRI C and T spine w and w/o contrast was performed which didn't reveal acute pathology, only showed broad C6-7 disc bulging w/o any spinal compression, no contrast enchainment. Her hx of probable sarcoidosis, enlargement of mediastinal LN, unintentional weight loss needs oncological w/up. MRI L-spine showed extensive leptomeningeal enhancement along the periphery of the   conus medullaris with enhancement of the cauda equina nerve roots. compatible with active neurosarcoidosis, or Guillain-Bremen syndrome and other inflammatory, infectious, or neoplastic causes.  CPK, ESR, CRP was unremarkable. A1C prediabetic. EMG findings consistent with demyelinating disease, CSF with elevated protein supporting likely Guillain B[place].    Impression:  63 year old female history of OA, GERD, sarcoidosis, here for b/l LLE weakness, clinical presentation, EMG findings consistent with demyelinating disease, CSF with elevated protein supporting likely Guillain B[place]/AIDP, completed course of IVIG on 10/15. There has previously been some evidence of disease progression after the IVIG, with involvement of CN 7 and 10, as well as blurred vision on 10/17 but since 10/18 symptoms are stable, possibly plateaued with resolution of neck weakness, improvements in facial weakness and extremity strength.    Recommendations:     - Can c/w gabapentin to 300mg/300mg/600mg   - Elevated BP noted. Dysautonomia is an expected complication, continue anti-hypertensive to goal SBP <140  - C/w artifical tears and petrolatum Ophthalmic ointment b/l eyes  - Continue NIF and vital capacity Q4hrs   - Transaminitis noted. While this may be the result of IVIG, would eliminate Tylenol as pain is likely neuropathic  - Completed IVIG 500mg/kg x4 days      Case discussed with Dr. Gonsalves.  Thank you for the courtesy of this consult. Will continue to follow.       63 year old female with PMH of OA, GERD, sarcoidosis, migraines presenting with worsening back pain associated bilateral upper and lower extremities numbness and tingling in distal parts of all her extremities for the last week. Her neurological exam is worsened from yesterday, with more pronounced facial bulbar symptoms, progressive LE weakness, and now absent LE reflexes. Of note, had flu shot 2 weeks prior to presentation. MRI C and T spine w and w/o contrast was performed which didn't reveal acute pathology, only showed broad C6-7 disc bulging w/o any spinal compression, no contrast enchainment. Her hx of probable sarcoidosis, enlargement of mediastinal LN, unintentional weight loss needs oncological w/up. MRI L-spine showed extensive leptomeningeal enhancement along the periphery of the   conus medullaris with enhancement of the cauda equina nerve roots. compatible with active neurosarcoidosis, or Guillain-Farson syndrome and other inflammatory, infectious, or neoplastic causes.  CPK, ESR, CRP was unremarkable. A1C prediabetic. EMG findings consistent with demyelinating disease, CSF with elevated protein supporting likely Guillain Farson.    Impression:  63 year old female history of OA, GERD, sarcoidosis, here for b/l LLE weakness, clinical presentation, EMG findings consistent with demyelinating disease, CSF with elevated protein supporting likely Guillain Farson/AIDP, completed course of IVIG on 10/15. There has previously been some evidence of disease progression after the IVIG, with involvement of CN 7 and 10, as well as blurred vision on 10/17 but since 10/18 symptoms are stable, possibly plateaued with resolution of neck weakness, improvements in facial weakness and extremity strength.    Recommendations:     - Can c/w gabapentin to 300mg/300mg/600mg   - Elevated BP noted. Dysautonomia is an expected complication, continue anti-hypertensive to goal SBP <140  - C/w artifical tears but ensure administered every 2 hours and Ophthalmic ointment OU at night  - Continue NIF and vital capacity Q4hrs   - Transaminitis noted. While this may be the result of IVIG, would eliminate Tylenol as pain is likely neuropathic  - Completed IVIG 500mg/kg x4 days      Case discussed with Dr. Gonsalves.  Thank you for the courtesy of this consult. Will continue to follow.

## 2023-10-20 NOTE — PROGRESS NOTE ADULT - PROBLEM SELECTOR PLAN 11
N: regular w/ Ensure  E: Mg <2, Phosp <3, K <4  DVT ppx: Lovenox  GI: Protonix 40mg PO qd  C: Full Code  D: [facility] N: regular w/ Ensure  E: Mg <2, Phos <3, K <4  DVT ppx: Lovenox  GI: Protonix 40mg PO qd  C: Full Code  D: [facility]

## 2023-10-20 NOTE — PROGRESS NOTE ADULT - PROBLEM SELECTOR PLAN 4
BP gradually rising to 200s. Patient was started on labetalol 100 P.O BID. On 10/14-10/15 overnight BP in 220s s/p labetalol 5mg IV, Metoprolol 5mg IV in am. BP remained elevated to 190s, Patient received Amlodipine 10mg and BP dropped to 96/92.   10/17 BP 180s/100s  Plan:   - Monitor BP and restart anti-HTN as needed BP gradually rising to 200s. Patient was started on labetalol 100 P.O BID. On 10/14-10/15 overnight BP in 220s s/p labetalol 5mg IV, Metoprolol 5mg IV in am. BP remained elevated to 190s, Patient received Amlodipine 10mg and BP dropped to 96/92.   Plan:   - Per neurology, dysautonomia is an expected complication  - continue anti-hypertensive to goal SBP <140 BP gradually rising to 200s. Patient was started on labetalol 100 P.O BID. On 10/14-10/15 overnight BP in 220s s/p labetalol 5mg IV, Metoprolol 5mg IV in am. BP remained elevated to 190s, Patient received Amlodipine 10mg and BP dropped to 96/92.     Plan:   - Per neurology, dysautonomia is an expected complication  - continue anti-hypertensive to goal SBP <140

## 2023-10-20 NOTE — PROGRESS NOTE ADULT - PROBLEM SELECTOR PLAN 6
Na 128. Patient asymptomatic. Patient appear euvolemic on exam. Patient has been having severe and constant pain. 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  TSH 1.75 w/n/l.   Serum osmo low at 267, hypotonic hyponatremia. Ulytes c/w SIADH i/so sarcoidosis     Plan  - f/u BMP. 10/8: CT and MRI showed Extensive mediastinal, hilar, and paratracheal lymphadenopathy is present  in the chest. Enlarged thoracic lymphadenopathy was noted on the 06/08/2012 chest CT study which was attributed to the patient's known history of sarcoidosis at that time. Concern for rheumatologic vs malignant work up,  ESR, CRP, B12, RF w/n/l, Hep A/B/C negative, A1C 5.9. CHRIS elevated 1:160, SSA wnl, TSH 1.75 w/n/l. Immunoglobulin panel wnl. Immunnofixation no monoclonal band   MRI as above     Plan  -f/u PET scan  - consider paraneoplastic w/up. 10/8: CT and MRI showed Extensive mediastinal, hilar, and paratracheal lymphadenopathy is present  in the chest. Enlarged thoracic lymphadenopathy was noted on the 06/08/2012 chest CT study which was attributed to the patient's known history of sarcoidosis at that time. Concern for rheumatologic vs malignant work up,  ESR, CRP, B12, RF w/n/l, Hep A/B/C negative, A1C 5.9. CHRIS elevated 1:160, SSA wnl, TSH 1.75 w/n/l. Immunoglobulin panel wnl. Immunofixation no monoclonal band.  MRI as above     Plan  - consider paraneoplastic w/up.

## 2023-10-20 NOTE — PROGRESS NOTE ADULT - SUBJECTIVE AND OBJECTIVE BOX
OVERNIGHT EVENTS: Refused VC measurement    SUBJECTIVE:  Patient seen and examined at bedside. Back pain 4/10. Now decided to go to [facility] [place] instead of home.  No fever, chills, dizziness, headache, changes in vision, chest pain, dyspnea, nausea or vomiting, dysuria, changes in bowel movements, LE edema. Rest of 12 point Review of systems negative unless otherwise documented elsewhere in note.     Vital Signs Last 12 Hrs  T(F): 98.1 (10-20-23 @ 04:00), Max: 98.3 (10-19-23 @ 21:28)  HR: 84 (10-20-23 @ 04:39) (84 - 95)  BP: 138/83 (10-20-23 @ 04:39) (132/62 - 166/87)  BP(mean): 104 (10-20-23 @ 04:39) (89 - 119)  RR: 21 (10-20-23 @ 04:39) (20 - 21)  SpO2: 95% (10-20-23 @ 04:39) (95% - 98%)  I&O's Summary    19 Oct 2023 07:01  -  20 Oct 2023 07:00  --------------------------------------------------------  IN: 1075 mL / OUT: 1300 mL / NET: -225 mL        PHYSICAL EXAM:  Constitutional: NAD, comfortable in bed.  HEENT: NC/AT, PERRLA, EOMI, no conjunctival pallor or scleral icterus, MMM  Neck: Supple, no JVD  Respiratory: CTA B/L. No w/r/r.   Cardiovascular: RRR, normal S1 and S2, no m/r/g.   Gastrointestinal: +BS, soft NTND, no guarding or rebound tenderness, no palpable masses   Extremities: wwp; no cyanosis, clubbing or edema.   Vascular: Pulses equal and strong throughout.   Neurological: AAOx3, no CN deficits, strength and sensation intact throughout.   Skin: No gross skin abnormalities or rashes        LABS:                        14.0   3.78  )-----------( 167      ( 19 Oct 2023 05:30 )             42.2     10-19    134<L>  |  99  |  19  ----------------------------<  103<H>  4.0   |  25  |  0.61    Ca    8.9      19 Oct 2023 05:30  Phos  4.6     10-19  Mg     2.2     10-19    TPro  8.1  /  Alb  3.3  /  TBili  0.3  /  DBili  x   /  AST  111<H>  /  ALT  121<H>  /  AlkPhos  67  10-19      Urinalysis Basic - ( 19 Oct 2023 05:30 )    Color: x / Appearance: x / SG: x / pH: x  Gluc: 103 mg/dL / Ketone: x  / Bili: x / Urobili: x   Blood: x / Protein: x / Nitrite: x   Leuk Esterase: x / RBC: x / WBC x   Sq Epi: x / Non Sq Epi: x / Bacteria: x      RADIOLOGY & ADDITIONAL TESTS:    MEDICATIONS  (STANDING):  artificial tears (preservative free) Ophthalmic Solution 1 Drop(s) Both EYES every 6 hours  bimatoprost 0.01% Ophthalmic Solution 1 Drop(s) Both EYES at bedtime  enoxaparin Injectable 40 milliGRAM(s) SubCutaneous every 24 hours  gabapentin 300 milliGRAM(s) Oral <User Schedule>  gabapentin 600 milliGRAM(s) Oral every 24 hours  lactulose Syrup 10 Gram(s) Oral daily  lidocaine   4% Patch 1 Patch Transdermal every 24 hours  pantoprazole    Tablet 40 milliGRAM(s) Oral every 24 hours  petrolatum Ophthalmic Ointment 1 Application(s) Both EYES daily  polyethylene glycol 3350 17 Gram(s) Oral every 12 hours  senna 2 Tablet(s) Oral at bedtime    MEDICATIONS  (PRN):  aluminum hydroxide/magnesium hydroxide/simethicone Suspension 30 milliLiter(s) Oral every 4 hours PRN Dyspepsia  diphenhydrAMINE 25 milliGRAM(s) Oral daily PRN Allergy symptoms  ibuprofen  Tablet. 600 milliGRAM(s) Oral every 12 hours PRN Temp greater or equal to 38C (100.4F), Mild Pain (1 - 3)  ketorolac   Injectable 15 milliGRAM(s) IV Push every 12 hours PRN Severe Pain (7 - 10)  melatonin 3 milliGRAM(s) Oral at bedtime PRN Insomnia  morphine  - Injectable 4 milliGRAM(s) IV Push every 4 hours PRN Severe Pain (7 - 10)  morphine  - Injectable 2 milliGRAM(s) IV Push every 4 hours PRN Moderate Pain (4 - 6)  ondansetron Injectable 4 milliGRAM(s) IV Push every 8 hours PRN Nausea and/or Vomiting   ------------------Transfer from [facility] to [address] [place] [address]----------------  HOSPITAL COURSE:  63 year old female with PMH of OA, GERD, and sarcoidosis (diagnosed in 2008) presenting with worsening back pain and progressive lower limb weakness for the last  three days, s/p EMG and LP, now s/p 5 days of IVIG --- here for continued respiratory monitoring.     OVERNIGHT EVENTS: Refused VC measurement    SUBJECTIVE: Patient seen and examined at bedside. Back pain 4/10. Now decided to go to [facility] instead of home.  No fever, chills, dizziness, headache, changes in vision, chest pain, dyspnea, nausea or vomiting, dysuria, changes in bowel movements, LE edema. Rest of 12 point Review of systems negative unless otherwise documented elsewhere in note.     Vital Signs Last 12 Hrs  T(F): 98.1 (10-20-23 @ 04:00), Max: 98.3 (10-19-23 @ 21:28)  HR: 84 (10-20-23 @ 04:39) (84 - 95)  BP: 138/83 (10-20-23 @ 04:39) (132/62 - 166/87)  BP(mean): 104 (10-20-23 @ 04:39) (89 - 119)  RR: 21 (10-20-23 @ 04:39) (20 - 21)  SpO2: 95% (10-20-23 @ 04:39) (95% - 98%)  I&O's Summary    19 Oct 2023 07:01  -  20 Oct 2023 07:00  --------------------------------------------------------  IN: 1075 mL / OUT: 1300 mL / NET: -225 mL      PHYSICAL EXAM:  Constitutional: NAD, comfortable in bed.  HEENT: NC/AT, PERRLA, EOMI, no conjunctival pallor or scleral icterus, MMM  Neck: Supple, no JVD  Respiratory: CTA B/L. No w/r/r.   Cardiovascular: RRR, normal S1 and S2, no m/r/g.   Gastrointestinal: +BS, soft NTND, no guarding or rebound tenderness, no palpable masses   Extremities: wwp; no cyanosis, clubbing or edema.   Vascular: Pulses equal and strong throughout.   Neurological: AAOx3, mild dysarthria, unable to close L eye, can elevate both eyebrows slightly.   Skin: No gross skin abnormalities or rashes        LABS:                        14.0   3.78  )-----------( 167      ( 19 Oct 2023 05:30 )             42.2     10-19    134<L>  |  99  |  19  ----------------------------<  103<H>  4.0   |  25  |  0.61    Ca    8.9      19 Oct 2023 05:30  Phos  4.6     10-19  Mg     2.2     10-19    TPro  8.1  /  Alb  3.3  /  TBili  0.3  /  DBili  x   /  AST  111<H>  /  ALT  121<H>  /  AlkPhos  67  10-19      Urinalysis Basic - ( 19 Oct 2023 05:30 )    Color: x / Appearance: x / SG: x / pH: x  Gluc: 103 mg/dL / Ketone: x  / Bili: x / Urobili: x   Blood: x / Protein: x / Nitrite: x   Leuk Esterase: x / RBC: x / WBC x   Sq Epi: x / Non Sq Epi: x / Bacteria: x      RADIOLOGY & ADDITIONAL TESTS:    MEDICATIONS  (STANDING):  artificial tears (preservative free) Ophthalmic Solution 1 Drop(s) Both EYES every 6 hours  bimatoprost 0.01% Ophthalmic Solution 1 Drop(s) Both EYES at bedtime  enoxaparin Injectable 40 milliGRAM(s) SubCutaneous every 24 hours  gabapentin 300 milliGRAM(s) Oral <User Schedule>  gabapentin 600 milliGRAM(s) Oral every 24 hours  lactulose Syrup 10 Gram(s) Oral daily  lidocaine   4% Patch 1 Patch Transdermal every 24 hours  pantoprazole    Tablet 40 milliGRAM(s) Oral every 24 hours  petrolatum Ophthalmic Ointment 1 Application(s) Both EYES daily  polyethylene glycol 3350 17 Gram(s) Oral every 12 hours  senna 2 Tablet(s) Oral at bedtime    MEDICATIONS  (PRN):  aluminum hydroxide/magnesium hydroxide/simethicone Suspension 30 milliLiter(s) Oral every 4 hours PRN Dyspepsia  diphenhydrAMINE 25 milliGRAM(s) Oral daily PRN Allergy symptoms  ibuprofen  Tablet. 600 milliGRAM(s) Oral every 12 hours PRN Temp greater or equal to 38C (100.4F), Mild Pain (1 - 3)  ketorolac   Injectable 15 milliGRAM(s) IV Push every 12 hours PRN Severe Pain (7 - 10)  melatonin 3 milliGRAM(s) Oral at bedtime PRN Insomnia  morphine  - Injectable 4 milliGRAM(s) IV Push every 4 hours PRN Severe Pain (7 - 10)  morphine  - Injectable 2 milliGRAM(s) IV Push every 4 hours PRN Moderate Pain (4 - 6)  ondansetron Injectable 4 milliGRAM(s) IV Push every 8 hours PRN Nausea and/or Vomiting   ------------------Transfer from [facility] to [address] [place] [address]----------------  HOSPITAL COURSE:  63 year old female with PMH of OA, GERD, and sarcoidosis (diagnosed in 2008), presenting 10/11 with worsening back pain and progressive lower limb weakness for 3 days. Patient started to have neck and occipital headache with L. lower arm numbness; the pain progressed down her back and numbness in both arms; numbness progressed to all extremities. Patient had a flushot 3 weeks before. She was admitted for further evaluation. On 10/12 EMG and LP w/ CSF sent by neurology, suspicious of GBS. IVIG 30g qd started. New facial numbness noticed AM 10/13, ordered NIF and VC with RT, transferred to telemetry for high risk for intubation given progression of facial/bulbar weakness. On unit BP elevated i/s/o pain and sympathetic stim 2/2 GBS, s/p labetalol. MRI head and spine completed, PET CT done. Pain managed with morphine, warm compresses, gabapentin, tylenol, and toradol. On 10/16, patient reports blurred vision. Dysarthria noticed, together with upper & lower facial weakness L>R. NIF and VC frequency to q4h. Completed IVIG 500mg/kg x4 days. Since 10/17, continued improvement on neuro exam, but AST/ALT uptrending, most likely 2/2 IVIG. NIF and FVC stable. Patient not on oxygen and worked with PT. Stable to be stepped down to [facility] for further PT/OT, pain management, and SAEID placement.    OVERNIGHT EVENTS: Refused VC measurement    SUBJECTIVE: Patient seen and examined at bedside. Back pain 4/10. Now decided to go to [facility] instead of home.  No fever, chills, dizziness, headache, changes in vision, chest pain, dyspnea, nausea or vomiting, dysuria, changes in bowel movements, LE edema. Rest of 12 point Review of systems negative unless otherwise documented elsewhere in note.     Vital Signs Last 12 Hrs  T(F): 98.1 (10-20-23 @ 04:00), Max: 98.3 (10-19-23 @ 21:28)  HR: 84 (10-20-23 @ 04:39) (84 - 95)  BP: 138/83 (10-20-23 @ 04:39) (132/62 - 166/87)  BP(mean): 104 (10-20-23 @ 04:39) (89 - 119)  RR: 21 (10-20-23 @ 04:39) (20 - 21)  SpO2: 95% (10-20-23 @ 04:39) (95% - 98%)  I&O's Summary    19 Oct 2023 07:01  -  20 Oct 2023 07:00  --------------------------------------------------------  IN: 1075 mL / OUT: 1300 mL / NET: -225 mL      PHYSICAL EXAM:  Constitutional: NAD, comfortable in bed.  HEENT: NC/AT, PERRLA, EOMI, no conjunctival pallor or scleral icterus, MMM  Neck: Supple, no JVD  Respiratory: CTA B/L. No w/r/r.   Cardiovascular: RRR, normal S1 and S2, no m/r/g.   Gastrointestinal: +BS, soft NTND, no guarding or rebound tenderness, no palpable masses   Extremities: wwp; no cyanosis, clubbing or edema.   Vascular: Pulses equal and strong throughout.   Neurological: AAOx3, mild dysarthria, unable to close L eye, can elevate both eyebrows slightly.   Skin: No gross skin abnormalities or rashes        LABS:                        14.0   3.78  )-----------( 167      ( 19 Oct 2023 05:30 )             42.2     10-19    134<L>  |  99  |  19  ----------------------------<  103<H>  4.0   |  25  |  0.61    Ca    8.9      19 Oct 2023 05:30  Phos  4.6     10-19  Mg     2.2     10-19    TPro  8.1  /  Alb  3.3  /  TBili  0.3  /  DBili  x   /  AST  111<H>  /  ALT  121<H>  /  AlkPhos  67  10-19      Urinalysis Basic - ( 19 Oct 2023 05:30 )    Color: x / Appearance: x / SG: x / pH: x  Gluc: 103 mg/dL / Ketone: x  / Bili: x / Urobili: x   Blood: x / Protein: x / Nitrite: x   Leuk Esterase: x / RBC: x / WBC x   Sq Epi: x / Non Sq Epi: x / Bacteria: x      RADIOLOGY & ADDITIONAL TESTS:    MEDICATIONS  (STANDING):  artificial tears (preservative free) Ophthalmic Solution 1 Drop(s) Both EYES every 6 hours  bimatoprost 0.01% Ophthalmic Solution 1 Drop(s) Both EYES at bedtime  enoxaparin Injectable 40 milliGRAM(s) SubCutaneous every 24 hours  gabapentin 300 milliGRAM(s) Oral <User Schedule>  gabapentin 600 milliGRAM(s) Oral every 24 hours  lactulose Syrup 10 Gram(s) Oral daily  lidocaine   4% Patch 1 Patch Transdermal every 24 hours  pantoprazole    Tablet 40 milliGRAM(s) Oral every 24 hours  petrolatum Ophthalmic Ointment 1 Application(s) Both EYES daily  polyethylene glycol 3350 17 Gram(s) Oral every 12 hours  senna 2 Tablet(s) Oral at bedtime    MEDICATIONS  (PRN):  aluminum hydroxide/magnesium hydroxide/simethicone Suspension 30 milliLiter(s) Oral every 4 hours PRN Dyspepsia  diphenhydrAMINE 25 milliGRAM(s) Oral daily PRN Allergy symptoms  ibuprofen  Tablet. 600 milliGRAM(s) Oral every 12 hours PRN Temp greater or equal to 38C (100.4F), Mild Pain (1 - 3)  ketorolac   Injectable 15 milliGRAM(s) IV Push every 12 hours PRN Severe Pain (7 - 10)  melatonin 3 milliGRAM(s) Oral at bedtime PRN Insomnia  morphine  - Injectable 4 milliGRAM(s) IV Push every 4 hours PRN Severe Pain (7 - 10)  morphine  - Injectable 2 milliGRAM(s) IV Push every 4 hours PRN Moderate Pain (4 - 6)  ondansetron Injectable 4 milliGRAM(s) IV Push every 8 hours PRN Nausea and/or Vomiting

## 2023-10-20 NOTE — PROGRESS NOTE ADULT - PROBLEM SELECTOR PLAN 1
Progressive weakness in lower limbs over 3 days with continued urinary incontinence. Last admission 10/08 with MRI and CT didn't reveal acute pathology, only showed broad C6-7 disc bulging w/o any spinal compression, no contrast enchainment. Imaging also showed mediastinal LN. Pt on methylprednisolone 4mg for 6 day (on day 3/6- 3 pills 10/12, 2 pills 10/13, 1 pill 10/14).  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  s/p EMG and LP, suspecting GBS  Mr head: No acute infarct or other acute abnormality. No abnormal enhancement  MRI of lumbar spine on 10/14:  compatible with active neurosarcoidosis, other etiologies include Guillain-[place] syndrome and other inflammatory,  infectious, or neoplastic causes.  10/14: Vital Capacity 1130, 1510  10/15: VC 1340, f/u q4h   10/17: VC 2250  10/18: VC 5907-7859, NIF -30  Plan  - f/u neurology recs   - f/u Vital capacity q4h, should be greater than 20ml/kg  - neuro consulted, recommendation appreciated  - s/p IVIG 30g qd, infused over 6hr for 4 days  - PT consulted  - hold steroid course. Progressive weakness in lower limbs over 3 days with continued urinary incontinence. Last admission 10/08 with MRI and CT didn't reveal acute pathology, only showed broad C6-7 disc bulging w/o any spinal compression, no contrast enchainment. Imaging also showed mediastinal LN. Pt on methylprednisolone 4mg for 6 day (on day 3/6- 3 pills 10/12, 2 pills 10/13, 1 pill 10/14).  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  s/p EMG and LP, suspecting GBS  Mr head: No acute infarct or other acute abnormality. No abnormal enhancement  MRI of lumbar spine on 10/14:  compatible with active neurosarcoidosis, other etiologies include Guillain-[place] syndrome and other inflammatory,  infectious, or neoplastic causes.  10/14: Vital Capacity 1130, 1510  10/15: VC 1340, f/u q4h   10/17: VC 2250  10/18: VC 4005-2760, NIF -30  10/19: VC 9124-3857  Plan  - f/u neurology recs   - f/u Vital Capacity q4h, should be greater than 20ml/kg  - neuro consulted, recommendation appreciated  - s/p IVIG 30g qd, infused over 6hr for 4 days  - PT consulted  - hold steroid course. Progressive weakness in lower limbs over 3 days with continued urinary incontinence. Last admission 10/08 with MRI and CT didn't reveal acute pathology, only showed broad C6-7 disc bulging w/o any spinal compression, no contrast enchainment. Imaging also showed mediastinal LN. Pt on methylprednisolone 4mg for 6 day (on day 3/6- 3 pills 10/12, 2 pills 10/13, 1 pill 10/14).  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  s/p EMG and LP, suspecting GBS  Mr head: No acute infarct or other acute abnormality. No abnormal enhancement  MRI of lumbar spine on 10/14:  compatible with active neurosarcoidosis, other etiologies include Guillain-[place] syndrome and other inflammatory,  infectious, or neoplastic causes.  10/14: Vital Capacity 1130, 1510  10/15: VC 1340, f/u q4h   10/17: VC 2250  10/18: VC 5015-9198, NIF -30  10/19: VC 7042-8894    Plan  - f/u neurology recs   - f/u Vital Capacity q4h, should be greater than 20ml/kg  - neuro consulted, recommendation appreciated  - s/p IVIG 30g qd, infused over 6hr for 4 days  - PT consulted  - hold steroid course. Progressive weakness in lower limbs over 3 days with continued urinary incontinence. Last admission 10/08 with MRI and CT didn't reveal acute pathology, only showed broad C6-7 disc bulging w/o any spinal compression, no contrast enchainment. Imaging also showed mediastinal LN. Pt on methylprednisolone 4mg for 6 day (on day 3/6- 3 pills 10/12, 2 pills 10/13, 1 pill 10/14).  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  s/p EMG and LP, suspecting GBS  Mr head: No acute infarct or other acute abnormality. No abnormal enhancement  MRI of lumbar spine on 10/14:  compatible with active neurosarcoidosis, other etiologies include Guillain-[place] syndrome and other inflammatory,  infectious, or neoplastic causes.  10/14: Vital Capacity 1130, 1510  10/15: VC 1340, f/u q4h   10/17: VC 2250  10/18: VC 2384-2792, NIF -30  10/19: VC 2855-9021    Plan  - f/u neurology recs   - f/u Vital Capacity q4h, should be greater than 20ml/kg  - neuro consulted, recommendation appreciated  - s/p IVIG 30g qd, infused over 6hr for 4 days  - PT consulted. Patient can tolerate 3 hours of PT  - hold steroid course.

## 2023-10-20 NOTE — PROGRESS NOTE ADULT - PROBLEM SELECTOR PLAN 7
at home protonix 40mg qd  -c/w home med. Na 128. Patient asymptomatic. Patient appear euvolemic on exam. Patient has been having severe and constant pain. 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  TSH 1.75 w/n/l.   Serum osmo low at 267, hypotonic hyponatremia. Ulytes c/w SIADH i/so sarcoidosis     Plan  - f/u BMP. RESOLVED  Na 128. Patient asymptomatic. Patient appear euvolemic on exam. Patient has been having severe and constant pain. 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  TSH 1.75 w/n/l.   Serum osmo low at 267, hypotonic hyponatremia. Ulytes c/w SIADH i/so sarcoidosis     Plan  - f/u CMP

## 2023-10-20 NOTE — PROGRESS NOTE ADULT - PROBLEM SELECTOR PLAN 7
RESOLVED  Na 128. Patient asymptomatic. Patient appear euvolemic on exam. Patient has been having severe and constant pain. 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  TSH 1.75 w/n/l.   Serum osmo low at 267, hypotonic hyponatremia. Ulytes c/w SIADH i/so sarcoidosis     Plan  - f/u CMP

## 2023-10-20 NOTE — PROGRESS NOTE ADULT - PROBLEM SELECTOR PLAN 6
10/8: CT and MRI showed Extensive mediastinal, hilar, and paratracheal lymphadenopathy is present  in the chest. Enlarged thoracic lymphadenopathy was noted on the 06/08/2012 chest CT study which was attributed to the patient's known history of sarcoidosis at that time. Concern for rheumatologic vs malignant work up,  ESR, CRP, B12, RF w/n/l, Hep A/B/C negative, A1C 5.9. CHRIS elevated 1:160, SSA wnl, TSH 1.75 w/n/l. Immunoglobulin panel wnl. Immunofixation no monoclonal band.  MRI as above     - consider paraneoplastic w/up  - ? pursue LN biopsy

## 2023-10-20 NOTE — PROGRESS NOTE ADULT - PROBLEM SELECTOR PLAN 3
MRI of lumbar spine on 10/14:  compatible with active neurosarcoidosis, other etiologies include Guillain-[place] syndrome and other inflammatory,  infectious, or neoplastic causes. Imaging showed mediastinal LN.  DDx: LIkely related to inflammatory polyneuropathy vs malignancy driven pain     - increased gabapentin to 300mg/300mg/600mg   - dc'd Tylenol given transaminitis  - start ibuprofen 600 q12h PRN mild pain, alternating with acetaminophen  - start Toradol 15mg IV for breakthrough pain  - morphine PRN

## 2023-10-20 NOTE — PROGRESS NOTE ADULT - SUBJECTIVE AND OBJECTIVE BOX
INTERVAL HPI/OVERNIGHT EVENTS:  Interim reviewed;  Awake and still with some pain requiring narcotics;  Neuro exam is stable   Will work with PT today  Will go to [facility]       MEDICATIONS  (STANDING):  artificial tears (preservative free) Ophthalmic Solution 1 Drop(s) Both EYES every 6 hours  bimatoprost 0.01% Ophthalmic Solution 1 Drop(s) Both EYES at bedtime  enoxaparin Injectable 40 milliGRAM(s) SubCutaneous every 24 hours  gabapentin 300 milliGRAM(s) Oral <User Schedule>  gabapentin 600 milliGRAM(s) Oral every 24 hours  lactulose Syrup 10 Gram(s) Oral daily  lidocaine   4% Patch 1 Patch Transdermal every 24 hours  pantoprazole    Tablet 40 milliGRAM(s) Oral every 24 hours  petrolatum Ophthalmic Ointment 1 Application(s) Both EYES daily  polyethylene glycol 3350 17 Gram(s) Oral every 12 hours  senna 2 Tablet(s) Oral at bedtime    MEDICATIONS  (PRN):  aluminum hydroxide/magnesium hydroxide/simethicone Suspension 30 milliLiter(s) Oral every 4 hours PRN Dyspepsia  diphenhydrAMINE 25 milliGRAM(s) Oral daily PRN Allergy symptoms  ibuprofen  Tablet. 600 milliGRAM(s) Oral every 12 hours PRN Temp greater or equal to 38C (100.4F), Mild Pain (1 - 3)  ketorolac   Injectable 15 milliGRAM(s) IV Push every 12 hours PRN Severe Pain (7 - 10)  melatonin 3 milliGRAM(s) Oral at bedtime PRN Insomnia  morphine  - Injectable 2 milliGRAM(s) IV Push every 4 hours PRN Moderate Pain (4 - 6)  morphine  - Injectable 4 milliGRAM(s) IV Push every 4 hours PRN Severe Pain (7 - 10)  ondansetron Injectable 4 milliGRAM(s) IV Push every 8 hours PRN Nausea and/or Vomiting      Allergies    No Known Allergies    Intolerances        Vital Signs Last 24 Hrs  T(C): 36.7 (20 Oct 2023 04:00), Max: 37.2 (19 Oct 2023 13:44)  T(F): 98.1 (20 Oct 2023 04:00), Max: 98.9 (19 Oct 2023 13:44)  HR: 89 (20 Oct 2023 08:06) (84 - 112)  BP: 117/79 (20 Oct 2023 08:06) (117/79 - 166/87)  BP(mean): 94 (20 Oct 2023 08:06) (89 - 119)  RR: 16 (20 Oct 2023 08:06) (16 - 27)  SpO2: 97% (20 Oct 2023 08:06) (95% - 98%)    Parameters below as of 20 Oct 2023 08:06  Patient On (Oxygen Delivery Method): room air              Constitutional: Awake     Eyes: EMILY    ENMT: Negative    Neck: Supple    Back:  no tenderness     Respiratory:  clear     Cardiovascular: S1 S2    Gastrointestinal: soft     Genitourinary:    Extremities: no edema     Vascular:    Neurological: left facial improving     Skin:    Lymph Nodes:            10-19 @ 07:01  -  10-20 @ 07:00  --------------------------------------------------------  IN: 1075 mL / OUT: 1300 mL / NET: -225 mL      LABS:                        12.8   3.62  )-----------( 174      ( 20 Oct 2023 08:00 )             38.6     10-20    137  |  102  |  18  ----------------------------<  109<H>  3.6   |  25  |  0.60    Ca    9.0      20 Oct 2023 08:00  Phos  3.9     10-20  Mg     2.3     10-20    TPro  8.1  /  Alb  3.3  /  TBili  0.3  /  DBili  x   /  AST  111<H>  /  ALT  121<H>  /  AlkPhos  67  10-19      Urinalysis Basic - ( 20 Oct 2023 08:00 )    Color: x / Appearance: x / SG: x / pH: x  Gluc: 109 mg/dL / Ketone: x  / Bili: x / Urobili: x   Blood: x / Protein: x / Nitrite: x   Leuk Esterase: x / RBC: x / WBC x   Sq Epi: x / Non Sq Epi: x / Bacteria: x        RADIOLOGY & ADDITIONAL TESTS:

## 2023-10-21 DIAGNOSIS — H16.009 UNSPECIFIED CORNEAL ULCER, UNSPECIFIED EYE: ICD-10-CM

## 2023-10-21 LAB
ALBUMIN SERPL ELPH-MCNC: 3.4 G/DL — SIGNIFICANT CHANGE UP (ref 3.3–5)
ALBUMIN SERPL ELPH-MCNC: 3.4 G/DL — SIGNIFICANT CHANGE UP (ref 3.3–5)
ALP SERPL-CCNC: 70 U/L — SIGNIFICANT CHANGE UP (ref 40–120)
ALP SERPL-CCNC: 70 U/L — SIGNIFICANT CHANGE UP (ref 40–120)
ALT FLD-CCNC: 234 U/L — HIGH (ref 10–45)
ALT FLD-CCNC: 234 U/L — HIGH (ref 10–45)
ANION GAP SERPL CALC-SCNC: 6 MMOL/L — SIGNIFICANT CHANGE UP (ref 5–17)
ANION GAP SERPL CALC-SCNC: 6 MMOL/L — SIGNIFICANT CHANGE UP (ref 5–17)
AST SERPL-CCNC: 161 U/L — HIGH (ref 10–40)
AST SERPL-CCNC: 161 U/L — HIGH (ref 10–40)
BASOPHILS # BLD AUTO: 0.03 K/UL — SIGNIFICANT CHANGE UP (ref 0–0.2)
BASOPHILS # BLD AUTO: 0.03 K/UL — SIGNIFICANT CHANGE UP (ref 0–0.2)
BASOPHILS NFR BLD AUTO: 0.8 % — SIGNIFICANT CHANGE UP (ref 0–2)
BASOPHILS NFR BLD AUTO: 0.8 % — SIGNIFICANT CHANGE UP (ref 0–2)
BILIRUB SERPL-MCNC: 0.3 MG/DL — SIGNIFICANT CHANGE UP (ref 0.2–1.2)
BILIRUB SERPL-MCNC: 0.3 MG/DL — SIGNIFICANT CHANGE UP (ref 0.2–1.2)
BUN SERPL-MCNC: 18 MG/DL — SIGNIFICANT CHANGE UP (ref 7–23)
BUN SERPL-MCNC: 18 MG/DL — SIGNIFICANT CHANGE UP (ref 7–23)
CALCIUM SERPL-MCNC: 9.2 MG/DL — SIGNIFICANT CHANGE UP (ref 8.4–10.5)
CALCIUM SERPL-MCNC: 9.2 MG/DL — SIGNIFICANT CHANGE UP (ref 8.4–10.5)
CHLORIDE SERPL-SCNC: 102 MMOL/L — SIGNIFICANT CHANGE UP (ref 96–108)
CHLORIDE SERPL-SCNC: 102 MMOL/L — SIGNIFICANT CHANGE UP (ref 96–108)
CO2 SERPL-SCNC: 29 MMOL/L — SIGNIFICANT CHANGE UP (ref 22–31)
CO2 SERPL-SCNC: 29 MMOL/L — SIGNIFICANT CHANGE UP (ref 22–31)
CREAT SERPL-MCNC: 0.67 MG/DL — SIGNIFICANT CHANGE UP (ref 0.5–1.3)
CREAT SERPL-MCNC: 0.67 MG/DL — SIGNIFICANT CHANGE UP (ref 0.5–1.3)
EGFR: 99 ML/MIN/1.73M2 — SIGNIFICANT CHANGE UP
EGFR: 99 ML/MIN/1.73M2 — SIGNIFICANT CHANGE UP
EOSINOPHIL # BLD AUTO: 0.19 K/UL — SIGNIFICANT CHANGE UP (ref 0–0.5)
EOSINOPHIL # BLD AUTO: 0.19 K/UL — SIGNIFICANT CHANGE UP (ref 0–0.5)
EOSINOPHIL NFR BLD AUTO: 4.9 % — SIGNIFICANT CHANGE UP (ref 0–6)
EOSINOPHIL NFR BLD AUTO: 4.9 % — SIGNIFICANT CHANGE UP (ref 0–6)
GLUCOSE SERPL-MCNC: 102 MG/DL — HIGH (ref 70–99)
GLUCOSE SERPL-MCNC: 102 MG/DL — HIGH (ref 70–99)
HCT VFR BLD CALC: 42.8 % — SIGNIFICANT CHANGE UP (ref 34.5–45)
HCT VFR BLD CALC: 42.8 % — SIGNIFICANT CHANGE UP (ref 34.5–45)
HGB BLD-MCNC: 13.8 G/DL — SIGNIFICANT CHANGE UP (ref 11.5–15.5)
HGB BLD-MCNC: 13.8 G/DL — SIGNIFICANT CHANGE UP (ref 11.5–15.5)
IMM GRANULOCYTES NFR BLD AUTO: 0.3 % — SIGNIFICANT CHANGE UP (ref 0–0.9)
IMM GRANULOCYTES NFR BLD AUTO: 0.3 % — SIGNIFICANT CHANGE UP (ref 0–0.9)
LYMPHOCYTES # BLD AUTO: 1.02 K/UL — SIGNIFICANT CHANGE UP (ref 1–3.3)
LYMPHOCYTES # BLD AUTO: 1.02 K/UL — SIGNIFICANT CHANGE UP (ref 1–3.3)
LYMPHOCYTES # BLD AUTO: 26.4 % — SIGNIFICANT CHANGE UP (ref 13–44)
LYMPHOCYTES # BLD AUTO: 26.4 % — SIGNIFICANT CHANGE UP (ref 13–44)
MAGNESIUM SERPL-MCNC: 2.3 MG/DL — SIGNIFICANT CHANGE UP (ref 1.6–2.6)
MAGNESIUM SERPL-MCNC: 2.3 MG/DL — SIGNIFICANT CHANGE UP (ref 1.6–2.6)
MCHC RBC-ENTMCNC: 25.9 PG — LOW (ref 27–34)
MCHC RBC-ENTMCNC: 25.9 PG — LOW (ref 27–34)
MCHC RBC-ENTMCNC: 32.2 GM/DL — SIGNIFICANT CHANGE UP (ref 32–36)
MCHC RBC-ENTMCNC: 32.2 GM/DL — SIGNIFICANT CHANGE UP (ref 32–36)
MCV RBC AUTO: 80.3 FL — SIGNIFICANT CHANGE UP (ref 80–100)
MCV RBC AUTO: 80.3 FL — SIGNIFICANT CHANGE UP (ref 80–100)
MONOCYTES # BLD AUTO: 0.37 K/UL — SIGNIFICANT CHANGE UP (ref 0–0.9)
MONOCYTES # BLD AUTO: 0.37 K/UL — SIGNIFICANT CHANGE UP (ref 0–0.9)
MONOCYTES NFR BLD AUTO: 9.6 % — SIGNIFICANT CHANGE UP (ref 2–14)
MONOCYTES NFR BLD AUTO: 9.6 % — SIGNIFICANT CHANGE UP (ref 2–14)
NEUTROPHILS # BLD AUTO: 2.25 K/UL — SIGNIFICANT CHANGE UP (ref 1.8–7.4)
NEUTROPHILS # BLD AUTO: 2.25 K/UL — SIGNIFICANT CHANGE UP (ref 1.8–7.4)
NEUTROPHILS NFR BLD AUTO: 58 % — SIGNIFICANT CHANGE UP (ref 43–77)
NEUTROPHILS NFR BLD AUTO: 58 % — SIGNIFICANT CHANGE UP (ref 43–77)
NRBC # BLD: 0 /100 WBCS — SIGNIFICANT CHANGE UP (ref 0–0)
NRBC # BLD: 0 /100 WBCS — SIGNIFICANT CHANGE UP (ref 0–0)
PHOSPHATE SERPL-MCNC: 4.7 MG/DL — HIGH (ref 2.5–4.5)
PHOSPHATE SERPL-MCNC: 4.7 MG/DL — HIGH (ref 2.5–4.5)
PLATELET # BLD AUTO: 180 K/UL — SIGNIFICANT CHANGE UP (ref 150–400)
PLATELET # BLD AUTO: 180 K/UL — SIGNIFICANT CHANGE UP (ref 150–400)
POTASSIUM SERPL-MCNC: 4.1 MMOL/L — SIGNIFICANT CHANGE UP (ref 3.5–5.3)
POTASSIUM SERPL-MCNC: 4.1 MMOL/L — SIGNIFICANT CHANGE UP (ref 3.5–5.3)
POTASSIUM SERPL-SCNC: 4.1 MMOL/L — SIGNIFICANT CHANGE UP (ref 3.5–5.3)
POTASSIUM SERPL-SCNC: 4.1 MMOL/L — SIGNIFICANT CHANGE UP (ref 3.5–5.3)
PROT SERPL-MCNC: 8.1 G/DL — SIGNIFICANT CHANGE UP (ref 6–8.3)
PROT SERPL-MCNC: 8.1 G/DL — SIGNIFICANT CHANGE UP (ref 6–8.3)
RBC # BLD: 5.33 M/UL — HIGH (ref 3.8–5.2)
RBC # BLD: 5.33 M/UL — HIGH (ref 3.8–5.2)
RBC # FLD: 14.8 % — HIGH (ref 10.3–14.5)
RBC # FLD: 14.8 % — HIGH (ref 10.3–14.5)
SODIUM SERPL-SCNC: 137 MMOL/L — SIGNIFICANT CHANGE UP (ref 135–145)
SODIUM SERPL-SCNC: 137 MMOL/L — SIGNIFICANT CHANGE UP (ref 135–145)
WBC # BLD: 3.87 K/UL — SIGNIFICANT CHANGE UP (ref 3.8–10.5)
WBC # BLD: 3.87 K/UL — SIGNIFICANT CHANGE UP (ref 3.8–10.5)
WBC # FLD AUTO: 3.87 K/UL — SIGNIFICANT CHANGE UP (ref 3.8–10.5)
WBC # FLD AUTO: 3.87 K/UL — SIGNIFICANT CHANGE UP (ref 3.8–10.5)

## 2023-10-21 PROCEDURE — 99232 SBSQ HOSP IP/OBS MODERATE 35: CPT

## 2023-10-21 PROCEDURE — 99232 SBSQ HOSP IP/OBS MODERATE 35: CPT | Mod: GC

## 2023-10-21 RX ORDER — TOBRAMYCIN 0.3 %
1 DROPS OPHTHALMIC (EYE)
Refills: 0 | Status: DISCONTINUED | OUTPATIENT
Start: 2023-10-21 | End: 2023-10-22

## 2023-10-21 RX ADMIN — Medication 1 DROP(S): at 23:00

## 2023-10-21 RX ADMIN — Medication 1 DROP(S): at 17:44

## 2023-10-21 RX ADMIN — Medication 1 APPLICATION(S): at 01:15

## 2023-10-21 RX ADMIN — GABAPENTIN 300 MILLIGRAM(S): 400 CAPSULE ORAL at 06:55

## 2023-10-21 RX ADMIN — BIMATOPROST 1 DROP(S): 0.3 SOLUTION/ DROPS OPHTHALMIC at 01:19

## 2023-10-21 RX ADMIN — GABAPENTIN 300 MILLIGRAM(S): 400 CAPSULE ORAL at 13:11

## 2023-10-21 RX ADMIN — LIDOCAINE 1 PATCH: 4 CREAM TOPICAL at 07:04

## 2023-10-21 RX ADMIN — ENOXAPARIN SODIUM 40 MILLIGRAM(S): 100 INJECTION SUBCUTANEOUS at 18:53

## 2023-10-21 RX ADMIN — Medication 1 DROP(S): at 18:53

## 2023-10-21 RX ADMIN — LACTULOSE 10 GRAM(S): 10 SOLUTION ORAL at 11:57

## 2023-10-21 RX ADMIN — Medication 1 DROP(S): at 21:59

## 2023-10-21 RX ADMIN — Medication 1 DROP(S): at 06:54

## 2023-10-21 RX ADMIN — Medication 1 DROP(S): at 11:33

## 2023-10-21 RX ADMIN — LIDOCAINE 1 PATCH: 4 CREAM TOPICAL at 11:47

## 2023-10-21 RX ADMIN — POLYETHYLENE GLYCOL 3350 17 GRAM(S): 17 POWDER, FOR SOLUTION ORAL at 09:26

## 2023-10-21 RX ADMIN — Medication 600 MILLIGRAM(S): at 00:40

## 2023-10-21 RX ADMIN — BIMATOPROST 1 DROP(S): 0.3 SOLUTION/ DROPS OPHTHALMIC at 22:35

## 2023-10-21 RX ADMIN — PANTOPRAZOLE SODIUM 40 MILLIGRAM(S): 20 TABLET, DELAYED RELEASE ORAL at 07:01

## 2023-10-21 RX ADMIN — Medication 1 APPLICATION(S): at 22:00

## 2023-10-21 RX ADMIN — Medication 1 DROP(S): at 13:12

## 2023-10-21 RX ADMIN — Medication 1 DROP(S): at 15:35

## 2023-10-21 NOTE — PROGRESS NOTE ADULT - SUBJECTIVE AND OBJECTIVE BOX
Physical Medicine and Rehabilitation Progress Note :       Patient is a 63y old  Female who presents with a chief complaint of lower limb weakness (21 Oct 2023 12:41)      HPI:  63 year old female with PMH of OA, GERD, and sarcoidosis (diagnosed in 2008) presenting with worsening back pain and progressive lower limb weakness for the last three days. Pt was recently admitted on 10/8/23 for lower and upper extremity numbness and tingling. At the time, MRI and CT lumbar and thoracic which didn't reveal acute pathology, only showed broad C6-7 disc bulging w/o any spinal compression, no contrast enchainment. Imaging also showed mediastinal LN. Today, pt reports 9/10 tearing in quality lower back pain, increased weakness as she was unable to get off the toilet on her own. She also had to use a walker as she felt like she would fall otherwise. Pt also reports urinary incontinence during the day, reports no episodes of incontinence overnight. Pt endorses occipital headache and nausea. Last BM on sunday. Pt denies chest pain, sob, abd pain.  (11 Oct 2023 12:40)                            13.8   3.87  )-----------( 180      ( 21 Oct 2023 05:30 )             42.8       10-21    137  |  102  |  18  ----------------------------<  102<H>  4.1   |  29  |  0.67    Ca    9.2      21 Oct 2023 05:30  Phos  4.7     10-21  Mg     2.3     10-21    TPro  8.1  /  Alb  3.4  /  TBili  0.3  /  DBili  x   /  AST  161<H>  /  ALT  234<H>  /  AlkPhos  70  10-21    Vital Signs Last 24 Hrs  T(C): 36.8 (21 Oct 2023 12:00), Max: 37.2 (20 Oct 2023 14:02)  T(F): 98.2 (21 Oct 2023 12:00), Max: 98.9 (20 Oct 2023 14:02)  HR: 85 (21 Oct 2023 12:00) (85 - 99)  BP: 110/78 (21 Oct 2023 12:00) (110/78 - 147/93)  BP(mean): --  RR: 18 (21 Oct 2023 12:00) (16 - 18)  SpO2: 99% (21 Oct 2023 12:00) (97% - 99%)    Parameters below as of 21 Oct 2023 12:00  Patient On (Oxygen Delivery Method): room air        MEDICATIONS  (STANDING):  artificial tears (preservative free) Ophthalmic Solution 1 Drop(s) Both EYES every 2 hours  bimatoprost 0.01% Ophthalmic Solution 1 Drop(s) Both EYES at bedtime  enoxaparin Injectable 40 milliGRAM(s) SubCutaneous every 24 hours  gabapentin 300 milliGRAM(s) Oral <User Schedule>  gabapentin 600 milliGRAM(s) Oral every 24 hours  lactulose Syrup 10 Gram(s) Oral daily  lidocaine   4% Patch 1 Patch Transdermal every 24 hours  pantoprazole    Tablet 40 milliGRAM(s) Oral every 24 hours  petrolatum Ophthalmic Ointment 1 Application(s) Both EYES daily  polyethylene glycol 3350 17 Gram(s) Oral every 12 hours  senna 2 Tablet(s) Oral at bedtime  tobramycin 0.3% Ophthalmic Solution 1 Drop(s) Right EYE two times a day    MEDICATIONS  (PRN):  aluminum hydroxide/magnesium hydroxide/simethicone Suspension 30 milliLiter(s) Oral every 4 hours PRN Dyspepsia  diphenhydrAMINE 25 milliGRAM(s) Oral daily PRN Allergy symptoms  ibuprofen  Tablet. 600 milliGRAM(s) Oral every 12 hours PRN Temp greater or equal to 38C (100.4F), Mild Pain (1 - 3)  ketorolac   Injectable 15 milliGRAM(s) IV Push every 12 hours PRN Severe Pain (7 - 10)  melatonin 3 milliGRAM(s) Oral at bedtime PRN Insomnia  ondansetron Injectable 4 milliGRAM(s) IV Push every 8 hours PRN Nausea and/or Vomiting      10/20/2023 assessment:    Pain Assessment/Number Scale (0-10) Adult  Presence of Pain: denies pain/discomfort (Rating = 0)  Pain Rating (0-10): Rest: 0 (no pain/absence of nonverbal indicators of pain)  Pain Rating (0-10): Activity: 0 (no pain/absence of nonverbal indicators of pain)    Safety      AM-PAC Functional Assessment: Daily Activity  Type of Assessment: Daily assessment  Putting on and taking off regular lower body clothing?: 2 = A lot of assistance  Bathing (including washing, rinsing, drying)?: 3 = A little assistance  Toileting, which includes using toilet, bedpan or urinal?: 3 = A little assistance  Putting on and taking off regular upper body clothing?: 3 = A little assistance  Take care of personal grooming such as brushing teeth?: 3 = A little assistance  Eating meals?: 3 = A little assistance  Score: 17   Row Comment: Ask the patient "How much help from another person do you currently need? (If the patient hasn't done an activity recently, how much help from another person do you think he/she needs if he/she tried?)    Cognitive/Neuro      Cognitive/Neuro/Behavioral  Level of Consciousness: alert  Arousal Level: opens eyes spontaneously  Orientation: oriented x 4  Speech: clear;  spontaneous;  logical  Mood/Behavior: calm;  cooperative    Language Assistance  Preferred Language to Address Healthcare Preferred Language to Address Healthcare: English    Therapeutic Interventions      Sit-Stand Transfer Training  Transfer Training Sit-to-Stand Transfer: minimum assist (75% patient effort);  1 person assist;  verbal cues;  full weight-bearing   rolling walker  Transfer Training Stand-to-Sit Transfer: minimum assist (75% patient effort);  1 person assist;  full weight-bearing   rolling walker  Sit-to-Stand Transfer Training Transfer Safety Analysis: decreased weight-shifting ability;  decreased step length;  decreased strength;  impaired balance    Therapeutic Exercise  Therapeutic Exercise Detail: 20ftx2 with min A and RW     Functional Endurance  Functional Endurance Detail: Energy conservation education provided + techniques integrated throughout session; check for carryover in upcoming session     Lower Body Dressing Training  Lower Body Dressing Training Assistance: minimum assist (75% patient effort);  1 person assist;  supervision;  to don/doff socks and don underwear with standing hip hike. Pt requires frequent rest breaks and time to complete task 2/2 reduced endurance      PM&R Impression : as above    Current disposition plan recommendation :    acute rehab placement

## 2023-10-21 NOTE — PROGRESS NOTE ADULT - TIME BILLING
Patient seen and examined;  Continues to improve   Will go to acute rehab  Tobrex for left eye conjunctivitis

## 2023-10-21 NOTE — PROGRESS NOTE ADULT - PROBLEM SELECTOR PLAN 4
BP gradually rising to 200s. Patient was started on labetalol 100 P.O BID. On 10/14-10/15 overnight BP in 220s s/p labetalol 5mg IV, Metoprolol 5mg IV in am. BP remained elevated to 190s, Patient received Amlodipine 10mg and BP dropped to 96/92.     Plan:   - Per neurology, dysautonomia is an expected complication  - continue anti-hypertensive to goal SBP <140 MRI of lumbar spine on 10/14:  compatible with active neurosarcoidosis, other etiologies include Guillain-[place] syndrome and other inflammatory,  infectious, or neoplastic causes. Imaging showed mediastinal LN.  DDx: LIkely related to inflammatory polyneuropathy vs malignancy driven pain     - increased gabapentin to 300mg/300mg/600mg   - dc'd Tylenol given transaminitis  - start ibuprofen 600 q12h PRN mild pain, alternating with acetaminophen  - start Toradol 15mg IV for breakthrough pain  - morphine PRN

## 2023-10-21 NOTE — PROGRESS NOTE ADULT - SUBJECTIVE AND OBJECTIVE BOX
INTERVAL HPI/OVERNIGHT EVENTS:  Patient without chief complaint today;  Had a good night sleep  Pain improved  Ambulation appears improving  Patient will go to acute rehab       MEDICATIONS  (STANDING):  artificial tears (preservative free) Ophthalmic Solution 1 Drop(s) Both EYES every 2 hours  bimatoprost 0.01% Ophthalmic Solution 1 Drop(s) Both EYES at bedtime  enoxaparin Injectable 40 milliGRAM(s) SubCutaneous every 24 hours  gabapentin 600 milliGRAM(s) Oral every 24 hours  gabapentin 300 milliGRAM(s) Oral <User Schedule>  lactulose Syrup 10 Gram(s) Oral daily  lidocaine   4% Patch 1 Patch Transdermal every 24 hours  pantoprazole    Tablet 40 milliGRAM(s) Oral every 24 hours  petrolatum Ophthalmic Ointment 1 Application(s) Both EYES daily  polyethylene glycol 3350 17 Gram(s) Oral every 12 hours  senna 2 Tablet(s) Oral at bedtime  tobramycin 0.3% Ophthalmic Solution 1 Drop(s) Right EYE two times a day    MEDICATIONS  (PRN):  aluminum hydroxide/magnesium hydroxide/simethicone Suspension 30 milliLiter(s) Oral every 4 hours PRN Dyspepsia  diphenhydrAMINE 25 milliGRAM(s) Oral daily PRN Allergy symptoms  ibuprofen  Tablet. 600 milliGRAM(s) Oral every 12 hours PRN Temp greater or equal to 38C (100.4F), Mild Pain (1 - 3)  ketorolac   Injectable 15 milliGRAM(s) IV Push every 12 hours PRN Severe Pain (7 - 10)  melatonin 3 milliGRAM(s) Oral at bedtime PRN Insomnia  ondansetron Injectable 4 milliGRAM(s) IV Push every 8 hours PRN Nausea and/or Vomiting      Allergies    No Known Allergies    Intolerances        Vital Signs Last 24 Hrs  T(C): 36.8 (21 Oct 2023 12:00), Max: 37.2 (20 Oct 2023 14:02)  T(F): 98.2 (21 Oct 2023 12:00), Max: 98.9 (20 Oct 2023 14:02)  HR: 85 (21 Oct 2023 12:00) (85 - 99)  BP: 110/78 (21 Oct 2023 12:00) (110/78 - 147/93)  BP(mean): --  RR: 18 (21 Oct 2023 12:00) (16 - 18)  SpO2: 99% (21 Oct 2023 12:00) (97% - 99%)    Parameters below as of 21 Oct 2023 12:00  Patient On (Oxygen Delivery Method): room air              Constitutional: Awake     Eyes: EMILY  Left eye conjunctiva red     ENMT: Negative    Neck: Supple    Back:  no tenderness     Respiratory:  clear     Cardiovascular: S1 S2    Gastrointestinal:  soft     Genitourinary:    Extremities: no edema     Vascular:    Neurological:    Skin:    Lymph Nodes:            10-20 @ 07:01  -  10-21 @ 07:00  --------------------------------------------------------  IN: 575 mL / OUT: 600 mL / NET: -25 mL      LABS:                        13.8   3.87  )-----------( 180      ( 21 Oct 2023 05:30 )             42.8     10-21    137  |  102  |  18  ----------------------------<  102<H>  4.1   |  29  |  0.67    Ca    9.2      21 Oct 2023 05:30  Phos  4.7     10-21  Mg     2.3     10-21    TPro  8.1  /  Alb  3.4  /  TBili  0.3  /  DBili  x   /  AST  161<H>  /  ALT  234<H>  /  AlkPhos  70  10-21      Urinalysis Basic - ( 21 Oct 2023 05:30 )    Color: x / Appearance: x / SG: x / pH: x  Gluc: 102 mg/dL / Ketone: x  / Bili: x / Urobili: x   Blood: x / Protein: x / Nitrite: x   Leuk Esterase: x / RBC: x / WBC x   Sq Epi: x / Non Sq Epi: x / Bacteria: x        RADIOLOGY & ADDITIONAL TESTS:

## 2023-10-21 NOTE — PROGRESS NOTE ADULT - PROBLEM SELECTOR PLAN 1
Progressive weakness in lower limbs over 3 days with continued urinary incontinence. 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  MRI head: No acute infarct or other acute abnormality. No abnormal enhancement. MRI of lumbar spine on 10/14:  compatible with active neurosarcoidosis, other etiologies include Guillain-[place] syndrome and other inflammatory,  infectious, or neoplastic causes. Imaging showed mediastinal LN.  Daily NIFs and Vital Capacity improving. Still with weakness and bulbar involvement but improving     Plan  - neurology following, appeciate recs   - f/u Vital Capacity bid should be greater than 20ml/kg  - s/p IVIG 30g qd x4 days -10/16  - PT consulted. Patient can tolerate 3 hours of PT  - Can c/w gabapentin to 300mg/300mg/600mg   - Elevated BP noted. Dysautonomia is an expected complication, continue anti-hypertensive to goal SBP <140  - C/w artifical tears and petrolatum Ophthalmic ointment b/l eyes Progressive weakness in lower limbs over 3 days with continued urinary incontinence. 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  MRI head: No acute infarct or other acute abnormality. No abnormal enhancement. MRI of lumbar spine on 10/14:  compatible with active neurosarcoidosis, other etiologies include Guillain-[place] syndrome and other inflammatory,  infectious, or neoplastic causes. Imaging showed mediastinal LN.  Daily NIFs and Vital Capacity improving. Still with weakness and bulbar involvement but improving     Plan  - neurology following, appeciate recs   - f/u Vital Capacity bid should be greater than 20ml/kg  - s/p IVIG 30g qd x4 days -10/16  - PT consulted. Patient can tolerate 3 hours of PT  - Can c/w gabapentin to 300mg/300mg/600mg   - Elevated BP noted. Dysautonomia is an expected complication, continue anti-hypertensive to goal SBP <140  - C/w artifical tears and petrolatum Ophthalmic ointment b/l eyes  - c/w tobramycin opthalmic drops for conjunctivitis

## 2023-10-21 NOTE — PROGRESS NOTE ADULT - PROBLEM SELECTOR PLAN 8
at home protonix 40mg qd  -c/w home med. RESOLVED  Na 128. Patient asymptomatic. Patient appear euvolemic on exam. Patient has been having severe and constant pain. 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  TSH 1.75 w/n/l.   Serum osmo low at 267, hypotonic hyponatremia. Ulytes c/w SIADH i/so sarcoidosis     Plan  - f/u CMP

## 2023-10-21 NOTE — PROGRESS NOTE ADULT - PROBLEM SELECTOR PLAN 2
As above #r/o corneal ulcer  Pt at high risk for corneal ulcer and infection due to inability to close eye consider obtaining opthalmology evaluation in AM     - consider optho eval in AM

## 2023-10-21 NOTE — PROGRESS NOTE ADULT - PROBLEM SELECTOR PLAN 3
MRI of lumbar spine on 10/14:  compatible with active neurosarcoidosis, other etiologies include Guillain-[place] syndrome and other inflammatory,  infectious, or neoplastic causes. Imaging showed mediastinal LN.  DDx: LIkely related to inflammatory polyneuropathy vs malignancy driven pain     - increased gabapentin to 300mg/300mg/600mg   - dc'd Tylenol given transaminitis  - start ibuprofen 600 q12h PRN mild pain, alternating with acetaminophen  - start Toradol 15mg IV for breakthrough pain  - morphine PRN As above

## 2023-10-21 NOTE — PROGRESS NOTE ADULT - PROBLEM SELECTOR PLAN 5
Likely 2/2 IVIG treatment    Plan:  - dc'd Tylenol   - f/u CMP BP gradually rising to 200s. Patient was started on labetalol 100 P.O BID. On 10/14-10/15 overnight BP in 220s s/p labetalol 5mg IV, Metoprolol 5mg IV in am. BP remained elevated to 190s, Patient received Amlodipine 10mg and BP dropped to 96/92.     Plan:   - Per neurology, dysautonomia is an expected complication  - continue anti-hypertensive to goal SBP <140

## 2023-10-21 NOTE — PROGRESS NOTE ADULT - ASSESSMENT
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 There has previously been some evidence of disease progression after the IVIG, with involvement of CN 7 and 10, as well as blurred vision on 10/17 but since 10/18 symptoms are stable, possibly plateaued with resolution of neck weakness, improvements in facial weakness and extremity strength.    Recommendations:     - Can c/w gabapentin to 300mg/300mg/600mg   - Elevated BP noted. Dysautonomia is an expected complication, continue anti-hypertensive to goal SBP <140  - C/w artifical tears but ensure administered every 2 hours and Ophthalmic ointment OU at night  - Continue NIF and vital capacity Q4hrs   - Transaminitis noted. While this may be the result of IVIG, would eliminate Tylenol as pain is likely neuropathic  - Completed IVIG 500mg/kg x4 days

## 2023-10-21 NOTE — PROGRESS NOTE ADULT - PROBLEM SELECTOR PLAN 9
Diagnosed in 2008. MRI imaging 10/8 with mediastinal LAD.  Imaging consistent with neurosarcoid at home protonix 40mg qd  -c/w home med.

## 2023-10-21 NOTE — PROGRESS NOTE ADULT - SUBJECTIVE AND OBJECTIVE BOX
**INCOMPLETE NOTE    OVERNIGHT EVENTS:    SUBJECTIVE:  Patient seen and examined at bedside.    Vital Signs Last 12 Hrs  T(F): 98.8 (10-20-23 @ 22:55), Max: 98.8 (10-20-23 @ 22:55)  HR: 96 (10-20-23 @ 22:55) (96 - 96)  BP: 147/93 (10-20-23 @ 22:55) (147/93 - 147/93)  BP(mean): --  RR: 16 (10-20-23 @ 22:55) (16 - 16)  SpO2: 97% (10-20-23 @ 22:55) (97% - 97%)  I&O's Summary    19 Oct 2023 07:01  -  20 Oct 2023 07:00  --------------------------------------------------------  IN: 1075 mL / OUT: 1300 mL / NET: -225 mL    20 Oct 2023 07:01  -  21 Oct 2023 06:22  --------------------------------------------------------  IN: 575 mL / OUT: 600 mL / NET: -25 mL        PHYSICAL EXAM:  Constitutional: NAD, comfortable in bed.  HEENT: NC/AT, PERRLA, EOMI, no conjunctival pallor or scleral icterus, MMM  Neck: Supple, no JVD  Respiratory: CTA B/L. No w/r/r.   Cardiovascular: RRR, normal S1 and S2, no m/r/g.   Gastrointestinal: +BS, soft NTND, no guarding or rebound tenderness, no palpable masses   Extremities: wwp; no cyanosis, clubbing or edema.   Vascular: Pulses equal and strong throughout.   Neurological: AAOx3, no CN deficits, strength and sensation intact throughout.   Skin: No gross skin abnormalities or rashes        LABS:                        12.8   3.62  )-----------( 174      ( 20 Oct 2023 08:00 )             38.6     10-20    137  |  102  |  18  ----------------------------<  109<H>  3.6   |  25  |  0.60    Ca    9.0      20 Oct 2023 08:00  Phos  3.9     10-20  Mg     2.3     10-20        Urinalysis Basic - ( 20 Oct 2023 08:00 )    Color: x / Appearance: x / SG: x / pH: x  Gluc: 109 mg/dL / Ketone: x  / Bili: x / Urobili: x   Blood: x / Protein: x / Nitrite: x   Leuk Esterase: x / RBC: x / WBC x   Sq Epi: x / Non Sq Epi: x / Bacteria: x          RADIOLOGY & ADDITIONAL TESTS:    MEDICATIONS  (STANDING):  artificial tears (preservative free) Ophthalmic Solution 1 Drop(s) Both EYES every 6 hours  bimatoprost 0.01% Ophthalmic Solution 1 Drop(s) Both EYES at bedtime  enoxaparin Injectable 40 milliGRAM(s) SubCutaneous every 24 hours  gabapentin 600 milliGRAM(s) Oral every 24 hours  gabapentin 300 milliGRAM(s) Oral <User Schedule>  lactulose Syrup 10 Gram(s) Oral daily  lidocaine   4% Patch 1 Patch Transdermal every 24 hours  pantoprazole    Tablet 40 milliGRAM(s) Oral every 24 hours  petrolatum Ophthalmic Ointment 1 Application(s) Both EYES daily  polyethylene glycol 3350 17 Gram(s) Oral every 12 hours  senna 2 Tablet(s) Oral at bedtime    MEDICATIONS  (PRN):  aluminum hydroxide/magnesium hydroxide/simethicone Suspension 30 milliLiter(s) Oral every 4 hours PRN Dyspepsia  diphenhydrAMINE 25 milliGRAM(s) Oral daily PRN Allergy symptoms  ibuprofen  Tablet. 600 milliGRAM(s) Oral every 12 hours PRN Temp greater or equal to 38C (100.4F), Mild Pain (1 - 3)  ketorolac   Injectable 15 milliGRAM(s) IV Push every 12 hours PRN Severe Pain (7 - 10)  melatonin 3 milliGRAM(s) Oral at bedtime PRN Insomnia  ondansetron Injectable 4 milliGRAM(s) IV Push every 8 hours PRN Nausea and/or Vomiting     OVERNIGHT EVENTS: ALBAN    SUBJECTIVE:  Patient seen and examined at bedside, pt without acute complaints at this time. Pt states that her strength her UE is getting better and her facial weakness is improving.     Vital Signs Last 12 Hrs  T(F): 98.8 (10-20-23 @ 22:55), Max: 98.8 (10-20-23 @ 22:55)  HR: 96 (10-20-23 @ 22:55) (96 - 96)  BP: 147/93 (10-20-23 @ 22:55) (147/93 - 147/93)  BP(mean): --  RR: 16 (10-20-23 @ 22:55) (16 - 16)  SpO2: 97% (10-20-23 @ 22:55) (97% - 97%)  I&O's Summary    19 Oct 2023 07:01  -  20 Oct 2023 07:00  --------------------------------------------------------  IN: 1075 mL / OUT: 1300 mL / NET: -225 mL    20 Oct 2023 07:01  -  21 Oct 2023 06:22  --------------------------------------------------------  IN: 575 mL / OUT: 600 mL / NET: -25 mL        PHYSICAL EXAM:  Constitutional: NAD resting in bed.    HEENT: , MMM, conjunctival injection b/l   Respiratory: CTA b/l, good air entry b/l, no wheezing, no rhonchi, no rales, without accessory muscle use and no intercostal retractions  Cardiovascular: RRR, normal S1S2, no M/R/G  Gastrointestinal: abdomen soft, NTND, no masses palpable, BS normal  Extremities: Warm, well perfused, pulses equal bilateral upper and lower extremities, no edema, no clubbing  Skin: Normal temperature, warm  Neurto:  AOx3, L facial weakness L (can close her left eye mostly , left sided facial droop  Strength: b/l UE 5/5, LUE 5/5, BL LE 4+/5 proximally and 5/5 distally  DTRs: 2/4 right tricep, 1/4 remaining UE DTRs, 0/4 in BL LE            LABS:                        12.8   3.62  )-----------( 174      ( 20 Oct 2023 08:00 )             38.6     10-20    137  |  102  |  18  ----------------------------<  109<H>  3.6   |  25  |  0.60    Ca    9.0      20 Oct 2023 08:00  Phos  3.9     10-20  Mg     2.3     10-20        Urinalysis Basic - ( 20 Oct 2023 08:00 )    Color: x / Appearance: x / SG: x / pH: x  Gluc: 109 mg/dL / Ketone: x  / Bili: x / Urobili: x   Blood: x / Protein: x / Nitrite: x   Leuk Esterase: x / RBC: x / WBC x   Sq Epi: x / Non Sq Epi: x / Bacteria: x          RADIOLOGY & ADDITIONAL TESTS:    MEDICATIONS  (STANDING):  artificial tears (preservative free) Ophthalmic Solution 1 Drop(s) Both EYES every 6 hours  bimatoprost 0.01% Ophthalmic Solution 1 Drop(s) Both EYES at bedtime  enoxaparin Injectable 40 milliGRAM(s) SubCutaneous every 24 hours  gabapentin 600 milliGRAM(s) Oral every 24 hours  gabapentin 300 milliGRAM(s) Oral <User Schedule>  lactulose Syrup 10 Gram(s) Oral daily  lidocaine   4% Patch 1 Patch Transdermal every 24 hours  pantoprazole    Tablet 40 milliGRAM(s) Oral every 24 hours  petrolatum Ophthalmic Ointment 1 Application(s) Both EYES daily  polyethylene glycol 3350 17 Gram(s) Oral every 12 hours  senna 2 Tablet(s) Oral at bedtime    MEDICATIONS  (PRN):  aluminum hydroxide/magnesium hydroxide/simethicone Suspension 30 milliLiter(s) Oral every 4 hours PRN Dyspepsia  diphenhydrAMINE 25 milliGRAM(s) Oral daily PRN Allergy symptoms  ibuprofen  Tablet. 600 milliGRAM(s) Oral every 12 hours PRN Temp greater or equal to 38C (100.4F), Mild Pain (1 - 3)  ketorolac   Injectable 15 milliGRAM(s) IV Push every 12 hours PRN Severe Pain (7 - 10)  melatonin 3 milliGRAM(s) Oral at bedtime PRN Insomnia  ondansetron Injectable 4 milliGRAM(s) IV Push every 8 hours PRN Nausea and/or Vomiting

## 2023-10-21 NOTE — PROGRESS NOTE ADULT - PROBLEM SELECTOR PLAN 12
N: regular w/ Ensure  E: Mg <2, Phos <3, K <4  DVT ppx: Lovenox  GI: Protonix 40mg PO qd  C: Full Code  D: [facility], [place]

## 2023-10-21 NOTE — PROGRESS NOTE ADULT - ASSESSMENT
63 year old female with PMH of OA, GERD, sarcoidosis, migraines presenting with worsening back pain associated bilateral upper and lower extremities numbness and tingling in distal parts of all her extremities for the last week. Her neurological exam is worsened from yesterday, with more pronounced facial bulbar symptoms, progressive LE weakness, and now absent LE reflexes. Of note, had flu shot 2 weeks prior to presentation. MRI C and T spine w and w/o contrast was performed which didn't reveal acute pathology, only showed broad C6-7 disc bulging w/o any spinal compression, no contrast enchainment. Her hx of probable sarcoidosis, enlargement of mediastinal LN, unintentional weight loss needs oncological w/up. MRI L-spine showed extensive leptomeningeal enhancement along the periphery of the   conus medullaris with enhancement of the cauda equina nerve roots. compatible with active neurosarcoidosis, or Guillain-[place] syndrome and other inflammatory, infectious, or neoplastic causes.  CPK, ESR, CRP was unremarkable. A1C prediabetic. EMG findings consistent with demyelinating disease, CSF with elevated protein supporting likely Guillain [place].    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 There has previously been some evidence of disease progression after the IVIG, with involvement of CN 7 and 10, as well as blurred vision on 10/17 but since 10/18 symptoms are stable, likely plateaued with resolution of neck weakness, improvements in facial weakness and extremity strength.    Recommendations:   - Would obtain opthalmology evaluation, high risk for corneal ulcer and infection due to inability to close eye  - Transaminitis noted. Would check CK and consider RUQ US. Hepatitis is a consideration but serology should be deferred until >1mo after IVIG  - Can c/w gabapentin to 300mg/300mg/600mg   - Elevated BP noted. Dysautonomia is an expected complication, continue anti-hypertensive to goal SBP <140  - C/w artifical tears but ensure administered every 2 hours and Ophthalmic ointment OU at night  - Continue NIF and vital capacity   - Completed IVIG 500mg/kg x4 days      Case discussed with Dr. Gonsalves.  Thank you for the courtesy of this consult. Will continue to follow.

## 2023-10-21 NOTE — PROGRESS NOTE ADULT - PROBLEM SELECTOR PLAN 7
RESOLVED  Na 128. Patient asymptomatic. Patient appear euvolemic on exam. Patient has been having severe and constant pain. 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  TSH 1.75 w/n/l.   Serum osmo low at 267, hypotonic hyponatremia. Ulytes c/w SIADH i/so sarcoidosis     Plan  - f/u CMP 10/8: CT and MRI showed Extensive mediastinal, hilar, and paratracheal lymphadenopathy is present  in the chest. Enlarged thoracic lymphadenopathy was noted on the 06/08/2012 chest CT study which was attributed to the patient's known history of sarcoidosis at that time. Concern for rheumatologic vs malignant work up,  ESR, CRP, B12, RF w/n/l, Hep A/B/C negative, A1C 5.9. CHRIS elevated 1:160, SSA wnl, TSH 1.75 w/n/l. Immunoglobulin panel wnl. Immunofixation no monoclonal band.  MRI as above     - consider paraneoplastic w/up  - ? pursue LN biopsy

## 2023-10-21 NOTE — PROGRESS NOTE ADULT - PROBLEM SELECTOR PLAN 6
10/8: CT and MRI showed Extensive mediastinal, hilar, and paratracheal lymphadenopathy is present  in the chest. Enlarged thoracic lymphadenopathy was noted on the 06/08/2012 chest CT study which was attributed to the patient's known history of sarcoidosis at that time. Concern for rheumatologic vs malignant work up,  ESR, CRP, B12, RF w/n/l, Hep A/B/C negative, A1C 5.9. CHRIS elevated 1:160, SSA wnl, TSH 1.75 w/n/l. Immunoglobulin panel wnl. Immunofixation no monoclonal band.  MRI as above     - consider paraneoplastic w/up  - ? pursue LN biopsy Likely 2/2 IVIG treatment vs hepatitis. Ordered CK and RUQ US     Plan:  - f/u CK  - f/u RUQ US  - f/u CMP

## 2023-10-21 NOTE — PROGRESS NOTE ADULT - PROBLEM SELECTOR PLAN 10
Hx of Hashimoto's. Not on any meds. TSH 1.75 w/n/l. Diagnosed in 2008. MRI imaging 10/8 with mediastinal LAD.  Imaging consistent with neurosarcoid

## 2023-10-21 NOTE — PROGRESS NOTE ADULT - PROBLEM SELECTOR PLAN 11
N: regular w/ Ensure  E: Mg <2, Phos <3, K <4  DVT ppx: Lovenox  GI: Protonix 40mg PO qd  C: Full Code  D: [facility] Hx of Hashimoto's. Not on any meds. TSH 1.75 w/n/l.

## 2023-10-21 NOTE — PROGRESS NOTE ADULT - SUBJECTIVE AND OBJECTIVE BOX
Neurology Progress Note    Interval History:  The patient was seen and examined at the bedside.       PAST MEDICAL & SURGICAL HISTORY:  Ovarian cyst    GERD (gastroesophageal reflux disease)    Glaucoma    Sarcoidosis    Sinusitis    Osteoporosis    Migraines    Hashimoto's disease    Kidney stones    Torn meniscus  right    History of arthroscopy of shoulder  rotator cuff repair, right    History of umbilical hernia repair    S/P correction of deviated nasal septum    H/O sinus surgery            Medications:  aluminum hydroxide/magnesium hydroxide/simethicone Suspension 30 milliLiter(s) Oral every 4 hours PRN  artificial tears (preservative free) Ophthalmic Solution 1 Drop(s) Both EYES every 2 hours  bimatoprost 0.01% Ophthalmic Solution 1 Drop(s) Both EYES at bedtime  diphenhydrAMINE 25 milliGRAM(s) Oral daily PRN  enoxaparin Injectable 40 milliGRAM(s) SubCutaneous every 24 hours  gabapentin 300 milliGRAM(s) Oral <User Schedule>  gabapentin 600 milliGRAM(s) Oral every 24 hours  ibuprofen  Tablet. 600 milliGRAM(s) Oral every 12 hours PRN  ketorolac   Injectable 15 milliGRAM(s) IV Push every 12 hours PRN  lactulose Syrup 10 Gram(s) Oral daily  lidocaine   4% Patch 1 Patch Transdermal every 24 hours  melatonin 3 milliGRAM(s) Oral at bedtime PRN  ondansetron Injectable 4 milliGRAM(s) IV Push every 8 hours PRN  pantoprazole    Tablet 40 milliGRAM(s) Oral every 24 hours  petrolatum Ophthalmic Ointment 1 Application(s) Both EYES daily  polyethylene glycol 3350 17 Gram(s) Oral every 12 hours  senna 2 Tablet(s) Oral at bedtime  tobramycin 0.3% Ophthalmic Solution 1 Drop(s) Right EYE two times a day      Vital Signs Last 24 Hrs  T(C): 36.8 (21 Oct 2023 12:00), Max: 37.1 (20 Oct 2023 22:55)  T(F): 98.2 (21 Oct 2023 12:00), Max: 98.8 (20 Oct 2023 22:55)  HR: 85 (21 Oct 2023 12:00) (85 - 99)  BP: 110/78 (21 Oct 2023 12:00) (110/78 - 147/93)  BP(mean): --  RR: 18 (21 Oct 2023 12:00) (16 - 18)  SpO2: 99% (21 Oct 2023 12:00) (97% - 99%)    Parameters below as of 21 Oct 2023 12:00  Patient On (Oxygen Delivery Method): room air        NEUROLOGICAL EXAMINATION:  GENERAL:  Appearance is consistent with chronologic age. Able to count to >30 in one breath.    COGNITION/LANGUAGE:  Awake, alert, and oriented to person, place, time and date. Recent and remote memory intact.  Fund of knowledge is appropriate. No aphasia. Dysarthria now minimal.    CRANIAL NERVES:   - Eyes:  Pupils equal round and reactive, no RAPD. EOMI w/o nystagmus, skew or reported double vision. Normal visual field.  - Continued L>R facial weakness, upper and lower - now able to completely close R eye, facial creases with smile b/l. Still unable to completely close L eye. R uvula deviation.  MOTOR EXAM:                              Right | Left    Shoulder abductors:    5|5   Shoulder adductors:    5|5   Elbow flexors:             5|5   Elbow extensors:         5|5   Palmar flexion:            5|5   Palmar dorsiflexion:     5|5   Finger Abduction         5|5  Finger Adduction         5|5  Hip flexors:                 4+|4+  Hip extensors:            5|5   Knee extensors:          5|5   Knee flexors:              4+|4+   Foot dorsiflexors:        4+|4+   Foot plantarflexors:     5|5      DTRs:             Right | Left   Biceps:                 1+|1+     Triceps:                1+|1+   Brachioradialis:     1+|1+     Patellar:                0 | 0   Achilles:                0 | 0     Plantar responses mute b/l.  Head flexion is 5/5. Extension is 5/5.       No observable drift. Normal tone and bulk. No tenderness, twitching, tremors or involuntary movements.  SENSORY EXAM:  diminished vibration in toes and ankles b/l   REFLEXES: .  Plantar response mute b/l.  Jaw jerk, Mad[place], clonus absent.  CEREBELLUM:  Finger to nose intact.  No dysmetria.  Labs:  CBC Full  -  ( 21 Oct 2023 05:30 )  WBC Count : 3.87 K/uL  RBC Count : 5.33 M/uL  Hemoglobin : 13.8 g/dL  Hematocrit : 42.8 %  Platelet Count - Automated : 180 K/uL  Mean Cell Volume : 80.3 fl  Mean Cell Hemoglobin : 25.9 pg  Mean Cell Hemoglobin Concentration : 32.2 gm/dL  Auto Neutrophil # : 2.25 K/uL  Auto Lymphocyte # : 1.02 K/uL  Auto Monocyte # : 0.37 K/uL  Auto Eosinophil # : 0.19 K/uL  Auto Basophil # : 0.03 K/uL  Auto Neutrophil % : 58.0 %  Auto Lymphocyte % : 26.4 %  Auto Monocyte % : 9.6 %  Auto Eosinophil % : 4.9 %  Auto Basophil % : 0.8 %    10-21    137  |  102  |  18  ----------------------------<  102<H>  4.1   |  29  |  0.67    Ca    9.2      21 Oct 2023 05:30  Phos  4.7     10-21  Mg     2.3     10-21    TPro  8.1  /  Alb  3.4  /  TBili  0.3  /  DBili  x   /  AST  161<H>  /  ALT  234<H>  /  AlkPhos  70  10-21    LIVER FUNCTIONS - ( 21 Oct 2023 05:30 )  Alb: 3.4 g/dL / Pro: 8.1 g/dL / ALK PHOS: 70 U/L / ALT: 234 U/L / AST: 161 U/L / GGT: x             Urinalysis Basic - ( 21 Oct 2023 05:30 )    Color: x / Appearance: x / SG: x / pH: x  Gluc: 102 mg/dL / Ketone: x  / Bili: x / Urobili: x   Blood: x / Protein: x / Nitrite: x   Leuk Esterase: x / RBC: x / WBC x   Sq Epi: x / Non Sq Epi: x / Bacteria: x        Assessment:  This is a 63y Female w/ h/o     Plan:

## 2023-10-22 LAB
ALBUMIN SERPL ELPH-MCNC: 3.4 G/DL — SIGNIFICANT CHANGE UP (ref 3.3–5)
ALBUMIN SERPL ELPH-MCNC: 3.4 G/DL — SIGNIFICANT CHANGE UP (ref 3.3–5)
ALP SERPL-CCNC: 66 U/L — SIGNIFICANT CHANGE UP (ref 40–120)
ALP SERPL-CCNC: 66 U/L — SIGNIFICANT CHANGE UP (ref 40–120)
ALT FLD-CCNC: 198 U/L — HIGH (ref 10–45)
ALT FLD-CCNC: 198 U/L — HIGH (ref 10–45)
ANION GAP SERPL CALC-SCNC: 11 MMOL/L — SIGNIFICANT CHANGE UP (ref 5–17)
ANION GAP SERPL CALC-SCNC: 11 MMOL/L — SIGNIFICANT CHANGE UP (ref 5–17)
AST SERPL-CCNC: 107 U/L — HIGH (ref 10–40)
AST SERPL-CCNC: 107 U/L — HIGH (ref 10–40)
BASOPHILS # BLD AUTO: 0.04 K/UL — SIGNIFICANT CHANGE UP (ref 0–0.2)
BASOPHILS # BLD AUTO: 0.04 K/UL — SIGNIFICANT CHANGE UP (ref 0–0.2)
BASOPHILS NFR BLD AUTO: 1 % — SIGNIFICANT CHANGE UP (ref 0–2)
BASOPHILS NFR BLD AUTO: 1 % — SIGNIFICANT CHANGE UP (ref 0–2)
BILIRUB SERPL-MCNC: 0.3 MG/DL — SIGNIFICANT CHANGE UP (ref 0.2–1.2)
BILIRUB SERPL-MCNC: 0.3 MG/DL — SIGNIFICANT CHANGE UP (ref 0.2–1.2)
BUN SERPL-MCNC: 16 MG/DL — SIGNIFICANT CHANGE UP (ref 7–23)
BUN SERPL-MCNC: 16 MG/DL — SIGNIFICANT CHANGE UP (ref 7–23)
CALCIUM SERPL-MCNC: 8.8 MG/DL — SIGNIFICANT CHANGE UP (ref 8.4–10.5)
CALCIUM SERPL-MCNC: 8.8 MG/DL — SIGNIFICANT CHANGE UP (ref 8.4–10.5)
CHLORIDE SERPL-SCNC: 104 MMOL/L — SIGNIFICANT CHANGE UP (ref 96–108)
CHLORIDE SERPL-SCNC: 104 MMOL/L — SIGNIFICANT CHANGE UP (ref 96–108)
CK SERPL-CCNC: 43 U/L — SIGNIFICANT CHANGE UP (ref 25–170)
CK SERPL-CCNC: 43 U/L — SIGNIFICANT CHANGE UP (ref 25–170)
CO2 SERPL-SCNC: 22 MMOL/L — SIGNIFICANT CHANGE UP (ref 22–31)
CO2 SERPL-SCNC: 22 MMOL/L — SIGNIFICANT CHANGE UP (ref 22–31)
CREAT SERPL-MCNC: 0.55 MG/DL — SIGNIFICANT CHANGE UP (ref 0.5–1.3)
CREAT SERPL-MCNC: 0.55 MG/DL — SIGNIFICANT CHANGE UP (ref 0.5–1.3)
EGFR: 104 ML/MIN/1.73M2 — SIGNIFICANT CHANGE UP
EGFR: 104 ML/MIN/1.73M2 — SIGNIFICANT CHANGE UP
EOSINOPHIL # BLD AUTO: 0.22 K/UL — SIGNIFICANT CHANGE UP (ref 0–0.5)
EOSINOPHIL # BLD AUTO: 0.22 K/UL — SIGNIFICANT CHANGE UP (ref 0–0.5)
EOSINOPHIL NFR BLD AUTO: 5.2 % — SIGNIFICANT CHANGE UP (ref 0–6)
EOSINOPHIL NFR BLD AUTO: 5.2 % — SIGNIFICANT CHANGE UP (ref 0–6)
GLUCOSE SERPL-MCNC: 102 MG/DL — HIGH (ref 70–99)
GLUCOSE SERPL-MCNC: 102 MG/DL — HIGH (ref 70–99)
HCT VFR BLD CALC: 39.1 % — SIGNIFICANT CHANGE UP (ref 34.5–45)
HCT VFR BLD CALC: 39.1 % — SIGNIFICANT CHANGE UP (ref 34.5–45)
HGB BLD-MCNC: 12.5 G/DL — SIGNIFICANT CHANGE UP (ref 11.5–15.5)
HGB BLD-MCNC: 12.5 G/DL — SIGNIFICANT CHANGE UP (ref 11.5–15.5)
IMM GRANULOCYTES NFR BLD AUTO: 0.2 % — SIGNIFICANT CHANGE UP (ref 0–0.9)
IMM GRANULOCYTES NFR BLD AUTO: 0.2 % — SIGNIFICANT CHANGE UP (ref 0–0.9)
LYMPHOCYTES # BLD AUTO: 0.86 K/UL — LOW (ref 1–3.3)
LYMPHOCYTES # BLD AUTO: 0.86 K/UL — LOW (ref 1–3.3)
LYMPHOCYTES # BLD AUTO: 20.4 % — SIGNIFICANT CHANGE UP (ref 13–44)
LYMPHOCYTES # BLD AUTO: 20.4 % — SIGNIFICANT CHANGE UP (ref 13–44)
MAGNESIUM SERPL-MCNC: 2.2 MG/DL — SIGNIFICANT CHANGE UP (ref 1.6–2.6)
MAGNESIUM SERPL-MCNC: 2.2 MG/DL — SIGNIFICANT CHANGE UP (ref 1.6–2.6)
MCHC RBC-ENTMCNC: 25.7 PG — LOW (ref 27–34)
MCHC RBC-ENTMCNC: 25.7 PG — LOW (ref 27–34)
MCHC RBC-ENTMCNC: 32 GM/DL — SIGNIFICANT CHANGE UP (ref 32–36)
MCHC RBC-ENTMCNC: 32 GM/DL — SIGNIFICANT CHANGE UP (ref 32–36)
MCV RBC AUTO: 80.3 FL — SIGNIFICANT CHANGE UP (ref 80–100)
MCV RBC AUTO: 80.3 FL — SIGNIFICANT CHANGE UP (ref 80–100)
MONOCYTES # BLD AUTO: 0.37 K/UL — SIGNIFICANT CHANGE UP (ref 0–0.9)
MONOCYTES # BLD AUTO: 0.37 K/UL — SIGNIFICANT CHANGE UP (ref 0–0.9)
MONOCYTES NFR BLD AUTO: 8.8 % — SIGNIFICANT CHANGE UP (ref 2–14)
MONOCYTES NFR BLD AUTO: 8.8 % — SIGNIFICANT CHANGE UP (ref 2–14)
NEUTROPHILS # BLD AUTO: 2.71 K/UL — SIGNIFICANT CHANGE UP (ref 1.8–7.4)
NEUTROPHILS # BLD AUTO: 2.71 K/UL — SIGNIFICANT CHANGE UP (ref 1.8–7.4)
NEUTROPHILS NFR BLD AUTO: 64.4 % — SIGNIFICANT CHANGE UP (ref 43–77)
NEUTROPHILS NFR BLD AUTO: 64.4 % — SIGNIFICANT CHANGE UP (ref 43–77)
NRBC # BLD: 0 /100 WBCS — SIGNIFICANT CHANGE UP (ref 0–0)
NRBC # BLD: 0 /100 WBCS — SIGNIFICANT CHANGE UP (ref 0–0)
PHOSPHATE SERPL-MCNC: 3.3 MG/DL — SIGNIFICANT CHANGE UP (ref 2.5–4.5)
PHOSPHATE SERPL-MCNC: 3.3 MG/DL — SIGNIFICANT CHANGE UP (ref 2.5–4.5)
PLATELET # BLD AUTO: 160 K/UL — SIGNIFICANT CHANGE UP (ref 150–400)
PLATELET # BLD AUTO: 160 K/UL — SIGNIFICANT CHANGE UP (ref 150–400)
POTASSIUM SERPL-MCNC: 4 MMOL/L — SIGNIFICANT CHANGE UP (ref 3.5–5.3)
POTASSIUM SERPL-MCNC: 4 MMOL/L — SIGNIFICANT CHANGE UP (ref 3.5–5.3)
POTASSIUM SERPL-SCNC: 4 MMOL/L — SIGNIFICANT CHANGE UP (ref 3.5–5.3)
POTASSIUM SERPL-SCNC: 4 MMOL/L — SIGNIFICANT CHANGE UP (ref 3.5–5.3)
PROT SERPL-MCNC: 7.8 G/DL — SIGNIFICANT CHANGE UP (ref 6–8.3)
PROT SERPL-MCNC: 7.8 G/DL — SIGNIFICANT CHANGE UP (ref 6–8.3)
RBC # BLD: 4.87 M/UL — SIGNIFICANT CHANGE UP (ref 3.8–5.2)
RBC # BLD: 4.87 M/UL — SIGNIFICANT CHANGE UP (ref 3.8–5.2)
RBC # FLD: 14.7 % — HIGH (ref 10.3–14.5)
RBC # FLD: 14.7 % — HIGH (ref 10.3–14.5)
SODIUM SERPL-SCNC: 137 MMOL/L — SIGNIFICANT CHANGE UP (ref 135–145)
SODIUM SERPL-SCNC: 137 MMOL/L — SIGNIFICANT CHANGE UP (ref 135–145)
WBC # BLD: 4.21 K/UL — SIGNIFICANT CHANGE UP (ref 3.8–10.5)
WBC # BLD: 4.21 K/UL — SIGNIFICANT CHANGE UP (ref 3.8–10.5)
WBC # FLD AUTO: 4.21 K/UL — SIGNIFICANT CHANGE UP (ref 3.8–10.5)
WBC # FLD AUTO: 4.21 K/UL — SIGNIFICANT CHANGE UP (ref 3.8–10.5)

## 2023-10-22 PROCEDURE — 76705 ECHO EXAM OF ABDOMEN: CPT | Mod: 26

## 2023-10-22 PROCEDURE — 99232 SBSQ HOSP IP/OBS MODERATE 35: CPT

## 2023-10-22 RX ORDER — MORPHINE SULFATE 50 MG/1
2 CAPSULE, EXTENDED RELEASE ORAL EVERY 12 HOURS
Refills: 0 | Status: DISCONTINUED | OUTPATIENT
Start: 2023-10-22 | End: 2023-10-24

## 2023-10-22 RX ORDER — KETOROLAC TROMETHAMINE 30 MG/ML
30 SYRINGE (ML) INJECTION EVERY 12 HOURS
Refills: 0 | Status: DISCONTINUED | OUTPATIENT
Start: 2023-10-22 | End: 2023-10-25

## 2023-10-22 RX ORDER — TOBRAMYCIN 0.3 %
1 DROPS OPHTHALMIC (EYE)
Refills: 0 | Status: DISCONTINUED | OUTPATIENT
Start: 2023-10-22 | End: 2023-10-24

## 2023-10-22 RX ORDER — DICLOFENAC SODIUM 75 MG/1
75 TABLET, DELAYED RELEASE ORAL EVERY 12 HOURS
Refills: 0 | Status: DISCONTINUED | OUTPATIENT
Start: 2023-10-22 | End: 2023-10-25

## 2023-10-22 RX ADMIN — PANTOPRAZOLE SODIUM 40 MILLIGRAM(S): 20 TABLET, DELAYED RELEASE ORAL at 06:15

## 2023-10-22 RX ADMIN — LIDOCAINE 1 PATCH: 4 CREAM TOPICAL at 12:18

## 2023-10-22 RX ADMIN — Medication 1 DROP(S): at 06:16

## 2023-10-22 RX ADMIN — Medication 1 DROP(S): at 19:14

## 2023-10-22 RX ADMIN — Medication 1 DROP(S): at 09:41

## 2023-10-22 RX ADMIN — Medication 1 DROP(S): at 17:09

## 2023-10-22 RX ADMIN — Medication 1 DROP(S): at 15:13

## 2023-10-22 RX ADMIN — Medication 1 DROP(S): at 14:01

## 2023-10-22 RX ADMIN — Medication 1 DROP(S): at 22:31

## 2023-10-22 RX ADMIN — GABAPENTIN 300 MILLIGRAM(S): 400 CAPSULE ORAL at 13:54

## 2023-10-22 RX ADMIN — Medication 600 MILLIGRAM(S): at 03:30

## 2023-10-22 RX ADMIN — BIMATOPROST 1 DROP(S): 0.3 SOLUTION/ DROPS OPHTHALMIC at 22:00

## 2023-10-22 RX ADMIN — LIDOCAINE 1 PATCH: 4 CREAM TOPICAL at 23:00

## 2023-10-22 RX ADMIN — DICLOFENAC SODIUM 75 MILLIGRAM(S): 75 TABLET, DELAYED RELEASE ORAL at 17:09

## 2023-10-22 RX ADMIN — GABAPENTIN 300 MILLIGRAM(S): 400 CAPSULE ORAL at 06:15

## 2023-10-22 RX ADMIN — ENOXAPARIN SODIUM 40 MILLIGRAM(S): 100 INJECTION SUBCUTANEOUS at 19:14

## 2023-10-22 RX ADMIN — LIDOCAINE 1 PATCH: 4 CREAM TOPICAL at 06:22

## 2023-10-22 RX ADMIN — Medication 600 MILLIGRAM(S): at 02:30

## 2023-10-22 RX ADMIN — Medication 1 DROP(S): at 12:13

## 2023-10-22 RX ADMIN — Medication 3 MILLIGRAM(S): at 22:37

## 2023-10-22 RX ADMIN — GABAPENTIN 600 MILLIGRAM(S): 400 CAPSULE ORAL at 22:31

## 2023-10-22 RX ADMIN — Medication 1 DROP(S): at 07:28

## 2023-10-22 RX ADMIN — LIDOCAINE 1 PATCH: 4 CREAM TOPICAL at 00:38

## 2023-10-22 RX ADMIN — Medication 600 MILLIGRAM(S): at 02:50

## 2023-10-22 RX ADMIN — DICLOFENAC SODIUM 75 MILLIGRAM(S): 75 TABLET, DELAYED RELEASE ORAL at 17:20

## 2023-10-22 RX ADMIN — GABAPENTIN 600 MILLIGRAM(S): 400 CAPSULE ORAL at 00:36

## 2023-10-22 RX ADMIN — Medication 1 APPLICATION(S): at 22:41

## 2023-10-22 RX ADMIN — Medication 600 MILLIGRAM(S): at 02:20

## 2023-10-22 NOTE — PROGRESS NOTE ADULT - ASSESSMENT
63 year old female with PMH of OA, GERD, sarcoidosis, migraines presenting with worsening back pain associated bilateral upper and lower extremities numbness and tingling in distal parts of all her extremities for the last week. Her neurological exam is worsened from yesterday, with more pronounced facial bulbar symptoms, progressive LE weakness, and now absent LE reflexes. Of note, had flu shot 2 weeks prior to presentation. MRI C and T spine w and w/o contrast was performed which didn't reveal acute pathology, only showed broad C6-7 disc bulging w/o any spinal compression, no contrast enchainment. Her hx of probable sarcoidosis, enlargement of mediastinal LN, unintentional weight loss needs oncological w/up. MRI L-spine showed extensive leptomeningeal enhancement along the periphery of the   conus medullaris with enhancement of the cauda equina nerve roots. compatible with active neurosarcoidosis, or Guillain-[place] syndrome and other inflammatory, infectious, or neoplastic causes.  CPK, ESR, CRP was unremarkable. A1C prediabetic. EMG findings consistent with demyelinating disease, CSF with elevated protein supporting likely Guillain [place].    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 There has previously been some evidence of disease progression after the IVIG, with involvement of CN 7 and 10, as well as blurred vision on 10/17 but since 10/18 symptoms are stable, likely plateaued with resolution of neck weakness, improvements in facial weakness and extremity strength.    Recommendations:   - follow up opthalmology evaluation  - Transaminitis trending down, follow up RUQ US.   - Hepatitis is a consideration but serology should be deferred until >1mo after IVIG  - Can c/w gabapentin to 300mg/300mg/600mg   - Elevated BP noted. Dysautonomia is an expected complication, continue anti-hypertensive to goal SBP <140  - C/w artifical tears but ensure administered every 2 hours and Ophthalmic ointment OU at night  - Continue NIF and vital capacity   - Completed IVIG 500mg/kg x4 days      Case discussed with Dr. Gonsalves.  Thank you for the courtesy of this consult. Will continue to follow.

## 2023-10-22 NOTE — PROGRESS NOTE ADULT - SUBJECTIVE AND OBJECTIVE BOX
Neurology Progress Note    Interval History:  No acute events overnight. Patient seen bedside while sitting in the chair. Overall, she states feeling better, improved from before. She was able to work with PT/OT this morning and walked down the aisle. She is still waiting for auth for rehab.       PAST MEDICAL & SURGICAL HISTORY:  Ovarian cyst    GERD (gastroesophageal reflux disease)    Glaucoma    Sarcoidosis    Sinusitis    Osteoporosis    Migraines    Hashimoto's disease    Kidney stones    Torn meniscus  right    History of arthroscopy of shoulder  rotator cuff repair, right    History of umbilical hernia repair    S/P correction of deviated nasal septum    H/O sinus surgery        Medications:  aluminum hydroxide/magnesium hydroxide/simethicone Suspension 30 milliLiter(s) Oral every 4 hours PRN  artificial tears (preservative free) Ophthalmic Solution 1 Drop(s) Both EYES every 2 hours  bimatoprost 0.01% Ophthalmic Solution 1 Drop(s) Both EYES at bedtime  diclofenac 75 milliGRAM(s) Oral every 12 hours  enoxaparin Injectable 40 milliGRAM(s) SubCutaneous every 24 hours  gabapentin 600 milliGRAM(s) Oral every 24 hours  gabapentin 300 milliGRAM(s) Oral <User Schedule>  ketorolac   Injectable 30 milliGRAM(s) IV Push every 12 hours PRN  lactulose Syrup 10 Gram(s) Oral daily  lidocaine   4% Patch 1 Patch Transdermal every 24 hours  melatonin 3 milliGRAM(s) Oral at bedtime PRN  morphine  - Injectable 2 milliGRAM(s) IV Push every 12 hours PRN  ondansetron Injectable 4 milliGRAM(s) IV Push every 8 hours PRN  pantoprazole    Tablet 40 milliGRAM(s) Oral every 24 hours  petrolatum Ophthalmic Ointment 1 Application(s) Both EYES daily  polyethylene glycol 3350 17 Gram(s) Oral every 12 hours  senna 2 Tablet(s) Oral at bedtime  tobramycin 0.3% Ophthalmic Solution 1 Drop(s) Left EYE two times a day      Vital Signs Last 24 Hrs  T(C): 36.8 (22 Oct 2023 12:17), Max: 36.9 (22 Oct 2023 05:31)  T(F): 98.3 (22 Oct 2023 12:17), Max: 98.4 (22 Oct 2023 05:31)  HR: 95 (22 Oct 2023 12:17) (90 - 100)  BP: 119/78 (22 Oct 2023 12:17) (119/78 - 166/85)  BP(mean): --  RR: 18 (22 Oct 2023 12:17) (18 - 18)  SpO2: 95% (22 Oct 2023 12:17) (95% - 98%)    Parameters below as of 22 Oct 2023 12:17  Patient On (Oxygen Delivery Method): room air        Neurological Exam:   Mental status: Awake, alert and oriented x3.  Recent and remote memory intact.  Naming, repetition and comprehension intact.  Attention/concentration intact.  No dysarthria, no aphasia.  Fund of knowledge appropriate.    Cranial nerves: Pupils equally round and reactive to light, visual fields full, no nystagmus, extraocular muscles intact, V1 through V3 intact bilaterally and symmetric, face symmetric, hearing intact to finger rub, palate elevation symmetric, tongue was midline.  Motor: Normal tone and bulk.  No abnormal movements.    Gait: Deferred    Labs:  CBC Full  -  ( 22 Oct 2023 05:30 )  WBC Count : 4.21 K/uL  RBC Count : 4.87 M/uL  Hemoglobin : 12.5 g/dL  Hematocrit : 39.1 %  Platelet Count - Automated : 160 K/uL  Mean Cell Volume : 80.3 fl  Mean Cell Hemoglobin : 25.7 pg  Mean Cell Hemoglobin Concentration : 32.0 gm/dL  Auto Neutrophil # : 2.71 K/uL  Auto Lymphocyte # : 0.86 K/uL  Auto Monocyte # : 0.37 K/uL  Auto Eosinophil # : 0.22 K/uL  Auto Basophil # : 0.04 K/uL  Auto Neutrophil % : 64.4 %  Auto Lymphocyte % : 20.4 %  Auto Monocyte % : 8.8 %  Auto Eosinophil % : 5.2 %  Auto Basophil % : 1.0 %    10-22    137  |  104  |  16  ----------------------------<  102<H>  4.0   |  22  |  0.55    Ca    8.8      22 Oct 2023 05:30  Phos  3.3     10-22  Mg     2.2     10-22    TPro  7.8  /  Alb  3.4  /  TBili  0.3  /  DBili  x   /  AST  107<H>  /  ALT  198<H>  /  AlkPhos  66  10-22    LIVER FUNCTIONS - ( 22 Oct 2023 05:30 )  Alb: 3.4 g/dL / Pro: 7.8 g/dL / ALK PHOS: 66 U/L / ALT: 198 U/L / AST: 107 U/L / GGT: x             Urinalysis Basic - ( 22 Oct 2023 05:30 )    Color: x / Appearance: x / SG: x / pH: x  Gluc: 102 mg/dL / Ketone: x  / Bili: x / Urobili: x   Blood: x / Protein: x / Nitrite: x   Leuk Esterase: x / RBC: x / WBC x   Sq Epi: x / Non Sq Epi: x / Bacteria: x

## 2023-10-23 LAB
ALBUMIN SERPL ELPH-MCNC: 3.6 G/DL — SIGNIFICANT CHANGE UP (ref 3.3–5)
ALBUMIN SERPL ELPH-MCNC: 3.6 G/DL — SIGNIFICANT CHANGE UP (ref 3.3–5)
ALP SERPL-CCNC: 64 U/L — SIGNIFICANT CHANGE UP (ref 40–120)
ALP SERPL-CCNC: 64 U/L — SIGNIFICANT CHANGE UP (ref 40–120)
ALT FLD-CCNC: 144 U/L — HIGH (ref 10–45)
ALT FLD-CCNC: 144 U/L — HIGH (ref 10–45)
ANION GAP SERPL CALC-SCNC: 5 MMOL/L — SIGNIFICANT CHANGE UP (ref 5–17)
ANION GAP SERPL CALC-SCNC: 5 MMOL/L — SIGNIFICANT CHANGE UP (ref 5–17)
AST SERPL-CCNC: 64 U/L — HIGH (ref 10–40)
AST SERPL-CCNC: 64 U/L — HIGH (ref 10–40)
BILIRUB SERPL-MCNC: 0.3 MG/DL — SIGNIFICANT CHANGE UP (ref 0.2–1.2)
BILIRUB SERPL-MCNC: 0.3 MG/DL — SIGNIFICANT CHANGE UP (ref 0.2–1.2)
BUN SERPL-MCNC: 18 MG/DL — SIGNIFICANT CHANGE UP (ref 7–23)
BUN SERPL-MCNC: 18 MG/DL — SIGNIFICANT CHANGE UP (ref 7–23)
CALCIUM SERPL-MCNC: 8.8 MG/DL — SIGNIFICANT CHANGE UP (ref 8.4–10.5)
CALCIUM SERPL-MCNC: 8.8 MG/DL — SIGNIFICANT CHANGE UP (ref 8.4–10.5)
CHLORIDE SERPL-SCNC: 104 MMOL/L — SIGNIFICANT CHANGE UP (ref 96–108)
CHLORIDE SERPL-SCNC: 104 MMOL/L — SIGNIFICANT CHANGE UP (ref 96–108)
CO2 SERPL-SCNC: 29 MMOL/L — SIGNIFICANT CHANGE UP (ref 22–31)
CO2 SERPL-SCNC: 29 MMOL/L — SIGNIFICANT CHANGE UP (ref 22–31)
CREAT SERPL-MCNC: 0.6 MG/DL — SIGNIFICANT CHANGE UP (ref 0.5–1.3)
CREAT SERPL-MCNC: 0.6 MG/DL — SIGNIFICANT CHANGE UP (ref 0.5–1.3)
EGFR: 101 ML/MIN/1.73M2 — SIGNIFICANT CHANGE UP
EGFR: 101 ML/MIN/1.73M2 — SIGNIFICANT CHANGE UP
GLUCOSE SERPL-MCNC: 94 MG/DL — SIGNIFICANT CHANGE UP (ref 70–99)
GLUCOSE SERPL-MCNC: 94 MG/DL — SIGNIFICANT CHANGE UP (ref 70–99)
POTASSIUM SERPL-MCNC: 4 MMOL/L — SIGNIFICANT CHANGE UP (ref 3.5–5.3)
POTASSIUM SERPL-MCNC: 4 MMOL/L — SIGNIFICANT CHANGE UP (ref 3.5–5.3)
POTASSIUM SERPL-SCNC: 4 MMOL/L — SIGNIFICANT CHANGE UP (ref 3.5–5.3)
POTASSIUM SERPL-SCNC: 4 MMOL/L — SIGNIFICANT CHANGE UP (ref 3.5–5.3)
PROT SERPL-MCNC: 7.8 G/DL — SIGNIFICANT CHANGE UP (ref 6–8.3)
PROT SERPL-MCNC: 7.8 G/DL — SIGNIFICANT CHANGE UP (ref 6–8.3)
SODIUM SERPL-SCNC: 138 MMOL/L — SIGNIFICANT CHANGE UP (ref 135–145)
SODIUM SERPL-SCNC: 138 MMOL/L — SIGNIFICANT CHANGE UP (ref 135–145)

## 2023-10-23 PROCEDURE — 99232 SBSQ HOSP IP/OBS MODERATE 35: CPT | Mod: GC

## 2023-10-23 RX ADMIN — PANTOPRAZOLE SODIUM 40 MILLIGRAM(S): 20 TABLET, DELAYED RELEASE ORAL at 07:10

## 2023-10-23 RX ADMIN — MORPHINE SULFATE 2 MILLIGRAM(S): 50 CAPSULE, EXTENDED RELEASE ORAL at 03:31

## 2023-10-23 RX ADMIN — GABAPENTIN 300 MILLIGRAM(S): 400 CAPSULE ORAL at 13:19

## 2023-10-23 RX ADMIN — Medication 1 DROP(S): at 00:00

## 2023-10-23 RX ADMIN — MORPHINE SULFATE 2 MILLIGRAM(S): 50 CAPSULE, EXTENDED RELEASE ORAL at 04:15

## 2023-10-23 RX ADMIN — GABAPENTIN 300 MILLIGRAM(S): 400 CAPSULE ORAL at 07:09

## 2023-10-23 RX ADMIN — Medication 1 DROP(S): at 08:38

## 2023-10-23 RX ADMIN — DICLOFENAC SODIUM 75 MILLIGRAM(S): 75 TABLET, DELAYED RELEASE ORAL at 08:00

## 2023-10-23 RX ADMIN — Medication 1 DROP(S): at 21:12

## 2023-10-23 RX ADMIN — LIDOCAINE 1 PATCH: 4 CREAM TOPICAL at 11:00

## 2023-10-23 RX ADMIN — Medication 1 DROP(S): at 22:12

## 2023-10-23 RX ADMIN — DICLOFENAC SODIUM 75 MILLIGRAM(S): 75 TABLET, DELAYED RELEASE ORAL at 07:11

## 2023-10-23 RX ADMIN — LIDOCAINE 1 PATCH: 4 CREAM TOPICAL at 07:40

## 2023-10-23 RX ADMIN — ENOXAPARIN SODIUM 40 MILLIGRAM(S): 100 INJECTION SUBCUTANEOUS at 19:22

## 2023-10-23 RX ADMIN — DICLOFENAC SODIUM 75 MILLIGRAM(S): 75 TABLET, DELAYED RELEASE ORAL at 19:22

## 2023-10-23 RX ADMIN — Medication 1 APPLICATION(S): at 22:15

## 2023-10-23 RX ADMIN — Medication 1 DROP(S): at 06:42

## 2023-10-23 RX ADMIN — GABAPENTIN 600 MILLIGRAM(S): 400 CAPSULE ORAL at 22:15

## 2023-10-23 RX ADMIN — Medication 1 DROP(S): at 06:39

## 2023-10-23 RX ADMIN — BIMATOPROST 1 DROP(S): 0.3 SOLUTION/ DROPS OPHTHALMIC at 22:14

## 2023-10-23 NOTE — PROGRESS NOTE ADULT - ASSESSMENT
Neurology    63 year old female with PMH of OA, GERD, sarcoidosis, migraines presenting with worsening back pain associated bilateral upper and lower extremities numbness and tingling in distal parts of all her extremities for the last week. Her neurological exam is worsened from yesterday, with more pronounced facial bulbar symptoms, progressive LE weakness, and now absent LE reflexes. Of note, had flu shot 2 weeks prior to presentation. MRI C and T spine w and w/o contrast was performed which didn't reveal acute pathology, only showed broad C6-7 disc bulging w/o any spinal compression, no contrast enchainment. Her hx of probable sarcoidosis, enlargement of mediastinal LN, unintentional weight loss needs oncological w/up. MRI L-spine showed extensive leptomeningeal enhancement along the periphery of the   conus medullaris with enhancement of the cauda equina nerve roots. compatible with active neurosarcoidosis, or Guillain-[place] syndrome and other inflammatory, infectious, or neoplastic causes.  CPK, ESR, CRP was unremarkable. A1C prediabetic. 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 There has previously been some evidence of disease progression after the IVIG, with involvement of CN 7 and 10, as well as blurred vision on 10/17 but since 10/18 symptoms are stable, likely plateaued with resolution of neck weakness, improvements in facial weakness and extremity strength.    Recommendations:   - follow up opthalmology evaluation  - Transaminitis trending down, follow up RUQ US.   - Hepatitis is a consideration but serology should be deferred until >1mo after IVIG  - Can c/w gabapentin to 300mg/300mg/600mg   - Elevated BP noted. Dysautonomia is an expected complication, continue anti-hypertensive to goal SBP <140  - C/w artifical tears but ensure administered every 2 hours and Ophthalmic ointment OU at night  - Continue NIF and vital capacity   - Completed IVIG 500mg/kg x4 days      Case discussed with Dr. Gonsalves.  Thank you for the courtesy of this consult. Will continue to follow.

## 2023-10-23 NOTE — PROGRESS NOTE ADULT - PROBLEM SELECTOR PLAN 6
10/8: CT and MRI showed Extensive mediastinal, hilar, and paratracheal lymphadenopathy is present  in the chest. Enlarged thoracic lymphadenopathy was noted on the 06/08/2012 chest CT study which was attributed to the patient's known history of sarcoidosis at that time. Concern for rheumatologic vs malignant work up,  ESR, CRP, B12, RF w/n/l, Hep A/B/C negative, A1C 5.9. CHRIS elevated 1:160, SSA wnl, TSH 1.75 w/n/l. Immunoglobulin panel wnl. Immunofixation no monoclonal band. MRI as above.    Plan:  - Consider paraneoplastic workup/pursue LN biopsy

## 2023-10-23 NOTE — PROGRESS NOTE ADULT - PROBLEM SELECTOR PLAN 2
Pt at high risk for corneal ulcer and infection due to inability to close eye. Ophthalmology consulted; have not seen patient.    Plan:  - Confirm optho eval 10/24

## 2023-10-23 NOTE — PROGRESS NOTE ADULT - PROBLEM SELECTOR PLAN 3
MRI of lumbar spine on 10/14 compatible with active neurosarcoidosis, other etiologies include Guillain-[place] syndrome and other inflammatory,  infectious, or neoplastic causes. Imaging showed mediastinal LN.  DDx: Likely related to inflammatory polyneuropathy vs malignancy driven pain. Discontinued Tylenol given transaminitis.    Plan:  - Continue with gabapentin to 300mg/300mg/600mg   - Diclofenac 75mg PO bid  - Toradol 30 IV q12h for severe pain  - Morphine 2mg IV for breakthrough pain  - Lidocaine patch

## 2023-10-23 NOTE — PROGRESS NOTE ADULT - PROBLEM SELECTOR PLAN 7
#RESOLVED  Na 128. Patient asymptomatic. Patient appear euvolemic on exam. Patient has been having severe and constant pain. 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 TSH 1.75 w/n/l.  Serum osmo low at 267, hypotonic hyponatremia. Ulytes c/w SIADH i/so sarcoidosis     Plan  - Trend CMP

## 2023-10-23 NOTE — PROGRESS NOTE ADULT - PROBLEM SELECTOR PLAN 11
N: regular w/ Ensure  E: Mg <2, Phos <3, K <4  DVT ppx: Lovenox  GI: Protonix 40mg PO qd  C: Full Code  D: [facility]

## 2023-10-23 NOTE — PROGRESS NOTE ADULT - TIME BILLING
Patient seen and examined;  Continued improvement in neurological exam  Left eye improved;  Will go to Acute Rehab at [place]

## 2023-10-23 NOTE — PROGRESS NOTE ADULT - PROBLEM SELECTOR PLAN 5
Likely 2/2 IVIG treatment vs hepatitis. Ordered CK (wnl) and RUQ U/S (neg.). LFTs declining    Plan:  - Trend LFTs

## 2023-10-23 NOTE — PROGRESS NOTE ADULT - PROBLEM SELECTOR PLAN 4
BP gradually rising to 200s. Patient was started on labetalol 100 P.O BID. On 10/14-10/15 overnight BP in 220s s/p labetalol 5mg IV, Metoprolol 5mg IV in am. BP remained elevated to 190s, Patient received Amlodipine 10mg and BP dropped to 96/92. Per neurology, dysautonomia is an expected complication of GBS.    Plan:   - Continue anti-hypertensive to goal SBP <140

## 2023-10-23 NOTE — PROGRESS NOTE ADULT - SUBJECTIVE AND OBJECTIVE BOX
INTERVAL HPI/OVERNIGHT EVENTS:  Awake and alert  Continued improvement   LFT continues to improve   RUQ ultrasound normal   Left eye improved       MEDICATIONS  (STANDING):  artificial tears (preservative free) Ophthalmic Solution 1 Drop(s) Both EYES every 2 hours  bimatoprost 0.01% Ophthalmic Solution 1 Drop(s) Both EYES at bedtime  diclofenac 75 milliGRAM(s) Oral every 12 hours  enoxaparin Injectable 40 milliGRAM(s) SubCutaneous every 24 hours  gabapentin 600 milliGRAM(s) Oral every 24 hours  gabapentin 300 milliGRAM(s) Oral <User Schedule>  lactulose Syrup 10 Gram(s) Oral daily  lidocaine   4% Patch 1 Patch Transdermal every 24 hours  pantoprazole    Tablet 40 milliGRAM(s) Oral every 24 hours  petrolatum Ophthalmic Ointment 1 Application(s) Both EYES daily  polyethylene glycol 3350 17 Gram(s) Oral every 12 hours  senna 2 Tablet(s) Oral at bedtime  tobramycin 0.3% Ophthalmic Solution 1 Drop(s) Left EYE two times a day    MEDICATIONS  (PRN):  aluminum hydroxide/magnesium hydroxide/simethicone Suspension 30 milliLiter(s) Oral every 4 hours PRN Dyspepsia  ketorolac   Injectable 30 milliGRAM(s) IV Push every 12 hours PRN Severe Pain (7 - 10)  melatonin 3 milliGRAM(s) Oral at bedtime PRN Insomnia  morphine  - Injectable 2 milliGRAM(s) IV Push every 12 hours PRN breakthrough pain  ondansetron Injectable 4 milliGRAM(s) IV Push every 8 hours PRN Nausea and/or Vomiting      Allergies    No Known Allergies    Intolerances        Vital Signs Last 24 Hrs  T(C): 36.6 (23 Oct 2023 06:11), Max: 36.8 (22 Oct 2023 12:17)  T(F): 97.8 (23 Oct 2023 06:11), Max: 98.3 (22 Oct 2023 12:17)  HR: 78 (23 Oct 2023 06:11) (78 - 98)  BP: 136/77 (23 Oct 2023 06:11) (115/72 - 136/77)  BP(mean): --  RR: 18 (23 Oct 2023 06:11) (18 - 18)  SpO2: 95% (23 Oct 2023 06:11) (95% - 96%)    Parameters below as of 22 Oct 2023 21:10  Patient On (Oxygen Delivery Method): room air              Constitutional: Awake     Eyes: EMILY  Left eye improved     ENMT: Negative    Neck: Supple    Back:  no tenderness     Respiratory:  clear     Cardiovascular: S1 S2    Gastrointestinal:  soft     Genitourinary:    Extremities: no edema     Vascular:    Neurological: improving; Walking with walker     Skin:    Lymph Nodes:            LABS:                        12.5   4.21  )-----------( 160      ( 22 Oct 2023 05:30 )             39.1     10-23    138  |  104  |  18  ----------------------------<  94  4.0   |  29  |  0.60    Ca    8.8      23 Oct 2023 05:30  Phos  3.3     10-22  Mg     2.2     10-22    TPro  7.8  /  Alb  3.6  /  TBili  0.3  /  DBili  x   /  AST  64<H>  /  ALT  144<H>  /  AlkPhos  64  10-23      Urinalysis Basic - ( 23 Oct 2023 05:30 )    Color: x / Appearance: x / SG: x / pH: x  Gluc: 94 mg/dL / Ketone: x  / Bili: x / Urobili: x   Blood: x / Protein: x / Nitrite: x   Leuk Esterase: x / RBC: x / WBC x   Sq Epi: x / Non Sq Epi: x / Bacteria: x        RADIOLOGY & ADDITIONAL TESTS:

## 2023-10-23 NOTE — PROGRESS NOTE ADULT - SUBJECTIVE AND OBJECTIVE BOX
Physical Medicine and Rehabilitation Progress Note :       Patient is a 63y old  Female who presents with a chief complaint of lower limb weakness (23 Oct 2023 10:17)      HPI:  63 year old female with PMH of OA, GERD, and sarcoidosis (diagnosed in 2008) presenting with worsening back pain and progressive lower limb weakness for the last three days. Pt was recently admitted on 10/8/23 for lower and upper extremity numbness and tingling. At the time, MRI and CT lumbar and thoracic which didn't reveal acute pathology, only showed broad C6-7 disc bulging w/o any spinal compression, no contrast enchainment. Imaging also showed mediastinal LN. Today, pt reports 9/10 tearing in quality lower back pain, increased weakness as she was unable to get off the toilet on her own. She also had to use a walker as she felt like she would fall otherwise. Pt also reports urinary incontinence during the day, reports no episodes of incontinence overnight. Pt endorses occipital headache and nausea. Last BM on sunday. Pt denies chest pain, sob, abd pain.  (11 Oct 2023 12:40)                            12.5   4.21  )-----------( 160      ( 22 Oct 2023 05:30 )             39.1       10-23    138  |  104  |  18  ----------------------------<  94  4.0   |  29  |  0.60    Ca    8.8      23 Oct 2023 05:30  Phos  3.3     10-22  Mg     2.2     10-22    TPro  7.8  /  Alb  3.6  /  TBili  0.3  /  DBili  x   /  AST  64<H>  /  ALT  144<H>  /  AlkPhos  64  10-23    Vital Signs Last 24 Hrs  T(C): 36.6 (23 Oct 2023 06:11), Max: 36.6 (23 Oct 2023 06:11)  T(F): 97.8 (23 Oct 2023 06:11), Max: 97.8 (23 Oct 2023 06:11)  HR: 78 (23 Oct 2023 06:11) (78 - 98)  BP: 136/77 (23 Oct 2023 06:11) (115/72 - 136/77)  BP(mean): --  RR: 18 (23 Oct 2023 06:11) (18 - 18)  SpO2: 95% (23 Oct 2023 06:11) (95% - 96%)    Parameters below as of 22 Oct 2023 21:10  Patient On (Oxygen Delivery Method): room air        MEDICATIONS  (STANDING):  artificial tears (preservative free) Ophthalmic Solution 1 Drop(s) Both EYES every 2 hours  bimatoprost 0.01% Ophthalmic Solution 1 Drop(s) Both EYES at bedtime  diclofenac 75 milliGRAM(s) Oral every 12 hours  enoxaparin Injectable 40 milliGRAM(s) SubCutaneous every 24 hours  gabapentin 600 milliGRAM(s) Oral every 24 hours  gabapentin 300 milliGRAM(s) Oral <User Schedule>  lactulose Syrup 10 Gram(s) Oral daily  lidocaine   4% Patch 1 Patch Transdermal every 24 hours  pantoprazole    Tablet 40 milliGRAM(s) Oral every 24 hours  petrolatum Ophthalmic Ointment 1 Application(s) Both EYES daily  polyethylene glycol 3350 17 Gram(s) Oral every 12 hours  senna 2 Tablet(s) Oral at bedtime  tobramycin 0.3% Ophthalmic Solution 1 Drop(s) Left EYE two times a day    MEDICATIONS  (PRN):  aluminum hydroxide/magnesium hydroxide/simethicone Suspension 30 milliLiter(s) Oral every 4 hours PRN Dyspepsia  ketorolac   Injectable 30 milliGRAM(s) IV Push every 12 hours PRN Severe Pain (7 - 10)  melatonin 3 milliGRAM(s) Oral at bedtime PRN Insomnia  morphine  - Injectable 2 milliGRAM(s) IV Push every 12 hours PRN breakthrough pain  ondansetron Injectable 4 milliGRAM(s) IV Push every 8 hours PRN Nausea and/or Vomiting        10/22/2023 Functional Status Assessment :         Pain Assessment/Number Scale (0-10) Adult  Presence of Pain: denies pain/discomfort (Rating = 0)  Pain Rating (0-10): Rest: 0 (no pain/absence of nonverbal indicators of pain)  Pain Rating (0-10): Activity: 0 (no pain/absence of nonverbal indicators of pain)    Safety      AM-PAC Functional Assessment: Basic Mobility  Type of Assessment: Daily assessment  Turning from your back to your side while in a flat bed without using bedrails?: 3 = A little assistance  Moving from lying on your back to sitting on the flat side of a flat bed without using bedrails?: 3 = A little assistance  Moving to and from a bed to a chair (including a wheelchair)?: 3 = A little assistance  Standing up from a chair using your arms (e.g. wheelchair or bedside chair)?: 3 = A little assistance  Walking in hospital room?: 3 = A little assistance  Climbing 3-5 steps with a railing?: 3 = A little assistance  Score: 18   Row Comment: Ask the patient "How much help from another person do you currently need? (If the patient hasn't done an activity recently, how much help from another person do you think he/she needs if he/she tried?)    Cognitive/Neuro      Language Assistance  Preferred Language to Address Healthcare Preferred Language to Address Healthcare: English    Therapeutic Interventions      Sit-Stand Transfer Training  Transfer Training Sit-to-Stand Transfer: minimum assist (75% patient effort);  1 person assist;  weight-bearing as tolerated   rolling walker  Transfer Training Stand-to-Sit Transfer: minimum assist (75% patient effort);  1 person assist;  weight-bearing as tolerated   rolling walker  Sit-to-Stand Transfer Training Transfer Safety Analysis: decreased flexibility;  decreased ROM;  decreased strength;  impaired balance    Gait Training  Gait Training: minimum assist (75% patient effort);  1 person assist;  weight-bearing as tolerated   rolling walker;  25 feet;  x 4  Gait Analysis: 3-point gait   decreased ROM;  decreased flexibility;  decreased strength;  impaired balance;  25 feet;  x 4;  rolling walker    Therapeutic Exercise  Therapeutic Exercise Detail: Pt participated in gait training with focus on heel to toe pattern, and eccentric control during heel strike.         AM-PAC Functional Assessment: Daily Activity  Type of Assessment: Daily assessment  Putting on and taking off regular lower body clothing?: 3 = A little assistance  Bathing (including washing, rinsing, drying)?: 3 = A little assistance  Toileting, which includes using toilet, bedpan or urinal?: 3 = A little assistance  Putting on and taking off regular upper body clothing?: 3 = A little assistance  Take care of personal grooming such as brushing teeth?: 3 = A little assistance  Eating meals?: 4 = No assist / stand by assistance  Score: 19   Row Comment: Ask the patient "How much help from another person do you currently need? (If the patient hasn't done an activity recently, how much help from another person do you think he/she needs if he/she tried?)    Cognitive/Neuro      Cognitive/Neuro/Behavioral  Level of Consciousness: alert  Arousal Level: opens eyes spontaneously  Orientation: oriented x 4  Speech: clear;  spontaneous  Mood/Behavior: calm;  cooperative    Language Assistance  Preferred Language to Address Healthcare Preferred Language to Address Healthcare: English    Therapeutic Interventions      Sit-Stand Transfer Training  Transfer Training Sit-to-Stand Transfer: minimum assist (75% patient effort);  1 person assist;  rolling walker  Transfer Training Stand-to-Sit Transfer: minimum assist (75% patient effort);  1 person assist;  rolling walker  Sit-to-Stand Transfer Training Transfer Safety Analysis: decreased strength    Toilet Transfer Training  Transfer Training Toilet Transfer: minimum assist (75% patient effort);  1 person assist;  rolling walker;  grab bars  Toilet Transfer Training Transfer Safety Analysis: decreased strength    Therapeutic Exercise  Therapeutic Exercise Detail: Pt demonstrated ambulation within room and hallway with min A and RW     Toilet Training  Toilet Training Assistance: minimum assist (75% patient effort);  1 person assist;  for clothing management in standing          PM&R Impression : as above    Current disposition plan recommendation :     acute rehab placement

## 2023-10-23 NOTE — PROGRESS NOTE ADULT - PROBLEM SELECTOR PLAN 1
Progressive weakness in lower limbs over 3 days with continued urinary incontinence. 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 MRI head: No acute infarct or other acute abnormality. No abnormal enhancement. MRI of lumbar spine on 10/14 compatible with active neurosarcoidosis, other etiologies include Guillain-[place] syndrome and other inflammatory,  infectious, or neoplastic causes. Now s/p IVIG 30g qd x4 days -10/16. Imaging showed mediastinal LN. Daily NIFs and Vital Capacity improving. Still with weakness and bulbar involvement but improving.  - PT consulted. Patient can tolerate 3 hours of PT    Plan  - Neurology following, appeciate recommendations  - NIF and vital capacity tid (VC should be greater than 20mL/kg)  - Continue gabapentin to 300mg/300mg/600mg   - C/W artifical tears and petrolatum ophthalmic ointment b/l eyes  - C/W tobramycin opthalmic drops for conjunctivitis

## 2023-10-23 NOTE — PROGRESS NOTE ADULT - SUBJECTIVE AND OBJECTIVE BOX
OVERNIGHT EVENTS:    SUBJECTIVE / INTERVAL HPI: Patient seen and examined at bedside.     VITAL SIGNS:  Vital Signs Last 24 Hrs  T(C): 36.4 (22 Oct 2023 21:10), Max: 36.8 (22 Oct 2023 12:17)  T(F): 97.5 (22 Oct 2023 21:10), Max: 98.3 (22 Oct 2023 12:17)  HR: 98 (22 Oct 2023 21:10) (95 - 98)  BP: 115/72 (22 Oct 2023 21:10) (115/72 - 119/78)  BP(mean): --  RR: 18 (22 Oct 2023 21:10) (18 - 18)  SpO2: 96% (22 Oct 2023 21:10) (95% - 96%)    Parameters below as of 22 Oct 2023 21:10  Patient On (Oxygen Delivery Method): room air      I&O's Summary      PHYSICAL EXAM:    General: WDWN  HEENT: NC/AT; PERRL, anicteric sclera; MMM  Neck: supple  Cardiovascular: +S1/S2; RRR  Respiratory: CTA B/L; no W/R/R  Gastrointestinal: soft, NT/ND; +BSx4  Extremities: WWP; no edema, clubbing or cyanosis  Vascular: 2+ radial, DP/PT pulses B/L  Neurological: AAOx3; no focal deficits    MEDICATIONS:  MEDICATIONS  (STANDING):  artificial tears (preservative free) Ophthalmic Solution 1 Drop(s) Both EYES every 2 hours  bimatoprost 0.01% Ophthalmic Solution 1 Drop(s) Both EYES at bedtime  diclofenac 75 milliGRAM(s) Oral every 12 hours  enoxaparin Injectable 40 milliGRAM(s) SubCutaneous every 24 hours  gabapentin 300 milliGRAM(s) Oral <User Schedule>  gabapentin 600 milliGRAM(s) Oral every 24 hours  lactulose Syrup 10 Gram(s) Oral daily  lidocaine   4% Patch 1 Patch Transdermal every 24 hours  pantoprazole    Tablet 40 milliGRAM(s) Oral every 24 hours  petrolatum Ophthalmic Ointment 1 Application(s) Both EYES daily  polyethylene glycol 3350 17 Gram(s) Oral every 12 hours  senna 2 Tablet(s) Oral at bedtime  tobramycin 0.3% Ophthalmic Solution 1 Drop(s) Left EYE two times a day    MEDICATIONS  (PRN):  aluminum hydroxide/magnesium hydroxide/simethicone Suspension 30 milliLiter(s) Oral every 4 hours PRN Dyspepsia  ketorolac   Injectable 30 milliGRAM(s) IV Push every 12 hours PRN Severe Pain (7 - 10)  melatonin 3 milliGRAM(s) Oral at bedtime PRN Insomnia  morphine  - Injectable 2 milliGRAM(s) IV Push every 12 hours PRN breakthrough pain  ondansetron Injectable 4 milliGRAM(s) IV Push every 8 hours PRN Nausea and/or Vomiting      ALLERGIES:  Allergies    No Known Allergies    Intolerances        LABS:                        12.5   4.21  )-----------( 160      ( 22 Oct 2023 05:30 )             39.1     10-22    137  |  104  |  16  ----------------------------<  102<H>  4.0   |  22  |  0.55    Ca    8.8      22 Oct 2023 05:30  Phos  3.3     10-22  Mg     2.2     10-22    TPro  7.8  /  Alb  3.4  /  TBili  0.3  /  DBili  x   /  AST  107<H>  /  ALT  198<H>  /  AlkPhos  66  10-22      Urinalysis Basic - ( 22 Oct 2023 05:30 )    Color: x / Appearance: x / SG: x / pH: x  Gluc: 102 mg/dL / Ketone: x  / Bili: x / Urobili: x   Blood: x / Protein: x / Nitrite: x   Leuk Esterase: x / RBC: x / WBC x   Sq Epi: x / Non Sq Epi: x / Bacteria: x      CAPILLARY BLOOD GLUCOSE          RADIOLOGY & ADDITIONAL TESTS: Reviewed.   OVERNIGHT EVENTS: Refused bowel regimen. Back pain o/n, received morphine 2mg PRN    SUBJECTIVE / INTERVAL HPI: Patient seen and examined at bedside. States her back pain has improved. Denies dry eyes, states she has been using drops. Able to eat/drink, last BM yesterday. Denies CP, SOB, fever, night sweats, chills. Able to walk up/down kaur    VITAL SIGNS:  Vital Signs Last 24 Hrs  T(C): 36.4 (22 Oct 2023 21:10), Max: 36.8 (22 Oct 2023 12:17)  T(F): 97.5 (22 Oct 2023 21:10), Max: 98.3 (22 Oct 2023 12:17)  HR: 98 (22 Oct 2023 21:10) (95 - 98)  BP: 115/72 (22 Oct 2023 21:10) (115/72 - 119/78)  BP(mean): --  RR: 18 (22 Oct 2023 21:10) (18 - 18)  SpO2: 96% (22 Oct 2023 21:10) (95% - 96%)    Parameters below as of 22 Oct 2023 21:10  Patient On (Oxygen Delivery Method): room air      I&O's Summary      PHYSICAL EXAM:    General: Well-appearing, no acute distress  HEENT: NC/AT; PERRL, scleral injection; MMM  Neck: supple  Cardiovascular: +S1/S2; RRR  Respiratory: CTA B/L; no W/R/R  Gastrointestinal: soft, NT/ND; +BSx4  Extremities: WWP; no edema, clubbing or cyanosis  Vascular: 2+ radial, DP/PT pulses B/L  Neurological: AAOx3; mild L facial droop. Weakness in closing L eye. Strength 5/5 in all 4 ext, no sensory deficits    MEDICATIONS:  MEDICATIONS  (STANDING):  artificial tears (preservative free) Ophthalmic Solution 1 Drop(s) Both EYES every 2 hours  bimatoprost 0.01% Ophthalmic Solution 1 Drop(s) Both EYES at bedtime  diclofenac 75 milliGRAM(s) Oral every 12 hours  enoxaparin Injectable 40 milliGRAM(s) SubCutaneous every 24 hours  gabapentin 300 milliGRAM(s) Oral <User Schedule>  gabapentin 600 milliGRAM(s) Oral every 24 hours  lactulose Syrup 10 Gram(s) Oral daily  lidocaine   4% Patch 1 Patch Transdermal every 24 hours  pantoprazole    Tablet 40 milliGRAM(s) Oral every 24 hours  petrolatum Ophthalmic Ointment 1 Application(s) Both EYES daily  polyethylene glycol 3350 17 Gram(s) Oral every 12 hours  senna 2 Tablet(s) Oral at bedtime  tobramycin 0.3% Ophthalmic Solution 1 Drop(s) Left EYE two times a day    MEDICATIONS  (PRN):  aluminum hydroxide/magnesium hydroxide/simethicone Suspension 30 milliLiter(s) Oral every 4 hours PRN Dyspepsia  ketorolac   Injectable 30 milliGRAM(s) IV Push every 12 hours PRN Severe Pain (7 - 10)  melatonin 3 milliGRAM(s) Oral at bedtime PRN Insomnia  morphine  - Injectable 2 milliGRAM(s) IV Push every 12 hours PRN breakthrough pain  ondansetron Injectable 4 milliGRAM(s) IV Push every 8 hours PRN Nausea and/or Vomiting      ALLERGIES:  Allergies    No Known Allergies    Intolerances        LABS:                        12.5   4.21  )-----------( 160      ( 22 Oct 2023 05:30 )             39.1     10-22    137  |  104  |  16  ----------------------------<  102<H>  4.0   |  22  |  0.55    Ca    8.8      22 Oct 2023 05:30  Phos  3.3     10-22  Mg     2.2     10-22    TPro  7.8  /  Alb  3.4  /  TBili  0.3  /  DBili  x   /  AST  107<H>  /  ALT  198<H>  /  AlkPhos  66  10-22      Urinalysis Basic - ( 22 Oct 2023 05:30 )    Color: x / Appearance: x / SG: x / pH: x  Gluc: 102 mg/dL / Ketone: x  / Bili: x / Urobili: x   Blood: x / Protein: x / Nitrite: x   Leuk Esterase: x / RBC: x / WBC x   Sq Epi: x / Non Sq Epi: x / Bacteria: x      CAPILLARY BLOOD GLUCOSE          RADIOLOGY & ADDITIONAL TESTS: Reviewed.

## 2023-10-24 ENCOUNTER — APPOINTMENT (OUTPATIENT)
Dept: INTERNAL MEDICINE | Facility: CLINIC | Age: 63
End: 2023-10-24

## 2023-10-24 LAB
ALBUMIN SERPL ELPH-MCNC: 3.5 G/DL — SIGNIFICANT CHANGE UP (ref 3.3–5)
ALBUMIN SERPL ELPH-MCNC: 3.5 G/DL — SIGNIFICANT CHANGE UP (ref 3.3–5)
ALP SERPL-CCNC: 58 U/L — SIGNIFICANT CHANGE UP (ref 40–120)
ALP SERPL-CCNC: 58 U/L — SIGNIFICANT CHANGE UP (ref 40–120)
ALT FLD-CCNC: 113 U/L — HIGH (ref 10–45)
ALT FLD-CCNC: 113 U/L — HIGH (ref 10–45)
ANION GAP SERPL CALC-SCNC: 9 MMOL/L — SIGNIFICANT CHANGE UP (ref 5–17)
ANION GAP SERPL CALC-SCNC: 9 MMOL/L — SIGNIFICANT CHANGE UP (ref 5–17)
AST SERPL-CCNC: 47 U/L — HIGH (ref 10–40)
AST SERPL-CCNC: 47 U/L — HIGH (ref 10–40)
BASOPHILS # BLD AUTO: 0.03 K/UL — SIGNIFICANT CHANGE UP (ref 0–0.2)
BASOPHILS # BLD AUTO: 0.03 K/UL — SIGNIFICANT CHANGE UP (ref 0–0.2)
BASOPHILS NFR BLD AUTO: 0.7 % — SIGNIFICANT CHANGE UP (ref 0–2)
BASOPHILS NFR BLD AUTO: 0.7 % — SIGNIFICANT CHANGE UP (ref 0–2)
BILIRUB SERPL-MCNC: 0.2 MG/DL — SIGNIFICANT CHANGE UP (ref 0.2–1.2)
BILIRUB SERPL-MCNC: 0.2 MG/DL — SIGNIFICANT CHANGE UP (ref 0.2–1.2)
BUN SERPL-MCNC: 18 MG/DL — SIGNIFICANT CHANGE UP (ref 7–23)
BUN SERPL-MCNC: 18 MG/DL — SIGNIFICANT CHANGE UP (ref 7–23)
CALCIUM SERPL-MCNC: 8.5 MG/DL — SIGNIFICANT CHANGE UP (ref 8.4–10.5)
CALCIUM SERPL-MCNC: 8.5 MG/DL — SIGNIFICANT CHANGE UP (ref 8.4–10.5)
CHLORIDE SERPL-SCNC: 107 MMOL/L — SIGNIFICANT CHANGE UP (ref 96–108)
CHLORIDE SERPL-SCNC: 107 MMOL/L — SIGNIFICANT CHANGE UP (ref 96–108)
CO2 SERPL-SCNC: 23 MMOL/L — SIGNIFICANT CHANGE UP (ref 22–31)
CO2 SERPL-SCNC: 23 MMOL/L — SIGNIFICANT CHANGE UP (ref 22–31)
CREAT SERPL-MCNC: 0.55 MG/DL — SIGNIFICANT CHANGE UP (ref 0.5–1.3)
CREAT SERPL-MCNC: 0.55 MG/DL — SIGNIFICANT CHANGE UP (ref 0.5–1.3)
EGFR: 104 ML/MIN/1.73M2 — SIGNIFICANT CHANGE UP
EGFR: 104 ML/MIN/1.73M2 — SIGNIFICANT CHANGE UP
EOSINOPHIL # BLD AUTO: 0.25 K/UL — SIGNIFICANT CHANGE UP (ref 0–0.5)
EOSINOPHIL # BLD AUTO: 0.25 K/UL — SIGNIFICANT CHANGE UP (ref 0–0.5)
EOSINOPHIL NFR BLD AUTO: 5.9 % — SIGNIFICANT CHANGE UP (ref 0–6)
EOSINOPHIL NFR BLD AUTO: 5.9 % — SIGNIFICANT CHANGE UP (ref 0–6)
GLUCOSE SERPL-MCNC: 95 MG/DL — SIGNIFICANT CHANGE UP (ref 70–99)
GLUCOSE SERPL-MCNC: 95 MG/DL — SIGNIFICANT CHANGE UP (ref 70–99)
HCT VFR BLD CALC: 35.3 % — SIGNIFICANT CHANGE UP (ref 34.5–45)
HCT VFR BLD CALC: 35.3 % — SIGNIFICANT CHANGE UP (ref 34.5–45)
HGB BLD-MCNC: 11.2 G/DL — LOW (ref 11.5–15.5)
HGB BLD-MCNC: 11.2 G/DL — LOW (ref 11.5–15.5)
IMM GRANULOCYTES NFR BLD AUTO: 0.2 % — SIGNIFICANT CHANGE UP (ref 0–0.9)
IMM GRANULOCYTES NFR BLD AUTO: 0.2 % — SIGNIFICANT CHANGE UP (ref 0–0.9)
LYMPHOCYTES # BLD AUTO: 0.99 K/UL — LOW (ref 1–3.3)
LYMPHOCYTES # BLD AUTO: 0.99 K/UL — LOW (ref 1–3.3)
LYMPHOCYTES # BLD AUTO: 23.2 % — SIGNIFICANT CHANGE UP (ref 13–44)
LYMPHOCYTES # BLD AUTO: 23.2 % — SIGNIFICANT CHANGE UP (ref 13–44)
MAGNESIUM SERPL-MCNC: 2 MG/DL — SIGNIFICANT CHANGE UP (ref 1.6–2.6)
MAGNESIUM SERPL-MCNC: 2 MG/DL — SIGNIFICANT CHANGE UP (ref 1.6–2.6)
MCHC RBC-ENTMCNC: 25.7 PG — LOW (ref 27–34)
MCHC RBC-ENTMCNC: 25.7 PG — LOW (ref 27–34)
MCHC RBC-ENTMCNC: 31.7 GM/DL — LOW (ref 32–36)
MCHC RBC-ENTMCNC: 31.7 GM/DL — LOW (ref 32–36)
MCV RBC AUTO: 81.1 FL — SIGNIFICANT CHANGE UP (ref 80–100)
MCV RBC AUTO: 81.1 FL — SIGNIFICANT CHANGE UP (ref 80–100)
MONOCYTES # BLD AUTO: 0.35 K/UL — SIGNIFICANT CHANGE UP (ref 0–0.9)
MONOCYTES # BLD AUTO: 0.35 K/UL — SIGNIFICANT CHANGE UP (ref 0–0.9)
MONOCYTES NFR BLD AUTO: 8.2 % — SIGNIFICANT CHANGE UP (ref 2–14)
MONOCYTES NFR BLD AUTO: 8.2 % — SIGNIFICANT CHANGE UP (ref 2–14)
NEUTROPHILS # BLD AUTO: 2.63 K/UL — SIGNIFICANT CHANGE UP (ref 1.8–7.4)
NEUTROPHILS # BLD AUTO: 2.63 K/UL — SIGNIFICANT CHANGE UP (ref 1.8–7.4)
NEUTROPHILS NFR BLD AUTO: 61.8 % — SIGNIFICANT CHANGE UP (ref 43–77)
NEUTROPHILS NFR BLD AUTO: 61.8 % — SIGNIFICANT CHANGE UP (ref 43–77)
NRBC # BLD: 0 /100 WBCS — SIGNIFICANT CHANGE UP (ref 0–0)
NRBC # BLD: 0 /100 WBCS — SIGNIFICANT CHANGE UP (ref 0–0)
PHOSPHATE SERPL-MCNC: 2.9 MG/DL — SIGNIFICANT CHANGE UP (ref 2.5–4.5)
PHOSPHATE SERPL-MCNC: 2.9 MG/DL — SIGNIFICANT CHANGE UP (ref 2.5–4.5)
PLATELET # BLD AUTO: 157 K/UL — SIGNIFICANT CHANGE UP (ref 150–400)
PLATELET # BLD AUTO: 157 K/UL — SIGNIFICANT CHANGE UP (ref 150–400)
POTASSIUM SERPL-MCNC: 4.2 MMOL/L — SIGNIFICANT CHANGE UP (ref 3.5–5.3)
POTASSIUM SERPL-MCNC: 4.2 MMOL/L — SIGNIFICANT CHANGE UP (ref 3.5–5.3)
POTASSIUM SERPL-SCNC: 4.2 MMOL/L — SIGNIFICANT CHANGE UP (ref 3.5–5.3)
POTASSIUM SERPL-SCNC: 4.2 MMOL/L — SIGNIFICANT CHANGE UP (ref 3.5–5.3)
PROT SERPL-MCNC: 7.2 G/DL — SIGNIFICANT CHANGE UP (ref 6–8.3)
PROT SERPL-MCNC: 7.2 G/DL — SIGNIFICANT CHANGE UP (ref 6–8.3)
RBC # BLD: 4.35 M/UL — SIGNIFICANT CHANGE UP (ref 3.8–5.2)
RBC # BLD: 4.35 M/UL — SIGNIFICANT CHANGE UP (ref 3.8–5.2)
RBC # FLD: 14.7 % — HIGH (ref 10.3–14.5)
RBC # FLD: 14.7 % — HIGH (ref 10.3–14.5)
SODIUM SERPL-SCNC: 139 MMOL/L — SIGNIFICANT CHANGE UP (ref 135–145)
SODIUM SERPL-SCNC: 139 MMOL/L — SIGNIFICANT CHANGE UP (ref 135–145)
WBC # BLD: 4.26 K/UL — SIGNIFICANT CHANGE UP (ref 3.8–10.5)
WBC # BLD: 4.26 K/UL — SIGNIFICANT CHANGE UP (ref 3.8–10.5)
WBC # FLD AUTO: 4.26 K/UL — SIGNIFICANT CHANGE UP (ref 3.8–10.5)
WBC # FLD AUTO: 4.26 K/UL — SIGNIFICANT CHANGE UP (ref 3.8–10.5)

## 2023-10-24 PROCEDURE — 99232 SBSQ HOSP IP/OBS MODERATE 35: CPT | Mod: GC

## 2023-10-24 RX ORDER — OXYCODONE HYDROCHLORIDE 5 MG/1
5 TABLET ORAL EVERY 12 HOURS
Refills: 0 | Status: DISCONTINUED | OUTPATIENT
Start: 2023-10-24 | End: 2023-10-24

## 2023-10-24 RX ORDER — OXYCODONE HYDROCHLORIDE 5 MG/1
5 TABLET ORAL EVERY 12 HOURS
Refills: 0 | Status: DISCONTINUED | OUTPATIENT
Start: 2023-10-24 | End: 2023-10-25

## 2023-10-24 RX ORDER — LATANOPROST 0.05 MG/ML
1 SOLUTION/ DROPS OPHTHALMIC; TOPICAL AT BEDTIME
Refills: 0 | Status: DISCONTINUED | OUTPATIENT
Start: 2023-10-24 | End: 2023-10-24

## 2023-10-24 RX ADMIN — GABAPENTIN 600 MILLIGRAM(S): 400 CAPSULE ORAL at 21:59

## 2023-10-24 RX ADMIN — ENOXAPARIN SODIUM 40 MILLIGRAM(S): 100 INJECTION SUBCUTANEOUS at 18:03

## 2023-10-24 RX ADMIN — Medication 1 DROP(S): at 22:00

## 2023-10-24 RX ADMIN — BIMATOPROST 1 DROP(S): 0.3 SOLUTION/ DROPS OPHTHALMIC at 22:00

## 2023-10-24 RX ADMIN — Medication 1 DROP(S): at 23:48

## 2023-10-24 RX ADMIN — Medication 1 DROP(S): at 05:59

## 2023-10-24 RX ADMIN — Medication 1 DROP(S): at 10:03

## 2023-10-24 RX ADMIN — Medication 1 DROP(S): at 18:03

## 2023-10-24 RX ADMIN — LIDOCAINE 1 PATCH: 4 CREAM TOPICAL at 06:02

## 2023-10-24 RX ADMIN — Medication 1 APPLICATION(S): at 22:01

## 2023-10-24 RX ADMIN — LIDOCAINE 1 PATCH: 4 CREAM TOPICAL at 12:20

## 2023-10-24 RX ADMIN — Medication 1 DROP(S): at 14:02

## 2023-10-24 RX ADMIN — GABAPENTIN 300 MILLIGRAM(S): 400 CAPSULE ORAL at 05:58

## 2023-10-24 RX ADMIN — PANTOPRAZOLE SODIUM 40 MILLIGRAM(S): 20 TABLET, DELAYED RELEASE ORAL at 06:00

## 2023-10-24 RX ADMIN — LIDOCAINE 1 PATCH: 4 CREAM TOPICAL at 00:26

## 2023-10-24 RX ADMIN — Medication 3 MILLIGRAM(S): at 23:11

## 2023-10-24 RX ADMIN — Medication 1 DROP(S): at 21:50

## 2023-10-24 RX ADMIN — Medication 1 DROP(S): at 10:02

## 2023-10-24 RX ADMIN — DICLOFENAC SODIUM 75 MILLIGRAM(S): 75 TABLET, DELAYED RELEASE ORAL at 05:59

## 2023-10-24 RX ADMIN — DICLOFENAC SODIUM 75 MILLIGRAM(S): 75 TABLET, DELAYED RELEASE ORAL at 18:03

## 2023-10-24 RX ADMIN — LIDOCAINE 1 PATCH: 4 CREAM TOPICAL at 23:13

## 2023-10-24 RX ADMIN — DICLOFENAC SODIUM 75 MILLIGRAM(S): 75 TABLET, DELAYED RELEASE ORAL at 18:54

## 2023-10-24 RX ADMIN — GABAPENTIN 300 MILLIGRAM(S): 400 CAPSULE ORAL at 14:02

## 2023-10-24 NOTE — PROGRESS NOTE ADULT - PROBLEM SELECTOR PLAN 2
Pt at high risk for corneal ulcer and infection due to inability to close eye. Ophthalmology consulted; made recommendations 10/24. Tobramycin eyedrops stopped.    Plan:  - C/W artifical tears q2-3h  - Continue bimatoprost 0/01% ophthalmic solution for glaucoma

## 2023-10-24 NOTE — PROGRESS NOTE ADULT - TIME BILLING
Patient seen and examined;  Continue present regimen   Acute Rehab pending Patient seen and examined;  Continue present regimen   Acute Rehab pending  Eye consult noted

## 2023-10-24 NOTE — PROGRESS NOTE ADULT - PROBLEM SELECTOR PLAN 1
Progressive weakness in lower limbs over 3 days with continued urinary incontinence. 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 MRI head: No acute infarct or other acute abnormality. No abnormal enhancement. MRI of lumbar spine on 10/14 compatible with active neurosarcoidosis, other etiologies include Guillain-Terlton syndrome and other inflammatory,  infectious, or neoplastic causes. Now s/p IVIG 30g qd x4 days -10/16. Imaging showed mediastinal LN. Daily NIFs and Vital Capacity improving. Still with weakness and bulbar involvement but improving.  - PT consulted. Patient can tolerate 3 hours of PT    Plan  - Neurology following, appeciate recommendations  - NIF and vital capacity bid (VC should be greater than 20mL/kg)  - Continue gabapentin to 300mg/300mg/600mg

## 2023-10-24 NOTE — PROGRESS NOTE ADULT - ASSESSMENT
63 year old female with PMH of OA, GERD, and sarcoidosis (diagnosed in 2008) presenting with worsening back pain and progressive lower limb weakness for the 3 days, workup c/w possible GBS vs AIDP vs neurosarcoid now s/p IVIG x4 days with some symptomatic improvement, still with some bulbar involvement pending AR disposition.

## 2023-10-24 NOTE — PROGRESS NOTE ADULT - SUBJECTIVE AND OBJECTIVE BOX
INTERVAL HPI/OVERNIGHT EVENTS:  No chief complaint;  No pain medication over night   Eyes improved      MEDICATIONS  (STANDING):  artificial tears (preservative free) Ophthalmic Solution 1 Drop(s) Both EYES every 2 hours  bimatoprost 0.01% Ophthalmic Solution 1 Drop(s) Both EYES at bedtime  diclofenac 75 milliGRAM(s) Oral every 12 hours  enoxaparin Injectable 40 milliGRAM(s) SubCutaneous every 24 hours  gabapentin 600 milliGRAM(s) Oral every 24 hours  gabapentin 300 milliGRAM(s) Oral <User Schedule>  lactulose Syrup 10 Gram(s) Oral daily  lidocaine   4% Patch 1 Patch Transdermal every 24 hours  pantoprazole    Tablet 40 milliGRAM(s) Oral every 24 hours  petrolatum Ophthalmic Ointment 1 Application(s) Both EYES daily  polyethylene glycol 3350 17 Gram(s) Oral every 12 hours  senna 2 Tablet(s) Oral at bedtime  tobramycin 0.3% Ophthalmic Solution 1 Drop(s) Left EYE two times a day    MEDICATIONS  (PRN):  aluminum hydroxide/magnesium hydroxide/simethicone Suspension 30 milliLiter(s) Oral every 4 hours PRN Dyspepsia  ketorolac   Injectable 30 milliGRAM(s) IV Push every 12 hours PRN Severe Pain (7 - 10)  melatonin 3 milliGRAM(s) Oral at bedtime PRN Insomnia  ondansetron Injectable 4 milliGRAM(s) IV Push every 8 hours PRN Nausea and/or Vomiting  oxyCODONE  ER Tablet 5 milliGRAM(s) Oral every 12 hours PRN Severe Pain      Allergies    No Known Allergies    Intolerances        Vital Signs Last 24 Hrs  T(C): 36.9 (24 Oct 2023 05:49), Max: 36.9 (24 Oct 2023 05:49)  T(F): 98.5 (24 Oct 2023 05:49), Max: 98.5 (24 Oct 2023 05:49)  HR: 92 (24 Oct 2023 05:49) (92 - 97)  BP: 120/78 (24 Oct 2023 05:49) (120/74 - 124/79)  BP(mean): --  RR: 16 (24 Oct 2023 05:49) (16 - 18)  SpO2: 95% (24 Oct 2023 05:49) (95% - 96%)    Parameters below as of 23 Oct 2023 23:30  Patient On (Oxygen Delivery Method): room air              Constitutional: Awake     Eyes: EMILY    ENMT: Negative    Neck: Supple    Back:  no tenderness     Respiratory:  clear    Cardiovascular: S1 S2    Gastrointestinal: soft     Genitourinary:    Extremities: no edema     Vascular:    Neurological: strength improving    Skin:    Lymph Nodes:            LABS:                        11.2   4.26  )-----------( 157      ( 24 Oct 2023 09:00 )             35.3     10-24    139  |  107  |  18  ----------------------------<  95  4.2   |  23  |  0.55    Ca    8.5      24 Oct 2023 09:00  Phos  2.9     10-24  Mg     2.0     10-24    TPro  7.2  /  Alb  3.5  /  TBili  0.2  /  DBili  x   /  AST  47<H>  /  ALT  113<H>  /  AlkPhos  58  10-24      Urinalysis Basic - ( 24 Oct 2023 09:00 )    Color: x / Appearance: x / SG: x / pH: x  Gluc: 95 mg/dL / Ketone: x  / Bili: x / Urobili: x   Blood: x / Protein: x / Nitrite: x   Leuk Esterase: x / RBC: x / WBC x   Sq Epi: x / Non Sq Epi: x / Bacteria: x        RADIOLOGY & ADDITIONAL TESTS:

## 2023-10-24 NOTE — PROGRESS NOTE ADULT - PROBLEM SELECTOR PLAN 11
N: Regular w/ Ensure  E: Mg <2, Phos <3, K <4  DVT ppx: Lovenox  GI: Protonix 40mg PO qd  C: Full Code  D: [facility]

## 2023-10-24 NOTE — CONSULT NOTE ADULT - SUBJECTIVE AND OBJECTIVE BOX
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 There has previously been some evidence of disease progression after the IVIG, with involvement of CN 7 and 10, as well as blurred vision on 10/17 but since 10/18 symptoms are stable, likely plateaued with resolution of neck weakness, improvements in facial weakness and extremity strength.    Pt denies pain, redness, discharge, flashes, floaters. Reports mild blurry vision OD>OS.    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 There is no evidence of corneal abrasion or ulcer. Her vision is essentially at baseline with current glasses.    -- recommend continued lubrication for potential exposure keratopathy , with artificial tears q2-3h OU  -- continue lumigan for preexisting glaucoma, continue f/u with outpatient ophthalmologist after discharge    Ilene Hilario MD
  Patient is a 63y old  Female who presents with a chief complaint of lower limb weakness (11 Oct 2023 18:28)      HPI:  63 year old female with PMH of OA, GERD, and sarcoidosis (diagnosed in 2008) presenting with worsening back pain and progressive lower limb weakness for the last three days. Pt was recently admitted on 10/8/23 for lower and upper extremity numbness and tingling. At the time, MRI and CT lumbar and thoracic which didn't reveal acute pathology, only showed broad C6-7 disc bulging w/o any spinal compression, no contrast enchainment. Imaging also showed mediastinal LN. Today, pt reports 9/10 tearing in quality lower back pain, increased weakness as she was unable to get off the toilet on her own. She also had to use a walker as she felt like she would fall otherwise. Pt also reports urinary incontinence during the day, reports no episodes of incontinence overnight. Pt endorses occipital headache and nausea. Last BM on sunday. Pt denies chest pain, sob, abd pain.  (11 Oct 2023 12:40)    PAST MEDICAL & SURGICAL HISTORY:  Ovarian cyst      GERD (gastroesophageal reflux disease)      Glaucoma      Sarcoidosis      Sinusitis      Osteoporosis      Migraines      Hashimoto's disease      Kidney stones      Torn meniscus  right      History of arthroscopy of shoulder  rotator cuff repair, right      History of umbilical hernia repair      S/P correction of deviated nasal septum      H/O sinus surgery        MEDICATIONS  (STANDING):  enoxaparin Injectable 40 milliGRAM(s) SubCutaneous every 24 hours  latanoprost 0.005% Ophthalmic Solution 1 Drop(s) Both EYES at bedtime  pantoprazole    Tablet 40 milliGRAM(s) Oral every 24 hours    MEDICATIONS  (PRN):  acetaminophen     Tablet .. 650 milliGRAM(s) Oral every 6 hours PRN Temp greater or equal to 38C (100.4F), Mild Pain (1 - 3)  aluminum hydroxide/magnesium hydroxide/simethicone Suspension 30 milliLiter(s) Oral every 4 hours PRN Dyspepsia  HYDROmorphone  Injectable 0.5 milliGRAM(s) IV Push every 6 hours PRN Severe Pain (7 - 10)  HYDROmorphone  Injectable 0.25 milliGRAM(s) IV Push every 6 hours PRN Moderate Pain (4 - 6)  LORazepam     Tablet 0.5 milliGRAM(s) Oral every 8 hours PRN Anxiety  melatonin 3 milliGRAM(s) Oral at bedtime PRN Insomnia  ondansetron Injectable 4 milliGRAM(s) IV Push every 8 hours PRN Nausea and/or Vomiting             FAMILY HISTORY:      CBC Full  -  ( 11 Oct 2023 14:38 )  WBC Count : 11.24 K/uL  RBC Count : 5.31 M/uL  Hemoglobin : 13.7 g/dL  Hematocrit : 42.2 %  Platelet Count - Automated : 250 K/uL  Mean Cell Volume : 79.5 fl  Mean Cell Hemoglobin : 25.8 pg  Mean Cell Hemoglobin Concentration : 32.5 gm/dL  Auto Neutrophil # : 8.71 K/uL  Auto Lymphocyte # : 1.67 K/uL  Auto Monocyte # : 0.71 K/uL  Auto Eosinophil # : 0.06 K/uL  Auto Basophil # : 0.03 K/uL  Auto Neutrophil % : 77.5 %  Auto Lymphocyte % : 14.9 %  Auto Monocyte % : 6.3 %  Auto Eosinophil % : 0.5 %  Auto Basophil % : 0.3 %      10-11    129<L>  |  92<L>  |  11  ----------------------------<  145<H>  3.9   |  23  |  0.49<L>    Ca    9.1      11 Oct 2023 14:38  Phos  3.0     10-11  Mg     1.8     10-11    TPro  7.8  /  Alb  4.5  /  TBili  0.6  /  DBili  x   /  AST  17  /  ALT  16  /  AlkPhos  64  10-11      Urinalysis Basic - ( 11 Oct 2023 14:38 )    Color: x / Appearance: x / SG: x / pH: x  Gluc: 145 mg/dL / Ketone: x  / Bili: x / Urobili: x   Blood: x / Protein: x / Nitrite: x   Leuk Esterase: x / RBC: x / WBC x   Sq Epi: x / Non Sq Epi: x / Bacteria: x        Radiology :     < from: CT Cervical Spine No Cont (10.08.23 @ 13:01) >  ACC: [id number] EXAM:  CT THORACIC SPINE   ORDERED BY: ZACHARIAH PURI     ACC: [id number] EXAM:  CT CERVICAL SPINE   ORDERED BY: ZACHARIAH PURI     PROCEDURE DATE:  10/08/2023          INTERPRETATION:  Clinical indication: Back pain    Technique: CT ofthe cervical and thoracic spine was performed without   the use of intravenous contrast. Images were reformatted into coronal and   sagittal orientation.    Comparison: None available.    Findings:    There is no acute fracture or subluxation of the cervical or thoracic   spine. There is no significant listhesis. The vertebral bodies are of   normal height and configuration. There is no evidence of prevertebral   soft tissue swelling.    There is multilevel cervical spondylosis and facet arthropathy, most   pronounced at C5-C6 where there is mild central canal narrowing and mild   bilateral neural foraminal narrowing. No significant central canal or   neural foraminal narrowing throughout the thoracic spine.    The partially visualized intrathoracic cavity is within normal limits.   The partially visualized lungs are clear.    Impression: No acute fracture or subluxation of the cervical spine or   thoracic spine.    < from: MR Cervical Spine w/wo IV Cont (10.08.23 @ 17:08) >    ACC: [id number] EXAM:  MR SPINE THORACIC WAW IC   ORDERED BY: CAT SOLO     ACC: [id number] EXAM:  MR SPINE CERVICAL WAW IC   ORDERED BY: CAT SOLO     PROCEDURE DATE:  10/08/2023          INTERPRETATION:  History: Back pain, neck pain. Rule out spinal cord   compression, transverse myelitis, multiple sclerosis, neurosarcoidosis.   63 year old female with PMH of OA, GERD, and sarcoidosis presenting with   worsening back pain associated bilateral upper and lower extremities   numbness and tingling for the last 5 days. Patient reports she was in her   normal state of health until 8 days ago when she started having mucus in   her BM, denies any blood in urine or BM. Two days later, patient started   experiencing lower back pain which over the day moved to her thoracic   spine as a throbbing pain. She reports the pain continued to radiated to   her cervical spine causing a migraine. She denies any visual changes but   does note tenderness to her upper eyes. She denies any trauma or urinary   retention. She notes she does have stress incontinence and has been   experiencing urinary discomfort along with a mild odor over the last two   days. Of note, patient received flu shot about 2 weeks ago.    Description: MRI studies of the cervical and thoracic spine with and   without contrast were performed utilizing multiplanar multisequence   techniques.    Comparison is made to the spine CT from 10/08/2023.    7 cc intravenous Gadavist gadolinium contrast was administered, 3 cc   contrast was discarded.    Cervical spine MRI:    There is no acute vertebral body compression fracture or subluxation. No   areas of bone marrow edema are present.    There is no cervical spinal cord compression, cervical spinal cord mass,   syrinx, or focal spinal cord signal abnormality. No abnormal intraspinal   enhancement is present.    C2-C3: No significant central canal stenosis or neural foraminal   narrowing is present.    C3-C4: No significant central canal stenosis or neural foraminal   narrowing is present.    C4-C5: A small broad disc bulge and mild central canal stenosis are   noted. The neural foramina appear patent.    C5-C6: A broad disc bulge, facet hypertrophy, and uncovertebral joint   hypertrophy result in mild to moderate central spinal canal stenosis and   moderate right neural foramen narrowing and mild left neural foramen   narrowing.    C6-C7: A broad disc bulge, facet hypertrophy, and uncovertebral joint   hypertrophy result in mild central canal stenosis and mild neural   foraminal narrowing.    C7-T1: No significant central canal stenosis or neural foraminal   narrowing is present.    Thoracic spine MRI:    There is no evidence for thoracic spinal cord mass, syrinx, or focal   spinal cord signal abnormality. There is no thoracic spinal cord   compression.    There is no disc herniation, significant central canal stenosis, neural   foraminal narrowing within the thoracic spine. There is no vertebral body   compression fracture or subluxation. No areas of bonemarrow edema are   present. No abnormal intraspinal enhancement is present. A chronic   Schmorl's node indents the superior endplate of a thoracic vertebral body.    Extensive mediastinal, hilar, and paratracheal lymphadenopathy is present   in the chest. Enlarged thoracic lymphadenopathy was noted on the   06/08/2012 chest CT study which was attributed to the patient's known   history of sarcoidosis at that time. This is not well evaluated with   spine MRI technique. Serial chest CT imaging follow-up over time is   recommended to monitor for stability.    IMPRESSION:    No evidence for cervical or thoracic spinal cord compression. No signal   abnormalities or abnormal enhancement involves the spinal cord.    Extensive mediastinal, hilar, andparatracheal lymphadenopathy is present   in the chest. Enlarged thoracic lymphadenopathy was noted on the   06/08/2012 chest CT study which was attributed to the patient's known   history of sarcoidosis at that time. This is not well evaluated with   spine MRI technique. Serial chest CT imaging follow-up over time is   recommended to monitor for stability.          Review of Systems : per HPI         Vital Signs Last 24 Hrs  T(C): 36.8 (12 Oct 2023 06:09), Max: 36.8 (12 Oct 2023 06:09)  T(F): 98.2 (12 Oct 2023 06:09), Max: 98.2 (12 Oct 2023 06:09)  HR: 72 (12 Oct 2023 06:09) (72 - 76)  BP: 162/85 (12 Oct 2023 06:09) (141/88 - 173/94)  BP(mean): --  RR: 15 (12 Oct 2023 06:09) (15 - 16)  SpO2: 97% (12 Oct 2023 06:09) (97% - 97%)    Parameters below as of 11 Oct 2023 21:15  Patient On (Oxygen Delivery Method): room air            Physical Exam :   63 y o woman lying comfortably in semi Greene's position , awake , alert , no acute complaints     Head : normocephalic , atraumatic    Eyes : PERRLA , EOMI , no nystagmus , sclera anicteric    ENT / FACE : neg nasal discharge , uvula midline , no oropharyngeal erythema / exudate    Neck : supple , negative JVD , negative carotid bruits , no thyromegaly    Chest : CTA bilaterally , neg wheeze / rhonchi / rales / crackles / egophany    Cardiovascular : regular rate and rhythm , neg murmurs / rubs / gallops    Abdomen : soft , non distended , non tender to palpation in all 4 quadrants ,  normal bowel sounds     Extremities : WWP , neg cyanosis /clubbing / edema     Neurologic Exam :     Alert and oriented  x 3     Cranial Nerves:           II :                         pupils equal , round and reactive to light , visual fields intact         III/ IV/VI :              extraocular movements intact , neg nystagmus , neg ptosis        V :                        facial sensation intact , V1-3 normal        VII :                      face symmetric , no droop , normal eye closure and smile        VIII :                     hearing intact to finger rub bilaterally        IX and X :             no hoarseness , gag intact , palate/ uvula rise symmetrically        XI :                       SCM / trapezius strength intact bilateral        XII :                      no tongue deviation       Motor Exam:                Right UE:                     no focal weakness ,  > 4/5 throughout  , no pronator drift       Left UE:                       no focal weakness ,  > 4/5 throughout  , no pronator drift           Right LE:          no focal weakness ,  > 4/5 throughout          Left LE:          no focal weakness ,  > 4/5 throughout           Sensation :         intact to light touch x 4 extremities                            no neglect or extinction on double simultaneous testing.    DTR :           biceps/brachioradialis : equal                            patella/ankle : equal           neg Babinski           Coordination :            Finger to Nose :  neg dysmetria bilaterally          Gait :  not tested          PM&R Impression : admitted for c/o worsening back pain and progressive lower limb weakness for the last three days     - no acute pathology on CT imaging      Recommendations / Plan :       1) Physical / Occupational therapy focusing on therapeutic exercises , equipment evaluation , bed mobility/transfer out of bed evaluation , progressive ambulation with assistive devices prn .    2) Current disposition plan recommendation  :    pending functional progress                
NEUROLOGY CONSULT    HPI:  "63 year old female with PMH of OA, GERD, and sarcoidosis (diagnosed in 2008) presenting with worsening back pain and progressive lower limb weakness for the last three days. Pt was recently was seen by neurology in ED and was recommended polyneuropathy w/up and patient was agreed to do in in OP settings. Her lower back pain got worse and she started to experience weakness in both legs, balance issues, worsening her stress incontinence. On previous encounter only MRI C and T spine w and w/o contrast was done and was unremarkable w/o enhancement except extensive lymphoadenopathy. No saddle anesthesia, no dizziness, blurry vision, seizures, SOB, night sweating. Admits some unintentional wt loss (6lb) w/in 3-4 weeks.       MEDICATIONS  Home Medications:  alendronate 70 mg oral tablet: 1 tab(s) orally once a day (08 Oct 2023 12:25)  Lumigan 0.01% ophthalmic solution: 1 drop(s) in each affected eye once a day (at bedtime) Both eyes (11 Oct 2023 13:46)  methylPREDNISolone 4 mg oral tablet: 1 tab(s) orally 3 times a day Taper for 6 days (11 Oct 2023 13:47)  Protonix 40 mg oral delayed release tablet: 1 tab(s) orally once a day (08 Oct 2023 12:25)    MEDICATIONS  (STANDING):  enoxaparin Injectable 40 milliGRAM(s) SubCutaneous every 24 hours  latanoprost 0.005% Ophthalmic Solution 1 Drop(s) Both EYES at bedtime  pantoprazole    Tablet 40 milliGRAM(s) Oral every 24 hours    MEDICATIONS  (PRN):  acetaminophen     Tablet .. 650 milliGRAM(s) Oral every 6 hours PRN Temp greater or equal to 38C (100.4F), Mild Pain (1 - 3)  aluminum hydroxide/magnesium hydroxide/simethicone Suspension 30 milliLiter(s) Oral every 4 hours PRN Dyspepsia  HYDROmorphone  Injectable 0.5 milliGRAM(s) IV Push every 6 hours PRN Severe Pain (7 - 10)  melatonin 3 milliGRAM(s) Oral at bedtime PRN Insomnia  ondansetron Injectable 4 milliGRAM(s) IV Push every 8 hours PRN Nausea and/or Vomiting      FAMILY HISTORY:    SOCIAL HISTORY: negative for tobacco, alcohol, or ilicit drug use.    Allergies    No Known Allergies    Intolerances           NEUROLOGICAL EXAMINATION:  GENERAL:  Appearance is consistent with chronologic age.   COGNITION/LANGUAGE:  Awake, alert, and oriented to person, place, time and date.  Fluent, intact comprehension, repetition, naming. Recent and remote memory intact.  Fund of knowledge is appropriate.  Nondysarthric.    CRANIAL NERVES:   - Eyes:  Visual acuity intact. Pupils equal round and reactive, no RAPD. EOMI w/o nystagmus, skew or reported double vision. Normal visual field on confrontation. No ptosis/weakness of eyelid closure.   - Face:  Facial sensation normal V1 - 3, no facial asymmetry.    - Ears/Nose/Throat:  Hearing grossly intact b/l to finger rub.  Palate elevates midline.  Tongue and uvula midline.     MOTOR EXAM:  No observable drift. Normal tone and bulk. No tenderness, twitching, tremors or involuntary movements.                                                                 Right | Left    Shoulder abductors:    4|5   Shoulder adductors:    5|5   Elbow flexors:             5|5   Elbow extensors:         5|5   Palmar flexion:            5|5   Palmar dorsiflexion:     5|5   Hip flexors:              4-|4-   Hip extensors:            5|5   Knee extensors:          5|5   Knee flexors:            4-|4-   Foot dorsiflexors:        5|5   Foot plantarflexors:     5|5      DTRs:             Right | Left   Biceps:                 2+|2+     Triceps:                2+|2+   Brachioradialis:     2+|2+     Patellar:              3+|3+   Achilles:              0+|0+   Plantar responses equivocal b/l.    No observable drift. Normal tone and bulk. No tenderness, twitching, tremors or involuntary movements.  SENSORY EXAM:  Intact to light touch and pinprick, pain, proprioception but decreased temperature and vibration b/l in distal LE.   REFLEXES:   2+ b/l biceps, triceps, Brisk 3+ patella with adductor components, 0+ achilles.  Plantar response downgoing b/l.  Jaw jerk, Maday, clonus absent.  CEREBELLUM:  Finger to nose/Heel to knee and shin intact. No dysmetria.    GAIT: unsteady but generally narrow based.  Romberg: negative.       LABS:                        13.7   11.24 )-----------( 250      ( 11 Oct 2023 14:38 )             42.2     10-11    129<L>  |  92<L>  |  11  ----------------------------<  145<H>  3.9   |  23  |  0.49<L>    Ca    9.1      11 Oct 2023 14:38  Phos  3.0     10-11  Mg     1.8     10-11    TPro  7.8  /  Alb  4.5  /  TBili  0.6  /  DBili  x   /  AST  17  /  ALT  16  /  AlkPhos  64  10-11    Hemoglobin A1C:   Vitamin B12     CAPILLARY BLOOD GLUCOSE  Urinalysis Basic - ( 11 Oct 2023 14:38 )  Color: x / Appearance: x / SG: x / pH: x  Gluc: 145 mg/dL / Ketone: x  / Bili: x / Urobili: x   Blood: x / Protein: x / Nitrite: x   Leuk Esterase: x / RBC: x / WBC x   Sq Epi: x / Non Sq Epi: x / Bacteria: x    Microbiology:  RADIOLOGY, EKG AND ADDITIONAL TESTS: Reviewed.

## 2023-10-24 NOTE — PROGRESS NOTE ADULT - PROBLEM SELECTOR PLAN 3
MRI of lumbar spine on 10/14 compatible with active neurosarcoidosis, other etiologies include Guillain-[place] syndrome and other inflammatory,  infectious, or neoplastic causes. Imaging showed mediastinal LN.  DDx: Likely related to inflammatory polyneuropathy vs malignancy driven pain. Discontinued Tylenol given transaminitis. Stopped IV morphine 10/24, switched to oxycodone IR 5mg.    Plan:  - Continue with gabapentin to 300mg/300mg/600mg   - Diclofenac 75mg PO bid  - Toradol 30 IV q12h PRN for severe pain  - Oxycodone 5mg PO IR for severe pain  - Lidocaine patch

## 2023-10-24 NOTE — CHART NOTE - NSCHARTNOTEFT_GEN_A_CORE
Admitting Diagnosis:   Patient is a 63y old  Female who presents with a chief complaint of lower limb weakness (24 Oct 2023 10:29)    PAST MEDICAL & SURGICAL HISTORY:  Ovarian cyst  GERD (gastroesophageal reflux disease)  Glaucoma  Sarcoidosis  Sinusitis  Osteoporosis  Migraines  Hashimoto's disease  Kidney stones  Torn meniscus  right  History of arthroscopy of shoulder  rotator cuff repair, right  History of umbilical hernia repair  S/P correction of deviated nasal septum  H/O sinus surgery    Current Nutrition Order:  Regular  Ensure Plus High Protein x2/day     PO Intake: Good (%) [ x ]  Fair (50-75%) [   ] Poor (<25%) [   ]    GI Issues:   Pt denies nausea/vomiting/diarrhea/constipation   Last recorded BM 10/23  No abdominal distension/discomfort noted     Pain:  No pain reported     Skin Integrity:  No pressure injuries or edema documented, Alan score 21    Labs:   10-24    139  |  107  |  18  ----------------------------<  95  4.2   |  23  |  0.55    Ca    8.5      24 Oct 2023 09:00  Phos  2.9     10-24  Mg     2.0     10-24    TPro  7.2  /  Alb  3.5  /  TBili  0.2  /  DBili  x   /  AST  47<H>  /  ALT  113<H>  /  AlkPhos  58  10-24    Medications:  MEDICATIONS  (STANDING):  artificial tears (preservative free) Ophthalmic Solution 1 Drop(s) Both EYES every 2 hours  bimatoprost 0.01% Ophthalmic Solution 1 Drop(s) Both EYES at bedtime  diclofenac 75 milliGRAM(s) Oral every 12 hours  enoxaparin Injectable 40 milliGRAM(s) SubCutaneous every 24 hours  gabapentin 600 milliGRAM(s) Oral every 24 hours  gabapentin 300 milliGRAM(s) Oral <User Schedule>  lactulose Syrup 10 Gram(s) Oral daily  latanoprost 0.005% Ophthalmic Solution 1 Drop(s) Both EYES at bedtime  lidocaine   4% Patch 1 Patch Transdermal every 24 hours  pantoprazole    Tablet 40 milliGRAM(s) Oral every 24 hours  petrolatum Ophthalmic Ointment 1 Application(s) Both EYES daily  polyethylene glycol 3350 17 Gram(s) Oral every 12 hours  senna 2 Tablet(s) Oral at bedtime    MEDICATIONS  (PRN):  aluminum hydroxide/magnesium hydroxide/simethicone Suspension 30 milliLiter(s) Oral every 4 hours PRN Dyspepsia  ketorolac   Injectable 30 milliGRAM(s) IV Push every 12 hours PRN Severe Pain (7 - 10)  melatonin 3 milliGRAM(s) Oral at bedtime PRN Insomnia  ondansetron Injectable 4 milliGRAM(s) IV Push every 8 hours PRN Nausea and/or Vomiting  oxyCODONE  ER Tablet 5 milliGRAM(s) Oral every 12 hours PRN Severe Pain    Anthropometrics:  Height: 5'7"  Weight: 132lb/59.7kg  BMI: 20.6  IBW: 148lb/67.3kg    89% IBW    Weight Change:   No new weights obtained since admission. Recommend nursing to trend weights weekly. RD to continue monitoring weights as able.     Estimated energy needs:   Calories: 25-30kcal/k-1791kcal/d  Protein: 1.0-1.2g/k-72g/d  Fluid: 25-30mL/k-1791mL/d  Estimated needs based on CBW as within % IBW. Needs adjusted for age, sarcoidosis/GBS, and clinical status.     Subjective:   63 year old female with PMH of OA, GERD, and sarcoidosis (diagnosed in ) presenting with worsening back pain and progressive lower limb weakness for the 3 days, workup c/w possible GBS vs AIDP vs neurosarcoid now s/p IVIG x4 days with some symptomatic improvement, still with some weakness and bulbar involvement.    Pt seen on 7WO for follow-up. Labs and medication orders reviewed. On Regular diet with Ensure x2/day. Electrolytes WNL, AST/ALT 47/113 <H>. Pt reports consistently good intake, observed >75% completion breakfast this AM 10/24. Denies GI concerns and reports no difficulty chewing/swallowing. See nutrition recommendations. RD to remain available.     Previous Nutrition Diagnosis:  Inadequate Oral Intake related to pt condition as evidenced by intake <50% needs x3 days.    Active [   ]  Resolved [ x ]    If resolved, new PES:   Increased nutrient needs related to increased metabolic demand as evidenced by GBS status post IVIG.     Goal:  Consistently meet >75% nutrient needs.     Recommendations:  1. Continue Regular diet with Ensure High Protein (350kcal, 20g protein) x2/day.   2. Monitor GI tolerance, weight trends, labs, & skin integrity.  3. Defer bowel and pain regimens to team.   4. RD to remain available for diet education/intervention prn.     Education:   Pt aware RD remains available for additional questions/concerns.     Risk Level: High [   ] Moderate [ x ] Low [   ]

## 2023-10-24 NOTE — PROGRESS NOTE ADULT - SUBJECTIVE AND OBJECTIVE BOX
OVERNIGHT EVENTS:    SUBJECTIVE / INTERVAL HPI: Patient seen and examined at bedside.     VITAL SIGNS:  Vital Signs Last 24 Hrs  T(C): 36.9 (24 Oct 2023 05:49), Max: 36.9 (24 Oct 2023 05:49)  T(F): 98.5 (24 Oct 2023 05:49), Max: 98.5 (24 Oct 2023 05:49)  HR: 92 (24 Oct 2023 05:49) (78 - 97)  BP: 120/78 (24 Oct 2023 05:49) (120/74 - 136/77)  BP(mean): --  RR: 16 (24 Oct 2023 05:49) (16 - 18)  SpO2: 95% (24 Oct 2023 05:49) (95% - 96%)    Parameters below as of 23 Oct 2023 23:30  Patient On (Oxygen Delivery Method): room air      I&O's Summary      PHYSICAL EXAM:    General: WDWN  HEENT: NC/AT; PERRL, anicteric sclera; MMM  Neck: supple  Cardiovascular: +S1/S2; RRR  Respiratory: CTA B/L; no W/R/R  Gastrointestinal: soft, NT/ND; +BSx4  Extremities: WWP; no edema, clubbing or cyanosis  Vascular: 2+ radial, DP/PT pulses B/L  Neurological: AAOx3; no focal deficits    MEDICATIONS:  MEDICATIONS  (STANDING):  artificial tears (preservative free) Ophthalmic Solution 1 Drop(s) Both EYES every 2 hours  bimatoprost 0.01% Ophthalmic Solution 1 Drop(s) Both EYES at bedtime  diclofenac 75 milliGRAM(s) Oral every 12 hours  enoxaparin Injectable 40 milliGRAM(s) SubCutaneous every 24 hours  gabapentin 600 milliGRAM(s) Oral every 24 hours  gabapentin 300 milliGRAM(s) Oral <User Schedule>  lactulose Syrup 10 Gram(s) Oral daily  lidocaine   4% Patch 1 Patch Transdermal every 24 hours  pantoprazole    Tablet 40 milliGRAM(s) Oral every 24 hours  petrolatum Ophthalmic Ointment 1 Application(s) Both EYES daily  polyethylene glycol 3350 17 Gram(s) Oral every 12 hours  senna 2 Tablet(s) Oral at bedtime  tobramycin 0.3% Ophthalmic Solution 1 Drop(s) Left EYE two times a day    MEDICATIONS  (PRN):  aluminum hydroxide/magnesium hydroxide/simethicone Suspension 30 milliLiter(s) Oral every 4 hours PRN Dyspepsia  ketorolac   Injectable 30 milliGRAM(s) IV Push every 12 hours PRN Severe Pain (7 - 10)  melatonin 3 milliGRAM(s) Oral at bedtime PRN Insomnia  morphine  - Injectable 2 milliGRAM(s) IV Push every 12 hours PRN breakthrough pain  ondansetron Injectable 4 milliGRAM(s) IV Push every 8 hours PRN Nausea and/or Vomiting      ALLERGIES:  Allergies    No Known Allergies    Intolerances        LABS:    10-23    138  |  104  |  18  ----------------------------<  94  4.0   |  29  |  0.60    Ca    8.8      23 Oct 2023 05:30    TPro  7.8  /  Alb  3.6  /  TBili  0.3  /  DBili  x   /  AST  64<H>  /  ALT  144<H>  /  AlkPhos  64  10-23      Urinalysis Basic - ( 23 Oct 2023 05:30 )    Color: x / Appearance: x / SG: x / pH: x  Gluc: 94 mg/dL / Ketone: x  / Bili: x / Urobili: x   Blood: x / Protein: x / Nitrite: x   Leuk Esterase: x / RBC: x / WBC x   Sq Epi: x / Non Sq Epi: x / Bacteria: x      CAPILLARY BLOOD GLUCOSE          RADIOLOGY & ADDITIONAL TESTS: Reviewed.   OVERNIGHT EVENTS: No AOE    SUBJECTIVE / INTERVAL HPI: Patient seen and examined at bedside. Reports no concerns. Able to ambulate up and down kaur, denies weakness/dizziness. No SOB. Having regular BMs, able to urinate. Eating and drinking appropriately.    VITAL SIGNS:  Vital Signs Last 24 Hrs  T(C): 36.9 (24 Oct 2023 05:49), Max: 36.9 (24 Oct 2023 05:49)  T(F): 98.5 (24 Oct 2023 05:49), Max: 98.5 (24 Oct 2023 05:49)  HR: 92 (24 Oct 2023 05:49) (78 - 97)  BP: 120/78 (24 Oct 2023 05:49) (120/74 - 136/77)  BP(mean): --  RR: 16 (24 Oct 2023 05:49) (16 - 18)  SpO2: 95% (24 Oct 2023 05:49) (95% - 96%)    Parameters below as of 23 Oct 2023 23:30  Patient On (Oxygen Delivery Method): room air      I&O's Summary      PHYSICAL EXAM:    General: Well-appearing, no acute distress, seated in chair  HEENT: NC/AT; PERRL, scleral injection; MMM  Neck: supple  Cardiovascular: +S1/S2; RRR  Respiratory: CTA B/L; no W/R/R  Gastrointestinal: soft, NT/ND; +BSx4  Extremities: WWP; no edema, clubbing or cyanosis  Vascular: 2+ radial, DP/PT pulses B/L  Neurological: AAOx3; mild L facial droop. Weakness in closing L eye. Strength 5/5 in all 4 ext, no sensory deficits    MEDICATIONS:  MEDICATIONS  (STANDING):  artificial tears (preservative free) Ophthalmic Solution 1 Drop(s) Both EYES every 2 hours  bimatoprost 0.01% Ophthalmic Solution 1 Drop(s) Both EYES at bedtime  diclofenac 75 milliGRAM(s) Oral every 12 hours  enoxaparin Injectable 40 milliGRAM(s) SubCutaneous every 24 hours  gabapentin 600 milliGRAM(s) Oral every 24 hours  gabapentin 300 milliGRAM(s) Oral <User Schedule>  lactulose Syrup 10 Gram(s) Oral daily  lidocaine   4% Patch 1 Patch Transdermal every 24 hours  pantoprazole    Tablet 40 milliGRAM(s) Oral every 24 hours  petrolatum Ophthalmic Ointment 1 Application(s) Both EYES daily  polyethylene glycol 3350 17 Gram(s) Oral every 12 hours  senna 2 Tablet(s) Oral at bedtime  tobramycin 0.3% Ophthalmic Solution 1 Drop(s) Left EYE two times a day    MEDICATIONS  (PRN):  aluminum hydroxide/magnesium hydroxide/simethicone Suspension 30 milliLiter(s) Oral every 4 hours PRN Dyspepsia  ketorolac   Injectable 30 milliGRAM(s) IV Push every 12 hours PRN Severe Pain (7 - 10)  melatonin 3 milliGRAM(s) Oral at bedtime PRN Insomnia  morphine  - Injectable 2 milliGRAM(s) IV Push every 12 hours PRN breakthrough pain  ondansetron Injectable 4 milliGRAM(s) IV Push every 8 hours PRN Nausea and/or Vomiting      ALLERGIES:  Allergies    No Known Allergies    Intolerances        LABS:    10-23    138  |  104  |  18  ----------------------------<  94  4.0   |  29  |  0.60    Ca    8.8      23 Oct 2023 05:30    TPro  7.8  /  Alb  3.6  /  TBili  0.3  /  DBili  x   /  AST  64<H>  /  ALT  144<H>  /  AlkPhos  64  10-23      Urinalysis Basic - ( 23 Oct 2023 05:30 )    Color: x / Appearance: x / SG: x / pH: x  Gluc: 94 mg/dL / Ketone: x  / Bili: x / Urobili: x   Blood: x / Protein: x / Nitrite: x   Leuk Esterase: x / RBC: x / WBC x   Sq Epi: x / Non Sq Epi: x / Bacteria: x      CAPILLARY BLOOD GLUCOSE          RADIOLOGY & ADDITIONAL TESTS: Reviewed.

## 2023-10-24 NOTE — CONSULT NOTE ADULT - ASSESSMENT
63 year old female with PMH of OA, GERD, sarcoidosis, migraines presenting with worsening back pain associated bilateral upper and lower extremities numbness and tingling in distal parts of all her extremities for the last week. Her neurological exam is remarkable only for mild b/l proximal hip flexion and knee flexion vibration and temperature hypesthesia in b/l LE and increased patellar reflexes. MRI C and T spine w and w/o contrast was performed which didn't reveal acute pathology, only showed broad C6-7 disc bulging w/o any spinal compression, no contrast enchainment. Her hx of probable sarcoidosis, enlargement of mediastinal LN, unintentional weight loss needs oncological w/up. From neurological standpoint would benefit from MRI L-spine and brain w and w/o, basic polyneuropathy w/up, whole body PET scan. Considering LP w CSF studies.     Recommendations:     1. Please obtain on top of CMP and CBC w diff, Mg, P  - CPK,   - ESR, CRP,   - Serum ACE,   - CHRIS, SSA/B,   - RF,   - Hep B/C,   - A1C, GTT (glucose tolerance test),   - B12 level, B1 and B6 levels,   - SPEP w immunofixation; considering paraneoplastic w/up    2. MRI brain and L-spine w and w/o contrast  3. whole body PET scan for LAD      The case was discussed w Dr. Lozano        
see above
{\rtf1\ysozzp89151\ansi\qzmwtxf8226\ftnbj\uc1\deff0  {\fonttbl{\f0 \fnil Segoe UI;}{\f1 \fnil \fcharset0 Segoe UI;}{\f2 \fnil Times New Josiah;}}  {\colortbl ;\qwg223\dmoyn387\bfha138 ;\red0\green0\blue0 ;\red0\green0\wskv556 ;\red0\green0\blue0 ;}  {\stylesheet{\f0\fs20 Normal;}{\cs1 Default Paragraph Font;}{\cs2\f0\fs16 Line Number;}{\cs3\f2\fs24\ul\cf3 Hyperlink;}}  {\*\revtbl{Unknown;}}  \idczcu85545\qgcrnz04512\rwiit9290\pqfiz2288\ypntr2776\lwlwe8201\agibwly389\rnwzqlk703\nogrowautofit\ysosyg908\formshade\nofeaturethrottle1\dntblnsbdb\fet4\aendnotes\aftnnrlc\pgbrdrhead\pgbrdrfoot  \sectd\dalzgo99946\yidism50196\guttersxn0\wjbtjxvy1485\ldbhzcja0611\arxdotjd4984\ysuompkl2791\pepvyla611\dedulzw022\sbkpage\pgncont\pgndec  \plain\plain\f0\fs24\ql\plain\f0\fs24\plain\f0\fs20\ewfj6907\hich\f0\dbch\f0\loch\f0\fs20\par  I M\par  \par  63 year old female with PMH of OA, GERD, and sarcoidosis (diagnosed in 2008) presenting with worsening back pain and progressive lower limb weakness for the last three days. Admitted for pain management. \par  \par  \plain\f1\fs20\ifuu2165\hich\f1\dbch\f1\loch\f1\cf2\fs20\ul{\field{\*\fldinst HYPERLINK [id number] }{\fldrslt Problem/Plan - 1:}}\plain\f0\fs20\nrpt3588\hich\f0\dbch\f0\loch\f0\fs20\ql\par  \'b7  {\*\bkmkstart [id number]}{\*\bkmkend ft12519492681}Problem: {\*\bkmkstart ky26408604356}{\*\bkmkend ou49167099669}Lower extremity weakness. \par  \'b7  {\*\bkmkstart [id number]}{\*\bkmkend wt08683660075}Plan: {\*\bkmkstart cq27564075595}{\*\bkmkend fz65293152103}Progressive weakness in lower limbs over last 3 days with continued urinary incontinence. Last admission 10/08 with MRI and CT didn't   reveal acute pathology, only showed broad C6-7 disc bulging w/o any spinal compression, no contrast enchainment. Imaging also showed mediastinal LN. Pt on methylprednisolone 4mg for 6 day (on day 3/6- 3 pills 10/12, 2 pills 10/13, 1 pill 10/14).\par  - neuro consulted\par  - PT consulted\par  - hold steroid course pending neuro recs.\plain\f1\fs20\dlyc7584\hich\f1\dbch\f1\loch\f1\cf2\fs20\strike\plain\f0\fs20\njia0311\hich\f0\dbch\f0\loch\f0\fs20\par  \par  \plain\f1\fs20\ivph2466\hich\f1\dbch\f1\loch\f1\cf2\fs20\ul{\field{\*\fldinst HYPERLINK [id number] }{\fldrslt Problem/Plan - 2:}}\plain\f0\fs20\xwus8124\hich\f0\dbch\f0\loch\f0\fs20\ql\par  \'b7  {\*\bkmkstart [id number]}{\*\bkmkend jn12424312972}Problem: {\*\bkmkstart lg63328380662}{\*\bkmkend nj02610691980}Mediastinal lymphadenopathy.\plain\f1\fs20\ifos0614\hich\f1\dbch\f1\loch\f1\cf2\fs20\strike\plain\f0\fs20\vyuy0220\hich\f0\dbch\f0\loch\f0\fs20\par  \'b7  {\*\bkmkstart rd87411837362}{\*\bkmkend tj25922975378}Plan: {\*\bkmkstart yx17104564328}{\*\bkmkend ea78944505412}10/8: CT and MRI showed Extensive mediastinal, hilar, and paratracheal lymphadenopathy is present  in the chest. Enlarged thoracic   lymphadenopathy was noted on the 06/08/2012 chest CT study which was attributed to the patient's known history of sarcoidosis at that time. Concern for rheumatologic vs malignant work up\par  - f/u ESR, CRP, Serum ACE, CHRIS, SSA/B, RF, Hep B/C, A1C, B12 level, B1 and B6 levels\par  - consider paraneoplastic w/up.\plain\f1\fs20\rkcl1462\hich\f1\dbch\f1\loch\f1\cf2\fs20\strike\plain\f0\fs20\jpzg0379\hich\f0\dbch\f0\loch\f0\fs20\par  \par  \plain\f1\fs20\sgym9858\hich\f1\dbch\f1\loch\f1\cf2\fs20\ul{\field{\*\fldinst HYPERLINK [id number] }{\fldrslt Problem/Plan - 3:}}\plain\f0\fs20\xfya8130\hich\f0\dbch\f0\loch\f0\fs20\ql\par  \'b7  {\*\bkmkstart hr43455848336}{\*\bkmkend dr67801525519}Problem: {\*\bkmkstart iv38485279996}{\*\bkmkend qr29328844523}Lower back pain.\plain\f1\fs20\bzra5448\hich\f1\dbch\f1\loch\f1\cf2\fs20\strike\plain\f0\fs20\qvaf1440\hich\f0\dbch\f0\loch\f0\fs20\par  \'b7  {\*\bkmkstart cf36251826378}{\*\bkmkend al88554925254}Plan: {\*\bkmkstart fj64622400003}{\*\bkmkend ra10156423987}Lower lumbar back pain, that radiates down anterior part of b/l legs. \par  - dilaudid 0.5mg q6 for severe pain\par  - acetaminophen 650 prn q6.\plain\f1\fs20\kvet2512\hich\f1\dbch\f1\loch\f1\cf2\fs20\strike\plain\f0\fs20\rpqy3350\hich\f0\dbch\f0\loch\f0\fs20\par  \par  \plain\f1\fs20\ifhk5323\hich\f1\dbch\f1\loch\f1\cf2\fs20\ul{\field{\*\fldinst HYPERLINK [id number] }{\fldrslt Problem/Plan - 4:}}\plain\f0\fs20\aaqk8412\hich\f0\dbch\f0\loch\f0\fs20\ql\par  \'b7  {\*\bkmkstart ip67361405338}{\*\bkmkend kh24511157592}Problem: {\*\bkmkstart xe90210015472}{\*\bkmkend ye10786551116}GERD (gastroesophageal reflux disease).\plain\f1\fs20\fbhu8270\hich\f1\dbch\f1\loch\f1\cf2\fs20\strike\plain\f0\fs20\hwns8978\hich\f0\dbch\f0\loch\f0\fs20\par  \'b7  {\*\bkmkstart zu45806067226}{\*\bkmkend uq13587058911}Plan: {\*\bkmkstart fs82653364796}{\*\bkmkend ck05270661711}at home protonix 40mg qd\par  -c/w home med.\plain\f1\fs20\hztb8394\hich\f1\dbch\f1\loch\f1\cf2\fs20\strike\plain\f0\fs20\valk6475\hich\f0\dbch\f0\loch\f0\fs20\par  \par  \plain\f1\fs20\pklb8578\hich\f1\dbch\f1\loch\f1\cf2\fs20\ul{\field{\*\fldinst HYPERLINK [id number] }{\fldrslt Problem/Plan - 5:}}\plain\f0\fs20\pipm3582\hich\f0\dbch\f0\loch\f0\fs20\ql\par  \'b7  {\*\bkmkstart cm78918894135}{\*\bkmkend nk75100553682}Problem: {\*\bkmkstart kb84126142256}{\*\bkmkend uu69638597908}Sarcoidosis.\plain\f1\fs20\ssoi4603\hich\f1\dbch\f1\loch\f1\cf2\fs20\strike\plain\f0\fs20\bewi1398\hich\f0\dbch\f0\loch\f0\fs20\par  \'b7  {\*\bkmkstart vl50874621213}{\*\bkmkend xl28534418002}Plan: {\*\bkmkstart be41161023245}{\*\bkmkend vo88553227184}Diagnosed in 2008. MRI imaging 10/8 with mediastinal LAD.\plain\f1\fs20\huge3130\hich\f1\dbch\f1\loch\f1\cf2\fs20\strike\plain\f0\fs20\lciw6134\hich\f0\dbch\f0\loch\f0\fs20\par  \par  \plain\f1\fs20\cdea3698\hich\f1\dbch\f1\loch\f1\cf2\fs20\ul{\field{\*\fldinst HYPERLINK [id number] }{\fldrslt Problem/Plan - 6:}}\plain\f0\fs20\zypk5531\hich\f0\dbch\f0\loch\f0\fs20\ql\par  \'b7  {\*\bkmkstart jp71953622428}{\*\bkmkend si47953936679}Problem: {\*\bkmkstart ua25995653725}{\*\bkmkend lw73774142387}Hashimoto's disease.\plain\f1\fs20\imry2064\hich\f1\dbch\f1\loch\f1\cf2\fs20\strike\plain\f0\fs20\birp2434\hich\f0\dbch\f0\loch\f0\fs20\par  \'b7  {\*\bkmkstart bb48387093227}{\*\bkmkend fl46366533052}Plan: {\*\bkmkstart sc82382085127}{\*\bkmkend fz34195526215}Hx of hashimotos. Not on any meds.\plain\f1\fs20\jxjt7402\hich\f1\dbch\f1\loch\f1\cf2\fs20\strike\plain\f0\fs20\bpek1672\hich\f0\dbch\f0\loch\f0\fs20\par  \par  \plain\f1\fs20\lcxl3292\hich\f1\dbch\f1\loch\f1\cf2\fs20\ul{\field{\*\fldinst HYPERLINK [id number] }{\fldrslt Problem/Plan - 7:}}\plain\f0\fs20\orta9791\hich\f0\dbch\f0\loch\f0\fs20\ql\par  \'b7  {\*\bkmkstart mz25415095536}{\*\bkmkend jw41860195954}Problem: {\*\bkmkstart dg73395484808}{\*\bkmkend rh55401968193}Prophylactic measure. \par  \'b7  {\*\bkmkstart cf03654067526}{\*\bkmkend zq45661044856}Plan: {\*\bkmkstart vy69775296774}{\*\bkmkend zk96492451602}D: regular\par  E: Mg <2, Phosp <3, K <4\par  DVT ppx: Lovenox\par  Code: Full{\*\bkmkstart bkcommentCR}{\*\bkmkend bkcommentCR}.\par  \par  }

## 2023-10-25 ENCOUNTER — TRANSCRIPTION ENCOUNTER (OUTPATIENT)
Age: 63
End: 2023-10-25

## 2023-10-25 VITALS
OXYGEN SATURATION: 97 % | TEMPERATURE: 97 F | RESPIRATION RATE: 16 BRPM | SYSTOLIC BLOOD PRESSURE: 124 MMHG | HEART RATE: 86 BPM | DIASTOLIC BLOOD PRESSURE: 69 MMHG

## 2023-10-25 LAB
A1C WITH ESTIMATED AVERAGE GLUCOSE RESULT: 6.1 % — HIGH (ref 4–5.6)
A1C WITH ESTIMATED AVERAGE GLUCOSE RESULT: 6.1 % — HIGH (ref 4–5.6)
ALBUMIN SERPL ELPH-MCNC: 3.4 G/DL — SIGNIFICANT CHANGE UP (ref 3.3–5)
ALBUMIN SERPL ELPH-MCNC: 3.4 G/DL — SIGNIFICANT CHANGE UP (ref 3.3–5)
ALP SERPL-CCNC: 58 U/L — SIGNIFICANT CHANGE UP (ref 40–120)
ALP SERPL-CCNC: 58 U/L — SIGNIFICANT CHANGE UP (ref 40–120)
ALT FLD-CCNC: 85 U/L — HIGH (ref 10–45)
ALT FLD-CCNC: 85 U/L — HIGH (ref 10–45)
ANION GAP SERPL CALC-SCNC: 6 MMOL/L — SIGNIFICANT CHANGE UP (ref 5–17)
ANION GAP SERPL CALC-SCNC: 6 MMOL/L — SIGNIFICANT CHANGE UP (ref 5–17)
AST SERPL-CCNC: 35 U/L — SIGNIFICANT CHANGE UP (ref 10–40)
AST SERPL-CCNC: 35 U/L — SIGNIFICANT CHANGE UP (ref 10–40)
BILIRUB SERPL-MCNC: <0.2 MG/DL — SIGNIFICANT CHANGE UP (ref 0.2–1.2)
BILIRUB SERPL-MCNC: <0.2 MG/DL — SIGNIFICANT CHANGE UP (ref 0.2–1.2)
BUN SERPL-MCNC: 15 MG/DL — SIGNIFICANT CHANGE UP (ref 7–23)
BUN SERPL-MCNC: 15 MG/DL — SIGNIFICANT CHANGE UP (ref 7–23)
CALCIUM SERPL-MCNC: 8.4 MG/DL — SIGNIFICANT CHANGE UP (ref 8.4–10.5)
CALCIUM SERPL-MCNC: 8.4 MG/DL — SIGNIFICANT CHANGE UP (ref 8.4–10.5)
CHLORIDE SERPL-SCNC: 107 MMOL/L — SIGNIFICANT CHANGE UP (ref 96–108)
CHLORIDE SERPL-SCNC: 107 MMOL/L — SIGNIFICANT CHANGE UP (ref 96–108)
CO2 SERPL-SCNC: 25 MMOL/L — SIGNIFICANT CHANGE UP (ref 22–31)
CO2 SERPL-SCNC: 25 MMOL/L — SIGNIFICANT CHANGE UP (ref 22–31)
CREAT SERPL-MCNC: 0.63 MG/DL — SIGNIFICANT CHANGE UP (ref 0.5–1.3)
CREAT SERPL-MCNC: 0.63 MG/DL — SIGNIFICANT CHANGE UP (ref 0.5–1.3)
EGFR: 100 ML/MIN/1.73M2 — SIGNIFICANT CHANGE UP
EGFR: 100 ML/MIN/1.73M2 — SIGNIFICANT CHANGE UP
ESTIMATED AVERAGE GLUCOSE: 128 MG/DL — HIGH (ref 68–114)
ESTIMATED AVERAGE GLUCOSE: 128 MG/DL — HIGH (ref 68–114)
GLUCOSE SERPL-MCNC: 97 MG/DL — SIGNIFICANT CHANGE UP (ref 70–99)
GLUCOSE SERPL-MCNC: 97 MG/DL — SIGNIFICANT CHANGE UP (ref 70–99)
POTASSIUM SERPL-MCNC: 4 MMOL/L — SIGNIFICANT CHANGE UP (ref 3.5–5.3)
POTASSIUM SERPL-MCNC: 4 MMOL/L — SIGNIFICANT CHANGE UP (ref 3.5–5.3)
POTASSIUM SERPL-SCNC: 4 MMOL/L — SIGNIFICANT CHANGE UP (ref 3.5–5.3)
POTASSIUM SERPL-SCNC: 4 MMOL/L — SIGNIFICANT CHANGE UP (ref 3.5–5.3)
PROT SERPL-MCNC: 6.8 G/DL — SIGNIFICANT CHANGE UP (ref 6–8.3)
PROT SERPL-MCNC: 6.8 G/DL — SIGNIFICANT CHANGE UP (ref 6–8.3)
SODIUM SERPL-SCNC: 138 MMOL/L — SIGNIFICANT CHANGE UP (ref 135–145)
SODIUM SERPL-SCNC: 138 MMOL/L — SIGNIFICANT CHANGE UP (ref 135–145)

## 2023-10-25 PROCEDURE — 82945 GLUCOSE OTHER FLUID: CPT

## 2023-10-25 PROCEDURE — 97161 PT EVAL LOW COMPLEX 20 MIN: CPT

## 2023-10-25 PROCEDURE — 97535 SELF CARE MNGMENT TRAINING: CPT

## 2023-10-25 PROCEDURE — 84425 ASSAY OF VITAMIN B-1: CPT

## 2023-10-25 PROCEDURE — 97116 GAIT TRAINING THERAPY: CPT

## 2023-10-25 PROCEDURE — 84439 ASSAY OF FREE THYROXINE: CPT

## 2023-10-25 PROCEDURE — 99232 SBSQ HOSP IP/OBS MODERATE 35: CPT

## 2023-10-25 PROCEDURE — 87340 HEPATITIS B SURFACE AG IA: CPT

## 2023-10-25 PROCEDURE — 86334 IMMUNOFIX E-PHORESIS SERUM: CPT

## 2023-10-25 PROCEDURE — 86709 HEPATITIS A IGM ANTIBODY: CPT

## 2023-10-25 PROCEDURE — 84165 PROTEIN E-PHORESIS SERUM: CPT

## 2023-10-25 PROCEDURE — 86704 HEP B CORE ANTIBODY TOTAL: CPT

## 2023-10-25 PROCEDURE — 97530 THERAPEUTIC ACTIVITIES: CPT

## 2023-10-25 PROCEDURE — 97165 OT EVAL LOW COMPLEX 30 MIN: CPT

## 2023-10-25 PROCEDURE — 82164 ANGIOTENSIN I ENZYME TEST: CPT

## 2023-10-25 PROCEDURE — 86803 HEPATITIS C AB TEST: CPT

## 2023-10-25 PROCEDURE — 82947 ASSAY GLUCOSE BLOOD QUANT: CPT

## 2023-10-25 PROCEDURE — 86850 RBC ANTIBODY SCREEN: CPT

## 2023-10-25 PROCEDURE — A9585: CPT

## 2023-10-25 PROCEDURE — 83735 ASSAY OF MAGNESIUM: CPT

## 2023-10-25 PROCEDURE — 36415 COLL VENOUS BLD VENIPUNCTURE: CPT

## 2023-10-25 PROCEDURE — 85027 COMPLETE CBC AUTOMATED: CPT

## 2023-10-25 PROCEDURE — 83036 HEMOGLOBIN GLYCOSYLATED A1C: CPT

## 2023-10-25 PROCEDURE — A9552: CPT

## 2023-10-25 PROCEDURE — 86900 BLOOD TYPING SEROLOGIC ABO: CPT

## 2023-10-25 PROCEDURE — 99232 SBSQ HOSP IP/OBS MODERATE 35: CPT | Mod: GC

## 2023-10-25 PROCEDURE — 84155 ASSAY OF PROTEIN SERUM: CPT

## 2023-10-25 PROCEDURE — 85652 RBC SED RATE AUTOMATED: CPT

## 2023-10-25 PROCEDURE — 85025 COMPLETE CBC W/AUTO DIFF WBC: CPT

## 2023-10-25 PROCEDURE — 84157 ASSAY OF PROTEIN OTHER: CPT

## 2023-10-25 PROCEDURE — 72158 MRI LUMBAR SPINE W/O & W/DYE: CPT

## 2023-10-25 PROCEDURE — 83930 ASSAY OF BLOOD OSMOLALITY: CPT

## 2023-10-25 PROCEDURE — 86431 RHEUMATOID FACTOR QUANT: CPT

## 2023-10-25 PROCEDURE — 89051 BODY FLUID CELL COUNT: CPT

## 2023-10-25 PROCEDURE — 86140 C-REACTIVE PROTEIN: CPT

## 2023-10-25 PROCEDURE — 80053 COMPREHEN METABOLIC PANEL: CPT

## 2023-10-25 PROCEDURE — 92610 EVALUATE SWALLOWING FUNCTION: CPT

## 2023-10-25 PROCEDURE — 86706 HEP B SURFACE ANTIBODY: CPT

## 2023-10-25 PROCEDURE — 78815 PET IMAGE W/CT SKULL-THIGH: CPT

## 2023-10-25 PROCEDURE — 82962 GLUCOSE BLOOD TEST: CPT

## 2023-10-25 PROCEDURE — 70553 MRI BRAIN STEM W/O & W/DYE: CPT

## 2023-10-25 PROCEDURE — 82550 ASSAY OF CK (CPK): CPT

## 2023-10-25 PROCEDURE — 86901 BLOOD TYPING SEROLOGIC RH(D): CPT

## 2023-10-25 PROCEDURE — 86235 NUCLEAR ANTIGEN ANTIBODY: CPT

## 2023-10-25 PROCEDURE — 84300 ASSAY OF URINE SODIUM: CPT

## 2023-10-25 PROCEDURE — 83935 ASSAY OF URINE OSMOLALITY: CPT

## 2023-10-25 PROCEDURE — 82784 ASSAY IGA/IGD/IGG/IGM EACH: CPT

## 2023-10-25 PROCEDURE — 80048 BASIC METABOLIC PNL TOTAL CA: CPT

## 2023-10-25 PROCEDURE — 86038 ANTINUCLEAR ANTIBODIES: CPT

## 2023-10-25 PROCEDURE — 84443 ASSAY THYROID STIM HORMONE: CPT

## 2023-10-25 PROCEDURE — 83615 LACTATE (LD) (LDH) ENZYME: CPT

## 2023-10-25 PROCEDURE — 86705 HEP B CORE ANTIBODY IGM: CPT

## 2023-10-25 PROCEDURE — 82607 VITAMIN B-12: CPT

## 2023-10-25 PROCEDURE — 84207 ASSAY OF VITAMIN B-6: CPT

## 2023-10-25 PROCEDURE — 76705 ECHO EXAM OF ABDOMEN: CPT

## 2023-10-25 PROCEDURE — 84100 ASSAY OF PHOSPHORUS: CPT

## 2023-10-25 RX ORDER — GABAPENTIN 400 MG/1
2 CAPSULE ORAL
Qty: 60 | Refills: 0
Start: 2023-10-25

## 2023-10-25 RX ORDER — PANTOPRAZOLE SODIUM 20 MG/1
1 TABLET, DELAYED RELEASE ORAL
Refills: 0 | DISCHARGE

## 2023-10-25 RX ADMIN — DICLOFENAC SODIUM 75 MILLIGRAM(S): 75 TABLET, DELAYED RELEASE ORAL at 07:50

## 2023-10-25 RX ADMIN — GABAPENTIN 300 MILLIGRAM(S): 400 CAPSULE ORAL at 13:32

## 2023-10-25 RX ADMIN — Medication 1 DROP(S): at 10:55

## 2023-10-25 RX ADMIN — Medication 1 DROP(S): at 13:32

## 2023-10-25 RX ADMIN — GABAPENTIN 300 MILLIGRAM(S): 400 CAPSULE ORAL at 06:53

## 2023-10-25 RX ADMIN — PANTOPRAZOLE SODIUM 40 MILLIGRAM(S): 20 TABLET, DELAYED RELEASE ORAL at 06:53

## 2023-10-25 RX ADMIN — Medication 1 DROP(S): at 15:27

## 2023-10-25 RX ADMIN — Medication 1 DROP(S): at 12:26

## 2023-10-25 RX ADMIN — DICLOFENAC SODIUM 75 MILLIGRAM(S): 75 TABLET, DELAYED RELEASE ORAL at 06:54

## 2023-10-25 RX ADMIN — LIDOCAINE 1 PATCH: 4 CREAM TOPICAL at 07:05

## 2023-10-25 NOTE — PROGRESS NOTE ADULT - PROVIDER SPECIALTY LIST ADULT
Internal Medicine
Internal Medicine
Neurology
Neurology
Rehab Medicine
Internal Medicine
Neurology
Rehab Medicine
Internal Medicine
Internal Medicine
Neurology
Rehab Medicine
Rehab Medicine
Neurology
Rehab Medicine
Internal Medicine

## 2023-10-25 NOTE — PROGRESS NOTE ADULT - REASON FOR ADMISSION
lower limb weakness

## 2023-10-25 NOTE — PROGRESS NOTE ADULT - SUBJECTIVE AND OBJECTIVE BOX
OVERNIGHT EVENTS: No AOE    SUBJECTIVE / INTERVAL HPI: Patient seen and examined at bedside. Denies feeling weak/fatigued, able to ambulate. Good appetite, having regular BMs. No CP, SOB, fever, NS, chills.    VITAL SIGNS:  Vital Signs Last 24 Hrs  T(C): 36.7 (25 Oct 2023 05:35), Max: 36.9 (24 Oct 2023 13:00)  T(F): 98.1 (25 Oct 2023 05:35), Max: 98.4 (24 Oct 2023 13:00)  HR: 85 (25 Oct 2023 05:35) (84 - 97)  BP: 119/69 (25 Oct 2023 05:35) (119/69 - 132/79)  BP(mean): --  RR: 16 (25 Oct 2023 05:35) (16 - 17)  SpO2: 95% (25 Oct 2023 05:35) (95% - 96%)    Parameters below as of 24 Oct 2023 20:21  Patient On (Oxygen Delivery Method): room air      I&O's Summary      PHYSICAL EXAM:    General: Well-appearing, no acute distress  HEENT: NC/AT; PERRL, scleral injection; MMM  Neck: supple  Cardiovascular: +S1/S2; RRR  Respiratory: CTA B/L; no W/R/R  Gastrointestinal: soft, NT/ND; +BSx4  Extremities: WWP; no edema, clubbing or cyanosis  Vascular: 2+ radial, DP/PT pulses B/L  Neurological: AAOx3; no facial droop. Weakness in closing L eye. Strength 5/5 in all 4 ext, no sensory deficits    MEDICATIONS:  MEDICATIONS  (STANDING):  artificial tears (preservative free) Ophthalmic Solution 1 Drop(s) Both EYES every 2 hours  bimatoprost 0.01% Ophthalmic Solution 1 Drop(s) Both EYES at bedtime  diclofenac 75 milliGRAM(s) Oral every 12 hours  enoxaparin Injectable 40 milliGRAM(s) SubCutaneous every 24 hours  gabapentin 300 milliGRAM(s) Oral <User Schedule>  gabapentin 600 milliGRAM(s) Oral every 24 hours  lactulose Syrup 10 Gram(s) Oral daily  lidocaine   4% Patch 1 Patch Transdermal every 24 hours  pantoprazole    Tablet 40 milliGRAM(s) Oral every 24 hours  petrolatum Ophthalmic Ointment 1 Application(s) Both EYES daily  polyethylene glycol 3350 17 Gram(s) Oral every 12 hours  senna 2 Tablet(s) Oral at bedtime    MEDICATIONS  (PRN):  aluminum hydroxide/magnesium hydroxide/simethicone Suspension 30 milliLiter(s) Oral every 4 hours PRN Dyspepsia  ketorolac   Injectable 30 milliGRAM(s) IV Push every 12 hours PRN Severe Pain (7 - 10)  melatonin 3 milliGRAM(s) Oral at bedtime PRN Insomnia  ondansetron Injectable 4 milliGRAM(s) IV Push every 8 hours PRN Nausea and/or Vomiting  oxyCODONE    IR 5 milliGRAM(s) Oral every 12 hours PRN Severe Pain (7 - 10)      ALLERGIES:  Allergies    No Known Allergies    Intolerances        LABS:                        11.2   4.26  )-----------( 157      ( 24 Oct 2023 09:00 )             35.3     10-24    139  |  107  |  18  ----------------------------<  95  4.2   |  23  |  0.55    Ca    8.5      24 Oct 2023 09:00  Phos  2.9     10-24  Mg     2.0     10-24    TPro  7.2  /  Alb  3.5  /  TBili  0.2  /  DBili  x   /  AST  47<H>  /  ALT  113<H>  /  AlkPhos  58  10-24      Urinalysis Basic - ( 24 Oct 2023 09:00 )    Color: x / Appearance: x / SG: x / pH: x  Gluc: 95 mg/dL / Ketone: x  / Bili: x / Urobili: x   Blood: x / Protein: x / Nitrite: x   Leuk Esterase: x / RBC: x / WBC x   Sq Epi: x / Non Sq Epi: x / Bacteria: x      CAPILLARY BLOOD GLUCOSE          RADIOLOGY & ADDITIONAL TESTS: Reviewed.

## 2023-10-25 NOTE — PROGRESS NOTE ADULT - TIME BILLING
Patient seen and examined;  Continued improvement ;  Awaiting transfer to acute rehab \Check A1C Patient seen and examined;  Continued improvement ;  Awaiting transfer to acute rehab \Check A1C  LFT now essentially normal

## 2023-10-25 NOTE — PROGRESS NOTE ADULT - SUBJECTIVE AND OBJECTIVE BOX
INTERVAL HPI/OVERNIGHT EVENTS:  Awake and alert   Walking more on her own with improved balance       MEDICATIONS  (STANDING):  artificial tears (preservative free) Ophthalmic Solution 1 Drop(s) Both EYES every 2 hours  bimatoprost 0.01% Ophthalmic Solution 1 Drop(s) Both EYES at bedtime  diclofenac 75 milliGRAM(s) Oral every 12 hours  enoxaparin Injectable 40 milliGRAM(s) SubCutaneous every 24 hours  gabapentin 600 milliGRAM(s) Oral every 24 hours  gabapentin 300 milliGRAM(s) Oral <User Schedule>  lactulose Syrup 10 Gram(s) Oral daily  lidocaine   4% Patch 1 Patch Transdermal every 24 hours  pantoprazole    Tablet 40 milliGRAM(s) Oral every 24 hours  petrolatum Ophthalmic Ointment 1 Application(s) Both EYES daily  polyethylene glycol 3350 17 Gram(s) Oral every 12 hours  senna 2 Tablet(s) Oral at bedtime    MEDICATIONS  (PRN):  aluminum hydroxide/magnesium hydroxide/simethicone Suspension 30 milliLiter(s) Oral every 4 hours PRN Dyspepsia  ketorolac   Injectable 30 milliGRAM(s) IV Push every 12 hours PRN Severe Pain (7 - 10)  melatonin 3 milliGRAM(s) Oral at bedtime PRN Insomnia  ondansetron Injectable 4 milliGRAM(s) IV Push every 8 hours PRN Nausea and/or Vomiting  oxyCODONE    IR 5 milliGRAM(s) Oral every 12 hours PRN Severe Pain (7 - 10)      Allergies    No Known Allergies    Intolerances        Vital Signs Last 24 Hrs  T(C): 36.7 (25 Oct 2023 05:35), Max: 36.9 (24 Oct 2023 13:00)  T(F): 98.1 (25 Oct 2023 05:35), Max: 98.4 (24 Oct 2023 13:00)  HR: 85 (25 Oct 2023 05:35) (84 - 97)  BP: 119/69 (25 Oct 2023 05:35) (119/69 - 132/79)  BP(mean): --  RR: 16 (25 Oct 2023 05:35) (16 - 17)  SpO2: 95% (25 Oct 2023 05:35) (95% - 96%)    Parameters below as of 24 Oct 2023 20:21  Patient On (Oxygen Delivery Method): room air              Constitutional:  Awake     Eyes: EMILY  Eye closing improved     ENMT: Negative    Neck: Supple    Back:  no tenderness     Respiratory:  clear     Cardiovascular: S1 S2    Gastrointestinal: soft     Genitourinary:    Extremities:  no edema     Vascular:    Neurological:    Skin:    Lymph Nodes:            LABS:                        11.2   4.26  )-----------( 157      ( 24 Oct 2023 09:00 )             35.3     10-25    138  |  107  |  15  ----------------------------<  97  4.0   |  25  |  0.63    Ca    8.4      25 Oct 2023 05:30  Phos  2.9     10-24  Mg     2.0     10-24    TPro  6.8  /  Alb  3.4  /  TBili  <0.2  /  DBili  x   /  AST  35  /  ALT  85<H>  /  AlkPhos  58  10-25      Urinalysis Basic - ( 25 Oct 2023 05:30 )    Color: x / Appearance: x / SG: x / pH: x  Gluc: 97 mg/dL / Ketone: x  / Bili: x / Urobili: x   Blood: x / Protein: x / Nitrite: x   Leuk Esterase: x / RBC: x / WBC x   Sq Epi: x / Non Sq Epi: x / Bacteria: x        RADIOLOGY & ADDITIONAL TESTS:

## 2023-10-25 NOTE — PROGRESS NOTE ADULT - ASSESSMENT
63 year old female with PMH of OA, GERD, sarcoidosis, migraines presenting with worsening back pain associated bilateral upper and lower extremities numbness and tingling in distal parts of all her extremities for the last week. Her neurological exam is worsened from yesterday, with more pronounced facial bulbar symptoms, progressive LE weakness, and now absent LE reflexes. Of note, had flu shot 2 weeks prior to presentation. MRI C and T spine w and w/o contrast was performed which didn't reveal acute pathology, only showed broad C6-7 disc bulging w/o any spinal compression, no contrast enchainment. Her hx of probable sarcoidosis, enlargement of mediastinal LN, unintentional weight loss needs oncological w/up. MRI L-spine showed extensive leptomeningeal enhancement along the periphery of the conus medullaris with enhancement of the cauda equina nerve roots compatible with active neurosarcoidosis, or Guillain-[place] syndrome and other inflammatory, infectious, or neoplastic causes.  CPK, ESR, CRP was unremarkable. A1C prediabetic. EMG findings consistent with demyelinating disease, CSF with elevated protein supporting likely Guillain [place].    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 There has previously been some evidence of disease progression after the IVIG, with involvement of CN 7 and 10, as well as blurred vision on 10/17 but since 10/18 symptoms are stable, likely plateaued with resolution of neck weakness, improvements in facial weakness and extremity strength.    Recommendations:   - Hepatitis is a consideration but serology should be deferred until >1mo after IVIG  - Can c/w gabapentin to 300mg/300mg/600mg   - Elevated BP noted. Dysautonomia is an expected complication, continue anti-hypertensive to goal SBP <140  - C/w artifical tears but ensure administered every 2 hours and Ophthalmic ointment OU at night  - Patient stable for discharge from a neurological stand point.   - Follow up outpatient with Dr. Gonsalves   - Neurology signing off.         Case discussed with Dr. Orona      63 year old female with PMH of OA, GERD, sarcoidosis, migraines presenting with worsening back pain associated bilateral upper and lower extremities numbness and tingling in distal parts of all her extremities for the last week. Her neurological exam is worsened from yesterday, with more pronounced facial bulbar symptoms, progressive LE weakness, and now absent LE reflexes. Of note, had flu shot 2 weeks prior to presentation. MRI C and T spine w and w/o contrast was performed which didn't reveal acute pathology, only showed broad C6-7 disc bulging w/o any spinal compression, no contrast enchainment. Her hx of probable sarcoidosis, enlargement of mediastinal LN, unintentional weight loss needs oncological w/up. MRI L-spine showed extensive leptomeningeal enhancement along the periphery of the conus medullaris with enhancement of the cauda equina nerve roots compatible with active neurosarcoidosis, or Guillain-[place] syndrome and other inflammatory, infectious, or neoplastic causes.  CPK, ESR, CRP was unremarkable. A1C prediabetic. EMG findings consistent with demyelinating disease, CSF with elevated protein supporting likely Guillain [place].    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 There has previously been some evidence of disease progression after the IVIG, with involvement of CN 7 and 10, as well as blurred vision on 10/17 but since 10/18 symptoms are stable, likely plateaued with resolution of neck weakness, improvements in facial weakness and extremity strength.    Recommendations:   - Can c/w gabapentin to 300mg/300mg/600mg   - Elevated BP noted. Dysautonomia is an expected complication, continue anti-hypertensive to goal SBP <140  - C/w artifical tears but ensure administered every 2 hours and Ophthalmic ointment OU at night  - Patient stable for discharge from a neurological stand point.   - Follow up outpatient with Dr. Gonsalves   - Neurology signing off.         Case discussed with Dr. Orona

## 2023-10-25 NOTE — PROGRESS NOTE ADULT - SUBJECTIVE AND OBJECTIVE BOX
Neurology Progress Note    Interval History:  No acute events overnight. Patient seen bedside while sitting in the chair. Overall, she states feeling better, improved from before. She was able to work with PT/OT this morning and walked down the aisle. .       PAST MEDICAL & SURGICAL HISTORY:  Ovarian cyst    GERD (gastroesophageal reflux disease)    Glaucoma    Sarcoidosis    Sinusitis    Osteoporosis    Migraines    Hashimoto's disease    Kidney stones    Torn meniscus  right    History of arthroscopy of shoulder  rotator cuff repair, right    History of umbilical hernia repair    S/P correction of deviated nasal septum    H/O sinus surgery        MEDICATIONS  (STANDING):  artificial tears (preservative free) Ophthalmic Solution 1 Drop(s) Both EYES every 2 hours  bimatoprost 0.01% Ophthalmic Solution 1 Drop(s) Both EYES at bedtime  diclofenac 75 milliGRAM(s) Oral every 12 hours  enoxaparin Injectable 40 milliGRAM(s) SubCutaneous every 24 hours  gabapentin 600 milliGRAM(s) Oral every 24 hours  gabapentin 300 milliGRAM(s) Oral <User Schedule>  lactulose Syrup 10 Gram(s) Oral daily  lidocaine   4% Patch 1 Patch Transdermal every 24 hours  pantoprazole    Tablet 40 milliGRAM(s) Oral every 24 hours  petrolatum Ophthalmic Ointment 1 Application(s) Both EYES daily  polyethylene glycol 3350 17 Gram(s) Oral every 12 hours  senna 2 Tablet(s) Oral at bedtime    MEDICATIONS  (PRN):  aluminum hydroxide/magnesium hydroxide/simethicone Suspension 30 milliLiter(s) Oral every 4 hours PRN Dyspepsia  ketorolac   Injectable 30 milliGRAM(s) IV Push every 12 hours PRN Severe Pain (7 - 10)  melatonin 3 milliGRAM(s) Oral at bedtime PRN Insomnia  ondansetron Injectable 4 milliGRAM(s) IV Push every 8 hours PRN Nausea and/or Vomiting  oxyCODONE    IR 5 milliGRAM(s) Oral every 12 hours PRN Severe Pain (7 - 10)        Vital Signs Last 24 Hrs  T(C): 36.3 (25 Oct 2023 11:07), Max: 36.7 (24 Oct 2023 20:21)  T(F): 97.4 (25 Oct 2023 11:07), Max: 98.1 (24 Oct 2023 20:21)  HR: 86 (25 Oct 2023 11:07) (85 - 97)  BP: 124/69 (25 Oct 2023 11:07) (119/69 - 132/79)  BP(mean): --  RR: 16 (25 Oct 2023 11:07) (16 - 16)  SpO2: 97% (25 Oct 2023 11:07) (95% - 97%)    Parameters below as of 25 Oct 2023 11:07  Patient On (Oxygen Delivery Method): room air            Neurological Exam:   Mental status: Awake, alert and oriented x3.  Recent and remote memory intact.  Naming, repetition and comprehension intact.  Attention/concentration intact.  No dysarthria, no aphasia.  Fund of knowledge appropriate.    Cranial nerves: Pupils equally round and reactive to light, visual fields full, no nystagmus, extraocular muscles intact, V1 through V3 intact bilaterally and symmetric, face symmetric, slight weakness on L eye closure. hearing intact to finger rub, palate elevation symmetric, tongue was midline.  Motor: Normal tone and bulk.  No abnormal movements.    Gait: Deferred    LABS                          11.2   4.26  )-----------( 157      ( 24 Oct 2023 09:00 )             35.3     10-25    138  |  107  |  15  ----------------------------<  97  4.0   |  25  |  0.63    Ca    8.4      25 Oct 2023 05:30  Phos  2.9     10-24  Mg     2.0     10-24    TPro  6.8  /  Alb  3.4  /  TBili  <0.2  /  DBili  x   /  AST  35  /  ALT  85<H>  /  AlkPhos  58  10-25          Urinalysis Basic - ( 22 Oct 2023 05:30 )    Color: x / Appearance: x / SG: x / pH: x  Gluc: 102 mg/dL / Ketone: x  / Bili: x / Urobili: x   Blood: x / Protein: x / Nitrite: x   Leuk Esterase: x / RBC: x / WBC x   Sq Epi: x / Non Sq Epi: x / Bacteria: x

## 2023-10-25 NOTE — PROGRESS NOTE ADULT - ATTENDING COMMENTS
AIDP s/p IVIG. Clinically improving gradually and engaging in PT. Pending ophtho eval, using eye drops q2h and ointment qHS. No neck flexion weakness, dyspnea when supine and gradual improvement in facial strength, recovering quicker on the right than left. BP management per primary team on amlodipine.
I was physically present for the key portions of the evaluation and managemnent (E/M) service provided.  I agree with the above history, physical, and plan which I have reviewed and edited where appropriate, with the exceptions as per my note.    Pt seen and examined.    she reports substantial neurological improvement.    BL facial weakness L>R but can close eye on R and almost on L.  5/5 throughout    AP: improving AIDP, sp IVIG. f/u with Dr. Gonsalves as outpatient.
AIDP s/p IVIG 2g/kg. Today she is stable. Able to count above 30 in one breath at 2Hz rate, only slight neck flexion weakness, persistent facial diplegia weakness and uvula deviation and 4/5 finger abduction weakness on left and 4/5 HF and 4-/5 KF b/l LE. Continue with close clinical monitoring and NIF, VC (difficult to perform with facial weakness). She was hypertensive at bedside as a possible dysautonomic effect of AIDP, requiring management.
AIDP s/p IVIG continuing to improve in face strength (now able to close right eye more), and slight improvement in leg strength. BP slightly improved with amlodipine 2.5mg would up titrate to 5mg. Got eye drops and ointment at night to present corneal injury. NO issues with dyspnea or neck flexion weakness. Culbar symptoms improving. Continue to monitor. Cna increase gabapentin 300/300/600 for neuropathic pain. Avoid Acetominophen as LFTs were elevated yesterday, and not used for neuropathic pain typically.
AIDP s/p IVIG. Clinically improving gradually and engaging in PT. Pending ophtho eval, using eye drops q2h and ointment qHS. No neck flexion weakness, dyspnea when supine and gradual improvement in facial strength, recovering quicker on the right than left. BP management per primary team on amlodipine.
Exam continues to worsen - more severe right sided facial weakness, now with left facial weakness as well, with right uvula deviation and b/l palate weakness. Triceps and finger extensors 4/5 b/l, right deltoid 4/5, otherwise UE 5/5 b/l; LE hip flexion and knee flexion 4-/5, knee ext and ankle dorsiflexion 5/5. Reflexes 2+ UE and absent LE. NIF at bedside -28  Given progressive facial and bulbar weakness there is a higher likelihood of intubation. Continue IVIG, gabapentin, get speech and swallow evaluation  Will follow
clinically improving s/p IVIG for AIDP. left eye still doesn't close, denies irritation but conjunctival injection observed. Would use artificial tears every 2 hours and ointment at night as well as patching during the night (if available). No indication for further immunotherapy at this time.
She reports worsening balance compared to yesterday. Weakness mildly progressed in ankle dorsiflexion on exam, and more wide based stance on gait assessment.  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
63 year old woman with electrodiagnostic evidence of AIDP s/p 2g/kg IVIG. Bulbar, facial and neck flexion weakness raise concern for need to monitor for any need to intubate, although patient feels as though she is doing better than yesterday. She had bifacial weakness quadraparesis worse in legs but was able to stand and lacked reflexes at the patellars. She was able to count to 34 in one breath at 2Hz rate and had strong neck flexion on today's exam. Will have NIF/VC checks q12, if decline will request upgrade to ICU and monitor closely for any increased work of breathing. Blood gas after late finding but will check for hypercapnia. She remains on telemetry.
New left facial numbness and right facial weakness  Severe back pain overnight, improved with morphine  LP and EMG results consistent with GBS  Recommend transfer to step down unit for closer monitoring of vitals and respiratory status  Continue IVIG  NIF / FVC q12h  Will follow
AIDP s/p IVIG 2g/kg. Improved neck flexion and left upper limb distal motor function and stability of lower extremity HF and KF weakness, and cranial nerve palsies (b/l CN 7 and 10). Able to count to 30 in one breath at 2Hz rate, only slight neck flexion weakness, persistent facial diplegia weakness and uvula deviation and 4/5 HF and 4-/5 KF b/l LE. Continue with close clinical monitoring and NIF, VC (difficult to perform with facial weakness). She was hypertensive at bedside as a possible dysautonomic effect of AIDP, requiring management, recommending standing BP med such as nifedipine as labetalol 10 prn resulting in BP lability.
GBS with h/o sarcoidosis. on IVIG. Vital capcity has been stable. patient unable ot perform NIF because of facial drop. speech and swallow eval. rest as above.

## 2023-10-25 NOTE — DISCHARGE NOTE NURSING/CASE MANAGEMENT/SOCIAL WORK - PATIENT PORTAL LINK FT
You can access the FollowMyHealth Patient Portal offered by [facility] by registering at the following website: [email or web address]. By joining [facility]’s FollowMyHealth portal, you will also be able to view your health information using other applications (apps) compatible with our system.

## 2023-10-25 NOTE — DISCHARGE NOTE NURSING/CASE MANAGEMENT/SOCIAL WORK - NSDCFUADDAPPT_GEN_ALL_CORE_FT
Please bring your Insurance card, Photo ID and Discharge paperwork to the following appointment:    (1) Please follow up with your Neurology Provider, Dr. Bob Lozano at [address] [place] [address], 8th [place] [address] on 03/08/2024 at 3:20pm.    Please note that this a tentative date. Please note that the office will contact you for an earlier appointment once available.    Appointment was scheduled by Ms. MICHELLE Goodman, Referral Coordinator.

## 2023-10-25 NOTE — DISCHARGE NOTE NURSING/CASE MANAGEMENT/SOCIAL WORK - NSDCPEFALRISK_GEN_ALL_CORE
For information on Fall & Injury Prevention, visit: [email or web address] OR  [email or web address] OR  https://www.cdc.gov/steadi/patient.html

## 2023-10-26 ENCOUNTER — TRANSCRIPTION ENCOUNTER (OUTPATIENT)
Age: 63
End: 2023-10-26

## 2023-10-28 DIAGNOSIS — E06.3 AUTOIMMUNE THYROIDITIS: ICD-10-CM

## 2023-10-28 DIAGNOSIS — G61.0 GUILLAIN-BARRE SYNDROME: ICD-10-CM

## 2023-10-28 DIAGNOSIS — E22.2 SYNDROME OF INAPPROPRIATE SECRETION OF ANTIDIURETIC HORMONE: ICD-10-CM

## 2023-10-28 DIAGNOSIS — R73.03 PREDIABETES: ICD-10-CM

## 2023-10-28 DIAGNOSIS — R59.0 LOCALIZED ENLARGED LYMPH NODES: ICD-10-CM

## 2023-10-28 DIAGNOSIS — R74.01 ELEVATION OF LEVELS OF LIVER TRANSAMINASE LEVELS: ICD-10-CM

## 2023-10-28 DIAGNOSIS — K21.9 GASTRO-ESOPHAGEAL REFLUX DISEASE WITHOUT ESOPHAGITIS: ICD-10-CM
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
## 2023-10-28 DIAGNOSIS — K75.9 INFLAMMATORY LIVER DISEASE, UNSPECIFIED: ICD-10-CM

## 2023-10-28 DIAGNOSIS — H40.9 UNSPECIFIED GLAUCOMA: ICD-10-CM

## 2023-10-28 DIAGNOSIS — G62.9 POLYNEUROPATHY, UNSPECIFIED: ICD-10-CM

## 2023-10-28 DIAGNOSIS — M54.50 LOW BACK PAIN, UNSPECIFIED: ICD-10-CM

## 2023-10-28 DIAGNOSIS — R29.898 OTHER SYMPTOMS AND SIGNS INVOLVING THE MUSCULOSKELETAL SYSTEM: ICD-10-CM

## 2023-10-28 DIAGNOSIS — D86.9 SARCOIDOSIS, UNSPECIFIED: ICD-10-CM

## 2023-10-28 DIAGNOSIS — R03.0 ELEVATED BLOOD-PRESSURE READING, WITHOUT DIAGNOSIS OF HYPERTENSION: ICD-10-CM

## 2023-10-28 DIAGNOSIS — T50.Z15A ADVERSE EFFECT OF IMMUNOGLOBULIN, INITIAL ENCOUNTER: ICD-10-CM

## 2023-10-30 ENCOUNTER — APPOINTMENT (OUTPATIENT)
Dept: INTERNAL MEDICINE | Facility: CLINIC | Age: 63
End: 2023-10-30
Payer: COMMERCIAL

## 2023-10-30 VITALS
SYSTOLIC BLOOD PRESSURE: 148 MMHG | TEMPERATURE: 97.1 F | HEART RATE: 86 BPM | WEIGHT: 126.13 LBS | HEIGHT: 67 IN | OXYGEN SATURATION: 98 % | DIASTOLIC BLOOD PRESSURE: 81 MMHG | BODY MASS INDEX: 19.8 KG/M2

## 2023-10-30 DIAGNOSIS — F41.9 ANXIETY DISORDER, UNSPECIFIED: ICD-10-CM

## 2023-10-30 PROCEDURE — 99213 OFFICE O/P EST LOW 20 MIN: CPT

## 2023-10-30 RX ORDER — PANTOPRAZOLE 40 MG/1
40 TABLET, DELAYED RELEASE ORAL
Refills: 0 | Status: ACTIVE | COMMUNITY

## 2023-11-27 ENCOUNTER — APPOINTMENT (OUTPATIENT)
Dept: NEUROLOGY | Facility: CLINIC | Age: 63
End: 2023-11-27
Payer: COMMERCIAL

## 2023-11-27 VITALS
DIASTOLIC BLOOD PRESSURE: 80 MMHG | OXYGEN SATURATION: 96 % | HEART RATE: 79 BPM | TEMPERATURE: 97 F | SYSTOLIC BLOOD PRESSURE: 150 MMHG

## 2023-11-27 DIAGNOSIS — G65.0 SEQUELAE OF GUILLAIN-BARRE SYNDROME: ICD-10-CM

## 2023-11-27 PROCEDURE — 99214 OFFICE O/P EST MOD 30 MIN: CPT

## 2023-12-11 ENCOUNTER — RX RENEWAL (OUTPATIENT)
Age: 63
End: 2023-12-11

## 2023-12-24 ENCOUNTER — RX RENEWAL (OUTPATIENT)
Age: 63
End: 2023-12-24

## 2024-01-18 ENCOUNTER — RX RENEWAL (OUTPATIENT)
Age: 64
End: 2024-01-18

## 2024-03-04 ENCOUNTER — APPOINTMENT (OUTPATIENT)
Dept: NEUROLOGY | Facility: CLINIC | Age: 64
End: 2024-03-04

## 2024-03-14 ENCOUNTER — APPOINTMENT (OUTPATIENT)
Dept: ENDOCRINOLOGY | Facility: CLINIC | Age: 64
End: 2024-03-14
Payer: COMMERCIAL

## 2024-03-14 VITALS
SYSTOLIC BLOOD PRESSURE: 139 MMHG | HEIGHT: 67 IN | HEART RATE: 84 BPM | DIASTOLIC BLOOD PRESSURE: 82 MMHG | BODY MASS INDEX: 19.62 KG/M2 | WEIGHT: 125 LBS

## 2024-03-14 DIAGNOSIS — M81.0 AGE-RELATED OSTEOPOROSIS W/OUT CURRENT PATHOLOGICAL FRACTURE: ICD-10-CM

## 2024-03-14 DIAGNOSIS — E04.1 NONTOXIC SINGLE THYROID NODULE: ICD-10-CM

## 2024-03-14 PROCEDURE — 99214 OFFICE O/P EST MOD 30 MIN: CPT

## 2024-03-14 RX ORDER — OXYCODONE AND ACETAMINOPHEN 5; 325 MG/1; MG/1
5-325 TABLET ORAL
Qty: 30 | Refills: 0 | Status: DISCONTINUED | COMMUNITY
Start: 2023-10-25 | End: 2024-03-14

## 2024-03-14 RX ORDER — METHYLPREDNISOLONE 4 MG/1
4 TABLET ORAL
Qty: 1 | Refills: 1 | Status: DISCONTINUED | COMMUNITY
Start: 2023-10-09 | End: 2024-03-14

## 2024-03-14 RX ORDER — GABAPENTIN 300 MG/1
300 CAPSULE ORAL
Qty: 90 | Refills: 0 | Status: DISCONTINUED | COMMUNITY
Start: 2023-10-25 | End: 2024-03-14

## 2024-03-14 RX ORDER — LORAZEPAM 0.5 MG/1
0.5 TABLET ORAL
Qty: 30 | Refills: 0 | Status: DISCONTINUED | COMMUNITY
Start: 2023-10-30 | End: 2024-03-14

## 2024-03-14 NOTE — DATA REVIEWED
[FreeTextEntry1] : Laboratories (October 25, 2023) reviewed and significant for:  Calcium 8.4 mg/dL (albumin 3.4 g/dL) BUN/creatinine 15/0.63 mg/dL (eGFR 100 mL/min) Alkaline phosphatase 59 U/L  Laboratories (October 24, 2023) reviewed and significant for:  Hemoglobin 11.2 g/dL (normal: 11.5-15.5), otherwise unremarkable complete blood count  Laboratories (October 12, 2023) reviewed and significant for:  TSH 1.750 uIU/mL  Most recent bone mineral density Date: September 5, 2023 Source: [employer] Site: [facility]  Site	BMD	T-score	Change previous	Change baseline	 Lumbar spine	0.775	-2.5		+2.8%	 Femoral neck	0.691	-1.4		-4.5%*	 Total hip           0.804	-1.1		-5.7%*	 Distal radius	0.704	+0.2		-2.5%	 DXA comments: *Significant change from previous; left hip values Vertebral fracture assessment without evidence of compression fractures T7 to L5 (my read).   Impression: I have personally reviewed the DXA images and report. There is osteoporosis by the World Health Organization criteria. There were significant declines at the femoral neck and total hip from previous, due at least in part to positioning. Vertebral fracture assessment without evidence of compression fractures.

## 2024-03-14 NOTE — PHYSICAL EXAM
[Alert] : alert [Healthy Appearance] : healthy appearance [No Acute Distress] : no acute distress [Normal Sclera/Conjunctiva] : normal sclera/conjunctiva [Normal Hearing] : hearing was normal [No Respiratory Distress] : no respiratory distress [No Stigmata of Cushings Syndrome] : no stigmata of Cushings Syndrome [Normal Gait] : normal gait [Normal Insight/Judgement] : insight and judgment were intact [Kyphosis] : no kyphosis present [Acanthosis Nigricans] : no acanthosis nigricans [de-identified] : no moon facies, no supraclavicular fat pads

## 2024-03-14 NOTE — HISTORY OF PRESENT ILLNESS
[FreeTextEntry1] : Ms. Kelley is a 63 year-old woman presenting for follow-up of osteoporosis, Hashimoto's disease, and thyroid nodules. I saw her for an initial visit in March 2021 and last in September 2022. 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   Bone History Menopause: Age 54 Osteoporosis diagnosed in 2019 on routine bone density significant for T-scores of -2.8 at the lumbar spine, -1.4 at the femoral neck, -0.9 at the total hip, +0.7 at the distal radius; most recent bone density as below Fracture history: Right knee insufficiency fracture diagnosed in 2022 with torn meniscus Family history: No parental history of hip fracture Treatment: Alendronate 70 mg weekly from March 2021 to present  Falls: None recent Height loss: No Kidney stones: History of right kidney stone Dental health: Regular appointments, no history of implants, no upcoming procedures planned Exercise: Walks; was weight training four times per week but more recently limited after hospitalization Dairy intake: 1-3 serving daily (was having yogurt and milk daily, cheese a few times per week; diet changed after recent hospitalization) Calcium supplements: None Multivitamin: None Vitamin D supplements: 1000 intl units daily  Osteoporosis risk factors include: Postmenopausal status,  race, prior fracture, falls, height loss, small thin bones, tobacco use, excessive alcohol, anorexia, family history, vitamin D deficiency, corticosteroid use, seizure medications, malabsorption, hyperparathyroidism, hyperthyroidism. NEGATIVE EXCEPT: Postmenopausal status,  race  Hashimoto's disease. Thyroid nodules. She has a history of Hashimoto's disease but has remained biochemically euthyroid and has not required levothyroxine.  She has a history of multiple thyroid nodules. She had a biopsy of a single dominant nodule many years ago that was negative per report. Thyroid ultrasound from September 2022 with bilateral subcentimeter nodules not meeting American College of Radiology Thyroid Imaging Reporting and Data Systems (TIRADS) criteria for biopsy or monitoring.  No history of radiation exposure. Niece with history of hyperthyroidism. No family history of thyroid cancer.  Interim History  She has received alendronate from March 2021 to present. She is taking as prescribed and tolerating well.  Thyroid ultrasound from September 2022 with bilateral subcentimeter nodules not meeting American College of Radiology Thyroid Imaging Reporting and Data Systems (TIRADS) criteria for biopsy or monitoring.  Bone density from September 2023 as below. There is osteoporosis by the World Health Organization criteria. There were significant declines at the femoral neck and total hip from previous, due at least in part to positioning. Vertebral fracture assessment without evidence of compression fractures. 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  She has seen multiple providers; notes reviewed. Last laboratory results reviewed; see results from the health information exchange. Serum calcium, renal function, and TSH within range. Medical and surgical history, medications, allergies, social and family history reviewed and updated as needed.

## 2024-03-14 NOTE — ASSESSMENT
[Bisphosphonates] : The patient was instructed to take bisphosphonates on an empty stomach with a full glass of water,and wait at least 30 minutes before eating or lying down [FreeTextEntry1] : Osteoporosis. She has no history of fragility fracture. She has a history of alendronate therapy from 2021 to present. Metabolic evaluation for secondary causes of bone loss previously unremarkable. Her most recent bone density demonstrated possible declines at the femoral neck and total hip, which may be due at least in part to positioning. We have discussed the potential benefits and risks of antiresorptive osteoporosis therapy, including but not limited to osteonecrosis of the jaw and atypical femoral fracture. She discussed continuing alendronate versus a switch to zoledronic acid therapy; she prefers to continue alendronate pending interval bone density testing in 2024. We reviewed proper use and compliance with alendronate.  Continue alendronate 70 mg weekly Calcium 8611-5341 mg daily from diet (to be taken in divided doses as no more than 500-600 mg can be absorbed at one time); advise increased dietary and/or supplemental calcium Continue current vitamin D regimen Diet, exercise and fall prevention discussed  Hashimoto's disease. Thyroid nodules. She has been biochemically euthyroid. Thyroid ultrasound from 2022 with bilateral subcentimeter nodules not meeting American College of Radiology Thyroid Imaging Reporting and Data Systems (TIRADS) criteria for biopsy or monitoring.   Next bone density testin months Next appointment: 6 months or earlier as needed  I reviewed the DXA performed on 2023 with the patient today. I reviewed the thyroid ultrasound performed on 2022 with the patient today. I reviewed the laboratories performed from 2023 to present with the patient today.  I counseled the patient regarding calcium and vitamin D intake today. I discussed the following osteoporosis therapies: Alendronate, zoledronic acid

## 2024-03-22 ENCOUNTER — NON-APPOINTMENT (OUTPATIENT)
Age: 64
End: 2024-03-22

## 2024-04-29 RX ORDER — ALENDRONATE SODIUM 70 MG/1
70 TABLET ORAL
Qty: 12 | Refills: 3 | Status: ACTIVE | COMMUNITY
Start: 2021-03-02 | End: 1900-01-01

## 2024-05-08 ENCOUNTER — OUTPATIENT (OUTPATIENT)
Dept: OUTPATIENT SERVICES | Facility: HOSPITAL | Age: 64
LOS: 1 days | End: 2024-05-08

## 2024-05-08 ENCOUNTER — APPOINTMENT (OUTPATIENT)
Dept: MAMMOGRAPHY | Facility: HOSPITAL | Age: 64
End: 2024-05-08
Payer: COMMERCIAL

## 2024-05-08 ENCOUNTER — APPOINTMENT (OUTPATIENT)
Dept: ULTRASOUND IMAGING | Facility: HOSPITAL | Age: 64
End: 2024-05-08
Payer: COMMERCIAL

## 2024-05-08 DIAGNOSIS — Z98.890 OTHER SPECIFIED POSTPROCEDURAL STATES: Chronic | ICD-10-CM

## 2024-05-08 PROCEDURE — 77063 BREAST TOMOSYNTHESIS BI: CPT

## 2024-05-08 PROCEDURE — 76641 ULTRASOUND BREAST COMPLETE: CPT | Mod: 26,50

## 2024-05-08 PROCEDURE — 77063 BREAST TOMOSYNTHESIS BI: CPT | Mod: 26

## 2024-05-08 PROCEDURE — 77067 SCR MAMMO BI INCL CAD: CPT

## 2024-05-08 PROCEDURE — 77067 SCR MAMMO BI INCL CAD: CPT | Mod: 26

## 2024-05-08 PROCEDURE — 76641 ULTRASOUND BREAST COMPLETE: CPT

## 2024-05-21 ENCOUNTER — NON-APPOINTMENT (OUTPATIENT)
Age: 64
End: 2024-05-21

## 2024-05-22 ENCOUNTER — NON-APPOINTMENT (OUTPATIENT)
Age: 64
End: 2024-05-22

## 2024-05-22 ENCOUNTER — APPOINTMENT (OUTPATIENT)
Dept: INTERNAL MEDICINE | Facility: CLINIC | Age: 64
End: 2024-05-22
Payer: COMMERCIAL

## 2024-05-22 VITALS
BODY MASS INDEX: 19.93 KG/M2 | OXYGEN SATURATION: 99 % | TEMPERATURE: 97.6 F | HEART RATE: 73 BPM | SYSTOLIC BLOOD PRESSURE: 139 MMHG | WEIGHT: 127 LBS | DIASTOLIC BLOOD PRESSURE: 76 MMHG | HEIGHT: 67 IN

## 2024-05-22 DIAGNOSIS — Z86.69 PERSONAL HISTORY OF OTHER DISEASES OF THE NERVOUS SYSTEM AND SENSE ORGANS: ICD-10-CM

## 2024-05-22 DIAGNOSIS — E06.3 AUTOIMMUNE THYROIDITIS: ICD-10-CM

## 2024-05-22 DIAGNOSIS — Z00.00 ENCOUNTER FOR GENERAL ADULT MEDICAL EXAMINATION W/OUT ABNORMAL FINDINGS: ICD-10-CM

## 2024-05-22 PROCEDURE — 36415 COLL VENOUS BLD VENIPUNCTURE: CPT

## 2024-05-22 PROCEDURE — 99396 PREV VISIT EST AGE 40-64: CPT | Mod: 25

## 2024-05-22 RX ORDER — CIPROFLOXACIN HYDROCHLORIDE 500 MG/1
500 TABLET, FILM COATED ORAL TWICE DAILY
Qty: 10 | Refills: 2 | Status: ACTIVE | COMMUNITY
Start: 2022-09-19 | End: 1900-01-01

## 2024-05-22 NOTE — ASSESSMENT
[FreeTextEntry1] : Improved neurological examination Visual issues discussed EKG normal No change in current medication. All labs

## 2024-05-22 NOTE — REVIEW OF SYSTEMS
Order has been placed for glucose meter kit and lancets [Negative] : Heme/Lymph [FreeTextEntry3] : 0.Visual disturbaces

## 2024-05-22 NOTE — HISTORY OF PRESENT ILLNESS
[FreeTextEntry1] : Back at work part time  [de-identified] : Medication without change Vision still an issue Not walking without any devices Able to drive Has recurrent UTi; Taking Cipro Feels like she has heartbeat drop

## 2024-05-22 NOTE — HEALTH RISK ASSESSMENT
[Fair] :  ~his/her~ mood as fair [Patient reported mammogram was normal] : Patient reported mammogram was normal [MammogramDate] : 05/2024 [PapSmearComments] : Followed by Dr Sykes

## 2024-05-23 LAB
25(OH)D3 SERPL-MCNC: 32 NG/ML
ALBUMIN SERPL ELPH-MCNC: 4.6 G/DL
ALP BLD-CCNC: 55 U/L
ALT SERPL-CCNC: 11 U/L
ANION GAP SERPL CALC-SCNC: 16 MMOL/L
APPEARANCE: CLEAR
AST SERPL-CCNC: 14 U/L
BACTERIA: NEGATIVE /HPF
BILIRUB SERPL-MCNC: 0.2 MG/DL
BILIRUBIN URINE: NEGATIVE
BLOOD URINE: ABNORMAL
BUN SERPL-MCNC: 15 MG/DL
CALCIUM SERPL-MCNC: 9.3 MG/DL
CAST: 0 /LPF
CHLORIDE SERPL-SCNC: 102 MMOL/L
CHOLEST SERPL-MCNC: 208 MG/DL
CO2 SERPL-SCNC: 22 MMOL/L
COLOR: YELLOW
CREAT SERPL-MCNC: 0.75 MG/DL
EGFR: 89 ML/MIN/1.73M2
EPITHELIAL CELLS: 1 /HPF
ESTIMATED AVERAGE GLUCOSE: 114 MG/DL
GLUCOSE QUALITATIVE U: NEGATIVE MG/DL
GLUCOSE SERPL-MCNC: 96 MG/DL
HBA1C MFR BLD HPLC: 5.6 %
HCT VFR BLD CALC: 41.6 %
HDLC SERPL-MCNC: 77 MG/DL
HGB BLD-MCNC: 12.8 G/DL
KETONES URINE: NEGATIVE MG/DL
LDLC SERPL CALC-MCNC: 117 MG/DL
LEUKOCYTE ESTERASE URINE: NEGATIVE
MCHC RBC-ENTMCNC: 25.8 PG
MCHC RBC-ENTMCNC: 30.8 GM/DL
MCV RBC AUTO: 83.7 FL
MICROSCOPIC-UA: NORMAL
NITRITE URINE: NEGATIVE
NONHDLC SERPL-MCNC: 131 MG/DL
PH URINE: 5.5
PLATELET # BLD AUTO: 206 K/UL
POTASSIUM SERPL-SCNC: 4.1 MMOL/L
PROT SERPL-MCNC: 7.5 G/DL
PROTEIN URINE: NEGATIVE MG/DL
RBC # BLD: 4.97 M/UL
RBC # FLD: 15.5 %
RED BLOOD CELLS URINE: 6 /HPF
SODIUM SERPL-SCNC: 140 MMOL/L
SPECIFIC GRAVITY URINE: 1.02
T4 FREE SERPL-MCNC: 1.2 NG/DL
TRIGL SERPL-MCNC: 77 MG/DL
TSH SERPL-ACNC: 2.64 UIU/ML
UROBILINOGEN URINE: 0.2 MG/DL
WBC # FLD AUTO: 4.69 K/UL
WHITE BLOOD CELLS URINE: 1 /HPF

## 2024-08-02 NOTE — ASU DISCHARGE PLAN (ADULT/PEDIATRIC) - DISCHARGE PLAN IS COMPLETE AND GIVEN TO PATIENT
[TextEntry] : - follow up with Dr. Gamez as scheduled for ongoing cardiology care.  - proceed with CELESTE today.  - obtain carotid US and pre-procedure labs today.  - return to D clinic after testing, or as needed.   I, Dr. Sid Conrad, personally performed the evaluation and management (E/M) services for this new patient. That E/M includes conducting the clinically appropriate initial history &/or exam, assessing all conditions, and establishing the plan of care. Today, my MANJEET, noted herewith, was here to observe my evaluation and management service for this patient & follow plan of care established by me going forward.  
: Yes

## 2024-08-29 ENCOUNTER — NON-APPOINTMENT (OUTPATIENT)
Age: 64
End: 2024-08-29

## 2024-09-16 ENCOUNTER — APPOINTMENT (OUTPATIENT)
Dept: RADIOLOGY | Facility: HOSPITAL | Age: 64
End: 2024-09-16

## 2024-09-16 ENCOUNTER — OUTPATIENT (OUTPATIENT)
Dept: OUTPATIENT SERVICES | Facility: HOSPITAL | Age: 64
LOS: 1 days | End: 2024-09-16
Payer: COMMERCIAL

## 2024-09-16 DIAGNOSIS — Z98.890 OTHER SPECIFIED POSTPROCEDURAL STATES: Chronic | ICD-10-CM

## 2024-09-16 PROCEDURE — 77085 DXA BONE DENSITY AXL VRT FX: CPT | Mod: 26

## 2024-09-16 PROCEDURE — 77085 DXA BONE DENSITY AXL VRT FX: CPT

## 2024-09-17 ENCOUNTER — NON-APPOINTMENT (OUTPATIENT)
Age: 64
End: 2024-09-17

## 2024-09-26 ENCOUNTER — APPOINTMENT (OUTPATIENT)
Dept: ENDOCRINOLOGY | Facility: CLINIC | Age: 64
End: 2024-09-26

## 2024-09-30 ENCOUNTER — NON-APPOINTMENT (OUTPATIENT)
Age: 64
End: 2024-09-30

## 2024-10-02 ENCOUNTER — APPOINTMENT (OUTPATIENT)
Dept: UROLOGY | Facility: CLINIC | Age: 64
End: 2024-10-02
Payer: COMMERCIAL

## 2024-10-02 VITALS
OXYGEN SATURATION: 98 % | HEART RATE: 76 BPM | TEMPERATURE: 98.2 F | SYSTOLIC BLOOD PRESSURE: 129 MMHG | DIASTOLIC BLOOD PRESSURE: 80 MMHG

## 2024-10-02 LAB
BILIRUB UR QL STRIP: NORMAL
CLARITY UR: CLEAR
COLLECTION METHOD: NORMAL
GLUCOSE UR-MCNC: NORMAL
HCG UR QL: 0.2 EU/DL
HGB UR QL STRIP.AUTO: NORMAL
KETONES UR-MCNC: NORMAL
LEUKOCYTE ESTERASE UR QL STRIP: NORMAL
NITRITE UR QL STRIP: NORMAL
PH UR STRIP: 7
PROT UR STRIP-MCNC: NORMAL
SP GR UR STRIP: 1.02

## 2024-10-02 PROCEDURE — 99204 OFFICE O/P NEW MOD 45 MIN: CPT

## 2024-10-02 PROCEDURE — 81003 URINALYSIS AUTO W/O SCOPE: CPT | Mod: QW

## 2024-10-02 PROCEDURE — 99459 PELVIC EXAMINATION: CPT

## 2024-10-03 LAB
APPEARANCE: CLEAR
BACTERIA: NEGATIVE /HPF
BILIRUBIN URINE: NEGATIVE
BLOOD URINE: NEGATIVE
CAST: NORMAL /LPF
COLOR: YELLOW
EPITHELIAL CELLS: 1 /HPF
GLUCOSE QUALITATIVE U: NEGATIVE MG/DL
KETONES URINE: NEGATIVE MG/DL
LEUKOCYTE ESTERASE URINE: NEGATIVE
MICROSCOPIC-UA: NORMAL
NITRITE URINE: NEGATIVE
PH URINE: 7
PROTEIN URINE: NEGATIVE MG/DL
RED BLOOD CELLS URINE: NORMAL /HPF
REVIEW: NORMAL
SPECIFIC GRAVITY URINE: 1.02
UROBILINOGEN URINE: 0.2 MG/DL
WHITE BLOOD CELLS URINE: 0 /HPF

## 2024-10-03 NOTE — ASSESSMENT
[FreeTextEntry1] : I discussed the findings and options with Ms. MINE IBRAHIM in detail.   Urine studies sent Given c/o AARON, we discussed urodynamics in great detail to obtain more objective functional information on her bladder. Will return for UDS.    The total amount of time I have personally spent preparing for this visit, reviewing the patient's test results, obtaining external history, ordering tests/medications, documenting clinical information, communicating with and counseling the patient/family and/or caregiver(s), reviewing old records, and spent face to face with the patient explaining the above was 45 minutes.

## 2024-10-03 NOTE — ADDENDUM
[FreeTextEntry1] : A portion of this note was written by [Donovan Vásquez] on 10/02/2024 acting as a scribe for Dr. Campos.   I have personally reviewed the chart and agree that the record accurately reflects my personal performance of the history, physical exam, assessment, and plan.

## 2024-10-03 NOTE — PHYSICAL EXAM
[Normal Appearance] : normal appearance [Well Groomed] : well groomed [General Appearance - In No Acute Distress] : no acute distress [Edema] : no peripheral edema [Respiration, Rhythm And Depth] : normal respiratory rhythm and effort [Exaggerated Use Of Accessory Muscles For Inspiration] : no accessory muscle use [Abdomen Soft] : soft [Abdomen Tenderness] : non-tender [Costovertebral Angle Tenderness] : no ~M costovertebral angle tenderness [Urinary Bladder Findings] : the bladder was normal on palpation [Normal Station and Gait] : the gait and station were normal for the patient's age [] : no rash [No Focal Deficits] : no focal deficits [Oriented To Time, Place, And Person] : oriented to person, place, and time [Affect] : the affect was normal [Mood] : the mood was normal [No Palpable Adenopathy] : no palpable adenopathy [de-identified] : no prolapse [Chaperone Present] : A chaperone was present in the examining room during all aspects of the physical examination [98857] : A chaperone was present during the pelvic exam. [FreeTextEntry2] : Tia

## 2024-10-03 NOTE — HISTORY OF PRESENT ILLNESS
[FreeTextEntry1] : 10/02/2024 -- 64 F  with hx renal stones, presenting with AARON.   Reports AARON- leakage with laughing, coughing, sneezing, walking.  She wears thick pads (wet) began 6-8years ago. Denies nocturia but still wears pads at night for protection. Denies sensation of incomplete emptying.  Reports leakage with increased water intake. 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  Denies sensation of prolapse but feels "swollen".  She was told to have a right renal stone. Never passed a stone. No bothersome stone symptoms today. Denies FHx renal stones. Admits to h/o UTIs, x3-4 over the last 12 months. No bothersome UTI today.   Sexually active. Reports dyspareunia w vag dryness.  PSH bilateral oophorectomy 4 years ago- benign tumors. No radiation. She still has her uterus.   PVR: 0cc (done to rule out incomplete bladder emptying)   note- pt works as a nurse  PMH: renal stone  PSH: bilateral oophorectomy FH: no  malignancies  SocHx: non smoker, no alcohol  Meds: Allergies: NKDA  Habits: x2 caffeine AM and PM.

## 2024-10-03 NOTE — PHYSICAL EXAM
[Normal Appearance] : normal appearance [Well Groomed] : well groomed [General Appearance - In No Acute Distress] : no acute distress [Edema] : no peripheral edema [Respiration, Rhythm And Depth] : normal respiratory rhythm and effort [Exaggerated Use Of Accessory Muscles For Inspiration] : no accessory muscle use [Abdomen Soft] : soft [Abdomen Tenderness] : non-tender [Costovertebral Angle Tenderness] : no ~M costovertebral angle tenderness [Urinary Bladder Findings] : the bladder was normal on palpation [Normal Station and Gait] : the gait and station were normal for the patient's age [] : no rash [No Focal Deficits] : no focal deficits [Oriented To Time, Place, And Person] : oriented to person, place, and time [Affect] : the affect was normal [Mood] : the mood was normal [No Palpable Adenopathy] : no palpable adenopathy [de-identified] : no prolapse [Chaperone Present] : A chaperone was present in the examining room during all aspects of the physical examination [96730] : A chaperone was present during the pelvic exam. [FreeTextEntry2] : Tia

## 2024-10-07 LAB — BACTERIA UR CULT: NORMAL

## 2024-10-24 ENCOUNTER — APPOINTMENT (OUTPATIENT)
Dept: UROLOGY | Facility: CLINIC | Age: 64
End: 2024-10-24

## 2024-12-23 NOTE — HISTORY OF PRESENT ILLNESS
Amandays texted and needs provider review.    Medication: hydrALAZINE (APRESOLINE) 25 MG tablet   Last office visit date: 10/14/24  Medication Refill Protocol Failed.  Failed criteria: Abnormal labs. Sent to clinician to review.      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  After clarification the patient's prior knee pain was in her left knee as well as prior MRI of the right knee has been hurting her on and off for the past 2 years however.  Since we last saw each of the patient has been experiencing increasing pain she states she walks with a limp and has been using Advil intermittently with mild pain relief.\par \par

## 2025-03-25 ENCOUNTER — NON-APPOINTMENT (OUTPATIENT)
Age: 65
End: 2025-03-25

## 2025-03-25 ENCOUNTER — APPOINTMENT (OUTPATIENT)
Dept: GASTROENTEROLOGY | Facility: CLINIC | Age: 65
End: 2025-03-25
Payer: COMMERCIAL

## 2025-03-25 VITALS
HEART RATE: 77 BPM | HEIGHT: 67 IN | DIASTOLIC BLOOD PRESSURE: 60 MMHG | WEIGHT: 133 LBS | OXYGEN SATURATION: 98 % | BODY MASS INDEX: 20.88 KG/M2 | TEMPERATURE: 96.9 F | RESPIRATION RATE: 16 BRPM | SYSTOLIC BLOOD PRESSURE: 135 MMHG

## 2025-03-25 DIAGNOSIS — Z12.11 ENCOUNTER FOR SCREENING FOR MALIGNANT NEOPLASM OF COLON: ICD-10-CM

## 2025-03-25 PROCEDURE — 99204 OFFICE O/P NEW MOD 45 MIN: CPT

## 2025-03-25 RX ORDER — SODIUM SULFATE, POTASSIUM SULFATE AND MAGNESIUM SULFATE 1.6; 3.13; 17.5 G/177ML; G/177ML; G/177ML
17.5-3.13-1.6 SOLUTION ORAL
Qty: 1 | Refills: 0 | Status: ACTIVE | COMMUNITY
Start: 2025-03-25 | End: 1900-01-01

## 2025-04-09 ENCOUNTER — NON-APPOINTMENT (OUTPATIENT)
Age: 65
End: 2025-04-09

## 2025-04-09 ENCOUNTER — APPOINTMENT (OUTPATIENT)
Dept: VASCULAR SURGERY | Facility: CLINIC | Age: 65
End: 2025-04-09
Payer: COMMERCIAL

## 2025-04-09 VITALS
SYSTOLIC BLOOD PRESSURE: 145 MMHG | HEART RATE: 75 BPM | BODY MASS INDEX: 20.88 KG/M2 | HEIGHT: 67 IN | DIASTOLIC BLOOD PRESSURE: 81 MMHG | WEIGHT: 133 LBS

## 2025-04-09 DIAGNOSIS — I83.90 ASYMPTOMATIC VARICOSE VEINS OF UNSPECIFIED LOWER EXTREMITY: ICD-10-CM

## 2025-04-09 DIAGNOSIS — R29.898 OTHER SYMPTOMS AND SIGNS INVOLVING THE MUSCULOSKELETAL SYSTEM: ICD-10-CM

## 2025-04-09 DIAGNOSIS — I78.1 NEVUS, NON-NEOPLASTIC: ICD-10-CM

## 2025-04-09 PROCEDURE — 93970 EXTREMITY STUDY: CPT

## 2025-04-09 PROCEDURE — 99203 OFFICE O/P NEW LOW 30 MIN: CPT

## 2025-05-06 ENCOUNTER — OUTPATIENT (OUTPATIENT)
Dept: OUTPATIENT SERVICES | Facility: HOSPITAL | Age: 65
LOS: 1 days | Discharge: HOME | End: 2025-05-06
Payer: COMMERCIAL

## 2025-05-06 ENCOUNTER — RESULT REVIEW (OUTPATIENT)
Age: 65
End: 2025-05-06

## 2025-05-06 ENCOUNTER — APPOINTMENT (OUTPATIENT)
Dept: GASTROENTEROLOGY | Facility: HOSPITAL | Age: 65
End: 2025-05-06

## 2025-05-06 VITALS
HEART RATE: 67 BPM | TEMPERATURE: 98 F | HEIGHT: 67 IN | RESPIRATION RATE: 18 BRPM | DIASTOLIC BLOOD PRESSURE: 83 MMHG | WEIGHT: 132.94 LBS | OXYGEN SATURATION: 98 % | SYSTOLIC BLOOD PRESSURE: 145 MMHG

## 2025-05-06 DIAGNOSIS — Z98.890 OTHER SPECIFIED POSTPROCEDURAL STATES: Chronic | ICD-10-CM

## 2025-05-06 PROCEDURE — 88305 TISSUE EXAM BY PATHOLOGIST: CPT | Mod: 26

## 2025-05-06 PROCEDURE — 45380 COLONOSCOPY AND BIOPSY: CPT

## 2025-05-06 PROCEDURE — 88305 TISSUE EXAM BY PATHOLOGIST: CPT

## 2025-05-06 PROCEDURE — 45380 COLONOSCOPY AND BIOPSY: CPT | Mod: PT

## 2025-05-06 NOTE — PRE-ANESTHESIA EVALUATION ADULT - NSANTHPMHFT_GEN_ALL_CORE
Cardiac: Denies HTN, HLD, MI/Angina/Heart Failure, Arrhythmia, Murmur/Valvular Disorder. >4 METs  Pulmonary: Denies Asthma, COPD, VINCE  Renal: Positive for nephrolithiasis, right side, now resolved. Denies kidney dysfunction  Hepatic: Denies liver dysfunction  Gastrointestinal: Positive for GERD. Denies IBD.  Endocrine: Positive for Hashimoto's disease. Denies DM.  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 Denies stroke/seizure disorder.  Hematologic: Denies anemia, blood clotting disorder, blood thinning medication.  Musculoskeletal: Positive for prior torn meniscus and osteoporosis and rheumatoid arthritis.    PSH: Sinus surgery, correction of deviated nasal septum, umbilical hernia repair, shoulder arthroscopy, bilateral oophorectomy.

## 2025-05-06 NOTE — PRE-ANESTHESIA EVALUATION ADULT - NSDENTALSD_ENT_ALL_CORE
Missing several rear molars. Minor ground/worn teeth, chipped teeth. No open cavities noted./loose teeth/missing teeth

## 2025-05-06 NOTE — PRE-ANESTHESIA EVALUATION ADULT - NSANTHLABRESULTSFT_GEN_ALL_CORE
All available laboratories reviewed. Patient is more than two years post menopausal with history of bilateral oophorectomy.

## 2025-05-08 ENCOUNTER — APPOINTMENT (OUTPATIENT)
Dept: UROLOGY | Facility: CLINIC | Age: 65
End: 2025-05-08
Payer: COMMERCIAL

## 2025-05-08 VITALS
DIASTOLIC BLOOD PRESSURE: 73 MMHG | TEMPERATURE: 97.6 F | OXYGEN SATURATION: 97 % | SYSTOLIC BLOOD PRESSURE: 149 MMHG | HEART RATE: 74 BPM

## 2025-05-08 LAB
BILIRUB UR QL STRIP: NORMAL
CLARITY UR: CLEAR
COLLECTION METHOD: NORMAL
GLUCOSE UR-MCNC: NORMAL
HCG UR QL: 0.2 EU/DL
HGB UR QL STRIP.AUTO: NORMAL
KETONES UR-MCNC: NORMAL
LEUKOCYTE ESTERASE UR QL STRIP: NORMAL
NITRITE UR QL STRIP: NORMAL
PH UR STRIP: 6
PROT UR STRIP-MCNC: NORMAL
SP GR UR STRIP: 1.01

## 2025-05-08 PROCEDURE — 51784 ANAL/URINARY MUSCLE STUDY: CPT

## 2025-05-08 PROCEDURE — 51741 ELECTRO-UROFLOWMETRY FIRST: CPT

## 2025-05-08 PROCEDURE — 51728 CYSTOMETROGRAM W/VP: CPT

## 2025-05-08 PROCEDURE — 81003 URINALYSIS AUTO W/O SCOPE: CPT | Mod: QW

## 2025-05-08 PROCEDURE — 51797 INTRAABDOMINAL PRESSURE TEST: CPT

## 2025-05-12 ENCOUNTER — TRANSCRIPTION ENCOUNTER (OUTPATIENT)
Age: 65
End: 2025-05-12

## 2025-05-13 LAB — BACTERIA UR CULT: NORMAL

## 2025-05-22 ENCOUNTER — NON-APPOINTMENT (OUTPATIENT)
Age: 65
End: 2025-05-22

## 2025-05-22 ENCOUNTER — APPOINTMENT (OUTPATIENT)
Dept: INTERNAL MEDICINE | Facility: CLINIC | Age: 65
End: 2025-05-22
Payer: COMMERCIAL

## 2025-05-22 VITALS
WEIGHT: 135 LBS | DIASTOLIC BLOOD PRESSURE: 76 MMHG | HEART RATE: 76 BPM | TEMPERATURE: 97.6 F | SYSTOLIC BLOOD PRESSURE: 131 MMHG | BODY MASS INDEX: 21.19 KG/M2 | HEIGHT: 67 IN | OXYGEN SATURATION: 95 %

## 2025-05-22 DIAGNOSIS — N39.3 STRESS INCONTINENCE (FEMALE) (MALE): ICD-10-CM

## 2025-05-22 DIAGNOSIS — Z00.00 ENCOUNTER FOR GENERAL ADULT MEDICAL EXAMINATION W/OUT ABNORMAL FINDINGS: ICD-10-CM
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
## 2025-05-22 DIAGNOSIS — K21.9 GASTRO-ESOPHAGEAL REFLUX DISEASE W/OUT ESOPHAGITIS: ICD-10-CM

## 2025-05-22 DIAGNOSIS — Z86.69 PERSONAL HISTORY OF OTHER DISEASES OF THE NERVOUS SYSTEM AND SENSE ORGANS: ICD-10-CM

## 2025-05-22 PROCEDURE — 99396 PREV VISIT EST AGE 40-64: CPT | Mod: 25

## 2025-05-22 PROCEDURE — 36415 COLL VENOUS BLD VENIPUNCTURE: CPT

## 2025-05-23 LAB
25(OH)D3 SERPL-MCNC: 23.8 NG/ML
ALBUMIN SERPL ELPH-MCNC: 4.4 G/DL
ALP BLD-CCNC: 59 U/L
ALT SERPL-CCNC: 18 U/L
ANION GAP SERPL CALC-SCNC: 16 MMOL/L
APPEARANCE: CLEAR
AST SERPL-CCNC: 18 U/L
BACTERIA: NEGATIVE /HPF
BILIRUB SERPL-MCNC: 0.2 MG/DL
BILIRUBIN URINE: NEGATIVE
BLOOD URINE: NEGATIVE
BUN SERPL-MCNC: 18 MG/DL
CALCIUM SERPL-MCNC: 9 MG/DL
CAST: 0 /LPF
CHLORIDE SERPL-SCNC: 106 MMOL/L
CHOLEST SERPL-MCNC: 206 MG/DL
CO2 SERPL-SCNC: 22 MMOL/L
COLOR: YELLOW
CREAT SERPL-MCNC: 0.71 MG/DL
EGFRCR SERPLBLD CKD-EPI 2021: 95 ML/MIN/1.73M2
EPITHELIAL CELLS: 0 /HPF
ESTIMATED AVERAGE GLUCOSE: 117 MG/DL
GLUCOSE QUALITATIVE U: NEGATIVE MG/DL
GLUCOSE SERPL-MCNC: 99 MG/DL
HBA1C MFR BLD HPLC: 5.7 %
HCT VFR BLD CALC: 39.2 %
HDLC SERPL-MCNC: 68 MG/DL
HGB BLD-MCNC: 12.4 G/DL
KETONES URINE: NEGATIVE MG/DL
LDLC SERPL-MCNC: 125 MG/DL
LEUKOCYTE ESTERASE URINE: NEGATIVE
MCHC RBC-ENTMCNC: 25.7 PG
MCHC RBC-ENTMCNC: 31.6 G/DL
MCV RBC AUTO: 81.2 FL
MICROSCOPIC-UA: NORMAL
NITRITE URINE: NEGATIVE
NONHDLC SERPL-MCNC: 138 MG/DL
PH URINE: 7
PLATELET # BLD AUTO: 227 K/UL
POTASSIUM SERPL-SCNC: 4.5 MMOL/L
PROT SERPL-MCNC: 7 G/DL
PROTEIN URINE: NEGATIVE MG/DL
RBC # BLD: 4.83 M/UL
RBC # FLD: 15 %
RED BLOOD CELLS URINE: 1 /HPF
SODIUM SERPL-SCNC: 143 MMOL/L
SPECIFIC GRAVITY URINE: 1.01
T4 FREE SERPL-MCNC: 1.2 NG/DL
TRIGL SERPL-MCNC: 73 MG/DL
TSH SERPL-ACNC: 2.35 UIU/ML
UROBILINOGEN URINE: 0.2 MG/DL
WBC # FLD AUTO: 5.05 K/UL
WHITE BLOOD CELLS URINE: 0 /HPF

## 2025-06-05 ENCOUNTER — APPOINTMENT (OUTPATIENT)
Dept: UROLOGY | Facility: CLINIC | Age: 65
End: 2025-06-05
Payer: COMMERCIAL

## 2025-06-05 VITALS
OXYGEN SATURATION: 97 % | HEART RATE: 73 BPM | DIASTOLIC BLOOD PRESSURE: 71 MMHG | TEMPERATURE: 97.9 F | SYSTOLIC BLOOD PRESSURE: 115 MMHG

## 2025-06-05 DIAGNOSIS — R39.9 UNSPECIFIED SYMPTOMS AND SIGNS INVOLVING THE GENITOURINARY SYSTEM: ICD-10-CM

## 2025-06-05 LAB
BILIRUB UR QL STRIP: NORMAL
CLARITY UR: CLEAR
COLLECTION METHOD: NORMAL
GLUCOSE UR-MCNC: NORMAL
HCG UR QL: 0.2 EU/DL
HGB UR QL STRIP.AUTO: NORMAL
KETONES UR-MCNC: NORMAL
LEUKOCYTE ESTERASE UR QL STRIP: NORMAL
NITRITE UR QL STRIP: NORMAL
PH UR STRIP: 6.5
PROT UR STRIP-MCNC: NORMAL
SP GR UR STRIP: 1.02

## 2025-06-05 PROCEDURE — L8606: CPT

## 2025-06-05 PROCEDURE — 51715 ENDOSCOPIC INJECTION/IMPLANT: CPT

## 2025-06-05 PROCEDURE — 81003 URINALYSIS AUTO W/O SCOPE: CPT | Mod: QW

## 2025-06-07 LAB — BACTERIA UR CULT: NORMAL

## 2025-06-10 ENCOUNTER — APPOINTMENT (OUTPATIENT)
Dept: UROLOGY | Facility: CLINIC | Age: 65
End: 2025-06-10

## 2025-06-24 ENCOUNTER — APPOINTMENT (OUTPATIENT)
Dept: UROLOGY | Facility: CLINIC | Age: 65
End: 2025-06-24
Payer: COMMERCIAL

## 2025-06-24 PROCEDURE — 99213 OFFICE O/P EST LOW 20 MIN: CPT | Mod: 95

## 2025-07-09 ENCOUNTER — APPOINTMENT (OUTPATIENT)
Dept: RADIOLOGY | Facility: CLINIC | Age: 65
End: 2025-07-09

## 2025-07-09 ENCOUNTER — APPOINTMENT (OUTPATIENT)
Dept: ULTRASOUND IMAGING | Facility: CLINIC | Age: 65
End: 2025-07-09

## 2025-07-09 ENCOUNTER — APPOINTMENT (OUTPATIENT)
Dept: MAMMOGRAPHY | Facility: CLINIC | Age: 65
End: 2025-07-09
Payer: COMMERCIAL

## 2025-07-09 PROCEDURE — 77063 BREAST TOMOSYNTHESIS BI: CPT

## 2025-07-09 PROCEDURE — 77085 DXA BONE DENSITY AXL VRT FX: CPT

## 2025-07-09 PROCEDURE — 77067 SCR MAMMO BI INCL CAD: CPT

## 2025-07-09 PROCEDURE — 76641 ULTRASOUND BREAST COMPLETE: CPT | Mod: 50

## 2025-07-13 LAB
APPEARANCE: CLEAR
BILIRUBIN URINE: NEGATIVE
BLOOD URINE: NEGATIVE
COLOR: YELLOW
GLUCOSE QUALITATIVE U: NEGATIVE MG/DL
KETONES URINE: NEGATIVE MG/DL
LEUKOCYTE ESTERASE URINE: NEGATIVE
NITRITE URINE: NEGATIVE
PH URINE: 5.5
PROTEIN URINE: NEGATIVE MG/DL
SPECIFIC GRAVITY URINE: 1.02
UROBILINOGEN URINE: 0.2 MG/DL

## 2025-07-17 ENCOUNTER — APPOINTMENT (OUTPATIENT)
Dept: UROLOGY | Facility: CLINIC | Age: 65
End: 2025-07-17
Payer: COMMERCIAL

## 2025-07-17 VITALS
TEMPERATURE: 97.8 F | OXYGEN SATURATION: 97 % | SYSTOLIC BLOOD PRESSURE: 131 MMHG | HEART RATE: 79 BPM | DIASTOLIC BLOOD PRESSURE: 73 MMHG

## 2025-07-17 PROCEDURE — L8606: CPT

## 2025-07-17 PROCEDURE — 81003 URINALYSIS AUTO W/O SCOPE: CPT | Mod: QW

## 2025-07-17 PROCEDURE — 51715 ENDOSCOPIC INJECTION/IMPLANT: CPT

## 2025-07-21 LAB
BACTERIA UR CULT: NORMAL
BILIRUB UR QL STRIP: NORMAL
CLARITY UR: CLEAR
COLLECTION METHOD: NORMAL
GLUCOSE UR-MCNC: NORMAL
HCG UR QL: 0.2 EU/DL
HGB UR QL STRIP.AUTO: NORMAL
KETONES UR-MCNC: NORMAL
LEUKOCYTE ESTERASE UR QL STRIP: NORMAL
NITRITE UR QL STRIP: NORMAL
PH UR STRIP: 6.5
PROT UR STRIP-MCNC: NORMAL
SP GR UR STRIP: 1.01

## 2025-08-11 ENCOUNTER — APPOINTMENT (OUTPATIENT)
Dept: UROLOGY | Facility: CLINIC | Age: 65
End: 2025-08-11

## (undated) DEVICE — FORCEP RADIAL JAW 4 240CM DISP